# Patient Record
Sex: MALE | Race: WHITE | Employment: OTHER | URBAN - METROPOLITAN AREA
[De-identification: names, ages, dates, MRNs, and addresses within clinical notes are randomized per-mention and may not be internally consistent; named-entity substitution may affect disease eponyms.]

---

## 2017-02-13 DIAGNOSIS — R79.83 HOMOCYSTEINEMIA: ICD-10-CM

## 2017-02-13 RX ORDER — FOLIC ACID 1 MG/1
TABLET ORAL
Qty: 90 TABLET | Refills: 1 | Status: SHIPPED | OUTPATIENT
Start: 2017-02-13 | End: 2017-08-08 | Stop reason: SDUPTHER

## 2017-03-06 DIAGNOSIS — F03.90 DEMENTIA WITHOUT BEHAVIORAL DISTURBANCE, UNSPECIFIED DEMENTIA TYPE: Primary | ICD-10-CM

## 2017-03-28 ENCOUNTER — OFFICE VISIT (OUTPATIENT)
Dept: INTERNAL MEDICINE CLINIC | Age: 82
End: 2017-03-28

## 2017-03-28 VITALS
BODY MASS INDEX: 33.91 KG/M2 | HEART RATE: 92 BPM | RESPIRATION RATE: 17 BRPM | DIASTOLIC BLOOD PRESSURE: 80 MMHG | SYSTOLIC BLOOD PRESSURE: 122 MMHG | OXYGEN SATURATION: 93 % | WEIGHT: 223 LBS

## 2017-03-28 DIAGNOSIS — G30.9 DEMENTIA DUE TO ALZHEIMER'S DISEASE (HCC): Primary | ICD-10-CM

## 2017-03-28 DIAGNOSIS — F02.80 DEMENTIA DUE TO ALZHEIMER'S DISEASE (HCC): Primary | ICD-10-CM

## 2017-03-28 DIAGNOSIS — I25.810 CORONARY ARTERY DISEASE INVOLVING CORONARY BYPASS GRAFT OF NATIVE HEART WITHOUT ANGINA PECTORIS: ICD-10-CM

## 2017-03-28 PROCEDURE — 99213 OFFICE O/P EST LOW 20 MIN: CPT | Performed by: INTERNAL MEDICINE

## 2017-03-28 RX ORDER — QUETIAPINE FUMARATE 25 MG/1
25 TABLET, FILM COATED ORAL DAILY PRN
Qty: 30 TABLET | Refills: 2 | Status: ON HOLD | OUTPATIENT
Start: 2017-03-28 | End: 2017-05-04 | Stop reason: HOSPADM

## 2017-03-28 RX ORDER — DONEPEZIL HYDROCHLORIDE 5 MG/1
5 TABLET, FILM COATED ORAL NIGHTLY
Qty: 90 TABLET | Refills: 1 | Status: SHIPPED | OUTPATIENT
Start: 2017-03-28 | End: 2017-09-12 | Stop reason: SDUPTHER

## 2017-03-30 ENCOUNTER — TELEPHONE (OUTPATIENT)
Dept: INTERNAL MEDICINE CLINIC | Age: 82
End: 2017-03-30

## 2017-03-30 DIAGNOSIS — I25.810 CORONARY ARTERY DISEASE INVOLVING CORONARY BYPASS GRAFT OF NATIVE HEART WITHOUT ANGINA PECTORIS: ICD-10-CM

## 2017-04-24 ENCOUNTER — OFFICE VISIT (OUTPATIENT)
Dept: INTERNAL MEDICINE CLINIC | Age: 82
End: 2017-04-24

## 2017-04-24 VITALS
WEIGHT: 225 LBS | BODY MASS INDEX: 34.21 KG/M2 | SYSTOLIC BLOOD PRESSURE: 110 MMHG | OXYGEN SATURATION: 94 % | HEART RATE: 87 BPM | DIASTOLIC BLOOD PRESSURE: 60 MMHG | RESPIRATION RATE: 20 BRPM

## 2017-04-24 DIAGNOSIS — I63.19 CEREBROVASCULAR ACCIDENT (CVA) DUE TO EMBOLISM OF OTHER PRECEREBRAL ARTERY (HCC): ICD-10-CM

## 2017-04-24 DIAGNOSIS — I70.90 ATHEROSCLEROSIS: ICD-10-CM

## 2017-04-24 DIAGNOSIS — E78.2 MIXED HYPERLIPIDEMIA: ICD-10-CM

## 2017-04-24 DIAGNOSIS — I71.40 AAA (ABDOMINAL AORTIC ANEURYSM) WITHOUT RUPTURE: ICD-10-CM

## 2017-04-24 DIAGNOSIS — M51.36 DDD (DEGENERATIVE DISC DISEASE), LUMBAR: ICD-10-CM

## 2017-04-24 DIAGNOSIS — J42 CHRONIC BRONCHITIS, UNSPECIFIED CHRONIC BRONCHITIS TYPE (HCC): ICD-10-CM

## 2017-04-24 DIAGNOSIS — R30.0 DYSURIA: ICD-10-CM

## 2017-04-24 DIAGNOSIS — I25.810 CORONARY ARTERY DISEASE INVOLVING CORONARY BYPASS GRAFT OF NATIVE HEART WITHOUT ANGINA PECTORIS: Primary | ICD-10-CM

## 2017-04-24 DIAGNOSIS — M16.0 PRIMARY OSTEOARTHRITIS OF BOTH HIPS: ICD-10-CM

## 2017-04-24 DIAGNOSIS — R79.83 HOMOCYSTEINEMIA: ICD-10-CM

## 2017-04-24 LAB
BILIRUBIN URINE: NEGATIVE
BLOOD, URINE: NEGATIVE
CLARITY: ABNORMAL
COLOR: ABNORMAL
CRYSTALS, UA: ABNORMAL /HPF
EPITHELIAL CELLS, UA: 6 /HPF (ref 0–5)
GLUCOSE URINE: NEGATIVE MG/DL
HYALINE CASTS: 4 /LPF (ref 0–8)
KETONES, URINE: NEGATIVE MG/DL
LEUKOCYTE ESTERASE, URINE: ABNORMAL
MICROSCOPIC EXAMINATION: YES
NITRITE, URINE: NEGATIVE
PH UA: 5.5
PROTEIN UA: ABNORMAL MG/DL
RBC UA: 3 /HPF (ref 0–4)
SPECIFIC GRAVITY UA: 1.03
URINE TYPE: ABNORMAL
UROBILINOGEN, URINE: 0.2 E.U./DL
WBC UA: 6 /HPF (ref 0–5)

## 2017-04-24 PROCEDURE — 99214 OFFICE O/P EST MOD 30 MIN: CPT | Performed by: INTERNAL MEDICINE

## 2017-04-25 DIAGNOSIS — N30.90 BLADDER INFECTION: Primary | ICD-10-CM

## 2017-04-25 RX ORDER — CIPROFLOXACIN 250 MG/1
250 TABLET, FILM COATED ORAL 2 TIMES DAILY
Qty: 6 TABLET | Refills: 0 | Status: SHIPPED | OUTPATIENT
Start: 2017-04-25 | End: 2017-04-28

## 2017-04-30 LAB
A/G RATIO: 1.5 (CALC) (ref 0.8–2.6)
ALBUMIN SERPL-MCNC: 4 GM/DL (ref 3.5–5.2)
ALP BLD-CCNC: 83 U/L (ref 23–144)
ALT SERPL-CCNC: 18 U/L (ref 0–60)
AST SERPL-CCNC: 18 U/L (ref 0–55)
BASOPHILS ABSOLUTE: 0 K/MM3 (ref 0–0.3)
BASOPHILS RELATIVE PERCENT: 0.2 % (ref 0–2)
BILIRUB SERPL-MCNC: 0.3 MG/DL (ref 0–1.2)
BUN / CREAT RATIO: 13 (CALC) (ref 7–25)
BUN BLDV-MCNC: 18 MG/DL (ref 3–29)
CALCIUM SERPL-MCNC: 9.7 MG/DL (ref 8.5–10.5)
CHLORIDE BLD-SCNC: 101 MEQ/L (ref 96–110)
CHOLESTEROL, TOTAL: 165 MG/DL
CO2: 28 MEQ/L (ref 19–32)
CREAT SERPL-MCNC: 1.4 MG/DL
EOSINOPHILS ABSOLUTE: 0 K/MM3 (ref 0–0.6)
EOSINOPHILS RELATIVE PERCENT: 0.3 % (ref 0–7)
GFR SERPL CREATININE-BSD FRML MDRD: 47 ML/MIN/1.73M2
GLOBULIN: 2.7 GM/DL (CALC) (ref 1.9–3.6)
GLUCOSE BLD-MCNC: 96 MG/DL
HCT VFR BLD CALC: 44.1 % (ref 41–50)
HDLC SERPL-MCNC: 34 MG/DL
HEMOGLOBIN: 14.3 G/DL (ref 13.8–17.2)
HOMOCYSTEINE PLASMA: 14.7 UMOL/L (ref 6–16.8)
LDL CHOLESTEROL: 91 MG/DL (CALC)
LEUKOCYTES, UA: 7 K/MM3 (ref 3.8–10.8)
LYMPHOCYTES ABSOLUTE: 2.4 K/MM3 (ref 0.9–4.1)
LYMPHOCYTES RELATIVE PERCENT: 34.1 % (ref 14–51)
MCH RBC QN AUTO: 33.6 PG (ref 27–33)
MCHC RBC AUTO-ENTMCNC: 32.5 G/DL (ref 32–36)
MCV RBC AUTO: 103.5 FL (ref 80–100)
MONOCYTES ABSOLUTE: 0.5 K/MM3 (ref 0.2–1.1)
MONOCYTES RELATIVE PERCENT: 6.8 % (ref 0–14)
NEUTROPHILS ABSOLUTE: 4.1 K/MM3 (ref 1.5–7.8)
PDW BLD-RTO: 14.6 % (ref 9–15)
PLATELET # BLD: 155 K/MM3 (ref 130–400)
POTASSIUM SERPL-SCNC: 4.5 MEQ/L (ref 3.4–5.3)
RBC # BLD: 4.26 M/MM3 (ref 4.4–5.8)
SEGMENTED NEUTROPHILS RELATIVE PERCENT: 58.6 % (ref 40–76)
SODIUM BLD-SCNC: 143 MEQ/L (ref 135–148)
TOTAL PROTEIN: 6.7 GM/DL (ref 6–8.3)
TRIGL SERPL-MCNC: 200 MG/DL
TSH SERPL DL<=0.05 MIU/L-ACNC: 1.83 MICRO IU/ML (ref 0.4–4)
VLDLC SERPL CALC-MCNC: 40 MG/DL (CALC) (ref 4–38)

## 2017-05-01 DIAGNOSIS — M51.36 DDD (DEGENERATIVE DISC DISEASE), LUMBAR: ICD-10-CM

## 2017-05-02 DIAGNOSIS — M16.0 PRIMARY OSTEOARTHRITIS OF BOTH HIPS: Primary | ICD-10-CM

## 2017-05-03 PROBLEM — G45.1 HEMISPHERIC CAROTID ARTERY SYNDROME: Status: ACTIVE | Noted: 2017-05-03

## 2017-05-03 PROBLEM — R41.0 DELIRIUM: Status: ACTIVE | Noted: 2017-05-03

## 2017-05-04 PROBLEM — I47.20 VENTRICULAR TACHYCARDIA (HCC): Status: ACTIVE | Noted: 2017-05-04

## 2017-05-09 ENCOUNTER — OFFICE VISIT (OUTPATIENT)
Dept: INTERNAL MEDICINE CLINIC | Age: 82
End: 2017-05-09

## 2017-05-09 VITALS
HEART RATE: 63 BPM | DIASTOLIC BLOOD PRESSURE: 68 MMHG | OXYGEN SATURATION: 93 % | BODY MASS INDEX: 33.15 KG/M2 | WEIGHT: 218 LBS | SYSTOLIC BLOOD PRESSURE: 124 MMHG | RESPIRATION RATE: 16 BRPM

## 2017-05-09 DIAGNOSIS — I47.20 VENTRICULAR TACHYCARDIA: Primary | ICD-10-CM

## 2017-05-09 DIAGNOSIS — R41.0 DELIRIUM: ICD-10-CM

## 2017-05-09 DIAGNOSIS — I25.810 CORONARY ARTERY DISEASE INVOLVING CORONARY BYPASS GRAFT OF NATIVE HEART WITHOUT ANGINA PECTORIS: ICD-10-CM

## 2017-05-09 PROCEDURE — 99213 OFFICE O/P EST LOW 20 MIN: CPT | Performed by: INTERNAL MEDICINE

## 2017-05-09 RX ORDER — METOPROLOL SUCCINATE 25 MG/1
25 TABLET, EXTENDED RELEASE ORAL DAILY
Qty: 90 TABLET | Refills: 1 | Status: SHIPPED | OUTPATIENT
Start: 2017-05-09 | End: 2017-08-28 | Stop reason: SDUPTHER

## 2017-05-09 RX ORDER — AMIODARONE HYDROCHLORIDE 200 MG/1
200 TABLET ORAL DAILY
Qty: 30 TABLET | Refills: 3 | Status: CANCELLED | OUTPATIENT
Start: 2017-05-09

## 2017-05-09 RX ORDER — RANOLAZINE 500 MG/1
500 TABLET, EXTENDED RELEASE ORAL 2 TIMES DAILY
Qty: 180 TABLET | Refills: 1 | Status: SHIPPED | OUTPATIENT
Start: 2017-05-09 | End: 2017-06-05

## 2017-06-05 ENCOUNTER — OFFICE VISIT (OUTPATIENT)
Dept: INTERNAL MEDICINE CLINIC | Age: 82
End: 2017-06-05

## 2017-06-05 VITALS
DIASTOLIC BLOOD PRESSURE: 62 MMHG | HEART RATE: 81 BPM | WEIGHT: 216.8 LBS | OXYGEN SATURATION: 95 % | BODY MASS INDEX: 32.96 KG/M2 | SYSTOLIC BLOOD PRESSURE: 110 MMHG

## 2017-06-05 DIAGNOSIS — I47.20 VENTRICULAR TACHYCARDIA: ICD-10-CM

## 2017-06-05 DIAGNOSIS — I25.810 CORONARY ARTERY DISEASE INVOLVING CORONARY BYPASS GRAFT OF NATIVE HEART WITHOUT ANGINA PECTORIS: ICD-10-CM

## 2017-06-05 DIAGNOSIS — J42 CHRONIC BRONCHITIS, UNSPECIFIED CHRONIC BRONCHITIS TYPE (HCC): ICD-10-CM

## 2017-06-05 DIAGNOSIS — I71.40 ABDOMINAL AORTIC ANEURYSM (AAA) WITHOUT RUPTURE: Primary | ICD-10-CM

## 2017-06-05 PROCEDURE — 99213 OFFICE O/P EST LOW 20 MIN: CPT | Performed by: INTERNAL MEDICINE

## 2017-06-05 RX ORDER — LORAZEPAM 0.5 MG/1
0.5 TABLET ORAL EVERY 6 HOURS PRN
COMMUNITY
End: 2017-09-12 | Stop reason: SDUPTHER

## 2017-06-14 ENCOUNTER — TELEPHONE (OUTPATIENT)
Dept: INTERNAL MEDICINE CLINIC | Age: 82
End: 2017-06-14

## 2017-06-14 RX ORDER — CIPROFLOXACIN 250 MG/1
250 TABLET, FILM COATED ORAL 2 TIMES DAILY
Qty: 10 TABLET | Refills: 0 | Status: SHIPPED | OUTPATIENT
Start: 2017-06-14 | End: 2017-06-19

## 2017-06-25 DIAGNOSIS — F32.A ANXIETY AND DEPRESSION: ICD-10-CM

## 2017-06-25 DIAGNOSIS — F41.9 ANXIETY AND DEPRESSION: ICD-10-CM

## 2017-06-26 RX ORDER — CITALOPRAM 10 MG/1
TABLET ORAL
Qty: 180 TABLET | Refills: 1 | Status: SHIPPED | OUTPATIENT
Start: 2017-06-26 | End: 2018-04-23 | Stop reason: SDUPTHER

## 2017-06-30 ENCOUNTER — TELEPHONE (OUTPATIENT)
Dept: INTERNAL MEDICINE CLINIC | Age: 82
End: 2017-06-30

## 2017-08-08 ENCOUNTER — OFFICE VISIT (OUTPATIENT)
Dept: INTERNAL MEDICINE CLINIC | Age: 82
End: 2017-08-08

## 2017-08-08 VITALS
WEIGHT: 218 LBS | BODY MASS INDEX: 33.04 KG/M2 | SYSTOLIC BLOOD PRESSURE: 128 MMHG | TEMPERATURE: 98 F | DIASTOLIC BLOOD PRESSURE: 88 MMHG | OXYGEN SATURATION: 94 % | HEIGHT: 68 IN | HEART RATE: 98 BPM

## 2017-08-08 DIAGNOSIS — R79.83 HOMOCYSTEINEMIA: ICD-10-CM

## 2017-08-08 DIAGNOSIS — F51.01 PRIMARY INSOMNIA: ICD-10-CM

## 2017-08-08 DIAGNOSIS — I10 ESSENTIAL HYPERTENSION: Primary | ICD-10-CM

## 2017-08-08 DIAGNOSIS — J02.9 SORE THROAT: ICD-10-CM

## 2017-08-08 PROCEDURE — 99213 OFFICE O/P EST LOW 20 MIN: CPT | Performed by: INTERNAL MEDICINE

## 2017-08-08 RX ORDER — TRAZODONE HYDROCHLORIDE 50 MG/1
25 TABLET ORAL NIGHTLY PRN
Qty: 30 TABLET | Refills: 2
Start: 2017-08-08 | End: 2017-11-07 | Stop reason: SDUPTHER

## 2017-08-08 RX ORDER — FOLIC ACID 1 MG/1
TABLET ORAL
Qty: 90 TABLET | Refills: 1 | Status: SHIPPED | OUTPATIENT
Start: 2017-08-08 | End: 2018-07-25 | Stop reason: SDUPTHER

## 2017-08-09 DIAGNOSIS — R79.83 HOMOCYSTEINEMIA: ICD-10-CM

## 2017-08-09 RX ORDER — FOLIC ACID 1 MG/1
TABLET ORAL
Qty: 90 TABLET | Refills: 1 | Status: SHIPPED | OUTPATIENT
Start: 2017-08-09 | End: 2017-11-07 | Stop reason: SDUPTHER

## 2017-08-28 DIAGNOSIS — I47.20 VENTRICULAR TACHYCARDIA: ICD-10-CM

## 2017-08-28 DIAGNOSIS — I25.810 CORONARY ARTERY DISEASE INVOLVING CORONARY BYPASS GRAFT OF NATIVE HEART WITHOUT ANGINA PECTORIS: ICD-10-CM

## 2017-08-28 RX ORDER — METOPROLOL SUCCINATE 25 MG/1
25 TABLET, EXTENDED RELEASE ORAL DAILY
Qty: 90 TABLET | Refills: 1 | Status: SHIPPED | OUTPATIENT
Start: 2017-08-28 | End: 2017-11-07 | Stop reason: SDUPTHER

## 2017-09-12 ENCOUNTER — OFFICE VISIT (OUTPATIENT)
Dept: INTERNAL MEDICINE CLINIC | Age: 82
End: 2017-09-12

## 2017-09-12 VITALS
DIASTOLIC BLOOD PRESSURE: 82 MMHG | OXYGEN SATURATION: 92 % | SYSTOLIC BLOOD PRESSURE: 126 MMHG | WEIGHT: 217 LBS | RESPIRATION RATE: 16 BRPM | BODY MASS INDEX: 32.99 KG/M2 | HEART RATE: 78 BPM

## 2017-09-12 DIAGNOSIS — I10 ESSENTIAL HYPERTENSION: ICD-10-CM

## 2017-09-12 DIAGNOSIS — F41.9 ANXIETY AND DEPRESSION: ICD-10-CM

## 2017-09-12 DIAGNOSIS — Z23 NEED FOR PNEUMOCOCCAL VACCINATION: ICD-10-CM

## 2017-09-12 DIAGNOSIS — F02.80 DEMENTIA DUE TO ALZHEIMER'S DISEASE (HCC): Primary | ICD-10-CM

## 2017-09-12 DIAGNOSIS — G30.9 DEMENTIA DUE TO ALZHEIMER'S DISEASE (HCC): Primary | ICD-10-CM

## 2017-09-12 DIAGNOSIS — Z23 NEED FOR INFLUENZA VACCINATION: ICD-10-CM

## 2017-09-12 DIAGNOSIS — F32.A ANXIETY AND DEPRESSION: ICD-10-CM

## 2017-09-12 PROCEDURE — 99213 OFFICE O/P EST LOW 20 MIN: CPT | Performed by: INTERNAL MEDICINE

## 2017-09-12 PROCEDURE — 90662 IIV NO PRSV INCREASED AG IM: CPT | Performed by: INTERNAL MEDICINE

## 2017-09-12 PROCEDURE — G0008 ADMIN INFLUENZA VIRUS VAC: HCPCS | Performed by: INTERNAL MEDICINE

## 2017-09-12 PROCEDURE — G0009 ADMIN PNEUMOCOCCAL VACCINE: HCPCS | Performed by: INTERNAL MEDICINE

## 2017-09-12 PROCEDURE — 90670 PCV13 VACCINE IM: CPT | Performed by: INTERNAL MEDICINE

## 2017-09-12 RX ORDER — DONEPEZIL HYDROCHLORIDE 5 MG/1
5 TABLET, FILM COATED ORAL NIGHTLY
Qty: 90 TABLET | Refills: 1 | Status: SHIPPED | OUTPATIENT
Start: 2017-09-12 | End: 2017-11-07 | Stop reason: SDUPTHER

## 2017-09-12 RX ORDER — LORAZEPAM 0.5 MG/1
0.5 TABLET ORAL EVERY 6 HOURS PRN
Qty: 20 TABLET | Refills: 0 | Status: SHIPPED | OUTPATIENT
Start: 2017-09-12 | End: 2017-11-07 | Stop reason: SDUPTHER

## 2017-11-07 ENCOUNTER — OFFICE VISIT (OUTPATIENT)
Dept: INTERNAL MEDICINE CLINIC | Age: 82
End: 2017-11-07

## 2017-11-07 VITALS
WEIGHT: 222 LBS | HEART RATE: 81 BPM | OXYGEN SATURATION: 95 % | BODY MASS INDEX: 33.75 KG/M2 | DIASTOLIC BLOOD PRESSURE: 64 MMHG | SYSTOLIC BLOOD PRESSURE: 118 MMHG | RESPIRATION RATE: 16 BRPM

## 2017-11-07 DIAGNOSIS — F32.A ANXIETY AND DEPRESSION: ICD-10-CM

## 2017-11-07 DIAGNOSIS — F51.01 PRIMARY INSOMNIA: ICD-10-CM

## 2017-11-07 DIAGNOSIS — F02.80 DEMENTIA DUE TO ALZHEIMER'S DISEASE (HCC): ICD-10-CM

## 2017-11-07 DIAGNOSIS — J42 CHRONIC BRONCHITIS, UNSPECIFIED CHRONIC BRONCHITIS TYPE (HCC): ICD-10-CM

## 2017-11-07 DIAGNOSIS — I47.20 VENTRICULAR TACHYCARDIA: ICD-10-CM

## 2017-11-07 DIAGNOSIS — G47.62 NOCTURNAL LEG CRAMPS: ICD-10-CM

## 2017-11-07 DIAGNOSIS — I25.810 CORONARY ARTERY DISEASE INVOLVING CORONARY BYPASS GRAFT OF NATIVE HEART WITHOUT ANGINA PECTORIS: Primary | ICD-10-CM

## 2017-11-07 DIAGNOSIS — G30.9 DEMENTIA DUE TO ALZHEIMER'S DISEASE (HCC): ICD-10-CM

## 2017-11-07 DIAGNOSIS — F41.9 ANXIETY AND DEPRESSION: ICD-10-CM

## 2017-11-07 PROCEDURE — 3288F FALL RISK ASSESSMENT DOCD: CPT | Performed by: INTERNAL MEDICINE

## 2017-11-07 PROCEDURE — 99214 OFFICE O/P EST MOD 30 MIN: CPT | Performed by: INTERNAL MEDICINE

## 2017-11-07 PROCEDURE — G8510 SCR DEP NEG, NO PLAN REQD: HCPCS | Performed by: INTERNAL MEDICINE

## 2017-11-07 RX ORDER — TRAZODONE HYDROCHLORIDE 50 MG/1
25 TABLET ORAL NIGHTLY PRN
Qty: 30 TABLET | Refills: 2 | Status: SHIPPED | OUTPATIENT
Start: 2017-11-07 | End: 2018-01-22 | Stop reason: SDUPTHER

## 2017-11-07 RX ORDER — ASPIRIN AND DIPYRIDAMOLE 25; 200 MG/1; MG/1
1 CAPSULE, EXTENDED RELEASE ORAL 2 TIMES DAILY
Qty: 180 CAPSULE | Refills: 1 | Status: SHIPPED | OUTPATIENT
Start: 2017-11-07 | End: 2018-10-04 | Stop reason: SDUPTHER

## 2017-11-07 RX ORDER — DONEPEZIL HYDROCHLORIDE 5 MG/1
5 TABLET, FILM COATED ORAL NIGHTLY
Qty: 90 TABLET | Refills: 1 | Status: SHIPPED | OUTPATIENT
Start: 2017-11-07 | End: 2018-01-22 | Stop reason: SDUPTHER

## 2017-11-07 RX ORDER — LISINOPRIL 2.5 MG/1
2.5 TABLET ORAL DAILY
Qty: 90 TABLET | Refills: 1 | Status: SHIPPED | OUTPATIENT
Start: 2017-11-07 | End: 2018-02-20

## 2017-11-07 RX ORDER — METOPROLOL SUCCINATE 25 MG/1
25 TABLET, EXTENDED RELEASE ORAL DAILY
Qty: 90 TABLET | Refills: 1 | Status: SHIPPED | OUTPATIENT
Start: 2017-11-07 | End: 2018-02-20 | Stop reason: SDUPTHER

## 2017-11-07 RX ORDER — LORAZEPAM 0.5 MG/1
0.5 TABLET ORAL EVERY 6 HOURS PRN
Qty: 20 TABLET | Refills: 0 | Status: SHIPPED | OUTPATIENT
Start: 2017-11-07 | End: 2018-01-22 | Stop reason: SDUPTHER

## 2017-11-07 RX ORDER — ALBUTEROL SULFATE 90 UG/1
2 AEROSOL, METERED RESPIRATORY (INHALATION) EVERY 6 HOURS PRN
Qty: 1 INHALER | Refills: 6 | Status: SHIPPED | OUTPATIENT
Start: 2017-11-07 | End: 2018-10-08 | Stop reason: SDUPTHER

## 2017-11-07 RX ORDER — PRAVASTATIN SODIUM 40 MG
40 TABLET ORAL EVERY EVENING
Qty: 90 TABLET | Refills: 1 | Status: SHIPPED | OUTPATIENT
Start: 2017-11-07 | End: 2018-10-04 | Stop reason: SDUPTHER

## 2017-11-07 RX ORDER — GABAPENTIN 100 MG/1
100 CAPSULE ORAL NIGHTLY
Qty: 90 CAPSULE | Refills: 1 | Status: SHIPPED | OUTPATIENT
Start: 2017-11-07 | End: 2018-07-25 | Stop reason: SDUPTHER

## 2017-11-07 ASSESSMENT — PATIENT HEALTH QUESTIONNAIRE - PHQ9
SUM OF ALL RESPONSES TO PHQ9 QUESTIONS 1 & 2: 0
SUM OF ALL RESPONSES TO PHQ QUESTIONS 1-9: 0
1. LITTLE INTEREST OR PLEASURE IN DOING THINGS: 0
2. FEELING DOWN, DEPRESSED OR HOPELESS: 0

## 2017-11-07 NOTE — PROGRESS NOTES
Anxiety and depression    5. Primary insomnia    6. Nocturnal leg cramps    7. Chronic bronchitis, unspecified chronic bronchitis type [facility])        PLAN:    Malinda Lau was seen today for other. Diagnoses and all orders for this visit:    Coronary artery disease involving coronary bypass graft of native heart without angina pectoris- W SX CP AND LAST CATH YRS AGO MAY NEED FURTHER EVAL, REFER CARDIO  -     metoprolol succinate (TOPROL XL) 25 MG extended release tablet; Take 1 tablet by mouth daily  -     dipyridamole-aspirin (AGGRENOX)  MG per extended release capsule; Take 1 capsule by mouth 2 times daily  -     lisinopril (PRINIVIL;ZESTRIL) 2.5 MG tablet; Take 1 tablet by mouth daily Do not fill until patient calls  -     pravastatin (PRAVACHOL) 40 MG tablet; Take 1 tablet by mouth every evening  -     [facility]- Todd Hill MD ([facility])  -     Lipid Panel  -     Comprehensive Metabolic Panel  -     CBC Auto Differential    Dementia due to Alzheimer's disease- Mental status, functional status remain stable on meds and will cont current regimen, monitor for progression. Remains in stable home setting.    -     donepezil (ARICEPT) 5 MG tablet; Take 1 tablet by mouth nightly    Ventricular tachycardia (HCC)- NO PALPITATIONS, SYNCOPE, RF TOPROL AND REFER CARDIO  -     metoprolol succinate (TOPROL XL) 25 MG extended release tablet; Take 1 tablet by mouth daily  -     [facility]- Todd Hill MD ([facility])    Anxiety and depression- The current medical regimen is effective;  continue present plan and medications. Controlled substances monitoring: possible medication side effects, risk of tolerance and/or dependence, and alternative treatments discussed, no signs of potential drug abuse or diversion identified and [place]AR[place] report reviewed today- activity consistent with treatment plan.]      -     LORazepam (ATIVAN) 0.5 MG tablet;  Take 1 tablet by mouth every 6 hours as needed for Anxiety

## 2018-01-22 ENCOUNTER — OFFICE VISIT (OUTPATIENT)
Dept: INTERNAL MEDICINE CLINIC | Age: 83
End: 2018-01-22

## 2018-01-22 VITALS
OXYGEN SATURATION: 96 % | RESPIRATION RATE: 16 BRPM | BODY MASS INDEX: 34.52 KG/M2 | SYSTOLIC BLOOD PRESSURE: 122 MMHG | HEART RATE: 70 BPM | DIASTOLIC BLOOD PRESSURE: 64 MMHG | WEIGHT: 227 LBS

## 2018-01-22 DIAGNOSIS — F51.01 PRIMARY INSOMNIA: ICD-10-CM

## 2018-01-22 DIAGNOSIS — I10 ESSENTIAL HYPERTENSION: ICD-10-CM

## 2018-01-22 DIAGNOSIS — I71.40 ABDOMINAL AORTIC ANEURYSM (AAA) WITHOUT RUPTURE: ICD-10-CM

## 2018-01-22 DIAGNOSIS — F02.80 DEMENTIA DUE TO ALZHEIMER'S DISEASE (HCC): ICD-10-CM

## 2018-01-22 DIAGNOSIS — I63.19 CEREBROVASCULAR ACCIDENT (CVA) DUE TO EMBOLISM OF OTHER PRECEREBRAL ARTERY (HCC): Primary | ICD-10-CM

## 2018-01-22 DIAGNOSIS — J42 CHRONIC BRONCHITIS, UNSPECIFIED CHRONIC BRONCHITIS TYPE (HCC): ICD-10-CM

## 2018-01-22 DIAGNOSIS — F32.A ANXIETY AND DEPRESSION: ICD-10-CM

## 2018-01-22 DIAGNOSIS — E78.5 DYSLIPIDEMIA: ICD-10-CM

## 2018-01-22 DIAGNOSIS — F41.9 ANXIETY AND DEPRESSION: ICD-10-CM

## 2018-01-22 DIAGNOSIS — R79.83 HOMOCYSTEINEMIA: ICD-10-CM

## 2018-01-22 DIAGNOSIS — G30.9 DEMENTIA DUE TO ALZHEIMER'S DISEASE (HCC): ICD-10-CM

## 2018-01-22 LAB
BASOPHILS ABSOLUTE: 0 K/UL (ref 0–0.2)
BASOPHILS RELATIVE PERCENT: 0.6 %
EOSINOPHILS ABSOLUTE: 0.1 K/UL (ref 0–0.6)
EOSINOPHILS RELATIVE PERCENT: 1.3 %
HCT VFR BLD CALC: 45.3 % (ref 40.5–52.5)
HEMOGLOBIN: 14.8 G/DL (ref 13.5–17.5)
LYMPHOCYTES ABSOLUTE: 2.4 K/UL (ref 1–5.1)
LYMPHOCYTES RELATIVE PERCENT: 30.1 %
MCH RBC QN AUTO: 34.2 PG (ref 26–34)
MCHC RBC AUTO-ENTMCNC: 32.6 G/DL (ref 31–36)
MCV RBC AUTO: 104.8 FL (ref 80–100)
MONOCYTES ABSOLUTE: 0.7 K/UL (ref 0–1.3)
MONOCYTES RELATIVE PERCENT: 9.4 %
NEUTROPHILS ABSOLUTE: 4.6 K/UL (ref 1.7–7.7)
NEUTROPHILS RELATIVE PERCENT: 58.6 %
PDW BLD-RTO: 16.1 % (ref 12.4–15.4)
PLATELET # BLD: 186 K/UL (ref 135–450)
PMV BLD AUTO: 8.9 FL (ref 5–10.5)
RBC # BLD: 4.33 M/UL (ref 4.2–5.9)
WBC # BLD: 7.9 K/UL (ref 4–11)

## 2018-01-22 PROCEDURE — 99214 OFFICE O/P EST MOD 30 MIN: CPT | Performed by: INTERNAL MEDICINE

## 2018-01-22 RX ORDER — DONEPEZIL HYDROCHLORIDE 10 MG/1
10 TABLET, FILM COATED ORAL NIGHTLY
Qty: 90 TABLET | Refills: 1 | Status: SHIPPED | OUTPATIENT
Start: 2018-01-22 | End: 2018-07-21 | Stop reason: SDUPTHER

## 2018-01-22 RX ORDER — LORAZEPAM 0.5 MG/1
0.5 TABLET ORAL EVERY 6 HOURS PRN
Qty: 20 TABLET | Refills: 0 | Status: SHIPPED | OUTPATIENT
Start: 2018-01-22 | End: 2018-02-21

## 2018-01-22 RX ORDER — TRAZODONE HYDROCHLORIDE 50 MG/1
50 TABLET ORAL NIGHTLY PRN
Qty: 90 TABLET | Refills: 1 | Status: SHIPPED | OUTPATIENT
Start: 2018-01-22 | End: 2018-02-11 | Stop reason: SDUPTHER

## 2018-01-22 NOTE — PROGRESS NOTES
periodic monitoring of fasting lipid profile, glucose, liver function.    -     Lipid Panel  -     Comprehensive Metabolic Panel  -     TSH with Reflex  -     CBC Auto Differential

## 2018-01-23 LAB
A/G RATIO: 1.4 (ref 1.1–2.2)
ALBUMIN SERPL-MCNC: 4.1 G/DL (ref 3.4–5)
ALP BLD-CCNC: 81 U/L (ref 40–129)
ALT SERPL-CCNC: 18 U/L (ref 10–40)
ANION GAP SERPL CALCULATED.3IONS-SCNC: 19 MMOL/L (ref 3–16)
AST SERPL-CCNC: 17 U/L (ref 15–37)
BILIRUB SERPL-MCNC: 0.3 MG/DL (ref 0–1)
BUN BLDV-MCNC: 25 MG/DL (ref 7–20)
CALCIUM SERPL-MCNC: 9.5 MG/DL (ref 8.3–10.6)
CHLORIDE BLD-SCNC: 99 MMOL/L (ref 99–110)
CHOLESTEROL, TOTAL: 180 MG/DL (ref 0–199)
CO2: 28 MMOL/L (ref 21–32)
CREAT SERPL-MCNC: 1.4 MG/DL (ref 0.8–1.3)
GFR AFRICAN AMERICAN: 59
GFR NON-AFRICAN AMERICAN: 49
GLOBULIN: 3 G/DL
GLUCOSE BLD-MCNC: 86 MG/DL (ref 70–99)
HDLC SERPL-MCNC: 31 MG/DL (ref 40–60)
LDL CHOLESTEROL CALCULATED: ABNORMAL MG/DL
LDL CHOLESTEROL DIRECT: 108 MG/DL
POTASSIUM SERPL-SCNC: 4.8 MMOL/L (ref 3.5–5.1)
SODIUM BLD-SCNC: 146 MMOL/L (ref 136–145)
TOTAL PROTEIN: 7.1 G/DL (ref 6.4–8.2)
TRIGL SERPL-MCNC: 435 MG/DL (ref 0–150)
TSH REFLEX: 1.95 UIU/ML (ref 0.27–4.2)
VLDLC SERPL CALC-MCNC: ABNORMAL MG/DL

## 2018-02-11 DIAGNOSIS — F51.01 PRIMARY INSOMNIA: ICD-10-CM

## 2018-02-12 RX ORDER — TRAZODONE HYDROCHLORIDE 50 MG/1
TABLET ORAL
Qty: 30 TABLET | Refills: 2 | Status: SHIPPED | OUTPATIENT
Start: 2018-02-12 | End: 2018-08-27

## 2018-02-20 ENCOUNTER — OFFICE VISIT (OUTPATIENT)
Dept: INTERNAL MEDICINE CLINIC | Age: 83
End: 2018-02-20

## 2018-02-20 VITALS
RESPIRATION RATE: 16 BRPM | DIASTOLIC BLOOD PRESSURE: 60 MMHG | OXYGEN SATURATION: 94 % | HEART RATE: 70 BPM | SYSTOLIC BLOOD PRESSURE: 116 MMHG

## 2018-02-20 DIAGNOSIS — R42 LIGHTHEADEDNESS: Primary | ICD-10-CM

## 2018-02-20 DIAGNOSIS — I47.20 VENTRICULAR TACHYCARDIA: ICD-10-CM

## 2018-02-20 DIAGNOSIS — I25.810 CORONARY ARTERY DISEASE INVOLVING CORONARY BYPASS GRAFT OF NATIVE HEART WITHOUT ANGINA PECTORIS: ICD-10-CM

## 2018-02-20 DIAGNOSIS — I10 ESSENTIAL HYPERTENSION: ICD-10-CM

## 2018-02-20 PROCEDURE — 99213 OFFICE O/P EST LOW 20 MIN: CPT | Performed by: INTERNAL MEDICINE

## 2018-02-20 RX ORDER — METOPROLOL SUCCINATE 25 MG/1
12.5 TABLET, EXTENDED RELEASE ORAL DAILY
Qty: 90 TABLET | Refills: 1
Start: 2018-02-20 | End: 2018-04-12 | Stop reason: SDUPTHER

## 2018-02-20 NOTE — PROGRESS NOTES
Gerda Lima  1935 02/20/18    SUBJECTIVE:    High TGs noted last mo, to 435, now cutting down on carbs, sweets. Main concern is recurring dizziness w standing the past two weeks. Is drinking fluids. Some headache noted at times. BP sl low today at 116/60. R arm sit 100/50, stand was 90/50. Hx of VT and no recent palpitations, stable on beta blocker. OBJECTIVE:    /60   Pulse 70   Resp 16   SpO2 94%     Physical Exam   Constitutional: He appears well-developed and well-nourished. Neck: Neck supple. Cardiovascular: Normal rate, regular rhythm and normal heart sounds. Exam reveals no gallop and no friction rub. No murmur heard. Pulmonary/Chest: Effort normal and breath sounds normal. No respiratory distress. He has no wheezes. He has no rales. Abdominal: Soft. Bowel sounds are normal. He exhibits no distension. There is no tenderness. Musculoskeletal: He exhibits no edema. Neurological: He is alert. Psychiatric: He has a normal mood and affect. Vitals reviewed. ASSESSMENT:    1. Lightheadedness    2. Ventricular tachycardia ([address]    3. Essential hypertension    4. Coronary artery disease involving coronary bypass graft of native heart without angina pectoris        PLAN:    Farhat Chance was seen today for headache and dizziness. Diagnoses and all orders for this visit:    Lightheadedness - C/W LOW BP, NO DEFINITE ORTHOSTASIS. PLAN STOP LISINOPRIL, PUSH FLUIDS, ALSO DECR TOPROL TO 1/2 TAB DAILY AND WIFE WILL CALL US BACK W BPS LATER THIS WEEK    Ventricular tachycardia ([address]- RESOLVED AND CONT BETA BLOCKER  -     metoprolol succinate (TOPROL XL) 25 MG extended release tablet; Take 0.5 tablets by mouth daily    Essential hypertension- ADJUST MEDS AS NOTED    Coronary artery disease involving coronary bypass graft of native heart without angina pectoris-OTHERWISE ASYMPTOMATIC AND MONITOR  -     metoprolol succinate (TOPROL XL) 25 MG extended release tablet;  Take 0.5 tablets

## 2018-02-22 ENCOUNTER — TELEPHONE (OUTPATIENT)
Dept: INTERNAL MEDICINE CLINIC | Age: 83
End: 2018-02-22

## 2018-03-15 DIAGNOSIS — G30.9 DEMENTIA DUE TO ALZHEIMER'S DISEASE (HCC): ICD-10-CM

## 2018-03-15 DIAGNOSIS — F02.80 DEMENTIA DUE TO ALZHEIMER'S DISEASE (HCC): ICD-10-CM

## 2018-03-15 RX ORDER — DONEPEZIL HYDROCHLORIDE 5 MG/1
5 TABLET, FILM COATED ORAL NIGHTLY
Qty: 90 TABLET | Refills: 1 | Status: SHIPPED | OUTPATIENT
Start: 2018-03-15 | End: 2018-09-13 | Stop reason: SDUPTHER

## 2018-03-19 ENCOUNTER — TELEPHONE (OUTPATIENT)
Dept: INTERNAL MEDICINE CLINIC | Age: 83
End: 2018-03-19

## 2018-04-12 DIAGNOSIS — I47.20 VENTRICULAR TACHYCARDIA: ICD-10-CM

## 2018-04-12 DIAGNOSIS — I25.810 CORONARY ARTERY DISEASE INVOLVING CORONARY BYPASS GRAFT OF NATIVE HEART WITHOUT ANGINA PECTORIS: ICD-10-CM

## 2018-04-12 RX ORDER — METOPROLOL SUCCINATE 25 MG/1
12.5 TABLET, EXTENDED RELEASE ORAL DAILY
Qty: 45 TABLET | Refills: 1 | Status: SHIPPED | OUTPATIENT
Start: 2018-04-12 | End: 2018-07-25 | Stop reason: SDUPTHER

## 2018-04-23 ENCOUNTER — OFFICE VISIT (OUTPATIENT)
Dept: INTERNAL MEDICINE CLINIC | Age: 83
End: 2018-04-23

## 2018-04-23 VITALS
HEART RATE: 88 BPM | DIASTOLIC BLOOD PRESSURE: 70 MMHG | RESPIRATION RATE: 18 BRPM | WEIGHT: 228 LBS | BODY MASS INDEX: 34.67 KG/M2 | OXYGEN SATURATION: 96 % | SYSTOLIC BLOOD PRESSURE: 132 MMHG

## 2018-04-23 DIAGNOSIS — F32.A ANXIETY AND DEPRESSION: ICD-10-CM

## 2018-04-23 DIAGNOSIS — I10 ESSENTIAL HYPERTENSION: Primary | ICD-10-CM

## 2018-04-23 DIAGNOSIS — F41.9 ANXIETY AND DEPRESSION: ICD-10-CM

## 2018-04-23 PROCEDURE — 99213 OFFICE O/P EST LOW 20 MIN: CPT | Performed by: INTERNAL MEDICINE

## 2018-04-23 RX ORDER — CITALOPRAM 10 MG/1
TABLET ORAL
Qty: 180 TABLET | Refills: 1 | Status: SHIPPED | OUTPATIENT
Start: 2018-04-23 | End: 2018-07-25 | Stop reason: SDUPTHER

## 2018-05-24 RX ORDER — LORAZEPAM 0.5 MG/1
0.5 TABLET ORAL EVERY 6 HOURS PRN
Qty: 20 TABLET | Refills: 0 | Status: SHIPPED | OUTPATIENT
Start: 2018-05-24 | End: 2018-08-27 | Stop reason: SDUPTHER

## 2018-07-21 DIAGNOSIS — F51.01 PRIMARY INSOMNIA: ICD-10-CM

## 2018-07-21 DIAGNOSIS — G30.9 DEMENTIA DUE TO ALZHEIMER'S DISEASE (HCC): ICD-10-CM

## 2018-07-21 DIAGNOSIS — F02.80 DEMENTIA DUE TO ALZHEIMER'S DISEASE (HCC): ICD-10-CM

## 2018-07-23 RX ORDER — TRAZODONE HYDROCHLORIDE 50 MG/1
50 TABLET ORAL NIGHTLY PRN
Qty: 90 TABLET | Refills: 1 | Status: SHIPPED | OUTPATIENT
Start: 2018-07-23 | End: 2018-07-25 | Stop reason: SDUPTHER

## 2018-07-23 RX ORDER — DONEPEZIL HYDROCHLORIDE 10 MG/1
10 TABLET, FILM COATED ORAL NIGHTLY
Qty: 90 TABLET | Refills: 1 | Status: SHIPPED | OUTPATIENT
Start: 2018-07-23 | End: 2019-01-08 | Stop reason: SDUPTHER

## 2018-07-25 ENCOUNTER — OFFICE VISIT (OUTPATIENT)
Dept: INTERNAL MEDICINE CLINIC | Age: 83
End: 2018-07-25

## 2018-07-25 VITALS
RESPIRATION RATE: 18 BRPM | HEART RATE: 77 BPM | BODY MASS INDEX: 35.28 KG/M2 | WEIGHT: 232 LBS | SYSTOLIC BLOOD PRESSURE: 132 MMHG | OXYGEN SATURATION: 95 % | DIASTOLIC BLOOD PRESSURE: 80 MMHG

## 2018-07-25 DIAGNOSIS — I47.20 VENTRICULAR TACHYCARDIA: ICD-10-CM

## 2018-07-25 DIAGNOSIS — I47.20 VENTRICULAR TACHYCARDIA (HCC): ICD-10-CM

## 2018-07-25 DIAGNOSIS — Z23 NEED FOR PROPHYLACTIC VACCINATION AGAINST STREPTOCOCCUS PNEUMONIAE (PNEUMOCOCCUS): ICD-10-CM

## 2018-07-25 DIAGNOSIS — E78.2 HYPERLIPEMIA, MIXED: ICD-10-CM

## 2018-07-25 DIAGNOSIS — F41.9 ANXIETY AND DEPRESSION: ICD-10-CM

## 2018-07-25 DIAGNOSIS — M54.50 CHRONIC MIDLINE LOW BACK PAIN WITHOUT SCIATICA: ICD-10-CM

## 2018-07-25 DIAGNOSIS — R79.83 HOMOCYSTEINEMIA: ICD-10-CM

## 2018-07-25 DIAGNOSIS — I25.810 CORONARY ARTERY DISEASE INVOLVING CORONARY BYPASS GRAFT OF NATIVE HEART WITHOUT ANGINA PECTORIS: ICD-10-CM

## 2018-07-25 DIAGNOSIS — F51.01 PRIMARY INSOMNIA: ICD-10-CM

## 2018-07-25 DIAGNOSIS — G89.29 CHRONIC MIDLINE LOW BACK PAIN WITHOUT SCIATICA: ICD-10-CM

## 2018-07-25 DIAGNOSIS — G47.62 NOCTURNAL LEG CRAMPS: ICD-10-CM

## 2018-07-25 DIAGNOSIS — N18.30 STAGE 3 CHRONIC KIDNEY DISEASE (HCC): ICD-10-CM

## 2018-07-25 DIAGNOSIS — F32.A ANXIETY AND DEPRESSION: ICD-10-CM

## 2018-07-25 DIAGNOSIS — I63.19 CEREBROVASCULAR ACCIDENT (CVA) DUE TO EMBOLISM OF OTHER PRECEREBRAL ARTERY (HCC): ICD-10-CM

## 2018-07-25 DIAGNOSIS — I63.19 CEREBROVASCULAR ACCIDENT (CVA) DUE TO EMBOLISM OF OTHER PRECEREBRAL ARTERY (HCC): Primary | ICD-10-CM

## 2018-07-25 LAB
A/G RATIO: 1.3 (ref 1.1–2.2)
ALBUMIN SERPL-MCNC: 4.2 G/DL (ref 3.4–5)
ALP BLD-CCNC: 93 U/L (ref 40–129)
ALT SERPL-CCNC: 20 U/L (ref 10–40)
ANION GAP SERPL CALCULATED.3IONS-SCNC: 11 MMOL/L (ref 3–16)
AST SERPL-CCNC: 23 U/L (ref 15–37)
BASOPHILS ABSOLUTE: 0 K/UL (ref 0–0.2)
BASOPHILS RELATIVE PERCENT: 0.7 %
BILIRUB SERPL-MCNC: 0.4 MG/DL (ref 0–1)
BUN BLDV-MCNC: 16 MG/DL (ref 7–20)
CALCIUM SERPL-MCNC: 10.3 MG/DL (ref 8.3–10.6)
CHLORIDE BLD-SCNC: 102 MMOL/L (ref 99–110)
CHOLESTEROL, TOTAL: 184 MG/DL (ref 0–199)
CO2: 29 MMOL/L (ref 21–32)
CREAT SERPL-MCNC: 1.4 MG/DL (ref 0.8–1.3)
EOSINOPHILS ABSOLUTE: 0 K/UL (ref 0–0.6)
EOSINOPHILS RELATIVE PERCENT: 0.5 %
GFR AFRICAN AMERICAN: 59
GFR NON-AFRICAN AMERICAN: 49
GLOBULIN: 3.3 G/DL
GLUCOSE BLD-MCNC: 103 MG/DL (ref 70–99)
HCT VFR BLD CALC: 46.8 % (ref 40.5–52.5)
HDLC SERPL-MCNC: 38 MG/DL (ref 40–60)
HEMOGLOBIN: 15.4 G/DL (ref 13.5–17.5)
HOMOCYSTEINE: 13 UMOL/L (ref 0–10)
LDL CHOLESTEROL CALCULATED: ABNORMAL MG/DL
LDL CHOLESTEROL DIRECT: 121 MG/DL
LYMPHOCYTES ABSOLUTE: 2.6 K/UL (ref 1–5.1)
LYMPHOCYTES RELATIVE PERCENT: 38.4 %
MCH RBC QN AUTO: 34.3 PG (ref 26–34)
MCHC RBC AUTO-ENTMCNC: 32.9 G/DL (ref 31–36)
MCV RBC AUTO: 104.3 FL (ref 80–100)
MONOCYTES ABSOLUTE: 0.5 K/UL (ref 0–1.3)
MONOCYTES RELATIVE PERCENT: 7.6 %
NEUTROPHILS ABSOLUTE: 3.6 K/UL (ref 1.7–7.7)
NEUTROPHILS RELATIVE PERCENT: 52.8 %
PDW BLD-RTO: 14.7 % (ref 12.4–15.4)
PLATELET # BLD: 176 K/UL (ref 135–450)
PMV BLD AUTO: 8.4 FL (ref 5–10.5)
POTASSIUM SERPL-SCNC: 4.9 MMOL/L (ref 3.5–5.1)
RBC # BLD: 4.49 M/UL (ref 4.2–5.9)
SODIUM BLD-SCNC: 142 MMOL/L (ref 136–145)
TOTAL PROTEIN: 7.5 G/DL (ref 6.4–8.2)
TRIGL SERPL-MCNC: 325 MG/DL (ref 0–150)
TSH REFLEX: 3.04 UIU/ML (ref 0.27–4.2)
VLDLC SERPL CALC-MCNC: ABNORMAL MG/DL
WBC # BLD: 6.9 K/UL (ref 4–11)

## 2018-07-25 PROCEDURE — 99214 OFFICE O/P EST MOD 30 MIN: CPT | Performed by: INTERNAL MEDICINE

## 2018-07-25 PROCEDURE — 90732 PPSV23 VACC 2 YRS+ SUBQ/IM: CPT | Performed by: INTERNAL MEDICINE

## 2018-07-25 PROCEDURE — G0009 ADMIN PNEUMOCOCCAL VACCINE: HCPCS | Performed by: INTERNAL MEDICINE

## 2018-07-25 RX ORDER — CITALOPRAM 10 MG/1
TABLET ORAL
Qty: 180 TABLET | Refills: 1 | Status: SHIPPED | OUTPATIENT
Start: 2018-07-25 | End: 2018-10-08 | Stop reason: SDUPTHER

## 2018-07-25 RX ORDER — GABAPENTIN 100 MG/1
100 CAPSULE ORAL 2 TIMES DAILY
Qty: 180 CAPSULE | Refills: 1 | Status: SHIPPED | OUTPATIENT
Start: 2018-07-25 | End: 2018-10-04 | Stop reason: SDUPTHER

## 2018-07-25 RX ORDER — FOLIC ACID 1 MG/1
TABLET ORAL
Qty: 90 TABLET | Refills: 1 | Status: SHIPPED | OUTPATIENT
Start: 2018-07-25 | End: 2019-01-08 | Stop reason: SDUPTHER

## 2018-07-25 RX ORDER — TRAZODONE HYDROCHLORIDE 50 MG/1
50 TABLET ORAL NIGHTLY PRN
Qty: 90 TABLET | Refills: 1 | Status: SHIPPED | OUTPATIENT
Start: 2018-07-25 | End: 2018-10-08 | Stop reason: SDUPTHER

## 2018-07-25 RX ORDER — METOPROLOL SUCCINATE 25 MG/1
12.5 TABLET, EXTENDED RELEASE ORAL DAILY
Qty: 45 TABLET | Refills: 1 | Status: SHIPPED | OUTPATIENT
Start: 2018-07-25 | End: 2018-10-08 | Stop reason: SDUPTHER

## 2018-08-27 ENCOUNTER — HOSPITAL ENCOUNTER (OUTPATIENT)
Dept: MRI IMAGING | Age: 83
Discharge: HOME | End: 2018-08-27
Attending: INTERNAL MEDICINE | Admitting: INTERNAL MEDICINE

## 2018-08-27 ENCOUNTER — OFFICE VISIT (OUTPATIENT)
Dept: INTERNAL MEDICINE CLINIC | Age: 83
End: 2018-08-27

## 2018-08-27 VITALS
SYSTOLIC BLOOD PRESSURE: 136 MMHG | HEART RATE: 90 BPM | OXYGEN SATURATION: 94 % | RESPIRATION RATE: 20 BRPM | DIASTOLIC BLOOD PRESSURE: 62 MMHG

## 2018-08-27 DIAGNOSIS — H53.8 BLURRED VISION, LEFT EYE: ICD-10-CM

## 2018-08-27 DIAGNOSIS — M54.50 CHRONIC MIDLINE LOW BACK PAIN WITHOUT SCIATICA: ICD-10-CM

## 2018-08-27 DIAGNOSIS — F41.9 ANXIETY: ICD-10-CM

## 2018-08-27 DIAGNOSIS — I63.19 CEREBROVASCULAR ACCIDENT (CVA) DUE TO EMBOLISM OF OTHER PRECEREBRAL ARTERY (HCC): ICD-10-CM

## 2018-08-27 DIAGNOSIS — I63.19 CEREBRAL INFARCTION DUE TO EMBOLISM OF OTHER PRECEREBRAL ARTERY (HCC): ICD-10-CM

## 2018-08-27 DIAGNOSIS — G89.29 CHRONIC MIDLINE LOW BACK PAIN WITHOUT SCIATICA: ICD-10-CM

## 2018-08-27 DIAGNOSIS — F34.1 DYSTHYMIA: ICD-10-CM

## 2018-08-27 DIAGNOSIS — I63.19 CEREBROVASCULAR ACCIDENT (CVA) DUE TO EMBOLISM OF OTHER PRECEREBRAL ARTERY (HCC): Primary | ICD-10-CM

## 2018-08-27 PROCEDURE — 99214 OFFICE O/P EST MOD 30 MIN: CPT | Performed by: INTERNAL MEDICINE

## 2018-08-27 RX ORDER — LORAZEPAM 0.5 MG/1
0.5 TABLET ORAL EVERY 6 HOURS PRN
Qty: 20 TABLET | Refills: 0 | Status: SHIPPED | OUTPATIENT
Start: 2018-08-27 | End: 2019-01-08 | Stop reason: SDUPTHER

## 2018-08-27 RX ORDER — TRAMADOL HYDROCHLORIDE 50 MG/1
50 TABLET ORAL 2 TIMES DAILY PRN
Qty: 14 TABLET | Refills: 0 | Status: SHIPPED | OUTPATIENT
Start: 2018-08-27 | End: 2018-09-03

## 2018-08-27 NOTE — PROGRESS NOTES
Patient is getting MRI and Carotid US at [facility] today at 15.
oriented to person, place, and time. He has normal reflexes. No cranial nerve deficit. Skin: Skin is warm and dry. Psychiatric: He has a normal mood and affect. Vitals reviewed. ASSESSMENT:    1. Cerebrovascular accident (CVA) due to embolism of other precerebral artery ([address]    2. Blurred vision, left eye    3. Chronic midline low back pain without sciatica    4. Dysthymia    5. Anxiety        PLAN:    Sameer Dias was seen today for joint pain, loss of vision and shortness of breath. Diagnoses and all orders for this visit:    Cerebrovascular accident (CVA) due to embolism of other precerebral artery ([address]- clinically HIGH RISK FOR RECURRENT STROKE, WILL CHECK STAT MRI BRAIN AND CAROTIDS, ALSO IF NO BLEED WILL PROCEED ALSO W ADDITIONAL BLOOD THINNING ON ELIQUIS LOW DOSE 2.5MG BID AND F/U CLOSELY END OF THE WEEK, ALREADY ON AGGRENOX  -     MRI BRAIN WO CONTRAST; Future  -     VL CAROTID BILATERAL; Future    Blurred vision, left eye- CVA SUSPECTED, DEP ON RESULTS IF NEG AND SX PERSIST ALSO CONSIDER OPTHO EVAL  -     MRI BRAIN WO CONTRAST; Future  -     VL CAROTID BILATERAL; Future    Chronic midline low back pain without sciatica- W SOME PAIN ONGOING, ALSO CURRENT HEADACHE SX WILL TRY PRN LOW DOSE TRAMADOL  -     traMADol (ULTRAM) 50 MG tablet; Take 1 tablet by mouth 2 times daily as needed for Pain for up to 7 days. Intended supply: 5 days. Take lowest dose possible to manage pain. Dysthymia- MOOD SL DEPRESSED BUT NOT MAJOR DEPRESSION, CONT ATIVAN ALSO PRN    Anxiety  -     LORazepam (ATIVAN) 0.5 MG tablet; Take 1 tablet by mouth every 6 hours as needed for Anxiety for up to 30 days. Inessa Castro

## 2018-08-31 ENCOUNTER — OFFICE VISIT (OUTPATIENT)
Dept: INTERNAL MEDICINE CLINIC | Age: 83
End: 2018-08-31

## 2018-08-31 VITALS
SYSTOLIC BLOOD PRESSURE: 130 MMHG | OXYGEN SATURATION: 95 % | DIASTOLIC BLOOD PRESSURE: 72 MMHG | RESPIRATION RATE: 16 BRPM | HEART RATE: 68 BPM

## 2018-08-31 DIAGNOSIS — I63.19 CEREBROVASCULAR ACCIDENT (CVA) DUE TO EMBOLISM OF OTHER PRECEREBRAL ARTERY (HCC): ICD-10-CM

## 2018-08-31 DIAGNOSIS — H35.62 RETINAL HEMORRHAGE OF LEFT EYE: Primary | ICD-10-CM

## 2018-08-31 PROCEDURE — 99213 OFFICE O/P EST LOW 20 MIN: CPT | Performed by: INTERNAL MEDICINE

## 2018-09-10 ENCOUNTER — TELEPHONE (OUTPATIENT)
Dept: INTERNAL MEDICINE CLINIC | Age: 83
End: 2018-09-10

## 2018-09-13 DIAGNOSIS — G30.9 DEMENTIA DUE TO ALZHEIMER'S DISEASE (HCC): ICD-10-CM

## 2018-09-13 DIAGNOSIS — F02.80 DEMENTIA DUE TO ALZHEIMER'S DISEASE (HCC): ICD-10-CM

## 2018-09-13 RX ORDER — DONEPEZIL HYDROCHLORIDE 5 MG/1
5 TABLET, FILM COATED ORAL NIGHTLY
Qty: 90 TABLET | Refills: 1 | Status: ON HOLD | OUTPATIENT
Start: 2018-09-13 | End: 2019-05-13 | Stop reason: ALTCHOICE

## 2018-09-17 ENCOUNTER — OFFICE VISIT (OUTPATIENT)
Dept: INTERNAL MEDICINE CLINIC | Age: 83
End: 2018-09-17

## 2018-09-17 ENCOUNTER — TELEPHONE (OUTPATIENT)
Dept: INTERNAL MEDICINE CLINIC | Age: 83
End: 2018-09-17

## 2018-09-17 VITALS
RESPIRATION RATE: 18 BRPM | OXYGEN SATURATION: 94 % | HEIGHT: 68 IN | DIASTOLIC BLOOD PRESSURE: 70 MMHG | SYSTOLIC BLOOD PRESSURE: 130 MMHG | BODY MASS INDEX: 35.77 KG/M2 | HEART RATE: 92 BPM | WEIGHT: 236 LBS

## 2018-09-17 DIAGNOSIS — Z23 NEED FOR IMMUNIZATION AGAINST INFLUENZA: ICD-10-CM

## 2018-09-17 DIAGNOSIS — I63.19 CEREBROVASCULAR ACCIDENT (CVA) DUE TO EMBOLISM OF OTHER PRECEREBRAL ARTERY (HCC): Primary | ICD-10-CM

## 2018-09-17 DIAGNOSIS — I25.810 CORONARY ARTERY DISEASE INVOLVING CORONARY BYPASS GRAFT OF NATIVE HEART WITHOUT ANGINA PECTORIS: ICD-10-CM

## 2018-09-17 PROCEDURE — 99213 OFFICE O/P EST LOW 20 MIN: CPT | Performed by: INTERNAL MEDICINE

## 2018-09-17 PROCEDURE — 90662 IIV NO PRSV INCREASED AG IM: CPT | Performed by: INTERNAL MEDICINE

## 2018-09-17 PROCEDURE — G0008 ADMIN INFLUENZA VIRUS VAC: HCPCS | Performed by: INTERNAL MEDICINE

## 2018-09-17 NOTE — TELEPHONE ENCOUNTER
Faxed referral to [facility] [phone].  [phone]. [facility] is out of their network.

## 2018-09-17 NOTE — PROGRESS NOTES
Faxed [facility] [address] [place] [address].
CVA and deconditioning ,also CAD, set up for [place] [address] eval and encouraged more exercise, daily activity. Cont walker and we will see if PT eval at home is helpful, reassess 4 weeks. Bryant Wallace was seen today for other.     Diagnoses and all orders for this visit:    Cerebrovascular accident (CVA) due to embolism of other precerebral artery ([facility] [address]-   -     [facility]    Coronary artery disease involving coronary bypass graft of native heart without angina pectoris  -     [facility]    Need for immunization against influenza  -     INFLUENZA, HIGH DOSE, 65 YRS +, IM, PF, PREFILL SYR, 0.5ML (FLUZONE HD)

## 2018-09-17 NOTE — TELEPHONE ENCOUNTER
[facility] called stating that they are out of network with Alex Molina. I informed Sweta Mcfadden, wife, of this and requested that she call her insurance and find out which [facility] [employer] companies are within their insurance. She is to call us back once she finds out.

## 2018-10-04 DIAGNOSIS — M54.50 CHRONIC MIDLINE LOW BACK PAIN WITHOUT SCIATICA: ICD-10-CM

## 2018-10-04 DIAGNOSIS — G47.62 NOCTURNAL LEG CRAMPS: ICD-10-CM

## 2018-10-04 DIAGNOSIS — G89.29 CHRONIC MIDLINE LOW BACK PAIN WITHOUT SCIATICA: ICD-10-CM

## 2018-10-04 DIAGNOSIS — I25.810 CORONARY ARTERY DISEASE INVOLVING CORONARY BYPASS GRAFT OF NATIVE HEART WITHOUT ANGINA PECTORIS: ICD-10-CM

## 2018-10-04 RX ORDER — GABAPENTIN 100 MG/1
CAPSULE ORAL
Qty: 90 CAPSULE | Refills: 1 | Status: SHIPPED | OUTPATIENT
Start: 2018-10-04 | End: 2019-01-08 | Stop reason: SDUPTHER

## 2018-10-04 RX ORDER — ASPIRIN AND DIPYRIDAMOLE 25; 200 MG/1; MG/1
CAPSULE, EXTENDED RELEASE ORAL
Qty: 180 CAPSULE | Refills: 1 | Status: SHIPPED | OUTPATIENT
Start: 2018-10-04 | End: 2018-10-08 | Stop reason: SDUPTHER

## 2018-10-04 RX ORDER — PRAVASTATIN SODIUM 40 MG
TABLET ORAL
Qty: 90 TABLET | Refills: 1 | Status: SHIPPED | OUTPATIENT
Start: 2018-10-04 | End: 2018-10-08 | Stop reason: SDUPTHER

## 2018-10-08 ENCOUNTER — OFFICE VISIT (OUTPATIENT)
Dept: INTERNAL MEDICINE CLINIC | Age: 83
End: 2018-10-08
Payer: MEDICARE

## 2018-10-08 VITALS
DIASTOLIC BLOOD PRESSURE: 76 MMHG | BODY MASS INDEX: 35.73 KG/M2 | OXYGEN SATURATION: 94 % | SYSTOLIC BLOOD PRESSURE: 122 MMHG | RESPIRATION RATE: 18 BRPM | WEIGHT: 235 LBS | HEART RATE: 78 BPM

## 2018-10-08 DIAGNOSIS — I47.20 VENTRICULAR TACHYCARDIA: ICD-10-CM

## 2018-10-08 DIAGNOSIS — F32.A ANXIETY AND DEPRESSION: ICD-10-CM

## 2018-10-08 DIAGNOSIS — F41.9 ANXIETY AND DEPRESSION: ICD-10-CM

## 2018-10-08 DIAGNOSIS — F51.01 PRIMARY INSOMNIA: ICD-10-CM

## 2018-10-08 DIAGNOSIS — J42 CHRONIC BRONCHITIS, UNSPECIFIED CHRONIC BRONCHITIS TYPE (HCC): ICD-10-CM

## 2018-10-08 DIAGNOSIS — I25.810 CORONARY ARTERY DISEASE INVOLVING CORONARY BYPASS GRAFT OF NATIVE HEART WITHOUT ANGINA PECTORIS: Primary | ICD-10-CM

## 2018-10-08 PROCEDURE — 99214 OFFICE O/P EST MOD 30 MIN: CPT | Performed by: INTERNAL MEDICINE

## 2018-10-08 RX ORDER — METOPROLOL SUCCINATE 25 MG/1
12.5 TABLET, EXTENDED RELEASE ORAL DAILY
Qty: 45 TABLET | Refills: 1 | Status: SHIPPED | OUTPATIENT
Start: 2018-10-08 | End: 2019-04-01 | Stop reason: SDUPTHER

## 2018-10-08 RX ORDER — ASPIRIN AND DIPYRIDAMOLE 25; 200 MG/1; MG/1
CAPSULE, EXTENDED RELEASE ORAL
Qty: 180 CAPSULE | Refills: 1 | Status: ON HOLD | OUTPATIENT
Start: 2018-10-08 | End: 2019-05-31 | Stop reason: HOSPADM

## 2018-10-08 RX ORDER — CITALOPRAM 10 MG/1
TABLET ORAL
Qty: 90 TABLET | Refills: 1 | Status: ON HOLD | OUTPATIENT
Start: 2018-10-08 | End: 2019-05-31 | Stop reason: HOSPADM

## 2018-10-08 RX ORDER — TRAZODONE HYDROCHLORIDE 50 MG/1
50 TABLET ORAL NIGHTLY PRN
Qty: 90 TABLET | Refills: 1 | Status: ON HOLD | OUTPATIENT
Start: 2018-10-08 | End: 2019-05-31 | Stop reason: HOSPADM

## 2018-10-08 RX ORDER — PRAVASTATIN SODIUM 40 MG
TABLET ORAL
Qty: 90 TABLET | Refills: 1 | Status: SHIPPED | OUTPATIENT
Start: 2018-10-08 | End: 2019-06-03 | Stop reason: SDUPTHER

## 2018-10-08 RX ORDER — ALBUTEROL SULFATE 90 UG/1
2 AEROSOL, METERED RESPIRATORY (INHALATION) EVERY 6 HOURS PRN
Qty: 1 INHALER | Refills: 6 | Status: SHIPPED | OUTPATIENT
Start: 2018-10-08

## 2019-01-08 ENCOUNTER — OFFICE VISIT (OUTPATIENT)
Dept: INTERNAL MEDICINE CLINIC | Age: 84
End: 2019-01-08
Payer: MEDICARE

## 2019-01-08 VITALS
WEIGHT: 237 LBS | SYSTOLIC BLOOD PRESSURE: 130 MMHG | RESPIRATION RATE: 18 BRPM | DIASTOLIC BLOOD PRESSURE: 82 MMHG | OXYGEN SATURATION: 92 % | BODY MASS INDEX: 36.04 KG/M2 | HEART RATE: 76 BPM

## 2019-01-08 DIAGNOSIS — G30.9 DEMENTIA DUE TO ALZHEIMER'S DISEASE (HCC): ICD-10-CM

## 2019-01-08 DIAGNOSIS — G89.29 CHRONIC MIDLINE LOW BACK PAIN WITHOUT SCIATICA: ICD-10-CM

## 2019-01-08 DIAGNOSIS — N39.41 URGE INCONTINENCE OF URINE: ICD-10-CM

## 2019-01-08 DIAGNOSIS — M54.50 CHRONIC MIDLINE LOW BACK PAIN WITHOUT SCIATICA: ICD-10-CM

## 2019-01-08 DIAGNOSIS — I63.19 CEREBROVASCULAR ACCIDENT (CVA) DUE TO EMBOLISM OF OTHER PRECEREBRAL ARTERY (HCC): ICD-10-CM

## 2019-01-08 DIAGNOSIS — F02.80 DEMENTIA DUE TO ALZHEIMER'S DISEASE (HCC): ICD-10-CM

## 2019-01-08 DIAGNOSIS — F41.9 ANXIETY: ICD-10-CM

## 2019-01-08 DIAGNOSIS — I71.40 AAA (ABDOMINAL AORTIC ANEURYSM) WITHOUT RUPTURE: ICD-10-CM

## 2019-01-08 DIAGNOSIS — G47.62 NOCTURNAL LEG CRAMPS: ICD-10-CM

## 2019-01-08 DIAGNOSIS — J42 CHRONIC BRONCHITIS, UNSPECIFIED CHRONIC BRONCHITIS TYPE (HCC): Primary | ICD-10-CM

## 2019-01-08 DIAGNOSIS — R79.83 HOMOCYSTEINEMIA: ICD-10-CM

## 2019-01-08 LAB
A/G RATIO: 1.4 (ref 1.1–2.2)
ALBUMIN SERPL-MCNC: 4.4 G/DL (ref 3.4–5)
ALP BLD-CCNC: 96 U/L (ref 40–129)
ALT SERPL-CCNC: 20 U/L (ref 10–40)
ANION GAP SERPL CALCULATED.3IONS-SCNC: 13 MMOL/L (ref 3–16)
AST SERPL-CCNC: 17 U/L (ref 15–37)
BASOPHILS ABSOLUTE: 0 K/UL (ref 0–0.2)
BASOPHILS RELATIVE PERCENT: 0.4 %
BILIRUB SERPL-MCNC: 0.5 MG/DL (ref 0–1)
BUN BLDV-MCNC: 15 MG/DL (ref 7–20)
CALCIUM SERPL-MCNC: 9.7 MG/DL (ref 8.3–10.6)
CHLORIDE BLD-SCNC: 102 MMOL/L (ref 99–110)
CHOLESTEROL, TOTAL: 209 MG/DL (ref 0–199)
CO2: 28 MMOL/L (ref 21–32)
CREAT SERPL-MCNC: 1.3 MG/DL (ref 0.8–1.3)
EOSINOPHILS ABSOLUTE: 0.1 K/UL (ref 0–0.6)
EOSINOPHILS RELATIVE PERCENT: 1 %
GFR AFRICAN AMERICAN: >60
GFR NON-AFRICAN AMERICAN: 53
GLOBULIN: 3.1 G/DL
GLUCOSE BLD-MCNC: 95 MG/DL (ref 70–99)
HCT VFR BLD CALC: 47.4 % (ref 40.5–52.5)
HDLC SERPL-MCNC: 36 MG/DL (ref 40–60)
HEMOGLOBIN: 15.4 G/DL (ref 13.5–17.5)
HOMOCYSTEINE: 12 UMOL/L (ref 0–10)
LDL CHOLESTEROL CALCULATED: 115 MG/DL
LYMPHOCYTES ABSOLUTE: 2.2 K/UL (ref 1–5.1)
LYMPHOCYTES RELATIVE PERCENT: 33.5 %
MCH RBC QN AUTO: 34.7 PG (ref 26–34)
MCHC RBC AUTO-ENTMCNC: 32.4 G/DL (ref 31–36)
MCV RBC AUTO: 107.1 FL (ref 80–100)
MONOCYTES ABSOLUTE: 0.4 K/UL (ref 0–1.3)
MONOCYTES RELATIVE PERCENT: 6.9 %
NEUTROPHILS ABSOLUTE: 3.8 K/UL (ref 1.7–7.7)
NEUTROPHILS RELATIVE PERCENT: 58.2 %
PDW BLD-RTO: 14.9 % (ref 12.4–15.4)
PLATELET # BLD: 182 K/UL (ref 135–450)
PMV BLD AUTO: 8.7 FL (ref 5–10.5)
POTASSIUM SERPL-SCNC: 4.9 MMOL/L (ref 3.5–5.1)
RBC # BLD: 4.43 M/UL (ref 4.2–5.9)
SODIUM BLD-SCNC: 143 MMOL/L (ref 136–145)
TOTAL PROTEIN: 7.5 G/DL (ref 6.4–8.2)
TRIGL SERPL-MCNC: 290 MG/DL (ref 0–150)
VLDLC SERPL CALC-MCNC: 58 MG/DL
WBC # BLD: 6.5 K/UL (ref 4–11)

## 2019-01-08 PROCEDURE — 99214 OFFICE O/P EST MOD 30 MIN: CPT | Performed by: INTERNAL MEDICINE

## 2019-01-08 RX ORDER — OXYBUTYNIN CHLORIDE 5 MG/1
5 TABLET ORAL 2 TIMES DAILY
Qty: 180 TABLET | Refills: 1 | Status: SHIPPED | OUTPATIENT
Start: 2019-01-08 | End: 2019-06-29 | Stop reason: SDUPTHER

## 2019-01-08 RX ORDER — LORAZEPAM 0.5 MG/1
0.5 TABLET ORAL EVERY 6 HOURS PRN
Qty: 20 TABLET | Refills: 0 | Status: SHIPPED | OUTPATIENT
Start: 2019-01-08 | End: 2019-08-09 | Stop reason: SDUPTHER

## 2019-01-08 RX ORDER — FOLIC ACID 1 MG/1
TABLET ORAL
Qty: 90 TABLET | Refills: 1 | Status: SHIPPED | OUTPATIENT
Start: 2019-01-08 | End: 2019-08-04 | Stop reason: SDUPTHER

## 2019-01-08 RX ORDER — DONEPEZIL HYDROCHLORIDE 10 MG/1
10 TABLET, FILM COATED ORAL NIGHTLY
Qty: 90 TABLET | Refills: 1 | Status: SHIPPED | OUTPATIENT
Start: 2019-01-08 | End: 2019-08-06 | Stop reason: SDUPTHER

## 2019-01-08 RX ORDER — QUETIAPINE FUMARATE 25 MG/1
25 TABLET, FILM COATED ORAL NIGHTLY
Qty: 90 TABLET | Refills: 1 | Status: SHIPPED | OUTPATIENT
Start: 2019-01-08 | End: 2019-04-09 | Stop reason: SDUPTHER

## 2019-01-08 RX ORDER — GABAPENTIN 100 MG/1
CAPSULE ORAL
Qty: 180 CAPSULE | Refills: 1 | Status: ON HOLD | OUTPATIENT
Start: 2019-01-08 | End: 2019-05-31 | Stop reason: HOSPADM

## 2019-04-01 DIAGNOSIS — I47.20 VENTRICULAR TACHYCARDIA: ICD-10-CM

## 2019-04-01 DIAGNOSIS — I25.810 CORONARY ARTERY DISEASE INVOLVING CORONARY BYPASS GRAFT OF NATIVE HEART WITHOUT ANGINA PECTORIS: ICD-10-CM

## 2019-04-01 RX ORDER — METOPROLOL SUCCINATE 25 MG/1
12.5 TABLET, EXTENDED RELEASE ORAL DAILY
Qty: 45 TABLET | Refills: 1 | Status: SHIPPED | OUTPATIENT
Start: 2019-04-01 | End: 2020-01-20

## 2019-04-09 ENCOUNTER — OFFICE VISIT (OUTPATIENT)
Dept: INTERNAL MEDICINE CLINIC | Age: 84
End: 2019-04-09
Payer: MEDICARE

## 2019-04-09 VITALS
SYSTOLIC BLOOD PRESSURE: 100 MMHG | BODY MASS INDEX: 36.01 KG/M2 | HEART RATE: 64 BPM | OXYGEN SATURATION: 93 % | WEIGHT: 236.8 LBS | DIASTOLIC BLOOD PRESSURE: 62 MMHG | RESPIRATION RATE: 18 BRPM

## 2019-04-09 DIAGNOSIS — F41.9 ANXIETY: ICD-10-CM

## 2019-04-09 DIAGNOSIS — I25.10 CORONARY ARTERY DISEASE INVOLVING NATIVE CORONARY ARTERY OF NATIVE HEART WITHOUT ANGINA PECTORIS: ICD-10-CM

## 2019-04-09 DIAGNOSIS — F02.80 DEMENTIA DUE TO ALZHEIMER'S DISEASE (HCC): ICD-10-CM

## 2019-04-09 DIAGNOSIS — J43.8 OTHER EMPHYSEMA (HCC): Primary | ICD-10-CM

## 2019-04-09 DIAGNOSIS — K21.9 GASTROESOPHAGEAL REFLUX DISEASE WITHOUT ESOPHAGITIS: ICD-10-CM

## 2019-04-09 DIAGNOSIS — G30.9 DEMENTIA DUE TO ALZHEIMER'S DISEASE (HCC): ICD-10-CM

## 2019-04-09 LAB
A/G RATIO: 1.4 (ref 1.1–2.2)
ALBUMIN SERPL-MCNC: 4.3 G/DL (ref 3.4–5)
ALP BLD-CCNC: 99 U/L (ref 40–129)
ALT SERPL-CCNC: 19 U/L (ref 10–40)
ANION GAP SERPL CALCULATED.3IONS-SCNC: 15 MMOL/L (ref 3–16)
AST SERPL-CCNC: 18 U/L (ref 15–37)
BASOPHILS ABSOLUTE: 0 K/UL (ref 0–0.2)
BASOPHILS RELATIVE PERCENT: 0.4 %
BILIRUB SERPL-MCNC: 0.5 MG/DL (ref 0–1)
BUN BLDV-MCNC: 15 MG/DL (ref 7–20)
CALCIUM SERPL-MCNC: 9.8 MG/DL (ref 8.3–10.6)
CHLORIDE BLD-SCNC: 103 MMOL/L (ref 99–110)
CHOLESTEROL, TOTAL: 187 MG/DL (ref 0–199)
CO2: 27 MMOL/L (ref 21–32)
CREAT SERPL-MCNC: 1.4 MG/DL (ref 0.8–1.3)
EOSINOPHILS ABSOLUTE: 0 K/UL (ref 0–0.6)
EOSINOPHILS RELATIVE PERCENT: 0.6 %
GFR AFRICAN AMERICAN: 59
GFR NON-AFRICAN AMERICAN: 49
GLOBULIN: 3.1 G/DL
GLUCOSE BLD-MCNC: 101 MG/DL (ref 70–99)
HCT VFR BLD CALC: 45.7 % (ref 40.5–52.5)
HDLC SERPL-MCNC: 35 MG/DL (ref 40–60)
HEMOGLOBIN: 15 G/DL (ref 13.5–17.5)
LDL CHOLESTEROL CALCULATED: ABNORMAL MG/DL
LDL CHOLESTEROL DIRECT: 114 MG/DL
LYMPHOCYTES ABSOLUTE: 2.5 K/UL (ref 1–5.1)
LYMPHOCYTES RELATIVE PERCENT: 38.5 %
MCH RBC QN AUTO: 35 PG (ref 26–34)
MCHC RBC AUTO-ENTMCNC: 32.9 G/DL (ref 31–36)
MCV RBC AUTO: 106.2 FL (ref 80–100)
MONOCYTES ABSOLUTE: 0.5 K/UL (ref 0–1.3)
MONOCYTES RELATIVE PERCENT: 8.1 %
NEUTROPHILS ABSOLUTE: 3.3 K/UL (ref 1.7–7.7)
NEUTROPHILS RELATIVE PERCENT: 52.4 %
PDW BLD-RTO: 14.6 % (ref 12.4–15.4)
PLATELET # BLD: 169 K/UL (ref 135–450)
PMV BLD AUTO: 8.9 FL (ref 5–10.5)
POTASSIUM SERPL-SCNC: 5.2 MMOL/L (ref 3.5–5.1)
RBC # BLD: 4.3 M/UL (ref 4.2–5.9)
SODIUM BLD-SCNC: 145 MMOL/L (ref 136–145)
TOTAL PROTEIN: 7.4 G/DL (ref 6.4–8.2)
TRIGL SERPL-MCNC: 345 MG/DL (ref 0–150)
VLDLC SERPL CALC-MCNC: ABNORMAL MG/DL
WBC # BLD: 6.4 K/UL (ref 4–11)

## 2019-04-09 PROCEDURE — 99214 OFFICE O/P EST MOD 30 MIN: CPT | Performed by: INTERNAL MEDICINE

## 2019-04-09 RX ORDER — OMEPRAZOLE 40 MG/1
40 CAPSULE, DELAYED RELEASE ORAL
Qty: 90 CAPSULE | Refills: 1 | Status: SHIPPED | OUTPATIENT
Start: 2019-04-09 | End: 2019-06-17

## 2019-04-09 RX ORDER — QUETIAPINE FUMARATE 25 MG/1
25 TABLET, FILM COATED ORAL NIGHTLY
Qty: 90 TABLET | Refills: 1 | Status: ON HOLD | OUTPATIENT
Start: 2019-04-09 | End: 2019-05-31 | Stop reason: HOSPADM

## 2019-04-09 NOTE — PROGRESS NOTES
Migeul Angel Loud  1935 04/09/19    SUBJECTIVE:    Some issues w stomach upset and GERD, ST noted in afternoon and evenings. Not on antacid therapy x sl relief w otc rolaids. Insomnia and confusion, better on seroquel but states ~100$ for 90 tabs. Asked her to check the [facility], is cheaper that way. COPD-  Sl incr SOB noted also, using inhaler albuterol prn. Not on LABA. CAD - Dr Dexter Arias apparently also planned for stress testing w sl abnl EKG? Denies sx cp. OBJECTIVE:    /62   Pulse 64   Resp 18   Wt 236 lb 12.8 oz (107.4 kg)   SpO2 93%   BMI 36.01 kg/m²     Physical Exam   Constitutional: He appears well-developed and well-nourished. No distress. HENT:   Head: Normocephalic and atraumatic. Nose: Nose normal.   Mouth/Throat: Oropharynx is clear and moist. No oropharyngeal exudate. Eyes: Pupils are equal, round, and reactive to light. Conjunctivae and EOM are normal. Right eye exhibits no discharge. Left eye exhibits no discharge. No scleral icterus. Neck: Normal range of motion. Neck supple. No tracheal deviation present. Cardiovascular: Normal rate, regular rhythm, normal heart sounds and intact distal pulses. Exam reveals no gallop and no friction rub. No murmur heard. Pulmonary/Chest: Effort normal and breath sounds normal. No respiratory distress. He has no wheezes. He has no rales. Abdominal: Soft. Bowel sounds are normal. He exhibits no distension and no mass. There is no tenderness. There is no rebound and no guarding. Musculoskeletal: He exhibits no edema or tenderness. Lymphadenopathy:     He has no cervical adenopathy. Neurological: He is alert. He has normal reflexes. Skin: Skin is warm and dry. Psychiatric: He has a normal mood and affect. Vitals reviewed. ASSESSMENT:    1. Other emphysema ([address]    2. Gastroesophageal reflux disease without esophagitis    3. Dementia due to Alzheimer's disease    4. Anxiety    5.  Coronary artery disease involving native coronary artery of native heart without angina pectoris        PLAN:    Isaiah Parikh was seen today for other, pain, shortness of breath and dizziness. Diagnoses and all orders for this visit:    Other emphysema ([address]- trial advair to see if helps his SOB/WILSON  -     fluticasone-salmeterol (ADVAIR DISKUS) 250-50 MCG/DOSE AEPB; Inhale 1 puff into the lungs every 12 hours    Gastroesophageal reflux disease without esophagitis- suspected cause of GI upset and ST, some heartburn noted. Trial prilosec  -     omeprazole (PRILOSEC) 40 MG delayed release capsule; Take 1 capsule by mouth every morning (before breakfast)    Dementia due to Alzheimer's disease- retry rogelio Cruz to see if less expensive, rf given  -     QUEtiapine (SEROQUEL) 25 MG tablet; Take 1 tablet by mouth nightly    Anxiety- stable and improved on meds  -     QUEtiapine (SEROQUEL) 25 MG tablet; Take 1 tablet by mouth nightly    Coronary artery disease involving native coronary artery of native heart without angina pectoris - for stress testing as noted per Dr Malu Garibay and check lab      -     Lipid Panel; Future  -     Comprehensive Metabolic Panel;  Future  -     CBC Auto Differential; Future

## 2019-05-07 ENCOUNTER — HOSPITAL ENCOUNTER (OUTPATIENT)
Age: 84
Discharge: HOME | End: 2019-05-07
Payer: MEDICARE

## 2019-05-07 LAB
ANION GAP SERPL CALCULATED.3IONS-SCNC: 15 MMOL/L (ref 4–16)
APTT: 28.2 SECONDS (ref 21.2–33)
BASOPHILS ABSOLUTE: 0 K/CU MM
BASOPHILS RELATIVE PERCENT: 0.3 % (ref 0–1)
BUN BLDV-MCNC: 19 MG/DL (ref 6–23)
CALCIUM SERPL-MCNC: 9 MG/DL (ref 8.3–10.6)
CHLORIDE BLD-SCNC: 98 MMOL/L (ref 99–110)
CO2: 24 MMOL/L (ref 21–32)
CREAT SERPL-MCNC: 1.3 MG/DL (ref 0.9–1.3)
DIFFERENTIAL TYPE: ABNORMAL
EOSINOPHILS ABSOLUTE: 0.1 K/CU MM
EOSINOPHILS RELATIVE PERCENT: 1.5 % (ref 0–3)
GFR AFRICAN AMERICAN: >60 ML/MIN/1.73M2
GFR NON-AFRICAN AMERICAN: 53 ML/MIN/1.73M2
GLUCOSE BLD-MCNC: 105 MG/DL (ref 70–99)
HCT VFR BLD CALC: 45.1 % (ref 42–52)
HEMOGLOBIN: 14.3 GM/DL (ref 13.5–18)
IMMATURE NEUTROPHIL %: 0.3 % (ref 0–0.43)
INR BLD: 0.99 INDEX
LYMPHOCYTES ABSOLUTE: 2.4 K/CU MM
LYMPHOCYTES RELATIVE PERCENT: 37.5 % (ref 24–44)
MCH RBC QN AUTO: 34 PG (ref 27–31)
MCHC RBC AUTO-ENTMCNC: 31.7 % (ref 32–36)
MCV RBC AUTO: 107.1 FL (ref 78–100)
MONOCYTES ABSOLUTE: 0.5 K/CU MM
MONOCYTES RELATIVE PERCENT: 7.7 % (ref 0–4)
NUCLEATED RBC %: 0 %
PDW BLD-RTO: 14.3 % (ref 11.7–14.9)
PLATELET # BLD: 180 K/CU MM (ref 140–440)
PMV BLD AUTO: 10.3 FL (ref 7.5–11.1)
POTASSIUM SERPL-SCNC: 4.5 MMOL/L (ref 3.5–5.1)
PROTHROMBIN TIME: 11.5 SECONDS (ref 9.12–12.5)
RBC # BLD: 4.21 M/CU MM (ref 4.6–6.2)
SEGMENTED NEUTROPHILS ABSOLUTE COUNT: 3.4 K/CU MM
SEGMENTED NEUTROPHILS RELATIVE PERCENT: 52.7 % (ref 36–66)
SODIUM BLD-SCNC: 137 MMOL/L (ref 135–145)
TOTAL IMMATURE NEUTOROPHIL: 0.02 K/CU MM
TOTAL NUCLEATED RBC: 0 K/CU MM
WBC # BLD: 6.5 K/CU MM (ref 4–10.5)

## 2019-05-07 PROCEDURE — 85025 COMPLETE CBC W/AUTO DIFF WBC: CPT

## 2019-05-07 PROCEDURE — 80048 BASIC METABOLIC PNL TOTAL CA: CPT

## 2019-05-07 PROCEDURE — 85730 THROMBOPLASTIN TIME PARTIAL: CPT

## 2019-05-07 PROCEDURE — 85610 PROTHROMBIN TIME: CPT

## 2019-05-07 PROCEDURE — 36415 COLL VENOUS BLD VENIPUNCTURE: CPT

## 2019-05-09 DIAGNOSIS — F41.9 ANXIETY AND DEPRESSION: ICD-10-CM

## 2019-05-09 DIAGNOSIS — F32.A ANXIETY AND DEPRESSION: ICD-10-CM

## 2019-05-09 RX ORDER — CITALOPRAM 10 MG/1
TABLET ORAL
Qty: 180 TABLET | Refills: 1 | Status: SHIPPED | OUTPATIENT
Start: 2019-05-09 | End: 2019-11-30 | Stop reason: SDUPTHER

## 2019-05-13 ENCOUNTER — HOSPITAL ENCOUNTER (INPATIENT)
Age: 84
LOS: 5 days | Discharge: NURSING, REHAB OR LONG-TERM CARE | DRG: 065 | End: 2019-05-18
Attending: EMERGENCY MEDICINE | Admitting: INTERNAL MEDICINE
Payer: MEDICARE

## 2019-05-13 ENCOUNTER — APPOINTMENT (OUTPATIENT)
Dept: GENERAL RADIOLOGY | Age: 84
DRG: 065 | End: 2019-05-13
Payer: MEDICARE

## 2019-05-13 ENCOUNTER — HOSPITAL ENCOUNTER (OUTPATIENT)
Dept: CARDIAC CATH/INVASIVE PROCEDURES | Age: 84
Discharge: HOME | DRG: 065 | End: 2019-05-13
Attending: INTERNAL MEDICINE | Admitting: INTERNAL MEDICINE
Payer: MEDICARE

## 2019-05-13 ENCOUNTER — APPOINTMENT (OUTPATIENT)
Dept: CT IMAGING | Age: 84
DRG: 065 | End: 2019-05-13
Payer: MEDICARE

## 2019-05-13 VITALS
HEIGHT: 68 IN | BODY MASS INDEX: 35.92 KG/M2 | HEART RATE: 66 BPM | TEMPERATURE: 97.3 F | WEIGHT: 237 LBS | RESPIRATION RATE: 18 BRPM | DIASTOLIC BLOOD PRESSURE: 57 MMHG | SYSTOLIC BLOOD PRESSURE: 132 MMHG | OXYGEN SATURATION: 97 %

## 2019-05-13 DIAGNOSIS — R47.1 DYSARTHRIA: Primary | ICD-10-CM

## 2019-05-13 DIAGNOSIS — R53.1 RIGHT SIDED WEAKNESS: ICD-10-CM

## 2019-05-13 DIAGNOSIS — R47.01 APHASIA: ICD-10-CM

## 2019-05-13 PROBLEM — I63.9 ACUTE CVA (CEREBROVASCULAR ACCIDENT) (HCC): Status: ACTIVE | Noted: 2019-05-13

## 2019-05-13 PROBLEM — I63.50 CEREBROVASCULAR ACCIDENT (CVA) DUE TO STENOSIS OF CEREBRAL ARTERY (HCC): Status: ACTIVE | Noted: 2019-05-13

## 2019-05-13 LAB
ALBUMIN SERPL-MCNC: 4 GM/DL (ref 3.4–5)
ALP BLD-CCNC: 97 IU/L (ref 40–129)
ALT SERPL-CCNC: 20 U/L (ref 10–40)
ANION GAP SERPL CALCULATED.3IONS-SCNC: 13 MMOL/L (ref 4–16)
APTT: 25.3 SECONDS (ref 21.2–33)
AST SERPL-CCNC: 20 IU/L (ref 15–37)
BASE EXCESS MIXED: ABNORMAL (ref 0–1.2)
BASOPHILS ABSOLUTE: 0 K/CU MM
BASOPHILS RELATIVE PERCENT: 0.3 % (ref 0–1)
BILIRUB SERPL-MCNC: 0.4 MG/DL (ref 0–1)
BUN BLDV-MCNC: 19 MG/DL (ref 6–23)
CALCIUM SERPL-MCNC: 9.1 MG/DL (ref 8.3–10.6)
CARBON MONOXIDE, BLOOD: 2.1 % (ref 0–5)
CHLORIDE BLD-SCNC: 100 MMOL/L (ref 99–110)
CO2 CONTENT: 28.7 MMOL/L (ref 19–24)
CO2: 25 MMOL/L (ref 21–32)
CREAT SERPL-MCNC: 1.5 MG/DL (ref 0.9–1.3)
DIFFERENTIAL TYPE: ABNORMAL
EOSINOPHILS ABSOLUTE: 0.1 K/CU MM
EOSINOPHILS RELATIVE PERCENT: 0.9 % (ref 0–3)
GFR AFRICAN AMERICAN: 54 ML/MIN/1.73M2
GFR NON-AFRICAN AMERICAN: 45 ML/MIN/1.73M2
GLUCOSE BLD-MCNC: 148 MG/DL (ref 70–99)
HCO3 ARTERIAL: 27.3 MMOL/L (ref 18–23)
HCT VFR BLD CALC: 45.9 % (ref 42–52)
HEMOGLOBIN: 14.4 GM/DL (ref 13.5–18)
IMMATURE NEUTROPHIL %: 0.3 % (ref 0–0.43)
INR BLD: 1.05 INDEX
LYMPHOCYTES ABSOLUTE: 1.6 K/CU MM
LYMPHOCYTES RELATIVE PERCENT: 21.2 % (ref 24–44)
MCH RBC QN AUTO: 33.3 PG (ref 27–31)
MCHC RBC AUTO-ENTMCNC: 31.4 % (ref 32–36)
MCV RBC AUTO: 106.3 FL (ref 78–100)
METHEMOGLOBIN ARTERIAL: 0.5 %
MONOCYTES ABSOLUTE: 0.5 K/CU MM
MONOCYTES RELATIVE PERCENT: 6.6 % (ref 0–4)
NUCLEATED RBC %: 0 %
O2 SATURATION: 91.1 % (ref 96–97)
PCO2 ARTERIAL: 44 MMHG (ref 32–45)
PDW BLD-RTO: 14.2 % (ref 11.7–14.9)
PH BLOOD: 7.4 (ref 7.34–7.45)
PLATELET # BLD: 154 K/CU MM (ref 140–440)
PMV BLD AUTO: 9.8 FL (ref 7.5–11.1)
PO2 ARTERIAL: 62 MMHG (ref 75–100)
POTASSIUM SERPL-SCNC: 4.8 MMOL/L (ref 3.5–5.1)
PROTHROMBIN TIME: 12.2 SECONDS (ref 9.12–12.5)
RBC # BLD: 4.32 M/CU MM (ref 4.6–6.2)
SEGMENTED NEUTROPHILS ABSOLUTE COUNT: 5.5 K/CU MM
SEGMENTED NEUTROPHILS RELATIVE PERCENT: 70.7 % (ref 36–66)
SODIUM BLD-SCNC: 138 MMOL/L (ref 135–145)
TOTAL IMMATURE NEUTOROPHIL: 0.02 K/CU MM
TOTAL NUCLEATED RBC: 0 K/CU MM
TOTAL PROTEIN: 7.2 GM/DL (ref 6.4–8.2)
TROPONIN T: <0.01 NG/ML
WBC # BLD: 7.7 K/CU MM (ref 4–10.5)

## 2019-05-13 PROCEDURE — 93005 ELECTROCARDIOGRAM TRACING: CPT | Performed by: EMERGENCY MEDICINE

## 2019-05-13 PROCEDURE — B2111ZZ FLUOROSCOPY OF MULTIPLE CORONARY ARTERIES USING LOW OSMOLAR CONTRAST: ICD-10-PCS | Performed by: INTERNAL MEDICINE

## 2019-05-13 PROCEDURE — 6360000004 HC RX CONTRAST MEDICATION: Performed by: EMERGENCY MEDICINE

## 2019-05-13 PROCEDURE — 85730 THROMBOPLASTIN TIME PARTIAL: CPT

## 2019-05-13 PROCEDURE — B2131ZZ FLUOROSCOPY OF MULTIPLE CORONARY ARTERY BYPASS GRAFTS USING LOW OSMOLAR CONTRAST: ICD-10-PCS | Performed by: INTERNAL MEDICINE

## 2019-05-13 PROCEDURE — 99285 EMERGENCY DEPT VISIT HI MDM: CPT

## 2019-05-13 PROCEDURE — 70498 CT ANGIOGRAPHY NECK: CPT

## 2019-05-13 PROCEDURE — C1769 GUIDE WIRE: HCPCS

## 2019-05-13 PROCEDURE — 4A023N7 MEASUREMENT OF CARDIAC SAMPLING AND PRESSURE, LEFT HEART, PERCUTANEOUS APPROACH: ICD-10-PCS | Performed by: INTERNAL MEDICINE

## 2019-05-13 PROCEDURE — 1200000000 HC SEMI PRIVATE

## 2019-05-13 PROCEDURE — B2121ZZ FLUOROSCOPY OF SINGLE CORONARY ARTERY BYPASS GRAFT USING LOW OSMOLAR CONTRAST: ICD-10-PCS | Performed by: INTERNAL MEDICINE

## 2019-05-13 PROCEDURE — 6370000000 HC RX 637 (ALT 250 FOR IP): Performed by: EMERGENCY MEDICINE

## 2019-05-13 PROCEDURE — 85025 COMPLETE CBC W/AUTO DIFF WBC: CPT

## 2019-05-13 PROCEDURE — 80053 COMPREHEN METABOLIC PANEL: CPT

## 2019-05-13 PROCEDURE — 70496 CT ANGIOGRAPHY HEAD: CPT

## 2019-05-13 PROCEDURE — 70450 CT HEAD/BRAIN W/O DYE: CPT

## 2019-05-13 PROCEDURE — 2580000003 HC RX 258: Performed by: INTERNAL MEDICINE

## 2019-05-13 PROCEDURE — C1894 INTRO/SHEATH, NON-LASER: HCPCS

## 2019-05-13 PROCEDURE — 71045 X-RAY EXAM CHEST 1 VIEW: CPT

## 2019-05-13 PROCEDURE — 36415 COLL VENOUS BLD VENIPUNCTURE: CPT

## 2019-05-13 PROCEDURE — 93461 R&L HRT ART/VENTRICLE ANGIO: CPT

## 2019-05-13 PROCEDURE — 6370000000 HC RX 637 (ALT 250 FOR IP): Performed by: INTERNAL MEDICINE

## 2019-05-13 PROCEDURE — 6360000002 HC RX W HCPCS

## 2019-05-13 PROCEDURE — 84484 ASSAY OF TROPONIN QUANT: CPT

## 2019-05-13 PROCEDURE — 2709999900 HC NON-CHARGEABLE SUPPLY

## 2019-05-13 PROCEDURE — 2500000003 HC RX 250 WO HCPCS

## 2019-05-13 PROCEDURE — C1751 CATH, INF, PER/CENT/MIDLINE: HCPCS

## 2019-05-13 PROCEDURE — 82803 BLOOD GASES ANY COMBINATION: CPT

## 2019-05-13 PROCEDURE — 93226 XTRNL ECG REC<48 HR SCAN A/R: CPT

## 2019-05-13 PROCEDURE — 6360000004 HC RX CONTRAST MEDICATION

## 2019-05-13 PROCEDURE — 85610 PROTHROMBIN TIME: CPT

## 2019-05-13 RX ORDER — SODIUM CHLORIDE 0.9 % (FLUSH) 0.9 %
10 SYRINGE (ML) INJECTION PRN
Status: DISCONTINUED | OUTPATIENT
Start: 2019-05-13 | End: 2019-05-13 | Stop reason: HOSPADM

## 2019-05-13 RX ORDER — OXYBUTYNIN CHLORIDE 5 MG/1
5 TABLET ORAL 2 TIMES DAILY
Status: DISCONTINUED | OUTPATIENT
Start: 2019-05-14 | End: 2019-05-18 | Stop reason: HOSPADM

## 2019-05-13 RX ORDER — PANTOPRAZOLE SODIUM 40 MG/1
40 TABLET, DELAYED RELEASE ORAL
Status: DISCONTINUED | OUTPATIENT
Start: 2019-05-14 | End: 2019-05-18 | Stop reason: HOSPADM

## 2019-05-13 RX ORDER — GABAPENTIN 100 MG/1
200 CAPSULE ORAL NIGHTLY
Status: DISCONTINUED | OUTPATIENT
Start: 2019-05-14 | End: 2019-05-17

## 2019-05-13 RX ORDER — ATROPINE SULFATE 0.4 MG/ML
0.5 AMPUL (ML) INJECTION
Status: DISCONTINUED | OUTPATIENT
Start: 2019-05-13 | End: 2019-05-13 | Stop reason: HOSPADM

## 2019-05-13 RX ORDER — ASPIRIN AND DIPYRIDAMOLE 25; 200 MG/1; MG/1
1 CAPSULE, EXTENDED RELEASE ORAL 2 TIMES DAILY
Status: DISCONTINUED | OUTPATIENT
Start: 2019-05-14 | End: 2019-05-15

## 2019-05-13 RX ORDER — QUETIAPINE FUMARATE 25 MG/1
25 TABLET, FILM COATED ORAL NIGHTLY
Status: DISCONTINUED | OUTPATIENT
Start: 2019-05-14 | End: 2019-05-17

## 2019-05-13 RX ORDER — SODIUM CHLORIDE 0.9 % (FLUSH) 0.9 %
10 SYRINGE (ML) INJECTION PRN
Status: DISCONTINUED | OUTPATIENT
Start: 2019-05-13 | End: 2019-05-18 | Stop reason: HOSPADM

## 2019-05-13 RX ORDER — ASPIRIN 81 MG/1
81 TABLET ORAL DAILY
Status: DISCONTINUED | OUTPATIENT
Start: 2019-05-14 | End: 2019-05-18 | Stop reason: HOSPADM

## 2019-05-13 RX ORDER — METOPROLOL SUCCINATE 25 MG/1
12.5 TABLET, EXTENDED RELEASE ORAL DAILY
Status: DISCONTINUED | OUTPATIENT
Start: 2019-05-14 | End: 2019-05-17

## 2019-05-13 RX ORDER — ONDANSETRON 2 MG/ML
4 INJECTION INTRAMUSCULAR; INTRAVENOUS EVERY 6 HOURS PRN
Status: DISCONTINUED | OUTPATIENT
Start: 2019-05-13 | End: 2019-05-18 | Stop reason: HOSPADM

## 2019-05-13 RX ORDER — TRAZODONE HYDROCHLORIDE 50 MG/1
50 TABLET ORAL NIGHTLY PRN
Status: DISCONTINUED | OUTPATIENT
Start: 2019-05-14 | End: 2019-05-18 | Stop reason: HOSPADM

## 2019-05-13 RX ORDER — CITALOPRAM 20 MG/1
10 TABLET ORAL DAILY
Status: DISCONTINUED | OUTPATIENT
Start: 2019-05-14 | End: 2019-05-16

## 2019-05-13 RX ORDER — ASPIRIN 81 MG/1
81 TABLET ORAL DAILY
Status: DISCONTINUED | OUTPATIENT
Start: 2019-05-14 | End: 2019-05-13

## 2019-05-13 RX ORDER — MORPHINE SULFATE 2 MG/ML
1 INJECTION, SOLUTION INTRAMUSCULAR; INTRAVENOUS
Status: DISCONTINUED | OUTPATIENT
Start: 2019-05-13 | End: 2019-05-13 | Stop reason: HOSPADM

## 2019-05-13 RX ORDER — ALBUTEROL SULFATE 90 UG/1
2 AEROSOL, METERED RESPIRATORY (INHALATION) EVERY 6 HOURS PRN
Status: DISCONTINUED | OUTPATIENT
Start: 2019-05-13 | End: 2019-05-18 | Stop reason: HOSPADM

## 2019-05-13 RX ORDER — DIPHENHYDRAMINE HCL 25 MG
50 TABLET ORAL ONCE
Status: DISCONTINUED | OUTPATIENT
Start: 2019-05-13 | End: 2019-05-13 | Stop reason: SINTOL

## 2019-05-13 RX ORDER — ASPIRIN 81 MG/1
324 TABLET, CHEWABLE ORAL ONCE
Status: COMPLETED | OUTPATIENT
Start: 2019-05-13 | End: 2019-05-13

## 2019-05-13 RX ORDER — DIPHENHYDRAMINE HCL 25 MG
50 TABLET ORAL ONCE
Status: COMPLETED | OUTPATIENT
Start: 2019-05-13 | End: 2019-05-13

## 2019-05-13 RX ORDER — SODIUM CHLORIDE 9 MG/ML
INJECTION, SOLUTION INTRAVENOUS CONTINUOUS
Status: DISCONTINUED | OUTPATIENT
Start: 2019-05-13 | End: 2019-05-13 | Stop reason: HOSPADM

## 2019-05-13 RX ORDER — PRAVASTATIN SODIUM 40 MG
40 TABLET ORAL DAILY
Status: DISCONTINUED | OUTPATIENT
Start: 2019-05-14 | End: 2019-05-14

## 2019-05-13 RX ORDER — ACETAMINOPHEN 325 MG/1
650 TABLET ORAL EVERY 4 HOURS PRN
Status: DISCONTINUED | OUTPATIENT
Start: 2019-05-13 | End: 2019-05-13 | Stop reason: HOSPADM

## 2019-05-13 RX ORDER — FOLIC ACID 1 MG/1
1 TABLET ORAL DAILY
Status: DISCONTINUED | OUTPATIENT
Start: 2019-05-14 | End: 2019-05-18 | Stop reason: HOSPADM

## 2019-05-13 RX ORDER — DONEPEZIL HYDROCHLORIDE 10 MG/1
10 TABLET, FILM COATED ORAL NIGHTLY
Status: DISCONTINUED | OUTPATIENT
Start: 2019-05-14 | End: 2019-05-18 | Stop reason: HOSPADM

## 2019-05-13 RX ORDER — SODIUM CHLORIDE 0.9 % (FLUSH) 0.9 %
10 SYRINGE (ML) INJECTION 2 TIMES DAILY
Status: DISCONTINUED | OUTPATIENT
Start: 2019-05-14 | End: 2019-05-18 | Stop reason: HOSPADM

## 2019-05-13 RX ORDER — DIAZEPAM 5 MG/1
5 TABLET ORAL ONCE
Status: COMPLETED | OUTPATIENT
Start: 2019-05-13 | End: 2019-05-13

## 2019-05-13 RX ORDER — SODIUM CHLORIDE 0.9 % (FLUSH) 0.9 %
10 SYRINGE (ML) INJECTION EVERY 12 HOURS SCHEDULED
Status: DISCONTINUED | OUTPATIENT
Start: 2019-05-13 | End: 2019-05-13 | Stop reason: HOSPADM

## 2019-05-13 RX ADMIN — DIAZEPAM 5 MG: 5 TABLET ORAL at 07:55

## 2019-05-13 RX ADMIN — DIPHENHYDRAMINE HCL 25 MG: 25 TABLET ORAL at 07:55

## 2019-05-13 RX ADMIN — SODIUM CHLORIDE: 9 INJECTION, SOLUTION INTRAVENOUS at 07:54

## 2019-05-13 RX ADMIN — ACETAMINOPHEN 650 MG: 325 TABLET ORAL at 11:00

## 2019-05-13 RX ADMIN — ASPIRIN 81 MG 324 MG: 81 TABLET ORAL at 21:42

## 2019-05-13 RX ADMIN — IOPAMIDOL 80 ML: 755 INJECTION, SOLUTION INTRAVENOUS at 19:52

## 2019-05-13 ASSESSMENT — PAIN SCALES - GENERAL
PAINLEVEL_OUTOF10: 0
PAINLEVEL_OUTOF10: 6

## 2019-05-13 NOTE — ED NOTES
Bed: 02TR-02  Expected date:   Expected time:   Means of arrival:   Comments:  Stroke alert     Stone Edwards RN  05/13/19 [id number]

## 2019-05-13 NOTE — ED PROVIDER NOTES
Triage Chief Complaint:   Aphasia and Extremity Weakness (right)    [place]:  Rebecca Tse is a 82 y.o. male that presents with slurred speech and right leg weakness and shuffling gait. Patient's last known well is reported as 1730, per spouse. Patient was sitting down D lunch when he needed to go to the bathroom and on the way to the bathroom wife noticed that patient was mumbling and slurring his speech. Additionally, patient had a shuffling gait. EMS was called. This is an abrupt change from patient's baseline. Wife reports the patient has had prior stroke but fully recovered after therapy. His old stroke affected his left side. Additionally, patient underwent cardiac cath today and was sent home. Patient is on aggrenox for anticoag. ROS:  At least 10 systems reviewed and otherwise acutely negative except as in the [address]     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 Ria Clemons. & Dr. Amish Boudreaux Carotid stenosis, right 11/13    ~50% or less, on MRA and carotid u/s    Chronic kidney disease     CKD (chronic kidney disease)     cannot take nsaids    COPD [facility]ch[facility] obstructive pulmonary disease) [facility]     CVA (cerebrovascular accident) ([facility] 11/16/13 admission    [place]) R temporal/occipital and R thalamic infarct, L sided weakness and focal seizure    Dementia due to Alzheimer's disease 03/28/2017    MMSE 22/30 ON 3/28/17--- STARTING TRIAL ARICEPT    Dyslipidemia 10/04    ED (erectile dysfunction) 6/02 372 ,  1/2001  46    testos    Former smoker     GERD (gastroesophageal reflux disease)     Hearing loss 5/06    wear hearing aides bilat  [facility]     on foltx    Hyperlipidemia     Hypertension     Kidney stone     Migraine headache     Obesity (BMI 30-39. 9)     PFO (patent foramen ovale)     noted on [place] 12/17/13- Dr Mega Pepe Redundant prepuce and phimosis 1/5/12    Dr Dustin Cam Seizure disorder, focal motor ([facility] 11/13 admission    L hand tremor assoc w CVA    Vertigo      Past Surgical History:   Procedure Laterality Date    ABDOMINAL AORTIC ANEURYSM REPAIR, ENDOVASCULAR  9/24/13    CARDIOVASCULAR STRESS TEST  1/10/2012    Dr. Marcus Terry- normal with EF 45%    CORONARY ARTERY BYPASS GRAFT  1/1994    Dr Carolina Long  years ago    wears full dentures     Family History   Problem Relation Age of Onset    COPD Mother     Other Mother         benign tremors     Social History     Socioeconomic History    Marital status:      Spouse name: Not on file    Number of children: Not on file    Years of education: Not on file    Highest education level: Not on file   Occupational History    Occupation: heating and air     Comment: business owner   Social Needs    Financial resource strain: Not on file    Food insecurity:     Worry: Not on file     Inability: Not on file    Transportation needs:     Medical: Not on file     Non-medical: Not on file   Tobacco Use    Smoking status: Former Smoker     Packs/day: 1.50     Years: 36.00     Pack years: 54.00    Smokeless tobacco: Never Used    Tobacco comment: quit smoking in 1994   Substance and Sexual Activity    Alcohol use: No     Comment:         Caffeine: Decaf    Drug use: No    Sexual activity: Not on file   Lifestyle    Physical activity:     Days per week: Not on file     Minutes per session: Not on file    Stress: Not on file   Relationships    Social connections:     Talks on phone: Not on file     Gets together: Not on file     Attends Caodaism service: Not on file     Active member of club or organization: Not on file     Attends meetings of clubs or organizations: Not on file     Relationship status: Not on file    Intimate partner violence:     Fear of current or ex partner: Not on file     Emotionally abused: Not on file     Physically abused: Not on file     Forced sexual activity: Not on file   Other Topics Concern    Not on file   Social History Narrative    Not on file     Current Facility-Administered Medications   Medication Dose Route Frequency Provider Last Rate Last Dose    iopamidol (ISOVUE-370) 76 % injection 80 mL  80 mL Intravenous ONCE PRN Veena Ornelas MD         Current Outpatient Medications   Medication Sig Dispense Refill    citalopram (CELEXA) 10 MG tablet TAKE 1 TABLET BY MOUTH IN THE MORNING AND 1 TABLET BY MOUTH AT BEDTIME 180 tablet 1    omeprazole (PRILOSEC) 40 MG delayed release capsule Take 1 capsule by mouth every morning (before breakfast) 90 capsule 1    QUEtiapine (SEROQUEL) 25 MG tablet Take 1 tablet by mouth nightly 90 tablet 1    fluticasone-salmeterol (ADVAIR DISKUS) 250-50 MCG/DOSE AEPB Inhale 1 puff into the lungs every 12 hours 60 each 3    metoprolol succinate (TOPROL XL) 25 MG extended release tablet Take 0.5 tablets by mouth daily 45 tablet 1    folic acid (FOLVITE) 1 MG tablet TAKE 1 TABLET BY MOUTH DAILY 90 tablet 1    donepezil (ARICEPT) 10 MG tablet Take 1 tablet by mouth nightly 90 tablet 1    gabapentin (NEURONTIN) 100 MG capsule TAKE TWO CAPSULE BY MOUTH EVERY NIGHT.  180 capsule 1    oxybutynin (DITROPAN) 5 MG tablet Take 1 tablet by mouth 2 times daily 180 tablet 1    albuterol sulfate HFA (PROVENTIL HFA) 108 (90 Base) MCG/ACT inhaler Inhale 2 puffs into the lungs every 6 hours as needed for Wheezing or Shortness of Breath (or coughing spells) 1 Inhaler 6    citalopram (CELEXA) 10 MG tablet TAKE 1/2 TABLET BY MOUTH IN THE MORNING AND 1/2 TABLET BY MOUTH AT BEDTIME 90 tablet 1    dipyridamole-aspirin (AGGRENOX)  MG per extended release capsule TAKE ONE CAPSULE BY MOUTH TWICE A  capsule 1    pravastatin (PRAVACHOL) 40 MG tablet TAKE 1 TABLET BY MOUTH EVERY DAY 90 tablet 1    traZODone (DESYREL) 50 MG tablet Take 1 tablet by mouth nightly as needed for Sleep 90 tablet 1    aspirin EC 81 MG EC tablet Take 1 tablet by mouth daily 30 tablet 3     Allergies   Allergen Reactions    Flomax [Tamsulosin Hcl]      Hypotension/syncope    Nsaids      Due to CKD    Penicillins Hives    Topamax [Topiramate]      Fatigue and severe depression       Nursing Notes Reviewed    Physical Exam:  ED Triage Vitals [05/13/19 1904]   Enc Vitals Group      BP       Pulse       Resp       Temp       Temp src       SpO2       Weight 237 lb (107.5 kg)      Height 5' 8.11\" (1.73 m)      Head Circumference       Peak Flow       Pain Score       Pain Loc       Pain Edu? Excl. in [address]? GENERAL APPEARANCE: Awake and alert. Cooperative. Appears deconditioned. HEAD: Normocephalic. Atraumatic. EYES: EOM's grossly intact. Sclera anicteric. ENT: Mucous membranes are moist. Tolerates saliva. No trismus. NECK: Supple. No meningismus. Trachea midline. HEART: RRR. Radial pulses 2+. LUNGS: Respirations unlabored. CTAB  ABDOMEN: Soft. Non-tender. No guarding or rebound. EXTREMITIES: No acute deformities. SKIN: Warm and dry.   NEUROLOGICAL:   Matilde Love's NIH Stroke Scale at 7:26 PM is:  Level of Consciousness:  0 - alert; keenly responsive    LOC Questions:  0 - answers both questions correctly    LOC Commands:  0 - performs both tasks correctly    Best Gaze:  0 - normal    Visual Fields:  0 - no visual loss    Facial Palsy:  0 - normal symmetric movement    Motor-Arm-Left:  0 - no drift, limb holds 90 (or 45) degrees for full 10 seconds    Motor-Leg-Left:  0 - no drift; leg holds 30 degree position for full 5 seconds    Motor-Arm-Right:  1 - drift, limb holds 90 (or 45) degrees but drifts down before full 10 seconds: does not hit bed    Motor-Leg-Right:  1 - drift; leg falls by the end of the 5 second period but does not hit bed    Limb Ataxia:  0 - absent    Sensory:  0 - normal; no sensory loss    Best Language:  1 - mild to moderate aphasia; some obvious loss of fluency or facility of comprehension without significant limitation on ideas expressed or form of expression. Reduction of speech and/or comprehension, however, makes conversation about provided materials difficult or impossible. For example, in conversation about provided materials, examiner can identify picture or naming card content from patient's response. Dysarthria:  1 - mild to moderate, patient slurs at least some words and at worst, can be understood with some difficulty    Extinction and Inattention:  0 - no abnormality  PSYCHIATRIC: Normal mood.     I have reviewed and interpreted all of the currently available lab results from this visit (if applicable):  Results for orders placed or performed during the hospital encounter of 05/13/19   CBC auto diff   Result Value Ref Range    WBC 7.7 4.0 - 10.5 K/CU MM    RBC 4.32 (L) 4.6 - 6.2 M/CU MM    Hemoglobin 14.4 13.5 - 18.0 GM/DL    Hematocrit 45.9 42 - 52 %    .3 (H) 78 - 100 FL    MCH 33.3 (H) 27 - 31 PG    MCHC 31.4 (L) 32.0 - 36.0 %    RDW 14.2 11.7 - 14.9 %    Platelets 370 985 - 435 K/CU MM    MPV 9.8 7.5 - 11.1 FL    Differential Type AUTOMATED DIFFERENTIAL     Segs Relative 70.7 (H) 36 - 66 %    Lymphocytes % 21.2 (L) 24 - 44 %    Monocytes % 6.6 (H) 0 - 4 %    Eosinophils % 0.9 0 - 3 %    Basophils % 0.3 0 - 1 %    Segs Absolute 5.5 K/CU MM    Lymphocytes # 1.6 K/CU MM    Monocytes # 0.5 K/CU MM    Eosinophils # 0.1 K/CU MM    Basophils # 0.0 K/CU MM    Nucleated RBC % 0.0 %    Total Nucleated RBC 0.0 K/CU MM    Total Immature Neutrophil 0.02 K/CU MM    Immature Neutrophil % 0.3 0 - 0.43 %   CMP   Result Value Ref Range    Sodium 138 135 - 145 MMOL/L    Potassium 4.8 3.5 - 5.1 MMOL/L    Chloride 100 99 - 110 mMol/L    CO2 25 21 - 32 MMOL/L    BUN 19 6 - 23 MG/DL CREATININE 1.5 (H) 0.9 - 1.3 MG/DL    Glucose 148 (H) 70 - 99 MG/DL    Calcium 9.1 8.3 - 10.6 MG/DL    Alb 4.0 3.4 - 5.0 GM/DL    Total Protein 7.2 6.4 - 8.2 GM/DL    Total Bilirubin 0.4 0.0 - 1.0 MG/DL    ALT 20 10 - 40 U/L    AST 20 15 - 37 IU/L    Alkaline Phosphatase 97 40 - 129 IU/L    GFR Non- 45 (L) >60 mL/min/1.73m2    GFR  54 (L) >60 mL/min/1.73m2    Anion Gap 13 4 - 16   Troponin   Result Value Ref Range    Troponin T <0.010 <0.01 NG/ML   PT - INR   Result Value Ref Range    Protime 12.2 9.12 - 12.5 SECONDS    INR 1.05 INDEX   PTT   Result Value Ref Range    aPTT 25.3 21.2 - 33.0 SECONDS      Radiographs (if obtained):  [] The following radiograph was interpreted by myself in the absence of a radiologist:  [x] Radiologist's Report Reviewed:  Ct Head Wo Contrast    Addendum Date: 5/13/2019    ADDENDUM: Critical results were called by Dr. Tabby De Paz to   [address] on 5/13/2019 at 19:34     Result Date: 5/13/2019  EXAMINATION: CT OF THE HEAD WITHOUT CONTRAST  5/13/2019 7:23 pm TECHNIQUE: CT of the head was performed without the administration of intravenous contrast. Dose modulation, iterative reconstruction, and/or weight based adjustment of the mA/kV was utilized to reduce the radiation dose to as low as reasonably achievable. COMPARISON: CT head 05/03/2017 HISTORY: ORDERING SYSTEM PROVIDED HISTORY: RLE weakness, slurred speech TECHNOLOGIST PROVIDED HISTORY: Has a \"code stroke\" or \"stroke alert\" been called? ->Yes Ordering Physician Provided Reason for Exam: RLE weakness, slurred speech Acuity: Acute Type of Exam: Initial Additional signs and symptoms: no Relevant Medical/Surgical History: none FINDINGS: BRAIN/VENTRICLES: There is no acute intracranial hemorrhage, mass effect or midline shift. No abnormal extra-axial fluid collection. Remote encephalomalacia right PCA distribution involving the right occipital and posterior inferior temporal lobes. ..   There is no evidence of hydrocephalus. Mild-to-moderate chronic small vessel ischemic disease. Intracranial vascular calcifications. ORBITS: The visualized portion of the orbits demonstrate no acute abnormality. SINUSES: The visualized paranasal sinuses and mastoid air cells demonstrate no acute abnormality. SOFT TISSUES/SKULL:  No acute abnormality of the visualized skull or soft tissues. No acute intracranial abnormality. Chronic encephalomalacia right occipital lobe and posterior inferior temporal lobe. Chronic small vessel ischemic disease. Xr Chest Portable    Result Date: 5/13/2019  EXAMINATION: ONE X-RAY VIEW OF THE CHEST, 5/13/2019 7:45 pm COMPARISON: May 3, 2017 HISTORY: ORDERING SYSTEM PROVIDED HISTORY: s/p cath, weakness, dysarthria TECHNOLOGIST PROVIDED HISTORY: Reason for exam:->s/p cath, weakness, dysarthria Ordering Physician Provided Reason for Exam: s/p cath, weakness, dysarthria Acuity: Unknown Type of Exam: Initial Additional signs and symptoms: None Relevant Medical/Surgical History: COPD, ASTHMA FINDINGS: No focal consolidation. Cardiomegaly. Sternotomy. No pulmonary edema. No acute findings. Cta Neck W Contrast    Result Date: 5/13/2019  EXAMINATION: CTA OF THE HEAD WITH CONTRAST; CTA OF THE NECK 5/13/2019 7:53 pm; 5/13/2019 7:51 pm: TECHNIQUE: CTA of the head/brain was performed with the administration of intravenous contrast. Multiplanar reformatted images are provided for review. MIP images are provided for review. Dose modulation, iterative reconstruction, and/or weight based adjustment of the mA/kV was utilized to reduce the radiation dose to as low as reasonably achievable.; CTA of the neck was performed with the administration of intravenous contrast. Multiplanar reformatted images are provided for review. MIP images are provided for review. Stenosis of the internal carotid arteries measured using NASCET criteria.  Dose modulation, iterative reconstruction, and/or weight based adjustment of criteria. Dose modulation, iterative reconstruction, and/or weight based adjustment of the mA/kV was utilized to reduce the radiation dose to as low as reasonably achievable. COMPARISON: None. HISTORY: ORDERING SYSTEM PROVIDED HISTORY: dysarthria TECHNOLOGIST PROVIDED HISTORY: Has a \"code stroke\" or \"stroke alert\" been called? ->Yes Ordering Physician Provided Reason for Exam: dysarthria, RLE weakness Acuity: Acute Type of Exam: Initial Additional signs and symptoms: no Relevant Medical/Surgical History: 80 ml isovue 370 used for head/neck.; ORDERING SYSTEM PROVIDED HISTORY: dysarthria, RLE weakness TECHNOLOGIST PROVIDED HISTORY: Has a \"code stroke\" or \"stroke alert\" been called? ->Yes Ordering Physician Provided Reason for Exam: dysarthria, RLE weakness Acuity: Acute Type of Exam: Initial Additional signs and symptoms: no Relevant Medical/Surgical History: 80 ml isovue 370 used for head/neck FINDINGS: CTA NECK: AORTIC ARCH/ARCH VESSELS: There is a normal branch pattern of the aortic arch. No significant stenosis is seen of the innominate artery or subclavian arteries. CAROTID ARTERIES: The common carotid arteries are normal in appearance without evidence of a flow limiting stenosis. Moderate severe plaque identified right carotid cave position resulting in 50 versus could appearance stenosis right proximal ICA per NASCET criteria. High-grade stenosis left proximal ICA resulting in critical greater than 90% narrowing per NASCET criteria. No dissection or arterial injury is seen. VERTEBRAL ARTERIES: The vertebral arteries both arise from the subclavian arteries and are normal in caliber without evidence of flow limiting stenosis or dissection. SOFT TISSUES: The lung apices are clear. No cervical or superior mediastinal lymphadenopathy. The visualized portion of the larynx and pharynx appear unremarkable. The parotid, submandibular and thyroid glands demonstrate no acute abnormality.  BONES: The visualized osseous structures demonstrate moderate severe multilevel degenerative changes. CTA HEAD: ANTERIOR CIRCULATION: The internal carotid arteries are normal in course and caliber without focal stenosis. The anterior cerebral and middle cerebral arteries demonstrate no focal stenosis. POSTERIOR CIRCULATION: The left posterior cerebral artery demonstrate no focal stenosis. There high-grade stenosis versus occlusion involving the right posterior cerebral artery corresponding to the area of encephalomalacia on recent CT. Right distal V4 segment demonstrates no contrast enhancement suggestive of occlusion. This is age indeterminate. On prior MRI there may have been loss of flow void in this location versus artifact. The vertebral and basilar arteries appear unremarkable. BRAIN: No mass effect or midline shift. No abnormal extra-axial fluid collection. The gray-white differentiation appears grossly maintained. Greater than 90% stenosis left proximal ICA 50-60% stenosis right proximal ICA. High-grade stenosis versus occlusion right V4 segment, age indeterminate. High-grade stenosis versus occlusion right posterior cerebral artery corresponding to the site of right PCA distribution encephalomalacia. MRI could be beneficial to evaluate for new  infarct as clinically warranted. EKG (if obtained): (All EKG's are interpreted by myself in the absence of a cardiologist)  12 lead EKG per my interpretation:  Normal Sinus Rhythm at 74  Axis is   Normal  QTc is  within an acceptable range  There is no specific T wave changes appreciated. There is no specific ST wave changes appreciated. RBBB    Prior EKG to compare with was available and no clinically significant change in overall morphology. MDM:  Pt presents as above. Emergent conditions considered. Presentation prompted initial immediate evaluation. Code stroke was called and patient quickly to CT. CT head negative for acute intracranial process.  CTA imaging of patient's head and neck also obtained on arrival. Patient discussed with Dr. Jerri Gonzalez stroke neurology attending who is not recommending tPA as patient's last known normal was outside of treatment window (per family new to bedside was before cardiac cath). He does play patient likely has had a stroke and would like patient to be monitored here and agrees with CTA imaging. CTA demonstrating ICA stenosis as above as well as patient's prior PCA infarct. There is no intervening amenable large vessel occlusion at this time. Patient to be kept here. Labs are nonemergent. Patient's cardiology team was also consulted and informed of patient's presence in the emergency department. Did speak with Dr. Nimesh Pendleton who will see the patient tomorrow in the hospital. Full dose aspirin given. Patient discussed with his primary care provider team (Dr. Abel Marroquin) and patient was admitted to medicine. Questions sought and answered with the patient. They voice understanding and agree with plan. Instructed to return for any worsening or worrisome concerns. 1.  Physician evaluation performed at:  1900  2. Stroke alert called at:  Pre-hospital  3. CT results obtained at:  1935  4. Decision was made that TPA is NOT indicated in consultation with [facility] Stroke Neurologist, Dr. Ruben Bautista. Clinical Impression:  1. Dysarthria    2. Aphasia    3.  Right sided weakness      (Please note that portions of this note may have been completed with a voice recognition program. Efforts were made to edit the dictations but occasionally words are mis-transcribed.)    MD Destiny Smith MD  05/14/19 [id number]

## 2019-05-13 NOTE — PROCEDURES
[address] [place] [address], [address] [place] [address]                            CARDIAC CATHETERIZATION    PATIENT NAME: Mary Morris                       :        1935  MED REC NO:   [id number]                          ROOM:  ACCOUNT NO:   [de-identified]                           ADMIT DATE: 2019  PROVIDER:     Abigail Jim MD    DATE OF PROCEDURE:  2019    This is an 82-year-old male patient who has been having chest pain,  dyspnea present, and heart failure present. Therefore, the patient is  here for heart catheterization today. DESCRIPTION OF PROCEDURE:  The patient brought to the cath lab today. Informed consent was obtained from the patient. The patient was prepped  and draped in a sterile fashion. The patient was injected with 20 mL of  2% lidocaine in the right femoral artery region. Using a micropuncture  needle, the right femoral artery was canalized and a 4-Scottish sheath was  placed in the right femoral artery and a 7-Scottish sheath was placed in  the right femoral vein. The entire procedure was done using guidewire. The sheath was flushed  in between the procedures. Right heart catheterization was performed. Aortic sat is 91% ON room  air. PA sat is 61% on room air. RA pressure is 9/8 with a mean of 7. RV is pressure 31/4 with a mean of 8.  PA pressure is 32/30 with a mean  of 21. Pulmonary capillary wedge pressure is 9/7 with a mean of 10. LVEDP is 13 mmHg present. On the pullback, there was no gradient  present across the aortic valve. Using a pigtail catheter, LVEDP was measured. EDP was normal.  On the  pullback, there was no gradient present. Using a JL5 4-Scottish catheter, left coronary artery angiogram was  performed. Left coronary angiogram revealed the left main is patent, it  bifurcates in the LAD and left circumflex artery. Circ is a medium-sized vessel.   It gives off a medium-sized OM1 and  small OM2 branch. There is mild disease noted. There is a collateral  circulation filling the PD and PL branch. It gives off medium-sized  ramus branch. Mild disease noted. LAD is proximal 100% occluded which  is unchanged from before. Right coronary artery is proximal 100% occluded. Using a LIMA catheter, LIMA to LAD was performed. LIMA to LAD is widely  patent. LAD reaches and wraps the apex. Using a multiple risk catheter SVG graft to the PDA was performed. SVG  graft to the PDA has moderate disease present, but there is a RAISA-3  flow noted unchanged from before. It fills the PD and PL branch. IMPRESSION:  1. Left main is patent. 2.  LAD is proximally 100% occluded. 3.  Ramus has mild disease. 4.  Circ has mild disease. 5.  RCA is 100% occluded. 6.  LIMA to LAD is patent. 7.  SVG to PDA is patent. There is a collateral circulation filling the  PLB branch. 8.  EDP was normal.  9.  Right heart pressures are normal.    1.  At this time, from cardiac stand, the patient is stable. 2.  I believe his symptoms are secondary to sleep apnea. I will  recommend evaluate for sleep apnea. No complication noted. The patient  will be discharged after bed rest completed.         Taya Yousif MD    D: 05/13/2019 10:12:09       T: 05/13/2019 10:40:18     [email or web address]  Job#: [id number]     Doc#: [id number]    CC:

## 2019-05-13 NOTE — CODE DOCUMENTATION
Wife bedside, told neurologist he did not seem normal after heart cath this AM but thought it was d/t medication given prior to cath

## 2019-05-13 NOTE — PROGRESS NOTES
Discharge instructions reviewed with patient. Voices understanding. Ambulated without difficulty as same level of activity as he can perform at home.

## 2019-05-13 NOTE — H&P
[facility] [id number]
and  reactive. CHEST:  Equal expansion. LUNGS:  Clear to auscultation. No wheezing or rhonchi. HEART:  Regular rhythm. ABDOMEN:  Soft and nontender. Bowel sounds are present. No  hepatosplenomegaly or guarding appreciated. EXTREMITIES:  No cyanosis or clubbing noted. NEUROLOGIC:  Cranial nerves are grossly intact. LABORATORY DATA:  Creatinine 1.3. CBC is normal.  His INR is 0.99. IMPRESSION:  This is an 71-year-old male patient with a history of  coronary artery disease status post CABG done. Recent stress test was  abnormal, stress test and LV dysfunction present. The plan is for heart  cath to further evaluate. We will make further recommendations based on  that. His wife is present at the bedside.         Trish Cr MD    D: 05/13/2019 7:41:12       T: 05/13/2019 9:54:51     [email or web address]  Job#: [id number]     Doc#: [id number]    CC:

## 2019-05-13 NOTE — OP NOTE
[facility] --[id number]  LEFT MAIN PATENT  LAD -PROXIMAL 100% OCCLUDED  RAMUS MILD DX  LCX MILD DX  RCA PROXIMAL 100% OCCLUDED  LIMA TO LAD PATENT  SVG TO PDA PATENT  LVEDP 10  RA-9/8/7  RV-31/4/8  PA-32/13/21  PCWP-9/7/10    AO-91% SAT ROOM AIR  PA-61% RA     MEDICAL TREATMENT  CHECK FOR SLEEP STUDY

## 2019-05-14 LAB
ALBUMIN SERPL-MCNC: 4 GM/DL (ref 3.4–5)
ALP BLD-CCNC: 91 IU/L (ref 40–129)
ALT SERPL-CCNC: 19 U/L (ref 10–40)
ANION GAP SERPL CALCULATED.3IONS-SCNC: 13 MMOL/L (ref 4–16)
AST SERPL-CCNC: 20 IU/L (ref 15–37)
BASOPHILS ABSOLUTE: 0 K/CU MM
BASOPHILS RELATIVE PERCENT: 0.2 % (ref 0–1)
BILIRUB SERPL-MCNC: 0.3 MG/DL (ref 0–1)
BUN BLDV-MCNC: 20 MG/DL (ref 6–23)
CALCIUM SERPL-MCNC: 9 MG/DL (ref 8.3–10.6)
CHLORIDE BLD-SCNC: 102 MMOL/L (ref 99–110)
CHOLESTEROL: 158 MG/DL
CO2: 24 MMOL/L (ref 21–32)
CREAT SERPL-MCNC: 1.3 MG/DL (ref 0.9–1.3)
DIFFERENTIAL TYPE: ABNORMAL
EOSINOPHILS ABSOLUTE: 0 K/CU MM
EOSINOPHILS RELATIVE PERCENT: 0.5 % (ref 0–3)
ERYTHROCYTE SEDIMENTATION RATE: 23 MM/HR (ref 0–20)
GFR AFRICAN AMERICAN: >60 ML/MIN/1.73M2
GFR NON-AFRICAN AMERICAN: 53 ML/MIN/1.73M2
GLUCOSE BLD-MCNC: 161 MG/DL (ref 70–99)
HCT VFR BLD CALC: 44.6 % (ref 42–52)
HDLC SERPL-MCNC: 33 MG/DL
HEMOGLOBIN: 13.8 GM/DL (ref 13.5–18)
IMMATURE NEUTROPHIL %: 0.4 % (ref 0–0.43)
LDL CHOLESTEROL DIRECT: 102 MG/DL
LYMPHOCYTES ABSOLUTE: 2.5 K/CU MM
LYMPHOCYTES RELATIVE PERCENT: 31.6 % (ref 24–44)
MCH RBC QN AUTO: 33.9 PG (ref 27–31)
MCHC RBC AUTO-ENTMCNC: 30.9 % (ref 32–36)
MCV RBC AUTO: 109.6 FL (ref 78–100)
MONOCYTES ABSOLUTE: 0.5 K/CU MM
MONOCYTES RELATIVE PERCENT: 6.4 % (ref 0–4)
NUCLEATED RBC %: 0 %
PDW BLD-RTO: 14.4 % (ref 11.7–14.9)
PLATELET # BLD: 145 K/CU MM (ref 140–440)
PMV BLD AUTO: 9.6 FL (ref 7.5–11.1)
POTASSIUM SERPL-SCNC: 4 MMOL/L (ref 3.5–5.1)
RBC # BLD: 4.07 M/CU MM (ref 4.6–6.2)
SEGMENTED NEUTROPHILS ABSOLUTE COUNT: 4.9 K/CU MM
SEGMENTED NEUTROPHILS RELATIVE PERCENT: 60.9 % (ref 36–66)
SODIUM BLD-SCNC: 139 MMOL/L (ref 135–145)
TOTAL IMMATURE NEUTOROPHIL: 0.03 K/CU MM
TOTAL NUCLEATED RBC: 0 K/CU MM
TOTAL PROTEIN: 6.7 GM/DL (ref 6.4–8.2)
TRIGL SERPL-MCNC: 283 MG/DL
WBC # BLD: 8 K/CU MM (ref 4–10.5)

## 2019-05-14 PROCEDURE — 93010 ELECTROCARDIOGRAM REPORT: CPT | Performed by: INTERNAL MEDICINE

## 2019-05-14 PROCEDURE — 83721 ASSAY OF BLOOD LIPOPROTEIN: CPT

## 2019-05-14 PROCEDURE — 1200000000 HC SEMI PRIVATE

## 2019-05-14 PROCEDURE — 99223 1ST HOSP IP/OBS HIGH 75: CPT | Performed by: PSYCHIATRY & NEUROLOGY

## 2019-05-14 PROCEDURE — 36415 COLL VENOUS BLD VENIPUNCTURE: CPT

## 2019-05-14 PROCEDURE — 2500000003 HC RX 250 WO HCPCS: Performed by: NURSE PRACTITIONER

## 2019-05-14 PROCEDURE — 6370000000 HC RX 637 (ALT 250 FOR IP): Performed by: INTERNAL MEDICINE

## 2019-05-14 PROCEDURE — 85652 RBC SED RATE AUTOMATED: CPT

## 2019-05-14 PROCEDURE — 2580000003 HC RX 258: Performed by: INTERNAL MEDICINE

## 2019-05-14 PROCEDURE — 2580000003 HC RX 258: Performed by: NURSE PRACTITIONER

## 2019-05-14 PROCEDURE — 94640 AIRWAY INHALATION TREATMENT: CPT

## 2019-05-14 PROCEDURE — 80053 COMPREHEN METABOLIC PANEL: CPT

## 2019-05-14 PROCEDURE — 6360000002 HC RX W HCPCS: Performed by: NURSE PRACTITIONER

## 2019-05-14 PROCEDURE — 80061 LIPID PANEL: CPT

## 2019-05-14 PROCEDURE — 85025 COMPLETE CBC W/AUTO DIFF WBC: CPT

## 2019-05-14 PROCEDURE — 6360000002 HC RX W HCPCS: Performed by: INTERNAL MEDICINE

## 2019-05-14 PROCEDURE — 99223 1ST HOSP IP/OBS HIGH 75: CPT | Performed by: INTERNAL MEDICINE

## 2019-05-14 PROCEDURE — 94761 N-INVAS EAR/PLS OXIMETRY MLT: CPT

## 2019-05-14 RX ORDER — ATORVASTATIN CALCIUM 40 MG/1
40 TABLET, FILM COATED ORAL NIGHTLY
Status: DISCONTINUED | OUTPATIENT
Start: 2019-05-14 | End: 2019-05-18 | Stop reason: HOSPADM

## 2019-05-14 RX ORDER — LORAZEPAM 2 MG/ML
0.5 INJECTION INTRAMUSCULAR ONCE
Status: DISCONTINUED | OUTPATIENT
Start: 2019-05-14 | End: 2019-05-18 | Stop reason: HOSPADM

## 2019-05-14 RX ORDER — ORPHENADRINE CITRATE 30 MG/ML
30 INJECTION INTRAMUSCULAR; INTRAVENOUS ONCE
Status: COMPLETED | OUTPATIENT
Start: 2019-05-14 | End: 2019-05-14

## 2019-05-14 RX ORDER — SODIUM CHLORIDE 9 MG/ML
INJECTION, SOLUTION INTRAVENOUS CONTINUOUS
Status: DISCONTINUED | OUTPATIENT
Start: 2019-05-14 | End: 2019-05-17

## 2019-05-14 RX ADMIN — ENOXAPARIN SODIUM 40 MG: 40 INJECTION SUBCUTANEOUS at 10:28

## 2019-05-14 RX ADMIN — OXYBUTYNIN CHLORIDE 5 MG: 5 TABLET ORAL at 20:50

## 2019-05-14 RX ADMIN — DONEPEZIL HYDROCHLORIDE 10 MG: 10 TABLET, FILM COATED ORAL at 02:39

## 2019-05-14 RX ADMIN — Medication 2 PUFF: at 20:58

## 2019-05-14 RX ADMIN — VALPROATE SODIUM 500 MG: 100 INJECTION, SOLUTION INTRAVENOUS at 20:54

## 2019-05-14 RX ADMIN — Medication 2 PUFF: at 08:48

## 2019-05-14 RX ADMIN — METOPROLOL SUCCINATE 12.5 MG: 25 TABLET, EXTENDED RELEASE ORAL at 10:28

## 2019-05-14 RX ADMIN — SODIUM CHLORIDE, PRESERVATIVE FREE 10 ML: 5 INJECTION INTRAVENOUS at 10:21

## 2019-05-14 RX ADMIN — OXYBUTYNIN CHLORIDE 5 MG: 5 TABLET ORAL at 02:38

## 2019-05-14 RX ADMIN — CITALOPRAM HYDROBROMIDE 10 MG: 20 TABLET ORAL at 10:27

## 2019-05-14 RX ADMIN — ASPIRIN AND EXTENDED-RELEASE DIPYRIDAMOLE 1 CAPSULE: 25; 200 CAPSULE ORAL at 10:27

## 2019-05-14 RX ADMIN — SODIUM CHLORIDE: 9 INJECTION, SOLUTION INTRAVENOUS at 10:30

## 2019-05-14 RX ADMIN — ORPHENADRINE CITRATE 30 MG: 30 INJECTION INTRAMUSCULAR; INTRAVENOUS at 13:46

## 2019-05-14 RX ADMIN — GABAPENTIN 200 MG: 100 CAPSULE ORAL at 02:39

## 2019-05-14 RX ADMIN — OXYBUTYNIN CHLORIDE 5 MG: 5 TABLET ORAL at 10:28

## 2019-05-14 RX ADMIN — TRAZODONE HYDROCHLORIDE 50 MG: 50 TABLET ORAL at 20:51

## 2019-05-14 RX ADMIN — ASPIRIN AND EXTENDED-RELEASE DIPYRIDAMOLE 1 CAPSULE: 25; 200 CAPSULE ORAL at 20:50

## 2019-05-14 RX ADMIN — ASPIRIN 81 MG: 81 TABLET, COATED ORAL at 10:27

## 2019-05-14 RX ADMIN — QUETIAPINE FUMARATE 25 MG: 25 TABLET ORAL at 02:41

## 2019-05-14 RX ADMIN — ASPIRIN AND EXTENDED-RELEASE DIPYRIDAMOLE 1 CAPSULE: 25; 200 CAPSULE ORAL at 02:38

## 2019-05-14 RX ADMIN — QUETIAPINE FUMARATE 25 MG: 25 TABLET ORAL at 20:51

## 2019-05-14 RX ADMIN — VALPROATE SODIUM 500 MG: 100 INJECTION, SOLUTION INTRAVENOUS at 13:51

## 2019-05-14 RX ADMIN — FOLIC ACID 1 MG: 1 TABLET ORAL at 10:27

## 2019-05-14 RX ADMIN — ATORVASTATIN CALCIUM 40 MG: 40 TABLET, FILM COATED ORAL at 20:50

## 2019-05-14 RX ADMIN — PANTOPRAZOLE SODIUM 40 MG: 40 TABLET, DELAYED RELEASE ORAL at 06:23

## 2019-05-14 RX ADMIN — DONEPEZIL HYDROCHLORIDE 10 MG: 10 TABLET, FILM COATED ORAL at 20:50

## 2019-05-14 RX ADMIN — GABAPENTIN 200 MG: 100 CAPSULE ORAL at 20:50

## 2019-05-14 RX ADMIN — SODIUM CHLORIDE, PRESERVATIVE FREE 10 ML: 5 INJECTION INTRAVENOUS at 02:40

## 2019-05-14 ASSESSMENT — PAIN SCALES - GENERAL: PAINLEVEL_OUTOF10: 0

## 2019-05-14 NOTE — CONSULTS
[address] [place] [address], [address] [facility]                                  CONSULTATION    PATIENT NAME: Thierno Bray                       :        1935  MED REC NO:   [id number]                          ROOM:       3007  ACCOUNT NO:   [de-identified]                           ADMIT DATE: 2019  PROVIDER:     Felisa David MD    CONSULT DATE:  2019    INDICATION:  Coronary artery disease. HISTORY OF PRESENT ILLNESS:  This is an 37-year-old male patient who  came in yesterday and underwent heart catheterization. Everything went  fine. He was discharged home. Then when he went home, the wife called  and said the patient was having weakness and was unable to walk. His  gait was shuffling and his legs were weak. His also speech was slurred  and he was unsteady when he fell, therefore, he came to the hospital.   He had a workup done in the ER. He has extensive carotid disease  present. His CTA of the head and neck was performed, shows greater than  90% stenosis of the left proximal internal carotid artery, 50% to 60%  stenosis noted in the right proximal internal carotid artery. High  grade stenosis versus occlusion in the right V4 segment age  indeterminate and high grade stenosis versus occlusion right posterior  cerebral artery. Right now, he is awake, alert, answers questions, and  follows commands. Denies any chest pain. No shortness of breath  present. He is in sinus rhythm present. The patient is undergoing MRI  this morning. The patient had a heart catheterization yesterday and heart cath shows  left main was patent. LAD was proximal 100% occluded. Ramus had mild  disease. Circ had mild disease. RCA was proximal 100% occluded. LIMA  to LAD was patent. SVG to PDA was patent. Right heart pressure were  within normal range.     PAST MEDICAL HISTORY:  Coronary artery disease status post CABG done,  two vessels were patent LIMA to LAD and SVG to PDA. All the other  grafts were occluded. Hypertension, hyperlipidemia present. History of  having nonsustained ventricular tachycardia, history of strokes in the  past, and kidney stones. PAST SURGICAL HISTORY:  Coronary artery bypass surgery, two bypasses  were opened, LIMA to LAD and SVG to PDA. SOCIAL HISTORY:  He does not smoke and does not drink. ALLERGIES:  FLOMAX, NSAIDS, PENICILLIN, and TOPAMAX. MEDICATIONS:  He is on is Celexa, Prilosec, Seroquel, Aggrenox,  Pravachol, aspirin, and Neurontin. PHYSICAL EXAMINATION:  GENERAL:  The patient is awake, alert, answering questions, not in acute  distress. VITAL SIGNS:  Temperature afebrile, pulse is 70 which is sinus rhythm,  blood pressure 141/75. HEENT:  Head is normocephalic, atraumatic. Pupils are equal and  reactive. CHEST:  Equal expansion. LUNGS:  Clear to auscultation. No wheezing or rhonchi. HEART:  Regular rhythm. ABDOMEN:  Soft, nontender. Bowel sounds are present. No  hepatosplenomegaly or guarding appreciated. EXTREMITIES:  No cyanosis or clubbing noted. NEUROLOGIC:  Cranial nerves II through XII are grossly intact. DATA:  His creatinine is 1.5 down to 1.3. Troponins are negative. Total cholesterol is 158 and triglycerides are 283. LFTs are normal.   CBC is within normal range. PT and PTT are therapeutic. IMPRESSION:  An 82-year-old male patient with history of coronary artery  disease status post heart catheterization yesterday. He came back  yesterday with having possible stroke like symptoms. CT was negative  for acute stroke, but MRI is pending. He has significant carotid  disease. He had a history of stroke. He is on Aggrenox at this time. Question would be would he be a candidate for surgery versus aggressive  medical treatment. We will wait for MRI and make further  recommendations. At this time, from a cardiac stand, he is stable.    Continue medical treatment. Probably consider also for sleep study for sleep apnea.         Krista Woods MD    D: 05/14/2019 7:54:45       T: 05/14/2019 8:55:42     [email or web address]  Job#: [id number]     Doc#: [id number]    CC:  <>

## 2019-05-14 NOTE — CONSULTS
Allergies   Allergen Reactions    Flomax [Tamsulosin Hcl]      Hypotension/syncope    Nsaids      Due to CKD    Penicillins Hives    Topamax [Topiramate]      Fatigue and severe depression       Social History:   Social History     Socioeconomic History    Marital status:      Spouse name: Not on file    Number of children: Not on file    Years of education: Not on file    Highest education level: Not on file   Occupational History    Occupation: heating and air     Comment: business owner   Social Needs    Financial resource strain: Not on file    Food insecurity:     Worry: Not on file     Inability: Not on file    Transportation needs:     Medical: Not on file     Non-medical: Not on file   Tobacco Use    Smoking status: Former Smoker     Packs/day: 1.50     Years: 36.00     Pack years: 54.00    Smokeless tobacco: Never Used    Tobacco comment: quit smoking in 1994   Substance and Sexual Activity    Alcohol use: No     Comment:         Caffeine: Decaf    Drug use: No    Sexual activity: Not on file   Lifestyle    Physical activity:     Days per week: Not on file     Minutes per session: Not on file    Stress: Not on file   Relationships    Social connections:     Talks on phone: Not on file     Gets together: Not on file     Attends Hinduism service: Not on file     Active member of club or organization: Not on file     Attends meetings of clubs or organizations: Not on file     Relationship status: Not on file    Intimate partner violence:     Fear of current or ex partner: Not on file     Emotionally abused: Not on file     Physically abused: Not on file     Forced sexual activity: Not on file   Other Topics Concern    Not on file   Social History Narrative    Not on file       Family History:        Problem Relation Age of Onset    COPD Mother    Andrea Notice Other Mother         benign tremors       REVIEW OF SYSTEMS:  ROS: Is as noted above in HPI.  Complete system review is done and is negative except as noted above. EXAM:  Blood pressure (!) 141/75, pulse 72, temperature 97.9 °F (36.6 °C), temperature source Oral, resp. rate 16, height 5' 8\" (1.727 m), weight 235 lb 3.7 oz (106.7 kg), SpO2 92 %. General appearance: No apparent distress, appears stated age and cooperative. Skin: unremarkable  HEENT Normocephalic, atraumatic without obvious deformity. Neck: Supple, Trachea midline   Lungs: Good respiratory effort. Clear to auscultation,   Heart: Regular rate/ rhythm   Abdomen: Soft, non-tender or non-distended   Extremities: min edema warm well perfused  Neurologic: Alert, grossly intact, mild R leg weakness   Mental status: normal affect    DATA:  CTA  Impression:     Greater than 90% stenosis left proximal ICA    50-60% stenosis right proximal ICA. High-grade stenosis versus occlusion right V4 segment, age indeterminate. High-grade stenosis versus occlusion right posterior cerebral artery  corresponding to the site of right PCA distribution encephalomalacia. MRI could be beneficial to evaluate for new  infarct as clinically warranted.          IMPRESSION   prob new neurologic event  S/p remote posterior CVA  LICA stenosis       Patient Active Problem List   Diagnosis    CAD (coronary artery disease)    Benign essential tremor    Obstructive lung disease ([address]    Back pain, chronic    Hypertension    Dyslipidemia    AAA (abdominal aortic aneurysm) without rupture (HCC)    Cerebral infarction ([address]    CVA (cerebrovascular accident) ([address]    Homocysteinemia ([address]    PFO (patent foramen ovale)    Migraine headache    COPD (chronic obstructive pulmonary disease) ([address]    AAA (abdominal aortic aneurysm) ([address]    Dementia due to Alzheimer's disease    Atherosclerosis     Delirium    Hemispheric carotid artery syndrome    Ventricular tachycardia (HCC)    CKD (chronic kidney disease)    Dysthymia    GERD (gastroesophageal reflux disease)    Cerebrovascular accident (CVA) due to stenosis of cerebral artery (HCC)    Acute CVA (cerebrovascular accident) ([facility]       L carotid stenosis > 90%      RECOMMENDATIONS:  Await results of MRI. Pt will likely need delayed L carotid endarterectomy. Plan to f/u in office in 2 weeks. D/w with wife in detail    Pt seen with Dr. Michaela Ravi.      Roland Parkinson PA-C

## 2019-05-14 NOTE — CARE COORDINATION
Patient discharged to ARU on 5/18/2019    CN met with patient at on 5/14, wife at bedside, wife states patient has had a headache for over a week. STROKE INITIAL ASSESSMENT      What symptoms brought you in to the hospital? Patient came in with slurred speech and right leg weakness and shuffling gait. Had [address] on 5/13 and was home when symptoms started Wife noticed patient mumbling and shuffled gait and called EMS. Last Known Well:5/13 1730    Do you have a Neurologist?  Name:    Where do you live:       [x] Home with wife       [] Independent Living     [] Assisted Living                  [] [address]   [] Homeless/Homeless Shelter   [] Group Home    Primary Physician:   [x]Dr Jessy Pinto   [] None    [] PA/NP   [] [facility]    Pharmacy:        [] [facility]        [x] [facility]        [] [facility]   [] [facility]      [] [facility]    [] [facility]   [] [facility]              [] [address]    [] [facility]   [] [place]   [] [facility]    [] [facility]   [] [facility]   [] [facility]   [] Other:          Meds to Beds:   [] Yes  [x] No  Home Care:         [] Yes, Name:       [x] No            SNF:  [] Yes, Name:   [x] No      Do you take a blood thinner? Aspirin  Do you take a statins? Pravachol    Are you out of any of your  home medications? [] Yes   [x] No  Can you pay for your meds? [x] Yes   [] No  Do you have transportation:            [x] Yes   [] No          What time do you prefer your appointments:  [] AM   [x] PM  [] Anytime    Goals for this admission:   Patient wants to:    Steps to meet goal:   1. Medications   2. Work with therapy   3.         CN provided education to patient on reducing risk for stroke/TIA by modifying /controlling risk factors:of . CAD, HLD, HTN, CVA, Carotid Stenosis. Obesity. .. Instructed to take the medications as prescribed and dont stop unless ordered by a physician. Educated on  need to follow-up with physician after discharge .      Discussed benefits of eating a healthy diet, regularly exercising. Copy of educational materials given to the patient/caregiver to take home, reviewed signs and symptoms of stroke, including sudden numbness, dizziness, or loss of coordination or balance; weakness on one side of the body, sudden confusion, difficulty speaking, understanding or swallowing, sudden loss of vision, or severe, sudden headache. Emphasized the need to call 911 immediately if they are experiencing signs and symptoms of stroke. FAST education provided, FAST magnet given to patient. All education with teachback and questions answered     CN Contact information card given to patient/caregiver    Dysphagia screen done prior to any oral intake (including meds)                                  Yes    Date & Time of screening-5/13 2107  Date & time of first medication-5/13 2142  Did the pt fail the dysphagia screen? If yes,call the physician for SLP order, make the patient NPO and change oral medications to other routes  No    VTE Prophylaxis given by day 2 of admission ( day 1 starts on adm date,this excludes obs status and ED)  Yes  If VTE not ordered, did the physician chart BOTH a pharmacological and mechanical reasons ( in context to VTE) why it wan not ordered? NA  Was the patient given an antithrombotic by end of day 2? (day 1 starts on patients arrival date)  Yes  If the pt did not have an order for antithrombotic by day 2, did the physician document why the pt did not receive it? ( NPO status and an allergy to an antithrombotic are not acceptable reasons)  NA    Did the pt receive education on how to call 911 and documented that handout was given to pt/caregiver? Yes  Did the pt receive education on stroke symptoms and documented that handout was given to pt/caregiver? Yes  Did the patient receive education on risk factors for stroke and documented that handout was given to pt/caregiver?   Yes  Does the pt have the personalized stroke factors checklist added the the AVS with appropriate risk factors checked? Yes    Did the pt receive a list of medications that are DC'D on the AVS ?  No-discharged to ARU  Was the pt assessed by PT/OT or SLP? If not is there a physician note that pt is back to baseline,no PT/OT/SLP eval needed or if there is an outpt referral. PT/OT phone #[phone] SLP phone # [phone]  Yes   If pt has history of A-fib/flutter do they have a RX for an anticoagulation medication or a reason charted by physician on why one was not prescribed to the patient?   Yes  Does the pt have  RX for a statin on DC? (pt does not need a RX for the following: LDL less than 70 MG/DL, allergy/intolerance or a written reason why one was not prescribed per physician)  Yes

## 2019-05-14 NOTE — CONSULTS
Nutrition Education    Type and Reason for Visit: Consult, Patient Education(Low Fat)    · Verbally reviewed following information with Patient and Family: Heart Healthy Eating Nutrition Therapy (Nutrition Care Manual). Emphasized healthy vs unhealthy fats,  lowering fat intake and reading labels to monitor fat consumption. · Written educational materials provided. · Contact name and number provided. · Refer to Patient Education activity for more details.     Electronically signed by Lizbeth Ross RD, LD on 5/14/19 at 1:50 PM    Contact Number: [phone]

## 2019-05-14 NOTE — PLAN OF CARE
Problem: Risk for Impaired Skin Integrity  Goal: Tissue integrity - skin and mucous membranes  Description  Structural intactness and normal physiological function of skin and  mucous membranes.   5/14/2019 1129 by Lyla Jacobo RN  Outcome: Ongoing  5/14/2019 0558 by Kellie Parker RN  Outcome: Ongoing  5/14/2019 0558 by Kellie Parker RN  Outcome: Ongoing     Problem: Falls - Risk of:  Goal: Will remain free from falls  Description  Will remain free from falls  5/14/2019 1129 by Lyla Jacobo RN  Outcome: Ongoing  5/14/2019 0558 by Kellie Parker RN  Outcome: Ongoing  5/14/2019 0558 by Kellie Parker RN  Outcome: Ongoing  Goal: Absence of physical injury  Description  Absence of physical injury  5/14/2019 1129 by Lyla Jacobo RN  Outcome: Ongoing  5/14/2019 0558 by Kellie Parker RN  Outcome: Ongoing  5/14/2019 0558 by Kellie Parker RN  Outcome: Ongoing

## 2019-05-14 NOTE — H&P
Internal Medicine  History and Physical    Patient:  Isacc Bush  MRN: [id number]    CHIEF COMPLAINT:    Weakness and slurred speech, unsteady w fall    History Obtained From:  patient, spouse, electronic medical record  PCP: Varghese Boo MD    HISTORY OF PRESENT ILLNESS:   The patient is a 82 y.o. male w hx of prior CVA and L sided weakness, CAD, presented to ED last evening w sudden onset of slurred speech and unsteady gait w near fall, caught by his wife. Speech was unintelligible, \"like mouth was full of marbles\" according to his wife. He did have a LHCath earlier yesterday w/o any critical findings. Taken by squad to ED, stroke alert called and was not candidate for thrombolysis, on CT head/neck did have chronic R sided changes noted fr prior CVA and also >90% stenosis L ICA- which was not identified on carotid u/s which was just done Aug last yr. Given ASA and admitted for further eval.  This morning wife and son state his speech has nearly returned to baseline and pt states R leg strength is much improved.   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 Michaela Ravi. & Dr. Shannan Qureshi Carotid stenosis, right 11/13    ~50% or less, on MRA and carotid u/s    Chronic kidney disease     CKD (chronic kidney disease)     cannot take nsaids    COPD (chronic obstructive pulmonary disease) ([facility]     CVA (cerebrovascular accident) ([facility] 11/16/13 admission    (aggrenox [facility]) R temporal/occipital and R thalamic infarct, L sided weakness and focal seizure    Dementia due to Alzheimer's disease 03/28/2017    MMSE 22/30 ON 3/28/17--- STARTING TRIAL ARICEPT    Dyslipidemia 10/04    ED (erectile dysfunction) 6/02 372 ,  1/2001  387    testos    Former smoker     GERD (gastroesophageal reflux disease)     Hearing loss 5/06    wear hearing aides bilat    Homocysteinemia ([address]     on foltx    Hyperlipidemia     Hypertension     Kidney stone     Migraine headache     Obesity (BMI 30-39. 9)     PFO (patent foramen ovale)     noted on [place] 12/17/13- Dr Lisa Valiente Redundant prepuce and phimosis 1/5/12    Dr Ellie Yin Seizure disorder, focal motor ([address] 11/13 admission    L hand tremor assoc w CVA    Vertigo        Past Surgical History:        Procedure Laterality Date    ABDOMINAL AORTIC ANEURYSM REPAIR, ENDOVASCULAR  9/24/13    CARDIOVASCULAR STRESS TEST  1/10/2012    Dr. Yifan Jacob- normal with EF 45%    CORONARY ARTERY BYPASS GRAFT  1/1994    Dr Sid Biswas  years ago    wears full dentures       Medications Prior to Admission:    Prior to Admission medications    Medication Sig Start Date End Date Taking?  Authorizing Provider   citalopram (CELEXA) 10 MG tablet TAKE 1 TABLET BY MOUTH IN THE MORNING AND 1 TABLET BY MOUTH AT BEDTIME 5/9/19  Yes Chinyere Banks MD   omeprazole (PRILOSEC) 40 MG delayed release capsule Take 1 capsule by mouth every morning (before breakfast) 4/9/19  Yes Chinyere Banks MD   QUEtiapine (SEROQUEL) 25 MG tablet Take 1 tablet by mouth nightly 4/9/19  Yes Chinyere Banks MD   fluticasone-salmeterol (ADVAIR DISKUS) 250-50 MCG/DOSE AEPB Inhale 1 puff into the lungs every 12 hours 4/9/19  Yes Chinyere Banks MD   metoprolol succinate (TOPROL XL) 25 MG extended release tablet Take 0.5 tablets by mouth daily 4/1/19  Yes Chinyere Banks MD   folic acid (FOLVITE) 1 MG tablet TAKE 1 TABLET BY MOUTH DAILY 1/8/19  Yes Oli Corona MD Marlena   donepezil (ARICEPT) 10 MG tablet Take 1 tablet by mouth nightly 1/8/19  Yes Marina Stubbs MD   oxybutynin (DITROPAN) 5 MG tablet Take 1 tablet by mouth 2 times daily 1/8/19  Yes Marina Stubbs MD   albuterol sulfate HFA (PROVENTIL HFA) 108 (90 Base) MCG/ACT inhaler Inhale 2 puffs into the lungs every 6 hours as needed for Wheezing or Shortness of Breath (or coughing spells) 10/8/18  Yes Marina Stubbs MD   citalopram (CELEXA) 10 MG tablet TAKE 1/2 TABLET BY MOUTH IN THE MORNING AND 1/2 TABLET BY MOUTH AT BEDTIME 10/8/18  Yes Marina Stubbs MD   dipyridamole-aspirin (AGGRENOX)  MG per extended release capsule TAKE ONE CAPSULE BY MOUTH TWICE A DAY 10/8/18  Yes Marina Stubbs MD   pravastatin (PRAVACHOL) 40 MG tablet TAKE 1 TABLET BY MOUTH EVERY DAY 10/8/18  Yes Marina Stubbs MD   traZODone (DESYREL) 50 MG tablet Take 1 tablet by mouth nightly as needed for Sleep 10/8/18  Yes Marina Stubbs MD   aspirin EC 81 MG EC tablet Take 1 tablet by mouth daily 9/13/16  Yes Marina Stubbs MD   gabapentin (NEURONTIN) 100 MG capsule TAKE TWO CAPSULE BY MOUTH EVERY NIGHT. 1/8/19 5/13/19  Marina Stubbs MD       Allergies:  Flomax [tamsulosin hcl]; Nsaids; Penicillins; and Topamax [topiramate]    Social History:   TOBACCO:   reports that he has quit smoking. He has a 54.00 pack-year smoking history. He has never used smokeless tobacco.  ETOH:   reports that he does not drink alcohol.        Family History:       Problem Relation Age of Onset    COPD Mother     Other Mother         benign tremors       REVIEW OF SYSTEMS:  Negative except for above    Physical Exam:    Vitals: BP (!) 141/75   Pulse 72   Temp 97.9 °F (36.6 °C) (Oral)   Resp 16   Ht 5' 8\" (1.727 m)   Wt 235 lb 3.7 oz (106.7 kg)   SpO2 94%   BMI 35.77 kg/m²   General appearance: alert, appears stated age and cooperative  Skin: Skin color, texture, turgor normal. No rashes or lesions  HEENT: Head: Normocephalic, no lesions, without obvious abnormality. Eye: Normal external eye, conjunctiva, lids cornea, BRITT. Nose: Normal external nose, mucus membranes and septum. Pharynx: Dental Hygiene adequate. Normal buccal mucosa. Normal pharynx. Neck: no adenopathy, supple, symmetrical, trachea midline and thyroid not enlarged, symmetric, no tenderness/mass/nodules  Lungs: clear to auscultation bilaterally  Heart: regular rate and rhythm, S1, S2 normal, no murmur, click, rub or gallop  Abdomen: soft, non-tender; bowel sounds normal; no masses,  no organomegaly  Extremities: extremities normal, atraumatic, no cyanosis or edema  Neurologic: Mental status: Alert, oriented, thought content appropriate, SUBTLE R LEG WEAKNESS W HIP FLEXION AND DORSIFLEXION 4+/5, SPEECH MILDLY SLURRED BUT INTELLIGIBLE, CN 2-12 INTACT, NO SENSORY DEFICIT    CBC:   Recent Labs     05/13/19 1905 05/14/19 0139   WBC 7.7 8.0   HGB 14.4 13.8    145     BMP:    Recent Labs     05/13/19 1905 05/14/19 0139    139   K 4.8 4.0    102   CO2 25 24   BUN 19 20   CREATININE 1.5* 1.3   GLUCOSE 148* 161*     Hepatic:   Recent Labs     05/13/19 1905 05/14/19 0139   AST 20 20   ALT 20 19   BILITOT 0.4 0.3   ALKPHOS 97 91     Troponin: No results for input(s): TROPONINI in the last 72 hours. BNP: No results for input(s): BNP in the last 72 hours.   Lipids:   Recent Labs     05/14/19 0139   CHOL 158   HDL 33*     ABGs:   Lab Results   Component Value Date    PO2ART 62 05/13/2019    FTV2HMO 44.0 05/13/2019     INR:   Recent Labs     05/13/19 1905   INR 1.05     -----------------------------------------------------------------       Assessment and Plan     Patient Active Problem List   Diagnosis Code    CAD (coronary artery disease) I25.10    Benign essential tremor G25.0    Obstructive lung disease ([address] J44.9    Back pain, chronic M54.9, G89.29    Hypertension I10    Dyslipidemia E78.5    AAA (abdominal aortic aneurysm)

## 2019-05-14 NOTE — CONSULTS
Neurology Service Consult Note  [facility]  Patient Name: Dandy Amaya  : 1935        Subjective:   Reason for consult: dysarthria, facial droop and RLE weakness  82 y.o. right-handed male with PMH of vertigo, seizures, prepuce and phimosis, PFO, obesity, gr, kidney stones, hypertension, hyperlipidemia, homocystinemia, hearing loss, GERD, tobacco abuse, ED, dementia, CVA, COPD, CAD with CAD, benign essential tremor,, back pain, asthma, arthritis, AAA presenting to [facility] with complaints of bilateral lower leg weakness with right leg weaker than the left, expressive aphasia and dysarthria with right facial droop that occurred 1 day prior to examination, patient went for heart catheterization yesterday, 1 day prior to exam, cath was completed without any complications, went home and suddenly developed the above symptoms of the right leg weakness  Slurred speech and facial droop. Symptoms were waxing and waning in the emergency room thus no TPA administered, stayed here is wrinkled regional and has CTA showed no large vessel occlusion. Prior to Arrival patient is on aspirin Aggrenox and Pravachol. Prior stroke-Affected the left side of the body. Patient denies chest pain or shortness breath. During my exam he still has a right lower extremity weakness, dysarthria and expressive aphasia. Not have any loss of consciousness tongue biting rigidity convulsions or loss of bowel or bladder control. Since wife at the bedside, provides past medical history foreign. Patient extremely hard of hearing. Patient's wife also informs me the patient's been having a posterior around the crown of the head cephalgia that started Friday prior to examination, patient is not typically a headache patient,  He doesn't have any temple region tenderness or jaw claudication or diplopia or loss of vision, however headache has been consistent for the last 4 days without any relief.       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 Lily Riley. & Dr. Diamond Rider Carotid stenosis, right 11/13    ~50% or less, on MRA and carotid u/s    Chronic kidney disease     CKD (chronic kidney disease)     cannot take nsaids    COPD (chronic obstructive pulmonary disease) ([address]     CVA (cerebrovascular accident) ([address] 11/16/13 admission    (aggrenox [facility]) R temporal/occipital and R thalamic infarct, L sided weakness and focal seizure    Dementia due to Alzheimer's disease 03/28/2017    MMSE 22/30 ON 3/28/17--- STARTING TRIAL ARICEPT    Dyslipidemia 10/04    ED (erectile dysfunction) 6/02 372 ,  1/2001  46    testos    Former smoker     GERD (gastroesophageal reflux disease)     Hearing loss 5/06    wear hearing aides bilat    Homocysteinemia ([address]     on foltx    Hyperlipidemia     Hypertension     Kidney stone     Migraine headache     Obesity (BMI 30-39. 9)     PFO (patent foramen ovale)     noted on [place] 12/17/13- Dr Radha Allen Redundant prepuce and phimosis 1/5/12    Dr Maira Huber Seizure disorder, focal motor ([address] 11/13 admission    L hand tremor assoc w CVA    Vertigo     :   Past Surgical History:   Procedure Laterality Date    ABDOMINAL AORTIC ANEURYSM REPAIR, ENDOVASCULAR  9/24/13    CARDIOVASCULAR STRESS TEST  1/10/2012    Dr. Duarte Orr- normal with EF 45%    CORONARY ARTERY BYPASS GRAFT  1/1994    Dr Miles Catherine  years ago    wears full dentures     Medications:  Scheduled Meds:   atorvastatin  40 mg Oral Nightly    LORazepam  0.5 mg Intravenous Once    sodium chloride flush  10 mL Intravenous BID    enoxaparin  40 mg Subcutaneous Daily    citalopram  10 mg Oral Daily    dipyridamole-aspirin  1 capsule Oral BID    donepezil  10 mg Oral Nightly    mometasone-formoterol  2 puff Inhalation BID    folic acid  1 mg Oral Daily    gabapentin  200 mg Oral Nightly    metoprolol succinate  12.5 mg Oral Daily    pantoprazole  40 mg Oral QAM AC    oxybutynin  5 mg Oral BID    QUEtiapine  25 mg Oral Nightly    aspirin  81 mg Oral Daily     Continuous Infusions:   sodium chloride 50 mL/hr at 05/14/19 0945     PRN Meds:.sodium chloride flush, magnesium hydroxide, ondansetron, albuterol sulfate HFA, traZODone    Allergies   Allergen Reactions    Flomax [Tamsulosin Hcl]      Hypotension/syncope    Nsaids      Due to CKD    Penicillins Hives    Topamax [Topiramate]      Fatigue and severe depression     Social History     Socioeconomic History    Marital status:      Spouse name: Not on file    Number of children: Not on file    Years of education: Not on file    Highest education level: Not on file   Occupational History    Occupation: heating and air     Comment: business owner   Social Needs    Financial resource strain: Not on file    Food insecurity:     Worry: Not on file     Inability: Not on file    Transportation needs:     Medical: Not on file     Non-medical: Not on file   Tobacco Use    Smoking status: Former Smoker     Packs/day: 1.50     Years: 36.00     Pack years: 54.00    Smokeless tobacco: Never Used    Tobacco comment: quit smoking in 1994   Substance and Sexual Activity    Alcohol use: No     Comment:         Caffeine: Decaf    Drug use: No    Sexual activity: Not on file   Lifestyle    Physical activity:     Days per week: Not on file     Minutes per session: Not on file    Stress: Not on file   Relationships    Social connections:     Talks on phone: Not on file     Gets together: Not on file     Attends Rastafarian service: Not on file     Active member of club or organization: Not on file     Attends meetings of clubs or 1. 5* 1.3   GLUCOSE 148* 161*   INR 1.05  --        Results for Mihir Tillman (MRN [id number]) as of 5/14/2019 09:56   Ref. Range 4/9/2019 09:36 5/13/2019 19:05 5/14/2019 01:39   Cholesterol, Total Latest Ref Range: 0 - 199 mg/dL 187     HDL Cholesterol Latest Ref Range: >40 MG/DL 35 (L)  33 (L)   LDL Calculated Latest Ref Range: <100 mg/dL see below     LDL Direct Latest Ref Range: <100 MG/ (H)  102 (H)   Triglycerides Latest Ref Range: <150 MG/ (H)  283 (H)   VLDL Cholesterol Calculated Latest Ref Range: Not Established mg/dL see below         IMAGING:    MRI Brain: pending  MRI C spine: pending   CTA:  Greater than 90% stenosis left proximal ICA       50-60% stenosis right proximal ICA.       High-grade stenosis versus occlusion right V4 segment, age indeterminate.       High-grade stenosis versus occlusion right posterior cerebral artery   corresponding to the site of right PCA distribution encephalomalacia.       MRI could be beneficial to evaluate for new  infarct as clinically warranted. ECHO/Cath:  IMPRESSION:  1. Left main is patent. 2.  LAD is proximally 100% occluded. 3.  Ramus has mild disease. 4.  Circ has mild disease. 5.  RCA is 100% occluded. 6.  LIMA to LAD is patent. 7.  SVG to PDA is patent. There is a collateral circulation filling the  PLB branch. 8.  EDP was normal.  9.  Right heart pressures are normal.          ASSESSMENT/PLAN:     3 82year old male with acute dysarthria and shuffling gait due to right leg weakness secondary to acute L-MCA infarction v. Reactivation. Will rule out vasculitis. We will continue with the following plan of care:  1. Stroke risk factors:  1. Age, previous CVA, CAD  2. Neuro Exam:  1. Expressive Aphasia   2. Dysarhtria  3. RLE weakness   3. Neurodiagnostics:  1. MRI brain pending   2. CTA discussed above  3. ECHO as above  4. Sed rate pending   5. Mri C spine pending   4. Medications:  1.  Continue ASA and statin pre-MRI  5. PT/OT/ST:  1. St the focus at this time   6. Follow up:  1. Pending completion of MRI  2. Thank you to cardiology, medicine and CT surgery. 3. LAZARO work up in our office         Thank you for allowing us to participate in the care of your patient. If there are any questions regarding evaluation please feel free to contact us. ROBBIE Humphrey - CNP, 5/14/2019    Attending Note:  I have rounded on this patient with Abhi Casillas CNP. I have reviewed the chart and we have discussed this case in detail. The patient was seen and examined by myself. Pertinent labs and imaging have been personally reviewed. Our findings and impressions were discussed with the patient. I concur with the Nurse Practioner's assessment and plan.     Andrea Bravo DO 5/14/2019 1:51 PM

## 2019-05-15 ENCOUNTER — APPOINTMENT (OUTPATIENT)
Dept: MRI IMAGING | Age: 84
DRG: 065 | End: 2019-05-15
Payer: MEDICARE

## 2019-05-15 PROCEDURE — 1200000000 HC SEMI PRIVATE

## 2019-05-15 PROCEDURE — 2580000003 HC RX 258: Performed by: INTERNAL MEDICINE

## 2019-05-15 PROCEDURE — 99233 SBSQ HOSP IP/OBS HIGH 50: CPT | Performed by: NURSE PRACTITIONER

## 2019-05-15 PROCEDURE — 97162 PT EVAL MOD COMPLEX 30 MIN: CPT

## 2019-05-15 PROCEDURE — 94761 N-INVAS EAR/PLS OXIMETRY MLT: CPT

## 2019-05-15 PROCEDURE — 97535 SELF CARE MNGMENT TRAINING: CPT

## 2019-05-15 PROCEDURE — 2500000003 HC RX 250 WO HCPCS: Performed by: NURSE PRACTITIONER

## 2019-05-15 PROCEDURE — 70551 MRI BRAIN STEM W/O DYE: CPT

## 2019-05-15 PROCEDURE — 93225 XTRNL ECG REC<48 HRS REC: CPT

## 2019-05-15 PROCEDURE — 6360000002 HC RX W HCPCS: Performed by: INTERNAL MEDICINE

## 2019-05-15 PROCEDURE — 72141 MRI NECK SPINE W/O DYE: CPT

## 2019-05-15 PROCEDURE — 6370000000 HC RX 637 (ALT 250 FOR IP): Performed by: NURSE PRACTITIONER

## 2019-05-15 PROCEDURE — 2580000003 HC RX 258: Performed by: NURSE PRACTITIONER

## 2019-05-15 PROCEDURE — 97530 THERAPEUTIC ACTIVITIES: CPT

## 2019-05-15 PROCEDURE — 99233 SBSQ HOSP IP/OBS HIGH 50: CPT | Performed by: INTERNAL MEDICINE

## 2019-05-15 PROCEDURE — 6370000000 HC RX 637 (ALT 250 FOR IP): Performed by: INTERNAL MEDICINE

## 2019-05-15 PROCEDURE — 97167 OT EVAL HIGH COMPLEX 60 MIN: CPT

## 2019-05-15 PROCEDURE — 94640 AIRWAY INHALATION TREATMENT: CPT

## 2019-05-15 RX ORDER — CLOPIDOGREL BISULFATE 75 MG/1
300 TABLET ORAL ONCE
Status: COMPLETED | OUTPATIENT
Start: 2019-05-15 | End: 2019-05-15

## 2019-05-15 RX ORDER — CLOPIDOGREL BISULFATE 75 MG/1
75 TABLET ORAL ONCE
Status: COMPLETED | OUTPATIENT
Start: 2019-05-15 | End: 2019-05-15

## 2019-05-15 RX ORDER — HYDROCODONE BITARTRATE AND ACETAMINOPHEN 5; 325 MG/1; MG/1
1 TABLET ORAL EVERY 6 HOURS PRN
Status: DISCONTINUED | OUTPATIENT
Start: 2019-05-15 | End: 2019-05-18 | Stop reason: HOSPADM

## 2019-05-15 RX ADMIN — CITALOPRAM HYDROBROMIDE 10 MG: 20 TABLET ORAL at 10:40

## 2019-05-15 RX ADMIN — Medication 2 PUFF: at 08:20

## 2019-05-15 RX ADMIN — ATORVASTATIN CALCIUM 40 MG: 40 TABLET, FILM COATED ORAL at 21:51

## 2019-05-15 RX ADMIN — OXYBUTYNIN CHLORIDE 5 MG: 5 TABLET ORAL at 21:51

## 2019-05-15 RX ADMIN — SODIUM CHLORIDE 50 ML/HR: 9 INJECTION, SOLUTION INTRAVENOUS at 06:20

## 2019-05-15 RX ADMIN — DONEPEZIL HYDROCHLORIDE 10 MG: 10 TABLET, FILM COATED ORAL at 21:51

## 2019-05-15 RX ADMIN — OXYBUTYNIN CHLORIDE 5 MG: 5 TABLET ORAL at 10:40

## 2019-05-15 RX ADMIN — SODIUM CHLORIDE, PRESERVATIVE FREE 10 ML: 5 INJECTION INTRAVENOUS at 21:55

## 2019-05-15 RX ADMIN — HYDROCODONE BITARTRATE AND ACETAMINOPHEN 1 TABLET: 5; 325 TABLET ORAL at 10:39

## 2019-05-15 RX ADMIN — GABAPENTIN 200 MG: 100 CAPSULE ORAL at 21:51

## 2019-05-15 RX ADMIN — FOLIC ACID 1 MG: 1 TABLET ORAL at 10:39

## 2019-05-15 RX ADMIN — PANTOPRAZOLE SODIUM 40 MG: 40 TABLET, DELAYED RELEASE ORAL at 06:12

## 2019-05-15 RX ADMIN — METOPROLOL SUCCINATE 12.5 MG: 25 TABLET, EXTENDED RELEASE ORAL at 10:40

## 2019-05-15 RX ADMIN — QUETIAPINE FUMARATE 25 MG: 25 TABLET ORAL at 21:51

## 2019-05-15 RX ADMIN — ASPIRIN 81 MG: 81 TABLET, COATED ORAL at 10:40

## 2019-05-15 RX ADMIN — ENOXAPARIN SODIUM 40 MG: 40 INJECTION SUBCUTANEOUS at 10:39

## 2019-05-15 RX ADMIN — CLOPIDOGREL BISULFATE 300 MG: 75 TABLET ORAL at 10:39

## 2019-05-15 RX ADMIN — VALPROATE SODIUM 500 MG: 100 INJECTION, SOLUTION INTRAVENOUS at 06:12

## 2019-05-15 ASSESSMENT — PAIN DESCRIPTION - FREQUENCY: FREQUENCY: INTERMITTENT

## 2019-05-15 ASSESSMENT — PAIN SCALES - GENERAL
PAINLEVEL_OUTOF10: 0
PAINLEVEL_OUTOF10: 7
PAINLEVEL_OUTOF10: 0
PAINLEVEL_OUTOF10: 7

## 2019-05-15 ASSESSMENT — PAIN DESCRIPTION - DESCRIPTORS: DESCRIPTORS: HEADACHE

## 2019-05-15 ASSESSMENT — PAIN DESCRIPTION - LOCATION: LOCATION: HEAD

## 2019-05-15 ASSESSMENT — PAIN DESCRIPTION - PAIN TYPE: TYPE: ACUTE PAIN

## 2019-05-15 NOTE — PROGRESS NOTES
Dyslipidemia    AAA (abdominal aortic aneurysm) without rupture (HCC)    Cerebral infarction ([address]    CVA (cerebrovascular accident) ([address]    Homocysteinemia ([address]    PFO (patent foramen ovale)    Migraine headache    COPD (chronic obstructive pulmonary disease) ([address]    AAA (abdominal aortic aneurysm) (HCC)    Dementia due to Alzheimer's disease    Atherosclerosis     Delirium    Hemispheric carotid artery syndrome    Ventricular tachycardia (HCC)    CKD (chronic kidney disease)    Dysthymia    GERD (gastroesophageal reflux disease)    Cerebrovascular accident (CVA) due to stenosis of cerebral artery (HCC)    Acute CVA (cerebrovascular accident) ([address]    Dysarthria         Due to acute infarct, recommend delaying L CEA about 1 mo. Continue plavix. F/u in office in 3 weeks to schedule.      Katelynn Diallo PA-C

## 2019-05-15 NOTE — PROGRESS NOTES
Neurology Service Progress Note   [facility]  Patient Name: Chepe Gonzalez  : 1935        Subjective:   Patient seen and and examined  Exam improving  Denies CP and SOB  Stronger in all extremities        :     Medications:  Scheduled Meds:   atorvastatin  40 mg Oral Nightly    LORazepam  0.5 mg Intravenous Once    sodium chloride flush  10 mL Intravenous BID    enoxaparin  40 mg Subcutaneous Daily    citalopram  10 mg Oral Daily    dipyridamole-aspirin  1 capsule Oral BID    donepezil  10 mg Oral Nightly    mometasone-formoterol  2 puff Inhalation BID    folic acid  1 mg Oral Daily    gabapentin  200 mg Oral Nightly    metoprolol succinate  12.5 mg Oral Daily    pantoprazole  40 mg Oral QAM AC    oxybutynin  5 mg Oral BID    QUEtiapine  25 mg Oral Nightly    aspirin  81 mg Oral Daily     Continuous Infusions:   sodium chloride 50 mL/hr (05/15/19 0620)     PRN Meds:.sodium chloride flush, magnesium hydroxide, ondansetron, albuterol sulfate HFA, traZODone         Family History   Problem Relation Age of Onset    COPD Mother     Other Mother         benign tremors       Review of Symptoms:    10-point system review completed. All of which are negative except as mentioned above. Physical Exam:      Gen: Alert and oriented to self  NAD, cooperative  HEENT: NC/AT, EOMI, PERRL, mmm, no carotid bruits, neck supple, no meningeal signs;   Heart: Regular  Lungs: no distress  Ext: no edema, no calf tenderness b/l  Psych: poor eye contact   Skin: no rashes or lesions    NEUROLOGIC EXAM:    Mental Status: A&O to self, wife, situation only NAD, expressive aphasia present, speech dysarthric, he is apraxic     Cranial Nerve Exam:   CN II-XII:  PERRL, VFF, no nystagmus, no gaze paresis, sensation V1-V3 intact b/l, muscles of facial expression asymmetric with a mild right facial droop; hearing intact to conversational tone, palate elevates symmetrically, shoulder elevation symmetric and tongue protrudes midline with movement side to side. Motor Exam:       BUE antigravity with chronic weakness on the LUE   RLE drift  LLE weakness that is chronic RLE weakness >LLE      Deep Tendon Reflexes: 2+/4 biceps, triceps, brachioradialis, trace: patellar, and achilles b/l; flexor plantar responses b/l    Sensation: Intact light touch UE's/LE's b/l    Coordination/Cerebellum:       Tremors--none      Rapidly alternating movements: chronic severe dysdiadochokinesia LUE         Gait and stance:      Gait: deferred      LABS:     Recent Labs     05/13/19  1905 05/14/19  0139 05/14/19  1221   WBC 7.7 8.0  --     139  --    K 4.8 4.0  --     102  --    CO2 25 24  --    BUN 19 20  --    CREATININE 1.5* 1.3  --    GLUCOSE 148* 161*  --    INR 1.05  --   --    ESR  --   --  23*       Results for Nic Kathleen (MRN [id number]) as of 5/14/2019 09:56   Ref. Range 4/9/2019 09:36 5/13/2019 19:05 5/14/2019 01:39   Cholesterol, Total Latest Ref Range: 0 - 199 mg/dL 187     HDL Cholesterol Latest Ref Range: >40 MG/DL 35 (L)  33 (L)   LDL Calculated Latest Ref Range: <100 mg/dL see below     LDL Direct Latest Ref Range: <100 MG/ (H)  102 (H)   Triglycerides Latest Ref Range: <150 MG/ (H)  283 (H)   VLDL Cholesterol Calculated Latest Ref Range: Not Established mg/dL see below         IMAGING:    MRI Brain: pending  MRI C spine: pending   CTA:  Greater than 90% stenosis left proximal ICA       50-60% stenosis right proximal ICA.       High-grade stenosis versus occlusion right V4 segment, age indeterminate.       High-grade stenosis versus occlusion right posterior cerebral artery   corresponding to the site of right PCA distribution encephalomalacia.       MRI could be beneficial to evaluate for new  infarct as clinically warranted. ECHO/Cath:  IMPRESSION:  1. Left main is patent. 2.  LAD is proximally 100% occluded. 3.  Ramus has mild disease. 4.  Circ has mild disease. 5.  RCA is 100% occluded.   6.  LIMA to LAD is patent. 7.  SVG to PDA is patent. There is a collateral circulation filling the  PLB branch. 8.  EDP was normal.  9.  Right heart pressures are normal.    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 Disc and osteophytes as   well as facet and ligamentum flavum hypertrophy contribute to further   stenosis of the thecal sac and narrowing of the neural foramina as discussed   above.  There is severe stenosis of the thecal sac at C4-5, C5-6, and C6-7. ASSESSMENT/PLAN:     3 82year old male with acute dysarthria and shuffling gait due to right leg weakness secondary to acute left pontine infarction superimposed on R-MCA remote infarction. We will continue with the following plan of care:  1. Stroke risk factors:  1. Age, previous CVA, CAD  2. Neuro Exam:  1. Expressive Aphasia, improving  2. Dysarhtria improving  3. RLE weakness improving   3. Neurodiagnostics:  1. MRI brain L pontine infarction  2. CTA discussed above  3. ECHO as above  4. Sed rate WNL  5. Mri C spine Discussed above   4. Medications:  1. Continue ASA   2. Add Plavix D/C Aggrenox   3. Statin nightly   5. PT/OT/ST:  1. St the focus at this time   6. Follow up:  1. Thank you to cardiology, medicine and CT surgery. 2. LAZARO work up in our office   3. Will follow up peripherally         Thank you for allowing us to participate in the care of your patient. If there are any questions regarding evaluation please feel free to contact us.      ROBBIE Zhao - YOVANNY, 5/15/2019

## 2019-05-15 NOTE — PROGRESS NOTES
Occupational Therapy   Occupational Therapy Initial Assessment  Date: 5/15/2019   Patient Name: Gisele Puente  MRN: [id number]     : 1935    Date of Service: 5/15/2019    Discharge Recommendations:  IP Rehab  OT Equipment Recommendations  Other: defer    Assessment   Performance deficits / Impairments: Decreased functional mobility ; Decreased safe awareness;Decreased endurance;Decreased high-level IADLs;Decreased balance;Decreased coordination;Decreased ADL status; Decreased cognition  Treatment Diagnosis: CVA  Prognosis: Good  Decision Making: High Complexity  Assistance / Modification: Pt is a 81 yo male admitted from home for CVA. Pt at baseline is Independent for ADLs, needs assisdtance for high level IADLs and is Independent for functional mobility/transfers. Pt currently presents w/ above deficits and requires increased assistance for functional activities. Pt would benefit from continued acute care OT services w/ discharge to ARU. Patient Education: ot role, discharge rec, proper hand placement for safe functional transfers  Barriers to Learning: decreased cognition/[place]  REQUIRES OT FOLLOW UP: Yes  Activity Tolerance  Activity Tolerance: Patient Tolerated treatment well  Safety Devices  Safety Devices in place: Yes  Type of devices: All fall risk precautions in place; Chair alarm in place;Gait belt;Left in chair;Nurse notified; Patient at risk for falls;Call light within reach  Restraints  Initially in place: No           Patient Diagnosis(es): The primary encounter diagnosis was Dysarthria. Diagnoses of Aphasia and Right sided weakness were also pertinent to this visit.      has a past medical history of AAA (abdominal aortic aneurysm) ([facility] Arteriosclerosis, Arthritis, Asthma, Back pain, Balanitis xerotica obliterans, Benign essential tremor, CAD (coronary artery disease), Carotid stenosis, right, Chronic kidney disease, CKD (chronic kidney disease), COPD (chronic obstructive pulmonary disease) ([facility] CVA (cerebrovascular accident) ([facility] Dementia due to Alzheimer's disease, Dyslipidemia, ED (erectile dysfunction), Former smoker, GERD (gastroesophageal reflux disease), Hearing loss, Homocysteinemia ([address] Hyperlipidemia, Hypertension, Kidney stone, Migraine headache, Obesity (BMI 30-39.9), PFO (patent foramen ovale), Redundant prepuce and phimosis, Seizure disorder, focal motor ([address] and Vertigo. has a past surgical history that includes Coronary artery bypass graft (1/1994); cardiovascular stress test (1/10/2012); Dental surgery (years ago); and AAA repair, endovascular (9/24/13). Treatment Diagnosis: CVA      Restrictions  Restrictions/Precautions  Restrictions/Precautions: Fall Risk, General Precautions    Subjective   General  Chart Reviewed: Yes  Patient assessed for rehabilitation services?: Yes  Family / Caregiver Present: No     Social/Functional History  Social/Functional History  Lives With: Spouse  Type of Home: House  Home Layout: One level  Home Access: Level entry  Bathroom Shower/Tub: Walk-in shower  Bathroom Equipment: Shower chair, Grab bars in shower  Home Equipment: Rolling walker, 4 wheeled walker(RW in house, 4WW in community)  ADL Assistance: [address] [place] [address]: Needs assistance  Homemaking Responsibilities: No  Ambulation Assistance: Independent  Transfer Assistance: Independent  Active : No  Additional Comments: fall 5/13       Objective   Vision: Within Functional Limits  Hearing: Exceptions to [facility]  Hearing Exceptions: Hard of hearing/hearing concerns    Orientation  Overall Orientation Status: Impaired  Orientation Level: Disoriented to situation;Disoriented to time;Disoriented to place;Oriented to person  Observation/Palpation  Posture: Fair  Observation: pt received supine in bed, agreeable to therapy  Balance  Sitting Balance: Minimal assistance(posterior lean sitting EOB)  Standing Balance:  Moderate assistance  Standing Balance  Time: ~3 minutes  Activity: errors  Insights: Decreased awareness of deficits  Initiation: Requires cues for some  Sequencing: Requires cues for some  Perception  Overall Perceptual Status: Impaired  Motor Planning: Cues to use objects appropriately     Sensation  Overall Sensation Status: WFL        LUE AROM (degrees)  LUE AROM : WFL  RUE AROM (degrees)  RUE AROM : WFL  LUE Strength  Gross LUE Strength: WFL  L Hand Grasp: 4+/5  RUE Strength  Gross RUE Strength: WFL  R Hand Grasp: 4+/5           Self Care Training:   Cues were given for safety, sequence, UE/LE placement, visual cues, and balance.     Activities performed today included LE dressing, LE bathing, UE ADL grooming, UE bathing          Plan   Plan  Times per week: 2x+  Times per day: Daily  Current Treatment Recommendations: Balance Training, Endurance Training, Gait Training, Neuromuscular Re-education, Pain Management, Patient/Caregiver Education & Training, Home Management Training, Cognitive/Perceptual Training, Functional Mobility Training, Stair training, Cognitive Reorientation, Safety Education & Training, Equipment Evaluation, Education, & procurement, Positioning, Self-Care / ADL             AM-PAC Score        [facility] In[facility] Daily Activity Raw Score: 12  AM-PAC Inpatient ADL T-Scale Score : 30.6  ADL Inpatient CMS 0-100% Score: 66.57  ADL Inpatient CMS G-Code Modifier : CL    Goals  Short term goals  Time Frame for Short term goals: discharge  Short term goal 1: Pt will perform UE bathing/dressing SBA  Short term goal 2: Pt will perform LE bathing/dressing Elizabeth  Short term goal 3: Pt will perform functional transfer w/ AD Elizabeth  Short term goal 4: Pt will perform functional mobility to/from bathroom w/ AD Elizabeth  Short term goal 5: Pt will perform toileting w/ AD Elizabeth  Patient Goals   Patient goals : go home     Time In: 1357  Time Out: 1432  Total Treatment Minutes: 25 minutes  Total Treatment Time: 35 minutes    Corazon Lal OTR/L [id number]  3:16 PM,5/15/2019

## 2019-05-15 NOTE — PROGRESS NOTES
Daily Progress Note    I have seen ,spoken to  and examined this patient personally, independently of the Physician assistant . I have reviewed the hospital care given to date and reviewed all pertinent labs and imaging. The plan was developed mutually at the time of the visit with the patient,  PA  and myself. I have spoken with patient, nursing staff and provided written and verbal instructions . The above note has been reviewed and I agree with the assessment, diagnosis, and treatment plan with changes made by me as follows     CARDIOLOGY ATTENDING ADDENDUM    HPI:  I have reviewed the above HPI  And agree with above   Kiera Seen is a 82 y. o.year old who and presents with had concerns including Aphasia and Extremity Weakness (right). Chief Complaint   Patient presents with    Aphasia    Extremity Weakness     right     Interval history:  Patient is awake alert doing ok  Remain in sinus   Has not been out of bed  MRI of head showed CVA in [place] area  carotid dx present  He has significant carotid dx and CABG  Plan for delayed left carotid surgery  Will place a holter for PAFIB  He may benefit with AC and ASA instead of Aggrenox --started on plavix already also   Supportive care    CT head and neck-  Impression:        Greater than 90% stenosis left proximal ICA    50-60% stenosis right proximal ICA. High-grade stenosis versus occlusion right V4 segment, age indeterminate. High-grade stenosis versus occlusion right posterior cerebral artery  corresponding to the site of right PCA distribution encephalomalacia. MRI could be beneficial to evaluate for new  infarct as clinically warranted. Cath--  IMPRESSION:  1.  Left main is patent. 2.  LAD is proximally 100% occluded. 3.  Ramus has mild disease. 4.  Circ has mild disease. 5.  RCA is 100% occluded. 6.  LIMA to LAD is patent. 7.  SVG to PDA is patent. Doreene Jason is a collateral circulation filling the  PLB branch.   8.  EDP was normal.  9.  Right heart pressures are normal.        MRI of head   Impression:        Acute infarct in the left [place]y. Physical Exam:  General:  Awake alert   Head:normal  Eye:normal  Neck:  No JVD   Chest:  Clear to auscultation, respiration easy  Cardiovascular:  Sinus   Abdomen:   nontender  Extremities:   No edema    Pulses; palpable  Neuro: grossly normal      MEDICAL DECISION MAKING;    I agree with the above plan, which was planned by myself and discussed with SHIRA Briseno Electronically signed by Romario Paul MD [facility] on 5/15/2019 at 1:09 PM    Pt. Awake, alert and feeling better  HR stable, NSR, BP stable  Denies CP, SOB    CVA    MRI pending    Slurred speech Rt. Sided weakness occurring after LHC 2 days ago    LICA found to have 04% blockage    CT surg. Following-will need delayed CEA    Neuro following    Appears to have improved strength on RT. Side on exam this am     CAD s/p CABG    EF reduced on recent stress so LH performed 5/13    Stable- med. Tx. Rec. Will cont. To follow    IMPRESSION:  1. Left main is patent. 2.  LAD is proximally 100% occluded. 3.  Ramus has mild disease. 4.  Circ has mild disease. 5.  RCA is 100% occluded. 6.  LIMA to LAD is patent. 7.  SVG to PDA is patent. There is a collateral circulation filling the  PLB branch. 8.  EDP was normal.  9.  Right heart pressures are normal.      PAST MEDICAL HISTORY:  Coronary artery disease status post CABG done,  two vessels were patent LIMA to LAD and SVG to PDA. All the other  grafts were occluded. Hypertension, hyperlipidemia present.   History of  having nonsustained ventricular tachycardia, history of strokes in the  past, and kidney stones.     PAST SURGICAL HISTORY:  Coronary artery bypass surgery, two bypasses  were opened, LIMA to LAD and SVG to PDA.     SOCIAL HISTORY:  He does not smoke and does not drink.     ALLERGIES:  FLOMAX, NSAIDS, PENICILLIN, and TOPAMAX.     MEDICATIONS:  He is on is Celexa, Prilosec, Seroquel, Aggrenox,  Pravachol, aspirin, and Neurontin. Objective:   /88   Pulse 68   Temp 98.2 °F (36.8 °C) (Axillary)   Resp 16   Ht 5' 8\" (1.727 m)   Wt 235 lb 3.7 oz (106.7 kg)   SpO2 93%   BMI 35.77 kg/m²   No intake or output data in the 24 hours ending 05/15/19 1018    Medications:   Scheduled Meds:   clopidogrel  300 mg Oral Once    Or    clopidogrel  75 mg Oral Once    atorvastatin  40 mg Oral Nightly    LORazepam  0.5 mg Intravenous Once    sodium chloride flush  10 mL Intravenous BID    enoxaparin  40 mg Subcutaneous Daily    citalopram  10 mg Oral Daily    donepezil  10 mg Oral Nightly    mometasone-formoterol  2 puff Inhalation BID    folic acid  1 mg Oral Daily    gabapentin  200 mg Oral Nightly    metoprolol succinate  12.5 mg Oral Daily    pantoprazole  40 mg Oral QAM AC    oxybutynin  5 mg Oral BID    QUEtiapine  25 mg Oral Nightly    aspirin  81 mg Oral Daily      Infusions:   sodium chloride 50 mL/hr (05/15/19 0620)      PRN Meds:  HYDROcodone-acetaminophen, sodium chloride flush, magnesium hydroxide, ondansetron, albuterol sulfate HFA, traZODone       Physical Exam:  Vitals:    05/15/19 0821   BP:    Pulse:    Resp:    Temp:    SpO2: 93%        General: AAO, NAD  Chest: Nontender  Cardiac: First and Second Heart Sounds are Normal, No Murmurs or Gallops noted  Lungs:Clear to auscultation and percussion. Abdomen: Soft, NT, ND, +BS  Extremities: No clubbing, no edema  Vascular:  Equal 2+ peripheral pulses.         Lab Data:  CBC:   Recent Labs     05/13/19 1905 05/14/19 0139   WBC 7.7 8.0   HGB 14.4 13.8   HCT 45.9 44.6   .3* 109.6*    145     BMP:   Recent Labs     05/13/19 1905 05/14/19 0139    139   K 4.8 4.0    102   CO2 25 24   BUN 19 20   CREATININE 1.5* 1.3     LIVER PROFILE:   Recent Labs     05/13/19 1905 05/14/19 0139   AST 20 20   ALT 20 19   BILITOT 0.4 0.3   ALKPHOS 97 91     PT/INR:   Recent Labs     05/13/19  1905   PROTIME 12.2   INR 1.05     APTT:   Recent Labs     05/13/19  1905   APTT 25.3     BNP:  No results for input(s): BNP in the last 72 hours.       Assessment:  Patient Active Problem List    Diagnosis Date Noted    Cerebrovascular accident (CVA) due to stenosis of cerebral artery ([address] 05/13/2019     Priority: High    Ventricular tachycardia ([address] 05/04/2017     Priority: High    Delirium 05/03/2017     Priority: High    Hemispheric carotid artery syndrome 05/03/2017     Priority: High    Cerebral infarction ([address] 11/16/2013     Priority: High    CVA (cerebrovascular accident) ([address] 11/16/2013     Priority: High    Obstructive lung disease ([address] 10/15/2012     Priority: High    CAD (coronary artery disease) 11/29/2010     Priority: Medium    Benign essential tremor 11/29/2010     Priority: Medium    Hypertension      Priority: Low    Dysarthria     Acute CVA (cerebrovascular accident) ([address] 05/13/2019    GERD (gastroesophageal reflux disease)     Dysthymia 08/27/2018    CKD (chronic kidney disease)     Atherosclerosis  04/24/2017    Dementia due to Alzheimer's disease     COPD (chronic obstructive pulmonary disease) ([address]     AAA (abdominal aortic aneurysm) (HCC)     Migraine headache     PFO (patent foramen ovale)     Homocysteinemia ([address]     AAA (abdominal aortic aneurysm) without rupture ([address] 09/25/2013    Dyslipidemia     Back pain, chronic 11/12/2012       Electronically signed by Ania , PA-C on 5/15/2019 at 10:18 AM

## 2019-05-15 NOTE — CONSULTS
body systems (includes body structures/functions, activity/participation limitations):  · Observation:  Supine in bed upon arrival   · Vision:  WFL, glasses  · Hearing:  [place]  · Cardiopulmonary:  No O2 needs  · Cognition: impaired, see OT/SLP note for further evaluation. Musculoskeletal  · ROM R/L:  WFL. · Strength R/L:  R: 3+/5, L: 4-/5, weakness in function and endurance. · Neuro:  Decreased LLE coordination      Mobility:  · Supine to sit: Max A  · Transfers: Max A  · Sitting balance:  Min A-->SBA. · Standing balance:  Min A with RW.    · Gait: Min/mod A with RW    [facility] 6 Clicks Inpatient Mobility:  AM-PAC Inpatient Mobility Raw Score : 11    Treatment:  Cues for sequencing supine to sit transfer mod A to roll and max A for sidelying to sit, max A with LE and trunk. Patient sitting EOB, initially min A, with repeated cues for anterior weight shift d/t retropulsive LOB. Patient with STS from bed and chair with max A and cues for LE positioning, safety with RW, and anterior weight shift. Side steps at EOB with min A for balance and R knee buckling. Patient's wife insistent on patient returning home, attempted family training in order to prepare wife for caregiver burden. Patient's wife instructed on donning/doffing gait belt and how to assist for safe transfers, attempted x2, patient's wife unable to stand patient. Wife attempting to have patient pull on walker to stand, educated on using armrests to push to stand for safety, wife disagrees. Educated on importance of therapy for stroke rehab, verbalized understanding. Increased time required d/t patient [place]. Safety: patient left in chair with chair alarm, call light within reach, RN notified, gait belt used. Assessment:  Patient is a 83 yo male who presents with R sided weakness, MRI positive for acute L pontine infarct. Patient demonstrates impaired mobility and would benefit from skilled PT services and d/c to ARU.   However, patient's wife insistent on returning home with [facility]. Complexity: Moderate  Prognosis: Good, no significant barriers to participation at this time. Plan Times per week: 4/week, 1 week,   Discharge Recommendations: IP Rehab  Equipment: TBD    Goals:  Short term goals  Time Frame for Short term goals: 1 week  Short term goal 1: Patient will perform supine to sit with min A. Short term goal 2: Patient will perform STS with RW and min A. Short term goal 3: Patient will ambulate 20ft with CGA and RW       Treatment plan:  Bed mobility, transfers, balance, gait, TA, TX. Recommendations for NURSING mobility: up to chair, assist x2 and RW.     Time:   Time in: 1357  Time out: 1432  Timed treatment minutes: 25  Total time: 35    Electronically signed by:    Fredo Barnard, PT  5/15/2019, 4:18 PM

## 2019-05-15 NOTE — PROGRESS NOTES
IM Progress Note  5/15/2019 7:35 AM  Subjective:   Admit Date: 5/13/2019  PCP: Edi Kendall MD  Chief complaint- slurred speech and R leg weakness      Interval History: speech improving, did not get out of bed and still pending MRI. Pending PT eval.  Wants to go home      Data:   Scheduled Meds:   atorvastatin  40 mg Oral Nightly    LORazepam  0.5 mg Intravenous Once    sodium chloride flush  10 mL Intravenous BID    enoxaparin  40 mg Subcutaneous Daily    citalopram  10 mg Oral Daily    dipyridamole-aspirin  1 capsule Oral BID    donepezil  10 mg Oral Nightly    mometasone-formoterol  2 puff Inhalation BID    folic acid  1 mg Oral Daily    gabapentin  200 mg Oral Nightly    metoprolol succinate  12.5 mg Oral Daily    pantoprazole  40 mg Oral QAM AC    oxybutynin  5 mg Oral BID    QUEtiapine  25 mg Oral Nightly    aspirin  81 mg Oral Daily     Continuous Infusions:   sodium chloride 50 mL/hr (05/15/19 0620)     PRN Meds:HYDROcodone-acetaminophen, sodium chloride flush, magnesium hydroxide, ondansetron, albuterol sulfate HFA, traZODone  No intake/output data recorded. CBC:   Recent Labs     05/13/19 1905 05/14/19 0139   WBC 7.7 8.0   HGB 14.4 13.8    145     BMP:    Recent Labs     05/13/19 1905 05/14/19 0139    139   K 4.8 4.0    102   CO2 25 24   BUN 19 20   CREATININE 1.5* 1.3   GLUCOSE 148* 161*     Hepatic:   Recent Labs     05/13/19 1905 05/14/19 0139   AST 20 20   ALT 20 19   BILITOT 0.4 0.3   ALKPHOS 97 91     Troponin: No results for input(s): TROPONINI in the last 72 hours. BNP: No results for input(s): BNP in the last 72 hours.   INR:   Recent Labs     05/13/19 1905   INR 1.05       Objective:   Vitals: /88   Pulse 68   Temp 98.2 °F (36.8 °C) (Axillary)   Resp 16   Ht 5' 8\" (1.727 m)   Wt 235 lb 3.7 oz (106.7 kg)   SpO2 92%   BMI 35.77 kg/m²   General appearance: alert and cooperative with exam  Lungs: clear to auscultation

## 2019-05-16 LAB
ALBUMIN SERPL-MCNC: 3.5 GM/DL (ref 3.4–5)
ALP BLD-CCNC: 82 IU/L (ref 40–128)
ALT SERPL-CCNC: 17 U/L (ref 10–40)
ANION GAP SERPL CALCULATED.3IONS-SCNC: 11 MMOL/L (ref 4–16)
AST SERPL-CCNC: 19 IU/L (ref 15–37)
BASOPHILS ABSOLUTE: 0 K/CU MM
BASOPHILS RELATIVE PERCENT: 0.5 % (ref 0–1)
BILIRUB SERPL-MCNC: 0.5 MG/DL (ref 0–1)
BUN BLDV-MCNC: 16 MG/DL (ref 6–23)
CALCIUM SERPL-MCNC: 8.6 MG/DL (ref 8.3–10.6)
CHLORIDE BLD-SCNC: 105 MMOL/L (ref 99–110)
CO2: 27 MMOL/L (ref 21–32)
CREAT SERPL-MCNC: 1.2 MG/DL (ref 0.9–1.3)
DIFFERENTIAL TYPE: ABNORMAL
EOSINOPHILS ABSOLUTE: 0.2 K/CU MM
EOSINOPHILS RELATIVE PERCENT: 2.6 % (ref 0–3)
GFR AFRICAN AMERICAN: >60 ML/MIN/1.73M2
GFR NON-AFRICAN AMERICAN: 58 ML/MIN/1.73M2
GLUCOSE BLD-MCNC: 91 MG/DL (ref 70–99)
HCT VFR BLD CALC: 41.8 % (ref 42–52)
HEMOGLOBIN: 13.1 GM/DL (ref 13.5–18)
IMMATURE NEUTROPHIL %: 0.3 % (ref 0–0.43)
LYMPHOCYTES ABSOLUTE: 2.1 K/CU MM
LYMPHOCYTES RELATIVE PERCENT: 33.1 % (ref 24–44)
MCH RBC QN AUTO: 34.1 PG (ref 27–31)
MCHC RBC AUTO-ENTMCNC: 31.3 % (ref 32–36)
MCV RBC AUTO: 108.9 FL (ref 78–100)
MONOCYTES ABSOLUTE: 0.5 K/CU MM
MONOCYTES RELATIVE PERCENT: 7.6 % (ref 0–4)
NUCLEATED RBC %: 0 %
PDW BLD-RTO: 14.3 % (ref 11.7–14.9)
PLATELET # BLD: 148 K/CU MM (ref 140–440)
PMV BLD AUTO: 10.2 FL (ref 7.5–11.1)
POTASSIUM SERPL-SCNC: 4.5 MMOL/L (ref 3.5–5.1)
RBC # BLD: 3.84 M/CU MM (ref 4.6–6.2)
SEGMENTED NEUTROPHILS ABSOLUTE COUNT: 3.6 K/CU MM
SEGMENTED NEUTROPHILS RELATIVE PERCENT: 55.9 % (ref 36–66)
SODIUM BLD-SCNC: 143 MMOL/L (ref 135–145)
TOTAL IMMATURE NEUTOROPHIL: 0.02 K/CU MM
TOTAL NUCLEATED RBC: 0 K/CU MM
TOTAL PROTEIN: 5.8 GM/DL (ref 6.4–8.2)
WBC # BLD: 6.4 K/CU MM (ref 4–10.5)

## 2019-05-16 PROCEDURE — 97530 THERAPEUTIC ACTIVITIES: CPT

## 2019-05-16 PROCEDURE — 99233 SBSQ HOSP IP/OBS HIGH 50: CPT | Performed by: NURSE PRACTITIONER

## 2019-05-16 PROCEDURE — 97116 GAIT TRAINING THERAPY: CPT

## 2019-05-16 PROCEDURE — 94761 N-INVAS EAR/PLS OXIMETRY MLT: CPT

## 2019-05-16 PROCEDURE — 6360000002 HC RX W HCPCS: Performed by: INTERNAL MEDICINE

## 2019-05-16 PROCEDURE — 2580000003 HC RX 258: Performed by: INTERNAL MEDICINE

## 2019-05-16 PROCEDURE — 85025 COMPLETE CBC W/AUTO DIFF WBC: CPT

## 2019-05-16 PROCEDURE — 97110 THERAPEUTIC EXERCISES: CPT

## 2019-05-16 PROCEDURE — 1200000000 HC SEMI PRIVATE

## 2019-05-16 PROCEDURE — 6370000000 HC RX 637 (ALT 250 FOR IP): Performed by: INTERNAL MEDICINE

## 2019-05-16 PROCEDURE — 94640 AIRWAY INHALATION TREATMENT: CPT

## 2019-05-16 PROCEDURE — 99233 SBSQ HOSP IP/OBS HIGH 50: CPT | Performed by: INTERNAL MEDICINE

## 2019-05-16 PROCEDURE — 2580000003 HC RX 258

## 2019-05-16 PROCEDURE — 80053 COMPREHEN METABOLIC PANEL: CPT

## 2019-05-16 PROCEDURE — 36415 COLL VENOUS BLD VENIPUNCTURE: CPT

## 2019-05-16 RX ORDER — CITALOPRAM 20 MG/1
20 TABLET ORAL DAILY
Status: DISCONTINUED | OUTPATIENT
Start: 2019-05-16 | End: 2019-05-18 | Stop reason: HOSPADM

## 2019-05-16 RX ADMIN — ASPIRIN 81 MG: 81 TABLET, COATED ORAL at 11:46

## 2019-05-16 RX ADMIN — Medication 2 PUFF: at 08:15

## 2019-05-16 RX ADMIN — METOPROLOL SUCCINATE 12.5 MG: 25 TABLET, EXTENDED RELEASE ORAL at 11:47

## 2019-05-16 RX ADMIN — ENOXAPARIN SODIUM 40 MG: 40 INJECTION SUBCUTANEOUS at 11:47

## 2019-05-16 RX ADMIN — HYDROCODONE BITARTRATE AND ACETAMINOPHEN 1 TABLET: 5; 325 TABLET ORAL at 02:36

## 2019-05-16 RX ADMIN — OXYBUTYNIN CHLORIDE 5 MG: 5 TABLET ORAL at 21:09

## 2019-05-16 RX ADMIN — DONEPEZIL HYDROCHLORIDE 10 MG: 10 TABLET, FILM COATED ORAL at 21:09

## 2019-05-16 RX ADMIN — ATORVASTATIN CALCIUM 40 MG: 40 TABLET, FILM COATED ORAL at 21:08

## 2019-05-16 RX ADMIN — QUETIAPINE FUMARATE 25 MG: 25 TABLET ORAL at 21:09

## 2019-05-16 RX ADMIN — OXYBUTYNIN CHLORIDE 5 MG: 5 TABLET ORAL at 11:46

## 2019-05-16 RX ADMIN — SODIUM CHLORIDE 50 ML/HR: 9 INJECTION, SOLUTION INTRAVENOUS at 02:32

## 2019-05-16 RX ADMIN — ONDANSETRON 4 MG: 2 INJECTION INTRAMUSCULAR; INTRAVENOUS at 11:47

## 2019-05-16 RX ADMIN — PANTOPRAZOLE SODIUM 40 MG: 40 TABLET, DELAYED RELEASE ORAL at 06:10

## 2019-05-16 RX ADMIN — CITALOPRAM HYDROBROMIDE 20 MG: 20 TABLET ORAL at 11:46

## 2019-05-16 RX ADMIN — SODIUM CHLORIDE, PRESERVATIVE FREE 10 ML: 5 INJECTION INTRAVENOUS at 21:10

## 2019-05-16 RX ADMIN — Medication 2 PUFF: at 21:05

## 2019-05-16 RX ADMIN — GABAPENTIN 200 MG: 100 CAPSULE ORAL at 21:09

## 2019-05-16 RX ADMIN — FOLIC ACID 1 MG: 1 TABLET ORAL at 11:46

## 2019-05-16 RX ADMIN — HYDROCODONE BITARTRATE AND ACETAMINOPHEN 1 TABLET: 5; 325 TABLET ORAL at 21:08

## 2019-05-16 ASSESSMENT — PAIN DESCRIPTION - ORIENTATION: ORIENTATION: MID

## 2019-05-16 ASSESSMENT — PAIN DESCRIPTION - LOCATION
LOCATION: BACK
LOCATION: ABDOMEN;HEAD

## 2019-05-16 ASSESSMENT — PAIN DESCRIPTION - FREQUENCY: FREQUENCY: CONTINUOUS

## 2019-05-16 ASSESSMENT — PAIN SCALES - GENERAL
PAINLEVEL_OUTOF10: 3
PAINLEVEL_OUTOF10: 5
PAINLEVEL_OUTOF10: 5
PAINLEVEL_OUTOF10: 3

## 2019-05-16 ASSESSMENT — PAIN DESCRIPTION - DESCRIPTORS: DESCRIPTORS: ACHING

## 2019-05-16 ASSESSMENT — PAIN DESCRIPTION - PROGRESSION: CLINICAL_PROGRESSION: NOT CHANGED

## 2019-05-16 ASSESSMENT — PAIN DESCRIPTION - PAIN TYPE
TYPE: ACUTE PAIN
TYPE: CHRONIC PAIN

## 2019-05-16 NOTE — PROGRESS NOTES
Physical Therapy    Physical Therapy Treatment Note  Name: Felicie Bosworth MRN: [id number] :   1935   Date:  2019   Admission Date: 2019 Room:  [address] [place] [address]-A   Restrictions/Precautions:  Restrictions/Precautions  Restrictions/Precautions: Fall Risk, General Precautions       Communication with other providers: okay to do tx per [employer]. RN notified of labored breaths  Subjective:  Patient states:  \" I am hanging in there. \"  Pain:   Location, Type, Intensity (0/10 to 10/10):  7-8/10 whole back  Objective:    Observation:  Supine with wife in room at arrival. Agreeable to tx. Yocha Dehe, IV, tele  Treatment, including education/measures:  Vitals:  BP:  164/73; 124/56 HR:  74  O2 9-95% supine    Therapeutic Activity: Pt needs simple commands. ModA sup<>sit; MaxA >EOB vc/tc proper technique, sequencing and safety to improve bed mobility  Comments: Pt demonstrated increased trunk and B LE rigidity and need for cues for sequencing.     Mod/MaxA sit<>stand x 3 vc/tc proper technique, sequencing, WS and safety to improve functional mobility  Comments: Pt demonstrated post LOB and trunk rigidity at terminal stand  ModA SPT vc/tc proper technique, sequencing,  WS, WB and safety to improve functional mobility  CGA sitting balance vc/tc proper technique, posture, WS and safety to improve functional mobility  Comments: Pt demonstrated R lateral lean cued for posture correction to midline and UE support  Asael static standing balance ~ 30 s x 2 reps vc/tc proper technique, posture, WS and safety to improve functional mobility  ModA standing marches x 3 reps; 5 reps balance ~ 30 s x 2 reps vc/tc proper technique, posture, WS and safety to improve functional mobility  Comments: Pt demonstrated increased instability with marches with UE tremors     Gait:  ModA amb w RW  ~ 3 ft vc/tc proper technique, sequencing, posture, Step H/L, ty,  RW management and safety to improve functional mobility  Comments: Pt demonstrated increased instability with UE tremors, fwd flexed posture, slow ty, short step H/L and cued on RW management and UE support    Exercises:  Education:  Pt was instructed on Purpose of Exercise Program, Amarilsi Scale and Signs and Symptoms of Exercise Intolerance  Overhead Side Stretch x 10  Ankle Pumps/ Heel Raises x 10  LAQ x 10  Marching Seated x 10  Forward Arm Raises x 10  Side Arm Raises x 10  Arm Crosses x 10  SittingTrunkTwist x 10     vc/tc  proper technique to improve ROM and strength   Comments: Pt demonstrated increased labored breaths with O2sats remaining around 95%. Cues for PLB to improve RR. Safety  Patient left safely in the chair, with call light/phone in reach with alarm applied. Gait belt was used for transfers and gait. Assessment / Impression:       Patient's tolerance of treatment:  Fair+   Adverse Reaction: none  Significant change in status and impact:  none  Barriers to improvement:  strength, balance and safety     Plan for Next Session:    Cont POC. Rec SNF  Time in:  1005  Time out:  1045  Timed treatment minutes:  40  Total treatment time:  40    Previously filed items:  Social/Functional History  Lives With: Spouse  Type of Home: House  Home Layout: One level  Home Access: Level entry  Bathroom Shower/Tub: Walk-in shower  Bathroom Equipment: Shower chair, Grab bars in [address]: Rolling walker, 4 wheeled walker(RW in house, 4WW in community)  ADL Assistance: [address]: Needs assistance  Homemaking Responsibilities: No  Ambulation Assistance: Independent  Transfer Assistance: Independent  Active : No  Additional Comments: fall 5/13  Short term goals  Time Frame for Short term goals: 1 week  Short term goal 1: Patient will perform supine to sit with min A. Short term goal 2: Patient will perform STS with RW and min A.   Short term goal 3: Patient will ambulate 20ft with CGA and RW       Electronically signed by:    Rodolfo

## 2019-05-16 NOTE — PROGRESS NOTES
IM Progress Note  5/16/2019 7:58 AM  Subjective:   Admit Date: 5/13/2019  PCP: Jose Almazan MD  Chief complaint- slurred speech and R leg weakness      Interval History: extended conversation 20-25min  w pt and wife this am.  He was very weak yesterday and unable to get up on his own. Wife realizes now she cannot take care of him safely at home. Had CVA and also cervical sp stenosis noted on MRI. PT and OT both rec ARU. Pt and wife now willing to proceed. He's been more depressed also since stroke. Data:   Scheduled Meds:   citalopram  20 mg Oral Daily    atorvastatin  40 mg Oral Nightly    LORazepam  0.5 mg Intravenous Once    sodium chloride flush  10 mL Intravenous BID    enoxaparin  40 mg Subcutaneous Daily    donepezil  10 mg Oral Nightly    mometasone-formoterol  2 puff Inhalation BID    folic acid  1 mg Oral Daily    gabapentin  200 mg Oral Nightly    metoprolol succinate  12.5 mg Oral Daily    pantoprazole  40 mg Oral QAM AC    oxybutynin  5 mg Oral BID    QUEtiapine  25 mg Oral Nightly    aspirin  81 mg Oral Daily     Continuous Infusions:   sodium chloride 50 mL/hr (05/16/19 0232)     PRN Meds:HYDROcodone-acetaminophen, sodium chloride flush, magnesium hydroxide, ondansetron, albuterol sulfate HFA, traZODone  I/O last 3 completed shifts: In: 120 [P.O.:120]  Out: 300 [Urine:300]  CBC:   Recent Labs     05/13/19 1905 05/14/19 0139 05/16/19  0332   WBC 7.7 8.0 6.4   HGB 14.4 13.8 13.1*    145 148     BMP:    Recent Labs     05/13/19 1905 05/14/19 0139 05/16/19  0332    139 143   K 4.8 4.0 4.5    102 105   CO2 25 24 27   BUN 19 20 16   CREATININE 1.5* 1.3 1.2   GLUCOSE 148* 161* 91     Hepatic:   Recent Labs     05/13/19 1905 05/14/19 0139 05/16/19  0332   AST 20 20 19   ALT 20 19 17   BILITOT 0.4 0.3 0.5   ALKPHOS 97 91 82     Troponin: No results for input(s): TROPONINI in the last 72 hours.   BNP: No results for input(s): BNP in the last 72 hours.  INR:   Recent Labs     05/13/19  1905   INR 1.05       Objective:   Vitals: BP (!) 164/73   Pulse 76   Temp 98.2 °F (36.8 °C) (Oral)   Resp 18   Ht 5' 8\" (1.727 m)   Wt 246 lb (111.6 kg)   SpO2 92%   BMI 37.40 kg/m²   General appearance: alert and cooperative with exam  Lungs: clear to auscultation bilaterally  Heart: regular rate and rhythm, S1, S2 normal, no murmur, click, rub or gallop  Abdomen: soft, non-tender; bowel sounds normal; no masses,  no organomegaly  Extremities: extremities normal, atraumatic, no cyanosis or edema  Neurologic: Mental status: Alert, oriented, thought content appropriate, no slurring noted this am, generalized weakness noted    Assessment and Plan:   1. L MCA CVA- pending MRI and PT eval.  Now on plavix, pt and family in agreement for stay at ARU. Will reconsult CM for transfer, consult also to Dr Lindia Oppenheim. WE NEED TO CHECK IF WIFE CAN STAY W HIM THR/OUT THE DAY AS THIS IS THE ONLY WAY TO HELP QUELL HIS SEVERE ANXIETY. PT MAY ALSO HELP W HIS SEVERE CERVICAL SP STENOSIS  2. L carotid stenosis, Dr Sidhu Cue to rec CEA in about a month. 3. CAD- Dr Negro Cote monitoring, stable. 4. Disposition- looking into transfer ARU, ASAP. ? HOPEFULLY IN AM?      Patient Active Problem List:     CAD (coronary artery disease)     Benign essential tremor     Obstructive lung disease (HCC)     Back pain, chronic     Hypertension     Dyslipidemia     AAA (abdominal aortic aneurysm) without rupture (HCC)     Cerebral infarction (HCC)     CVA (cerebrovascular accident) [facility]     Homocysteinemia ([facility]     PFO (patent foramen ovale)     Migraine headache     COPD (chronic obstructive pulmonary disease) (HCC)     AAA (abdominal aortic aneurysm) (HCC)     Dementia due to Alzheimer's disease     Atherosclerosis      Delirium     Hemispheric carotid artery syndrome     Ventricular tachycardia (HCC)     CKD (chronic kidney disease)     Dysthymia     GERD (gastroesophageal reflux disease) Cerebrovascular accident (CVA) due to stenosis of cerebral artery [address]     Acute CVA (cerebrovascular accident) [facility])     Dysarthria      Jose Almazan MD

## 2019-05-16 NOTE — PROGRESS NOTES
[employer] pump applied to this pt bed.  Electronically signed by Jessica Davenport RN on 5/15/2019 at 11:22 PM

## 2019-05-16 NOTE — PROGRESS NOTES
Neurology Service Progress Note   [facility]  Patient Name: Sallie Caraballo  : 1935        Subjective:   Patient seen and and examined  Exam improving  Denies CP and SOB  Stronger in all extremities but there is still right arm and leg weakness  Discussed case with cardiology  No family at the bedside today        Medications:  Scheduled Meds:   citalopram  20 mg Oral Daily    atorvastatin  40 mg Oral Nightly    LORazepam  0.5 mg Intravenous Once    sodium chloride flush  10 mL Intravenous BID    enoxaparin  40 mg Subcutaneous Daily    donepezil  10 mg Oral Nightly    mometasone-formoterol  2 puff Inhalation BID    folic acid  1 mg Oral Daily    gabapentin  200 mg Oral Nightly    metoprolol succinate  12.5 mg Oral Daily    pantoprazole  40 mg Oral QAM AC    oxybutynin  5 mg Oral BID    QUEtiapine  25 mg Oral Nightly    aspirin  81 mg Oral Daily     Continuous Infusions:   sodium chloride 50 mL/hr (19 0232)     PRN Meds:. HYDROcodone-acetaminophen, sodium chloride flush, magnesium hydroxide, ondansetron, albuterol sulfate HFA, traZODone         Family History   Problem Relation Age of Onset    COPD Mother     Other Mother         benign tremors       Review of Symptoms:    10-point system review completed. All of which are negative except as mentioned above. Physical Exam:      Gen: Alert and oriented to self  NAD, cooperative  HEENT: NC/AT, EOMI, PERRL, mmm, no carotid bruits, neck supple, no meningeal signs;   Heart: Regular  Lungs: no distress  Ext: no edema, no calf tenderness b/l  Psych: poor eye contact   Skin: no rashes or lesions    NEUROLOGIC EXAM:    Mental Status: A&O to self, wife, situation only NAD, expressive aphasia present, speech dysarthric, he is apraxic     Cranial Nerve Exam:   CN II-XII:  PERRL, VFF, no nystagmus, no gaze paresis, sensation V1-V3 intact b/l, muscles of facial expression asymmetric with a mild right facial droop; hearing intact to conversational tone, palate elevates symmetrically, shoulder elevation symmetric and tongue protrudes midline with movement side to side. Motor Exam:       BUE antigravity with chronic weakness on the LUE   RLE drift  LLE weakness that is chronic RLE weakness >LLE      Deep Tendon Reflexes: 2+/4 biceps, triceps, brachioradialis, trace: patellar, and achilles b/l; flexor plantar responses b/l    Sensation: Intact light touch UE's/LE's b/l    Coordination/Cerebellum:       Tremors--none      Rapidly alternating movements: chronic severe dysdiadochokinesia LUE         Gait and stance:      Gait: deferred      LABS:     Recent Labs     05/13/19  1905 05/14/19  0139 05/14/19  1221 05/16/19  0332   WBC 7.7 8.0  --  6.4    139  --  143   K 4.8 4.0  --  4.5    102  --  105   CO2 25 24  --  27   BUN 19 20  --  16   CREATININE 1.5* 1.3  --  1.2   GLUCOSE 148* 161*  --  91   INR 1.05  --   --   --    ESR  --   --  23*  --        Results for Mary Fifi (MRN [id number]) as of 5/14/2019 09:56   Ref. Range 4/9/2019 09:36 5/13/2019 19:05 5/14/2019 01:39   Cholesterol, Total Latest Ref Range: 0 - 199 mg/dL 187     HDL Cholesterol Latest Ref Range: >40 MG/DL 35 (L)  33 (L)   LDL Calculated Latest Ref Range: <100 mg/dL see below     LDL Direct Latest Ref Range: <100 MG/ (H)  102 (H)   Triglycerides Latest Ref Range: <150 MG/ (H)  283 (H)   VLDL Cholesterol Calculated Latest Ref Range: Not Established mg/dL see below         IMAGING:    MRI Brain:  Acute infarct in the left buffy. CTA:  Greater than 90% stenosis left proximal ICA       50-60% stenosis right proximal ICA.       High-grade stenosis versus occlusion right V4 segment, age indeterminate.       High-grade stenosis versus occlusion right posterior cerebral artery   corresponding to the site of right PCA distribution encephalomalacia.       MRI could be beneficial to evaluate for new  infarct as clinically warranted. ECHO/Cath:  IMPRESSION:  1.   Left main is patent. 2.  LAD is proximally 100% occluded. 3.  Ramus has mild disease. 4.  Circ has mild disease. 5.  RCA is 100% occluded. 6.  LIMA to LAD is patent. 7.  SVG to PDA is patent. There is a collateral circulation filling the  PLB branch. 8.  EDP was normal.  9.  Right heart pressures are normal.    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 Disc and osteophytes as   well as facet and ligamentum flavum hypertrophy contribute to further   stenosis of the thecal sac and narrowing of the neural foramina as discussed   above.  There is severe stenosis of the thecal sac at C4-5, C5-6, and C6-7. ASSESSMENT/PLAN:     3 82year old male with acute dysarthria and shuffling gait due to right leg weakness secondary to acute left pontine infarction superimposed on R-MCA remote infarction. We will continue with the following plan of care:  1. Stroke risk factors:  1. Age, previous CVA, CAD  2. Neuro Exam:  1. Expressive Aphasia, improving  2. Dysarhtria improving  3. RLE and RUE weakness improving   3. Neurodiagnostics:  1. MRI brain L pontine infarction  2. CTA discussed above  3. ECHO as above  4. Sed rate WNL  5. Mri C spine Discussed above   4. Medications:  1. Continue ASA   2. Add Plavix D/C Aggrenox   3. Statin nightly   5. PT/OT/ST:  1. St the focus at this time   6. Follow up:  1. Thank you to cardiology, medicine and CT surgery. 2. LAZARO work up in our office   3. Will follow up peripherally         Thank you for allowing us to participate in the care of your patient. If there are any questions regarding evaluation please feel free to contact us.      Maye Carlos, ROBBIE - YOVANNY, 5/16/2019

## 2019-05-16 NOTE — PROGRESS NOTES
Daily Progress Note     patient is awake alert feeling better  Working with therapy  Remain in sinus rate is stable  Hx of CAD s/p CABG moderate dx medical treatment   Hx of CVA on antiplatelets need rehab  Carotid dx plan for CEA   Supportive care  holter pending to r/p afib       CT head and neck-       Impression:         Greater than 90% stenosis left proximal ICA    50-60% stenosis right proximal ICA. High-grade stenosis versus occlusion right V4 segment, age indeterminate. High-grade stenosis versus occlusion right posterior cerebral artery  corresponding to the site of right PCA distribution encephalomalacia. MRI could be beneficial to evaluate for new  infarct as clinically warranted.      Cath--  IMPRESSION:  1.  Left main is patent. 2.  LAD is proximally 100% occluded. 3.  Ramus has mild disease. 4.  Circ has mild disease. 5.  RCA is 100% occluded. 6.  LIMA to LAD is patent. 7.  SVG to PDA is patent. Doreene Jason is a collateral circulation filling the  PLB branch.   8.  EDP was normal.  9.  Right heart pressures are normal.       MRI of head        Impression:         Acute infarct in the left buffy       Objective:   BP (!) 164/73   Pulse 76   Temp 98.2 °F (36.8 °C) (Oral)   Resp 16   Ht 5' 8\" (1.727 m)   Wt 246 lb (111.6 kg)   SpO2 91%   BMI 37.40 kg/m²       Intake/Output Summary (Last 24 hours) at 5/16/2019 1040  Last data filed at 5/16/2019 0603  Gross per 24 hour   Intake 120 ml   Output 300 ml   Net -180 ml       Medications:   Scheduled Meds:   citalopram  20 mg Oral Daily    atorvastatin  40 mg Oral Nightly    LORazepam  0.5 mg Intravenous Once    sodium chloride flush  10 mL Intravenous BID    enoxaparin  40 mg Subcutaneous Daily    donepezil  10 mg Oral Nightly    mometasone-formoterol  2 puff Inhalation BID    folic acid  1 mg Oral Daily    gabapentin  200 mg Oral Nightly    metoprolol succinate  12.5 mg Oral Daily    pantoprazole  40 mg Oral QAM AC    oxybutynin 5 mg Oral BID    QUEtiapine  25 mg Oral Nightly    aspirin  81 mg Oral Daily      Infusions:   sodium chloride 50 mL/hr (05/16/19 0232)      PRN Meds:  HYDROcodone-acetaminophen, sodium chloride flush, magnesium hydroxide, ondansetron, albuterol sulfate HFA, traZODone       Physical Exam:  Vitals:    05/16/19 0815   BP:    Pulse:    Resp: 16   Temp:    SpO2: 91%        General: awake alert   Chest: Nontender  Cardiac: sinus   Lungs:Clear to auscultation and percussion. Abdomen: Soft, NT, ND, +BS  Extremities: no edema   Vascular:  Equal 2+ peripheral pulses. Lab Data:  CBC:   Recent Labs     05/13/19 1905 05/14/19 0139 05/16/19 0332   WBC 7.7 8.0 6.4   HGB 14.4 13.8 13.1*   HCT 45.9 44.6 41.8*   .3* 109.6* 108.9*    145 148     BMP:   Recent Labs     05/13/19 1905 05/14/19 0139 05/16/19 0332    139 143   K 4.8 4.0 4.5    102 105   CO2 25 24 27   BUN 19 20 16   CREATININE 1.5* 1.3 1.2     LIVER PROFILE:   Recent Labs     05/13/19 1905 05/14/19 0139 05/16/19 0332   AST 20 20 19   ALT 20 19 17   BILITOT 0.4 0.3 0.5   ALKPHOS 97 91 82     PT/INR:   Recent Labs     05/13/19 1905   PROTIME 12.2   INR 1.05     APTT:   Recent Labs     05/13/19 1905   APTT 25.3     BNP:  No results for input(s): BNP in the last 72 hours.       Assessment:  Patient Active Problem List    Diagnosis Date Noted    [facility] to [facility] 05/13/2019     Priority: High    Ventricular tachycardia ([facility] 05/04/2017     Priority: High    Delirium 05/03/2017     Priority: High    Hemispheric carotid artery syndrome 05/03/2017     Priority: High    Cerebral infarction ([facility] 11/16/2013     Priority: High    CVA [facility]cerebrovascular accident) ([facility] 11/16/2013     Priority: High    Obstructive lung disease ([facility] 10/15/2012     Priority: High    CAD (coronary artery disease) 11/29/2010     Priority: Medium    Benign essential tremor 11/29/2010     Priority:

## 2019-05-16 NOTE — CARE COORDINATION
Noted Dr. Melany Knott conversation about ARU and called referral to [facility]/ARU admissions. 11:20 AM   Informed by Solis Khan RN in ARU of pt acceptance to ARU and pre-cert initiated with [employer]. Updated pt in his room and called his wife/Deidra [phone] and updated her also of acceptance and pre-cert initiation. CM following.

## 2019-05-17 ENCOUNTER — APPOINTMENT (OUTPATIENT)
Dept: GENERAL RADIOLOGY | Age: 84
DRG: 065 | End: 2019-05-17
Payer: MEDICARE

## 2019-05-17 ENCOUNTER — APPOINTMENT (OUTPATIENT)
Dept: CT IMAGING | Age: 84
DRG: 065 | End: 2019-05-17
Payer: MEDICARE

## 2019-05-17 PROBLEM — I65.22 LEFT CAROTID STENOSIS: Status: ACTIVE | Noted: 2019-05-17

## 2019-05-17 LAB
ACQUISITION DURATION: NORMAL S
AVERAGE HEART RATE: 71 BPM
EKG DIAGNOSIS: NORMAL
FASTEST SUPRAVENTRICULAR RATE: 112 BPM
HOOKUP DATE: NORMAL
HOOKUP TIME: NORMAL
LONGEST SUPRAVENTRICULAR RATE: 112 BPM
MAX HEART RATE TIME/DATE: NORMAL
MAX HEART RATE: 106 BPM
MIN HEART RATE TIME/DATE: NORMAL
MIN HEART RATE: 55 BPM
NUMBER OF FASTEST SUPRAVENTRICULAR BEATS: 3
NUMBER OF LONGEST SUPRAVENTRICULAR BEATS: 3
NUMBER OF QRS COMPLEXES: NORMAL
NUMBER OF SUPRAVENTRICULAR BEATS IN RUNS: 3
NUMBER OF SUPRAVENTRICULAR COUPLETS: 1
NUMBER OF SUPRAVENTRICULAR ECTOPICS: 47
NUMBER OF SUPRAVENTRICULAR ISOLATED BEATS: 42
NUMBER OF SUPRAVENTRICULAR RUNS: 1
NUMBER OF VENTRICULAR BEATS IN RUNS: 0
NUMBER OF VENTRICULAR BIGEMINAL CYCLES: 6
NUMBER OF VENTRICULAR COUPLETS: 16
NUMBER OF VENTRICULAR ECTOPICS: 1060
NUMBER OF VENTRICULAR ISOLATED BEATS: 1028
NUMBER OF VENTRICULAR RUNS: 0
PRO-BNP: 561.6 PG/ML

## 2019-05-17 PROCEDURE — 94664 DEMO&/EVAL PT USE INHALER: CPT

## 2019-05-17 PROCEDURE — 97530 THERAPEUTIC ACTIVITIES: CPT

## 2019-05-17 PROCEDURE — 1200000000 HC SEMI PRIVATE

## 2019-05-17 PROCEDURE — 99232 SBSQ HOSP IP/OBS MODERATE 35: CPT | Performed by: INTERNAL MEDICINE

## 2019-05-17 PROCEDURE — 6370000000 HC RX 637 (ALT 250 FOR IP): Performed by: NURSE PRACTITIONER

## 2019-05-17 PROCEDURE — 70450 CT HEAD/BRAIN W/O DYE: CPT

## 2019-05-17 PROCEDURE — 83880 ASSAY OF NATRIURETIC PEPTIDE: CPT

## 2019-05-17 PROCEDURE — 94761 N-INVAS EAR/PLS OXIMETRY MLT: CPT

## 2019-05-17 PROCEDURE — 6370000000 HC RX 637 (ALT 250 FOR IP): Performed by: INTERNAL MEDICINE

## 2019-05-17 PROCEDURE — 2700000000 HC OXYGEN THERAPY PER DAY

## 2019-05-17 PROCEDURE — 2580000003 HC RX 258: Performed by: INTERNAL MEDICINE

## 2019-05-17 PROCEDURE — 6360000002 HC RX W HCPCS: Performed by: INTERNAL MEDICINE

## 2019-05-17 PROCEDURE — 36415 COLL VENOUS BLD VENIPUNCTURE: CPT

## 2019-05-17 PROCEDURE — 71045 X-RAY EXAM CHEST 1 VIEW: CPT

## 2019-05-17 PROCEDURE — 94640 AIRWAY INHALATION TREATMENT: CPT

## 2019-05-17 RX ORDER — IPRATROPIUM BROMIDE AND ALBUTEROL SULFATE 2.5; .5 MG/3ML; MG/3ML
1 SOLUTION RESPIRATORY (INHALATION) 4 TIMES DAILY
Status: DISCONTINUED | OUTPATIENT
Start: 2019-05-17 | End: 2019-05-18 | Stop reason: HOSPADM

## 2019-05-17 RX ORDER — FOLIC ACID 1 MG/1
1 TABLET ORAL DAILY
Status: CANCELLED | OUTPATIENT
Start: 2019-05-18

## 2019-05-17 RX ORDER — AZITHROMYCIN 250 MG/1
250 TABLET, FILM COATED ORAL DAILY
Status: DISCONTINUED | OUTPATIENT
Start: 2019-05-17 | End: 2019-05-18 | Stop reason: HOSPADM

## 2019-05-17 RX ORDER — DONEPEZIL HYDROCHLORIDE 10 MG/1
10 TABLET, FILM COATED ORAL NIGHTLY
Status: CANCELLED | OUTPATIENT
Start: 2019-05-17

## 2019-05-17 RX ORDER — CITALOPRAM 20 MG/1
20 TABLET ORAL DAILY
Status: CANCELLED | OUTPATIENT
Start: 2019-05-18

## 2019-05-17 RX ORDER — METOPROLOL SUCCINATE 25 MG/1
25 TABLET, EXTENDED RELEASE ORAL DAILY
Status: DISCONTINUED | OUTPATIENT
Start: 2019-05-18 | End: 2019-05-18 | Stop reason: HOSPADM

## 2019-05-17 RX ORDER — ALBUTEROL SULFATE 90 UG/1
2 AEROSOL, METERED RESPIRATORY (INHALATION) EVERY 6 HOURS PRN
Status: CANCELLED | OUTPATIENT
Start: 2019-05-17

## 2019-05-17 RX ORDER — ATORVASTATIN CALCIUM 40 MG/1
40 TABLET, FILM COATED ORAL NIGHTLY
Status: CANCELLED | OUTPATIENT
Start: 2019-05-17

## 2019-05-17 RX ORDER — QUETIAPINE FUMARATE 25 MG/1
25 TABLET, FILM COATED ORAL NIGHTLY PRN
Status: CANCELLED | OUTPATIENT
Start: 2019-05-17

## 2019-05-17 RX ORDER — HYDROCODONE BITARTRATE AND ACETAMINOPHEN 5; 325 MG/1; MG/1
1 TABLET ORAL EVERY 6 HOURS PRN
Status: CANCELLED | OUTPATIENT
Start: 2019-05-17

## 2019-05-17 RX ORDER — METOPROLOL SUCCINATE 25 MG/1
12.5 TABLET, EXTENDED RELEASE ORAL DAILY
Status: CANCELLED | OUTPATIENT
Start: 2019-05-17

## 2019-05-17 RX ORDER — CLOPIDOGREL BISULFATE 75 MG/1
75 TABLET ORAL DAILY
Status: CANCELLED | OUTPATIENT
Start: 2019-05-18

## 2019-05-17 RX ORDER — ASPIRIN 81 MG/1
81 TABLET ORAL DAILY
Status: CANCELLED | OUTPATIENT
Start: 2019-05-17

## 2019-05-17 RX ORDER — TRAZODONE HYDROCHLORIDE 50 MG/1
50 TABLET ORAL NIGHTLY PRN
Status: CANCELLED | OUTPATIENT
Start: 2019-05-17

## 2019-05-17 RX ORDER — OXYBUTYNIN CHLORIDE 5 MG/1
5 TABLET ORAL 2 TIMES DAILY
Status: CANCELLED | OUTPATIENT
Start: 2019-05-17

## 2019-05-17 RX ORDER — PANTOPRAZOLE SODIUM 40 MG/1
40 TABLET, DELAYED RELEASE ORAL
Status: CANCELLED | OUTPATIENT
Start: 2019-05-18

## 2019-05-17 RX ORDER — CLOPIDOGREL BISULFATE 75 MG/1
75 TABLET ORAL DAILY
Status: DISCONTINUED | OUTPATIENT
Start: 2019-05-17 | End: 2019-05-18 | Stop reason: HOSPADM

## 2019-05-17 RX ORDER — FUROSEMIDE 10 MG/ML
20 INJECTION INTRAMUSCULAR; INTRAVENOUS ONCE
Status: COMPLETED | OUTPATIENT
Start: 2019-05-17 | End: 2019-05-17

## 2019-05-17 RX ORDER — FOLIC ACID 1 MG/1
1 TABLET ORAL DAILY
Status: CANCELLED | OUTPATIENT
Start: 2019-05-17

## 2019-05-17 RX ORDER — AZITHROMYCIN 250 MG/1
250 TABLET, FILM COATED ORAL DAILY
Status: CANCELLED | OUTPATIENT
Start: 2019-05-18 | End: 2019-05-22

## 2019-05-17 RX ORDER — CITALOPRAM 20 MG/1
20 TABLET ORAL DAILY
Status: CANCELLED | OUTPATIENT
Start: 2019-05-17

## 2019-05-17 RX ORDER — METOPROLOL SUCCINATE 25 MG/1
25 TABLET, EXTENDED RELEASE ORAL DAILY
Status: CANCELLED | OUTPATIENT
Start: 2019-05-18

## 2019-05-17 RX ORDER — ASPIRIN 81 MG/1
81 TABLET ORAL DAILY
Status: CANCELLED | OUTPATIENT
Start: 2019-05-18

## 2019-05-17 RX ORDER — QUETIAPINE FUMARATE 25 MG/1
25 TABLET, FILM COATED ORAL NIGHTLY PRN
Status: DISCONTINUED | OUTPATIENT
Start: 2019-05-17 | End: 2019-05-18 | Stop reason: HOSPADM

## 2019-05-17 RX ADMIN — Medication 2 PUFF: at 08:31

## 2019-05-17 RX ADMIN — ASPIRIN 81 MG: 81 TABLET, COATED ORAL at 10:00

## 2019-05-17 RX ADMIN — ENOXAPARIN SODIUM 40 MG: 40 INJECTION SUBCUTANEOUS at 10:00

## 2019-05-17 RX ADMIN — AZITHROMYCIN 250 MG: 250 TABLET, FILM COATED ORAL at 10:00

## 2019-05-17 RX ADMIN — Medication 2 PUFF: at 20:12

## 2019-05-17 RX ADMIN — FOLIC ACID 1 MG: 1 TABLET ORAL at 10:00

## 2019-05-17 RX ADMIN — SODIUM CHLORIDE, PRESERVATIVE FREE 10 ML: 5 INJECTION INTRAVENOUS at 20:00

## 2019-05-17 RX ADMIN — PANTOPRAZOLE SODIUM 40 MG: 40 TABLET, DELAYED RELEASE ORAL at 05:58

## 2019-05-17 RX ADMIN — CLOPIDOGREL BISULFATE 75 MG: 75 TABLET ORAL at 11:09

## 2019-05-17 RX ADMIN — SODIUM CHLORIDE, PRESERVATIVE FREE 10 ML: 5 INJECTION INTRAVENOUS at 11:09

## 2019-05-17 RX ADMIN — FUROSEMIDE 20 MG: 10 INJECTION, SOLUTION INTRAVENOUS at 11:15

## 2019-05-17 RX ADMIN — DONEPEZIL HYDROCHLORIDE 10 MG: 10 TABLET, FILM COATED ORAL at 19:59

## 2019-05-17 RX ADMIN — CITALOPRAM HYDROBROMIDE 20 MG: 20 TABLET ORAL at 10:00

## 2019-05-17 RX ADMIN — METOPROLOL SUCCINATE 12.5 MG: 25 TABLET, EXTENDED RELEASE ORAL at 10:00

## 2019-05-17 RX ADMIN — ATORVASTATIN CALCIUM 40 MG: 40 TABLET, FILM COATED ORAL at 20:00

## 2019-05-17 RX ADMIN — HYDROCODONE BITARTRATE AND ACETAMINOPHEN 1 TABLET: 5; 325 TABLET ORAL at 22:33

## 2019-05-17 RX ADMIN — OXYBUTYNIN CHLORIDE 5 MG: 5 TABLET ORAL at 10:00

## 2019-05-17 RX ADMIN — OXYBUTYNIN CHLORIDE 5 MG: 5 TABLET ORAL at 20:00

## 2019-05-17 RX ADMIN — IPRATROPIUM BROMIDE AND ALBUTEROL SULFATE 1 AMPULE: .5; 3 SOLUTION RESPIRATORY (INHALATION) at 20:03

## 2019-05-17 ASSESSMENT — PAIN DESCRIPTION - PROGRESSION: CLINICAL_PROGRESSION: NOT CHANGED

## 2019-05-17 ASSESSMENT — PAIN SCALES - GENERAL
PAINLEVEL_OUTOF10: 0
PAINLEVEL_OUTOF10: 6

## 2019-05-17 ASSESSMENT — PAIN DESCRIPTION - DESCRIPTORS: DESCRIPTORS: ACHING

## 2019-05-17 ASSESSMENT — PAIN DESCRIPTION - LOCATION: LOCATION: ABDOMEN

## 2019-05-17 ASSESSMENT — PAIN DESCRIPTION - ORIENTATION: ORIENTATION: RIGHT;LEFT

## 2019-05-17 ASSESSMENT — PAIN DESCRIPTION - FREQUENCY: FREQUENCY: CONTINUOUS

## 2019-05-17 ASSESSMENT — PAIN DESCRIPTION - PAIN TYPE: TYPE: ACUTE PAIN

## 2019-05-17 NOTE — PROGRESS NOTES
Physical Therapy    Physical Therapy Treatment Note  Name: Thomas Mora MRN: [id number] :   1935   Date:  2019   Admission Date: 2019 Room:  [address] [place], [place] [address]   Restrictions/Precautions:  Restrictions/Precautions  Restrictions/Precautions: Fall Risk, General Precautions       Communication with other providers: okay to do tx per JEN Ward  Subjective:  Patient states:  \" I want to eat in a chair  Pain:   Location, Type, Intensity (0/10 to 10/10):  8/10 HA  Objective:    Observation:  LS in bed with wife +1 guest in room at arrival. Agreeable to tx. Treatment, including education/measures:    Therapeutic Activity:  Asael sup>sit vc/tc proper technique, sequencing and safety to improve bed mobility  MaxA sit<>stand vc/tc proper technique, sequencing, WS and safety to improve functional mobility  MaxA SPT vc/tc proper technique, sequencing,  WS, WB and safety to improve functional mobility  SBA sitting balance vc/tc proper technique, posture, WS and safety to improve functional mobility  Comments: Pt demonstrated severe fatigue and SOB. Gait: Unable at this time    Safety  Patient left safely in the chair with wife in room at lunch in front of patient, with call light/phone in reach with alarm applied. Gait belt was used for transfers and gait. Assessment / Impression:       Patient's tolerance of treatment:  poor   Adverse Reaction: none  Significant change in status and impact:  none  Barriers to improvement:  strength, balance and safety     Plan for Next Session:    Cont POC.  Rec SNF  Time in:  6853  Time out:  1150  Timed treatment minutes:  14  Total treatment time:  14    Previously filed items:  Social/Functional History  Lives With: Spouse  Type of Home: House  Home Layout: One level  Home Access: Level entry  Bathroom Shower/Tub: Walk-in shower  Bathroom Equipment: Shower chair, Grab bars in [address] [place]: Rolling walker, 4 wheeled walker(RW in house, 4JA in community)  ADL Assistance: Independent  Homemaking Assistance: Needs assistance  Homemaking Responsibilities: No  Ambulation Assistance: Independent  Transfer Assistance: Independent  Active : No  Additional Comments: fall 5/13  Short term goals  Time Frame for Short term goals: 1 week  Short term goal 1: Patient will perform supine to sit with min A. Short term goal 2: Patient will perform STS with RW and min A.   Short term goal 3: Patient will ambulate 20ft with CGA and RW       Electronically signed by:    Abigail Chiu PTA  5/17/2019, 11:50 AM

## 2019-05-17 NOTE — CARE COORDINATION
TINO received a call from Kimmie Freeman with ARU and the pt has been approved by insurance to go to [facility]. ARU can take pt tomorrow 5/18/2019. TINO PS Dr. Edilma Caal and informed him of this information.

## 2019-05-17 NOTE — PROGRESS NOTES
now-not on any at home    Keeps having episodes of apnea-had one on exam today    Believe he has severe LAZARO-needs CPAP    CXR today     Will cont. To follow     IMPRESSION:  1.  Left main is patent. 2.  LAD is proximally 100% occluded. 3.  Ramus has mild disease. 4.  Circ has mild disease. 5.  RCA is 100% occluded. 6.  LIMA to LAD is patent. 7.  SVG to PDA is patent. Wayna Anson is a collateral circulation filling the  PLB branch. 8.  EDP was normal.  9.  Right heart pressures are normal.        PAST MEDICAL HISTORY:  Coronary artery disease status post CABG done,  two vessels were patent LIMA to LAD and SVG to PDA.  All the other  grafts were occluded.  Hypertension, hyperlipidemia present.  History of  having nonsustained ventricular tachycardia, history of strokes in the  past, and kidney stones.     PAST SURGICAL HISTORY:  Coronary artery bypass surgery, two bypasses  were opened, LIMA to LAD and SVG to PDA.     SOCIAL HISTORY:  He does not smoke and does not drink.     ALLERGIES:  FLOMAX, NSAIDS, PENICILLIN, and TOPAMAX.     MEDICATIONS:  He is on is Celexa, Prilosec, Seroquel, Aggrenox,  Pravachol, aspirin, and Neurontin.           Objective:   /70   Pulse 75   Temp 98.4 °F (36.9 °C) (Oral)   Resp 15   Ht 5' 8\" (1.727 m)   Wt 246 lb (111.6 kg)   SpO2 95%   BMI 37.40 kg/m²       Intake/Output Summary (Last 24 hours) at 5/17/2019 1004  Last data filed at 5/17/2019 0559  Gross per 24 hour   Intake 800 ml   Output 200 ml   Net 600 ml       Medications:   Scheduled Meds:   azithromycin  250 mg Oral Daily    ipratropium-albuterol  1 ampule Inhalation 4x daily    clopidogrel  75 mg Oral Daily    citalopram  20 mg Oral Daily    atorvastatin  40 mg Oral Nightly    LORazepam  0.5 mg Intravenous Once    sodium chloride flush  10 mL Intravenous BID    enoxaparin  40 mg Subcutaneous Daily    donepezil  10 mg Oral Nightly    mometasone-formoterol  2 puff Inhalation BID    folic acid  1 mg Oral Daily  gabapentin  200 mg Oral Nightly    metoprolol succinate  12.5 mg Oral Daily    pantoprazole  40 mg Oral QAM AC    oxybutynin  5 mg Oral BID    QUEtiapine  25 mg Oral Nightly    aspirin  81 mg Oral Daily      Infusions:   sodium chloride 50 mL/hr (05/16/19 0232)      PRN Meds:  HYDROcodone-acetaminophen, sodium chloride flush, magnesium hydroxide, ondansetron, albuterol sulfate HFA, traZODone       Physical Exam:  Vitals:    05/17/19 0955   BP: 116/70   Pulse: 75   Resp: 15   Temp: 98.4 °F (36.9 °C)   SpO2: 95%        General: AAO, NAD  Chest: Nontender  Cardiac: First and Second Heart Sounds are Normal, No Murmurs or Gallops noted  Lungs:Clear to auscultation and percussion. Abdomen: Soft, NT, ND, +BS  Extremities: No clubbing, no edema  Vascular:  Equal 2+ peripheral pulses. Lab Data:  CBC:   Recent Labs     05/16/19 0332   WBC 6.4   HGB 13.1*   HCT 41.8*   .9*        BMP:   Recent Labs     05/16/19 0332      K 4.5      CO2 27   BUN 16   CREATININE 1.2     LIVER PROFILE:   Recent Labs     05/16/19 0332   AST 19   ALT 17   BILITOT 0.5   ALKPHOS 82     PT/INR: No results for input(s): PROTIME, INR in the last 72 hours. APTT: No results for input(s): APTT in the last 72 hours. BNP:  No results for input(s): BNP in the last 72 hours.       Assessment:  Patient Active Problem List    Diagnosis Date Noted    Cerebrovascular accident (CVA) due to stenosis of cerebral artery ([facility] 05/13/2019     Priority: High    Ventricular tachycardia ([facility] 05/04/2017     Priority: High    Delirium 05/03/2017     Priority: High    Hemispheric carotid artery syndrome 05/03/2017     Priority: High    Cerebral infarction ([facility] 11/16/2013     Priority: High    CVA (cerebrovascular accident) ([facility] 11/16/2013     Priority: High    Obstructive lung disease ([facility] 10/15/2012     Priority: High    CAD (coronary artery disease) 11/29/2010     Priority: Medium    Benign essential tremor 11/29/2010     Priority: Medium    Hypertension      Priority: Low    Dysarthria     Acute CVA (cerebrovascular accident) ([facility] 05/13/2019    GERD (gastroesophageal reflux disease)     Dysthymia 08/27/2018    CKD (chronic kidney disease)     Atherosclerosis  04/24/2017    Dementia due to Alzheimer's disease     COPD (chronic obstructive pulmonary disease) ([facility]     AAA (abdominal aortic aneurysm) (HCC)     Migraine headache     PFO (patent foramen ovale)     Homocysteinemia (HCC)     AAA [facility]dominal [facility] 09/25/2013    Dyslipidemia     Back pain, chronic 11/12/2012       Electronically signed by Debra Pathak PA-C on 5/17/2019 at 10:04 AM

## 2019-05-17 NOTE — PROGRESS NOTES
IM Progress Note  5/17/2019 7:47 AM  Subjective:   Admit Date: 5/13/2019  PCP: Nitin Maloney MD  Chief complaint- slurred speech and R leg weakness      Interval History: a little stronger yesterday and got up to ambulate short distance w walker and was up in chair. Has been on O2 w sl desat and also coughing but no fever. No slurred speech. Data:   Scheduled Meds:   azithromycin  250 mg Oral Daily    ipratropium-albuterol  1 ampule Inhalation 4x daily    citalopram  20 mg Oral Daily    atorvastatin  40 mg Oral Nightly    LORazepam  0.5 mg Intravenous Once    sodium chloride flush  10 mL Intravenous BID    enoxaparin  40 mg Subcutaneous Daily    donepezil  10 mg Oral Nightly    mometasone-formoterol  2 puff Inhalation BID    folic acid  1 mg Oral Daily    gabapentin  200 mg Oral Nightly    metoprolol succinate  12.5 mg Oral Daily    pantoprazole  40 mg Oral QAM AC    oxybutynin  5 mg Oral BID    QUEtiapine  25 mg Oral Nightly    aspirin  81 mg Oral Daily     Continuous Infusions:   sodium chloride 50 mL/hr (05/16/19 0232)     PRN Meds:HYDROcodone-acetaminophen, sodium chloride flush, magnesium hydroxide, ondansetron, albuterol sulfate HFA, traZODone  I/O last 3 completed shifts: In: 800 [I.V.:800]  Out: 200 [Urine:200]  CBC:   Recent Labs     05/16/19 0332   WBC 6.4   HGB 13.1*        BMP:    Recent Labs     05/16/19 0332      K 4.5      CO2 27   BUN 16   CREATININE 1.2   GLUCOSE 91     Hepatic:   Recent Labs     05/16/19 0332   AST 19   ALT 17   BILITOT 0.5   ALKPHOS 82     Troponin: No results for input(s): TROPONINI in the last 72 hours. BNP: No results for input(s): BNP in the last 72 hours. INR:   No results for input(s): INR in the last 72 hours.     Objective:   Vitals: /68   Pulse 71   Temp 98.4 °F (36.9 °C) (Oral)   Resp 20   Ht 5' 8\" (1.727 m)   Wt 246 lb (111.6 kg)   SpO2 94%   BMI 37.40 kg/m²   General appearance: alert and cooperative with

## 2019-05-17 NOTE — CARE COORDINATION
Received call from [address] [place] [address] stating patient has been approved for ARU. Sandra Aranda #[id number]. Patient able to admit 5/18/2019.   Notified Ruby Fox CM of approval.

## 2019-05-17 NOTE — PROGRESS NOTES
Patient has acute left pontine infarction. The standard of care is for any small vessel infarction patient's should be treated with Dual Antiplatelet Therapy for 30 days. This patient was previously on ASA and Aggrenox, this is an Aggrenox failed infarction, thus new Dual Antiplatelet therapy should be ASA AND PLAVIX. There is no identification at this time of an A. Fib. If A. Fib is found then anticoagulation is our recommended source of secondary stroke prevention while D/Cing the Plavix. However until A. Fib is found, it is contraindicated to treat a left pontine infarction with anticoagulation. The left carotid stenosis has no relation to the left pontine infarction, however it is a leading stroke risk factor in this patient and intervention should be performed. Thank you  Mar Naylor CNP

## 2019-05-17 NOTE — DISCHARGE SUMMARY
Gisele Puente  :  1935  MRN:  [id number]    Admit date:  2019  Discharge date:      Admitting Physician:  Henny Castro MD    Discharge Diagnoses:    Patient Active Problem List   Diagnosis    CAD (coronary artery disease)    Benign essential tremor    Obstructive lung disease ([address]    Back pain, chronic    Hypertension    Dyslipidemia    AAA (abdominal aortic aneurysm) without rupture (HCC)    [facility]bral infarction [facility]    CVA (cerebrovascular accident) ([address]    Homocysteinemia ([address]    PFO (patent foramen ovale)    Migraine headache    COPD (chronic obstructive pulmonary disease) ([address]    AAA (abdominal aortic aneurysm) ([facility]    Dementia due to Alzheimer's disease    Atherosclerosis     Delirium    Hemispheric carotid artery syndrome    Ventricular tachycardia (HCC)    CKD (chronic kidney disease)    Dysthymia    GERD (gastroesophageal reflux disease)    Cerebrovascular accident (CVA) due to stenosis of cerebral artery (HCC)    Acute CVA [facility]cerebrovascular accident) ([facility]    Dysarthria    Left carotid stenosis       Admission Condition:  serious    Discharged Condition:  stable    Hospital Course:   81yo WM w hx of prior stroke and L sided weakness presented to ED w acute onset R sided weakness and slurred speech. Transferred by squad to ED and not candidate for thrombolytics, but clinically w ac CVA. Admitted for eval, neuro consulted. Cardiology followed w Dr Santo Woodruff. He had significant L sided carotid stenosis noted on CTA neck and was seen by Dr Theodora Cadena, will need L CEA in about a month w f/u vasc surgery rec in three weeks. He was also seen by neurology and antiplt rx switched fr aggrenox to plavix w cont of asa.  pravachol switched to lipitor. MRI brain did confirm ac L pontine infarct. He underwent pt and ot but was too unsteady for home w [facility] so later qualified for transfer to aru for cont rehab, accepted for . See orders.     Discharge Medications: Sole Tomas   Home Medication Instructions ULS:[id number]    Printed on:05/17/19 2877   Medication Information                      albuterol sulfate HFA (PROVENTIL HFA) 108 (90 Base) MCG/ACT inhaler  Inhale 2 puffs into the lungs every 6 hours as needed for Wheezing or Shortness of Breath (or coughing spells)             aspirin EC 81 MG EC tablet  Take 1 tablet by mouth daily             citalopram (CELEXA) 10 MG tablet  TAKE 1/2 TABLET BY MOUTH IN THE MORNING AND 1/2 TABLET BY MOUTH AT BEDTIME             citalopram (CELEXA) 10 MG tablet  TAKE 1 TABLET BY MOUTH IN THE MORNING AND 1 TABLET BY MOUTH AT BEDTIME             dipyridamole-aspirin (AGGRENOX)  MG per extended release capsule  TAKE ONE CAPSULE BY MOUTH TWICE A DAY             donepezil (ARICEPT) 10 MG tablet  Take 1 tablet by mouth nightly             fluticasone-salmeterol (ADVAIR DISKUS) 250-50 MCG/DOSE AEPB  Inhale 1 puff into the lungs every 12 hours             folic acid (FOLVITE) 1 MG tablet  TAKE 1 TABLET BY MOUTH DAILY             gabapentin (NEURONTIN) 100 MG capsule  TAKE TWO CAPSULE BY MOUTH EVERY NIGHT.              metoprolol succinate (TOPROL XL) 25 MG extended release tablet  Take 0.5 tablets by mouth daily             omeprazole (PRILOSEC) 40 MG delayed release capsule  Take 1 capsule by mouth every morning (before breakfast)             oxybutynin (DITROPAN) 5 MG tablet  Take 1 tablet by mouth 2 times daily             pravastatin (PRAVACHOL) 40 MG tablet  TAKE 1 TABLET BY MOUTH EVERY DAY             QUEtiapine (SEROQUEL) 25 MG tablet  Take 1 tablet by mouth nightly             traZODone (DESYREL) 50 MG tablet  Take 1 tablet by mouth nightly as needed for Sleep                   Consults: as above    Significant Diagnostic Studies: as above  Treatments:  As above    Discharge Exam:  BP (!) 125/53   Pulse 73   Temp 97.5 °F (36.4 °C) (Oral)   Resp 20   Ht 5' 8\" (1.727 m)   Wt 234 lb 12.6 oz (106.5 kg)   SpO2 95%   BMI

## 2019-05-17 NOTE — CARE COORDINATION
ARU pre-cert still pending at this time. Will continue to follow for determination. Met with patient regarding pre-cert process and discussed ARU. Explained to patient the required 3 hours of therapy a day. Also explained the average length of stay is 12 days, could be longer or shorter depending on recommendations of therapy and Dr. Shelly Pacheco. Patient expresses his understanding and states he's agreeable to admit to ARU. Patient states his goal is to return home with his wife at discharge from ARU.

## 2019-05-18 ENCOUNTER — HOSPITAL ENCOUNTER (INPATIENT)
Age: 84
LOS: 14 days | Discharge: HOME | DRG: 057 | End: 2019-06-01
Attending: PHYSICAL MEDICINE & REHABILITATION | Admitting: PHYSICAL MEDICINE & REHABILITATION
Payer: MEDICARE

## 2019-05-18 VITALS
HEIGHT: 68 IN | DIASTOLIC BLOOD PRESSURE: 43 MMHG | HEART RATE: 74 BPM | RESPIRATION RATE: 15 BRPM | OXYGEN SATURATION: 93 % | BODY MASS INDEX: 35.58 KG/M2 | WEIGHT: 234.79 LBS | SYSTOLIC BLOOD PRESSURE: 115 MMHG | TEMPERATURE: 98.3 F

## 2019-05-18 DIAGNOSIS — I71.40 ABDOMINAL AORTIC ANEURYSM (AAA) WITHOUT RUPTURE: Primary | ICD-10-CM

## 2019-05-18 LAB
ALBUMIN SERPL-MCNC: 3.8 GM/DL (ref 3.4–5)
ALP BLD-CCNC: 84 IU/L (ref 40–129)
ALT SERPL-CCNC: 23 U/L (ref 10–40)
ANION GAP SERPL CALCULATED.3IONS-SCNC: 10 MMOL/L (ref 4–16)
AST SERPL-CCNC: 23 IU/L (ref 15–37)
BASOPHILS ABSOLUTE: 0 K/CU MM
BASOPHILS RELATIVE PERCENT: 0.3 % (ref 0–1)
BILIRUB SERPL-MCNC: 0.5 MG/DL (ref 0–1)
BUN BLDV-MCNC: 15 MG/DL (ref 6–23)
CALCIUM SERPL-MCNC: 9 MG/DL (ref 8.3–10.6)
CHLORIDE BLD-SCNC: 99 MMOL/L (ref 99–110)
CO2: 30 MMOL/L (ref 21–32)
CREAT SERPL-MCNC: 1.2 MG/DL (ref 0.9–1.3)
DIFFERENTIAL TYPE: ABNORMAL
EOSINOPHILS ABSOLUTE: 0.1 K/CU MM
EOSINOPHILS RELATIVE PERCENT: 2 % (ref 0–3)
GFR AFRICAN AMERICAN: >60 ML/MIN/1.73M2
GFR NON-AFRICAN AMERICAN: 58 ML/MIN/1.73M2
GLUCOSE BLD-MCNC: 94 MG/DL (ref 70–99)
HCT VFR BLD CALC: 42 % (ref 42–52)
HEMOGLOBIN: 13.3 GM/DL (ref 13.5–18)
IMMATURE NEUTROPHIL %: 0.5 % (ref 0–0.43)
LYMPHOCYTES ABSOLUTE: 1.6 K/CU MM
LYMPHOCYTES RELATIVE PERCENT: 24.1 % (ref 24–44)
MCH RBC QN AUTO: 33.9 PG (ref 27–31)
MCHC RBC AUTO-ENTMCNC: 31.7 % (ref 32–36)
MCV RBC AUTO: 107.1 FL (ref 78–100)
MONOCYTES ABSOLUTE: 0.5 K/CU MM
MONOCYTES RELATIVE PERCENT: 8.4 % (ref 0–4)
NUCLEATED RBC %: 0 %
PDW BLD-RTO: 14.2 % (ref 11.7–14.9)
PLATELET # BLD: 150 K/CU MM (ref 140–440)
PMV BLD AUTO: 9.8 FL (ref 7.5–11.1)
POTASSIUM SERPL-SCNC: 4.2 MMOL/L (ref 3.5–5.1)
RBC # BLD: 3.92 M/CU MM (ref 4.6–6.2)
SEGMENTED NEUTROPHILS ABSOLUTE COUNT: 4.2 K/CU MM
SEGMENTED NEUTROPHILS RELATIVE PERCENT: 64.7 % (ref 36–66)
SODIUM BLD-SCNC: 139 MMOL/L (ref 135–145)
TOTAL IMMATURE NEUTOROPHIL: 0.03 K/CU MM
TOTAL NUCLEATED RBC: 0 K/CU MM
TOTAL PROTEIN: 6.5 GM/DL (ref 6.4–8.2)
WBC # BLD: 6.4 K/CU MM (ref 4–10.5)

## 2019-05-18 PROCEDURE — 6360000002 HC RX W HCPCS: Performed by: INTERNAL MEDICINE

## 2019-05-18 PROCEDURE — 6370000000 HC RX 637 (ALT 250 FOR IP): Performed by: INTERNAL MEDICINE

## 2019-05-18 PROCEDURE — 99238 HOSP IP/OBS DSCHRG MGMT 30/<: CPT | Performed by: INTERNAL MEDICINE

## 2019-05-18 PROCEDURE — 2700000000 HC OXYGEN THERAPY PER DAY

## 2019-05-18 PROCEDURE — 80053 COMPREHEN METABOLIC PANEL: CPT

## 2019-05-18 PROCEDURE — 94761 N-INVAS EAR/PLS OXIMETRY MLT: CPT

## 2019-05-18 PROCEDURE — 94640 AIRWAY INHALATION TREATMENT: CPT

## 2019-05-18 PROCEDURE — 2580000003 HC RX 258: Performed by: INTERNAL MEDICINE

## 2019-05-18 PROCEDURE — 36415 COLL VENOUS BLD VENIPUNCTURE: CPT

## 2019-05-18 PROCEDURE — 1280000000 HC REHAB R&B

## 2019-05-18 PROCEDURE — 6370000000 HC RX 637 (ALT 250 FOR IP): Performed by: NURSE PRACTITIONER

## 2019-05-18 PROCEDURE — 99233 SBSQ HOSP IP/OBS HIGH 50: CPT | Performed by: NURSE PRACTITIONER

## 2019-05-18 PROCEDURE — 85025 COMPLETE CBC W/AUTO DIFF WBC: CPT

## 2019-05-18 RX ORDER — FOLIC ACID 1 MG/1
1 TABLET ORAL DAILY
Status: DISCONTINUED | OUTPATIENT
Start: 2019-05-19 | End: 2019-06-01 | Stop reason: HOSPADM

## 2019-05-18 RX ORDER — DONEPEZIL HYDROCHLORIDE 10 MG/1
10 TABLET, FILM COATED ORAL NIGHTLY
Status: DISCONTINUED | OUTPATIENT
Start: 2019-05-18 | End: 2019-06-01 | Stop reason: HOSPADM

## 2019-05-18 RX ORDER — ATORVASTATIN CALCIUM 40 MG/1
40 TABLET, FILM COATED ORAL NIGHTLY
Status: DISCONTINUED | OUTPATIENT
Start: 2019-05-18 | End: 2019-06-01 | Stop reason: HOSPADM

## 2019-05-18 RX ORDER — QUETIAPINE FUMARATE 25 MG/1
25 TABLET, FILM COATED ORAL NIGHTLY PRN
Status: DISCONTINUED | OUTPATIENT
Start: 2019-05-18 | End: 2019-05-18

## 2019-05-18 RX ORDER — CITALOPRAM 20 MG/1
20 TABLET ORAL DAILY
Status: DISCONTINUED | OUTPATIENT
Start: 2019-05-18 | End: 2019-05-18

## 2019-05-18 RX ORDER — TRAZODONE HYDROCHLORIDE 50 MG/1
50 TABLET ORAL NIGHTLY PRN
Status: DISCONTINUED | OUTPATIENT
Start: 2019-05-18 | End: 2019-05-18

## 2019-05-18 RX ORDER — TRAZODONE HYDROCHLORIDE 50 MG/1
50 TABLET ORAL NIGHTLY PRN
Status: DISCONTINUED | OUTPATIENT
Start: 2019-05-18 | End: 2019-05-24

## 2019-05-18 RX ORDER — HYDROCODONE BITARTRATE AND ACETAMINOPHEN 5; 325 MG/1; MG/1
1 TABLET ORAL EVERY 6 HOURS PRN
Status: DISCONTINUED | OUTPATIENT
Start: 2019-05-18 | End: 2019-05-23

## 2019-05-18 RX ORDER — ALBUTEROL SULFATE 90 UG/1
2 AEROSOL, METERED RESPIRATORY (INHALATION) EVERY 6 HOURS PRN
Status: DISCONTINUED | OUTPATIENT
Start: 2019-05-18 | End: 2019-05-18

## 2019-05-18 RX ORDER — FOLIC ACID 1 MG/1
1 TABLET ORAL DAILY
Status: DISCONTINUED | OUTPATIENT
Start: 2019-05-18 | End: 2019-05-18 | Stop reason: SDUPTHER

## 2019-05-18 RX ORDER — METOPROLOL SUCCINATE 25 MG/1
12.5 TABLET, EXTENDED RELEASE ORAL DAILY
Status: DISCONTINUED | OUTPATIENT
Start: 2019-05-18 | End: 2019-06-01 | Stop reason: HOSPADM

## 2019-05-18 RX ORDER — METOPROLOL SUCCINATE 25 MG/1
25 TABLET, EXTENDED RELEASE ORAL DAILY
Status: DISCONTINUED | OUTPATIENT
Start: 2019-05-19 | End: 2019-05-18 | Stop reason: SDUPTHER

## 2019-05-18 RX ORDER — AZITHROMYCIN 250 MG/1
250 TABLET, FILM COATED ORAL DAILY
Status: COMPLETED | OUTPATIENT
Start: 2019-05-19 | End: 2019-05-22

## 2019-05-18 RX ORDER — ASPIRIN 81 MG/1
81 TABLET ORAL DAILY
Status: DISCONTINUED | OUTPATIENT
Start: 2019-05-19 | End: 2019-06-01 | Stop reason: HOSPADM

## 2019-05-18 RX ORDER — CLOPIDOGREL BISULFATE 75 MG/1
75 TABLET ORAL DAILY
Status: DISCONTINUED | OUTPATIENT
Start: 2019-05-19 | End: 2019-06-01 | Stop reason: HOSPADM

## 2019-05-18 RX ORDER — PANTOPRAZOLE SODIUM 40 MG/1
40 TABLET, DELAYED RELEASE ORAL
Status: DISCONTINUED | OUTPATIENT
Start: 2019-05-19 | End: 2019-06-01 | Stop reason: HOSPADM

## 2019-05-18 RX ORDER — QUETIAPINE FUMARATE 25 MG/1
25 TABLET, FILM COATED ORAL NIGHTLY PRN
Status: DISCONTINUED | OUTPATIENT
Start: 2019-05-18 | End: 2019-05-24

## 2019-05-18 RX ORDER — ALBUTEROL SULFATE 90 UG/1
2 AEROSOL, METERED RESPIRATORY (INHALATION) EVERY 6 HOURS PRN
Status: DISCONTINUED | OUTPATIENT
Start: 2019-05-18 | End: 2019-06-01 | Stop reason: HOSPADM

## 2019-05-18 RX ORDER — METOPROLOL SUCCINATE 25 MG/1
12.5 TABLET, EXTENDED RELEASE ORAL DAILY
Status: DISCONTINUED | OUTPATIENT
Start: 2019-05-18 | End: 2019-05-18 | Stop reason: DRUGHIGH

## 2019-05-18 RX ORDER — OXYBUTYNIN CHLORIDE 5 MG/1
5 TABLET ORAL 2 TIMES DAILY
Status: DISCONTINUED | OUTPATIENT
Start: 2019-05-18 | End: 2019-05-18

## 2019-05-18 RX ORDER — PANTOPRAZOLE SODIUM 40 MG/1
40 TABLET, DELAYED RELEASE ORAL
Status: DISCONTINUED | OUTPATIENT
Start: 2019-05-19 | End: 2019-05-18

## 2019-05-18 RX ORDER — ACETAMINOPHEN 325 MG/1
650 TABLET ORAL EVERY 4 HOURS PRN
Status: DISCONTINUED | OUTPATIENT
Start: 2019-05-18 | End: 2019-06-01 | Stop reason: HOSPADM

## 2019-05-18 RX ORDER — CITALOPRAM 20 MG/1
20 TABLET ORAL DAILY
Status: DISCONTINUED | OUTPATIENT
Start: 2019-05-19 | End: 2019-06-01 | Stop reason: HOSPADM

## 2019-05-18 RX ORDER — DONEPEZIL HYDROCHLORIDE 10 MG/1
10 TABLET, FILM COATED ORAL NIGHTLY
Status: DISCONTINUED | OUTPATIENT
Start: 2019-05-18 | End: 2019-05-18

## 2019-05-18 RX ORDER — ASPIRIN 81 MG/1
81 TABLET ORAL DAILY
Status: DISCONTINUED | OUTPATIENT
Start: 2019-05-19 | End: 2019-05-18 | Stop reason: SDUPTHER

## 2019-05-18 RX ORDER — OXYBUTYNIN CHLORIDE 5 MG/1
5 TABLET ORAL 2 TIMES DAILY
Status: DISCONTINUED | OUTPATIENT
Start: 2019-05-18 | End: 2019-06-01 | Stop reason: HOSPADM

## 2019-05-18 RX ORDER — ATORVASTATIN CALCIUM 40 MG/1
40 TABLET, FILM COATED ORAL NIGHTLY
Status: DISCONTINUED | OUTPATIENT
Start: 2019-05-18 | End: 2019-05-18

## 2019-05-18 RX ADMIN — PANTOPRAZOLE SODIUM 40 MG: 40 TABLET, DELAYED RELEASE ORAL at 06:15

## 2019-05-18 RX ADMIN — FOLIC ACID 1 MG: 1 TABLET ORAL at 10:05

## 2019-05-18 RX ADMIN — OXYBUTYNIN CHLORIDE 5 MG: 5 TABLET ORAL at 21:00

## 2019-05-18 RX ADMIN — AZITHROMYCIN 250 MG: 250 TABLET, FILM COATED ORAL at 10:06

## 2019-05-18 RX ADMIN — Medication 2 PUFF: at 19:45

## 2019-05-18 RX ADMIN — ATORVASTATIN CALCIUM 40 MG: 40 TABLET, FILM COATED ORAL at 21:00

## 2019-05-18 RX ADMIN — ASPIRIN 81 MG: 81 TABLET, COATED ORAL at 10:06

## 2019-05-18 RX ADMIN — TRAZODONE HYDROCHLORIDE 50 MG: 50 TABLET ORAL at 21:00

## 2019-05-18 RX ADMIN — CLOPIDOGREL BISULFATE 75 MG: 75 TABLET ORAL at 10:05

## 2019-05-18 RX ADMIN — Medication 2 PUFF: at 09:01

## 2019-05-18 RX ADMIN — CITALOPRAM HYDROBROMIDE 20 MG: 20 TABLET ORAL at 10:06

## 2019-05-18 RX ADMIN — ENOXAPARIN SODIUM 40 MG: 40 INJECTION SUBCUTANEOUS at 10:06

## 2019-05-18 RX ADMIN — OXYBUTYNIN CHLORIDE 5 MG: 5 TABLET ORAL at 10:06

## 2019-05-18 RX ADMIN — QUETIAPINE FUMARATE 25 MG: 25 TABLET, FILM COATED ORAL at 23:39

## 2019-05-18 RX ADMIN — TRAZODONE HYDROCHLORIDE 50 MG: 50 TABLET ORAL at 01:30

## 2019-05-18 RX ADMIN — DONEPEZIL HYDROCHLORIDE 10 MG: 10 TABLET, FILM COATED ORAL at 21:00

## 2019-05-18 RX ADMIN — SODIUM CHLORIDE, PRESERVATIVE FREE 10 ML: 5 INJECTION INTRAVENOUS at 10:07

## 2019-05-18 RX ADMIN — IPRATROPIUM BROMIDE AND ALBUTEROL SULFATE 1 AMPULE: .5; 3 SOLUTION RESPIRATORY (INHALATION) at 09:01

## 2019-05-18 ASSESSMENT — PAIN SCALES - GENERAL: PAINLEVEL_OUTOF10: 0

## 2019-05-18 NOTE — PROCEDURES
[address] [place] [address], [address] [place] [address]                                 HOLTER MONITOR    PATIENT NAME: Cristian Oviedo                       :        1935  MED REC NO:   [id number]                          ROOM:       3007  ACCOUNT NO:   [de-identified]                           ADMIT DATE: 2019  PROVIDER:     Adrián Amanda MD    HOLTER MONITOR 24-HOURS    DATE OF STUDY:  2019    INDICATION:  Stroke. Rule out atrial fibrillation. FINDINGS:  This is a 24-hour Holter monitor. Minimum heart rate is 55  beats per minute. Average heart rate is 71 beats per minute, maximum  heart rate is 176 beats per minute. The longest R-R interval is 1.40  seconds. The patient,s entire rhythm is sinus rhythm. The patient with  a maximum rate of 106 has sinus tachycardia. The patient has short runs  of SVTs present and the longest was 3 beats present. The patient has  bigeminy and isolated PVCs noted. There is no sustained  supraventricular or ventricular arrhythmias present. IMPRESSION:  1. Sinus rhythm. 2.  Sinus tachycardia. 3.  Short run of SVT. 4.  Isolated PVCs.         Deloise Kawasaki, MD    D: 2019 16:00:05       T: 2019 20:56:36     [email or web address]  Job#: [id number]     Doc#: [id number]    CC:  <>

## 2019-05-18 NOTE — PROGRESS NOTES
A Complete drug regimen review was completed for this patient this date. [x]  No clinically significant medication issue was identified   []  Yes, a clinically significant medication issue was identified     []  Adverse Drug Event:    []  Allergy:    []  Side Effect:    []  Ineffective Therapy:    []  Drug interaction:     []  Duplicate Therapy:    []  Untreated Indication:    []  Non-adherence:    []  Other:  Nursing contacted the physician:       Date:5/18/2019    Time:1329    Actions recommended by physician were completed:   Date:                 Time:  Action(s) Taken:             []  New Physician Order Received    []  Issue Noted by Physician;  However No Action Required    []  Other:

## 2019-05-18 NOTE — PLAN OF CARE
ARU Interdisciplinary Plan of Care (IPOC)  [facility]  [address] [facility]  Mika Hernandez, [address]  [phone]  Fax: [phone]        Tina Whitaker    : 1935  Acct #: [de-identified]  MRN: [id number]   PHYSICIAN:  Lilia Mcdonald MD  Primary Active Problems:   Active Hospital Problems    Diagnosis Date Noted    Hypertension [I10]      Priority: Low    Right hemiparesis ([facility] [G81.91] 2019    Acute CVA (cerebrovascular accident) ([facility] [I63.9] 2019    Stage 3 chronic kidney disease ([facility] [N18.3]     COPD (chronic obstructive pulmonary disease) ([facility] [J44.9]        Rehabilitation Diagnosis:     Acute CVA (cerebrovascular accident) [facility]) [I63.9]       ADMIT DATE:2019   CARE PLAN     NURSING:  Tina Whitaker while on this unit will:      Bowel and Bladder   [x] Be continent of bowel and bladder      [x] Have an adequate number of bowel movements   [] Urinate with no urinary retention >300ml in bladder   [] Bladder Scan: (details)   [] Complete bladder protocol with alegria removal   [] Initiate Bladder Program to toilet every ___ hours   [] Initiate Bowel Program to toilet every ___hours   [] Bladder training    [] Bowel training  Pulmonary   [x] Maintain O2 SATs at 92.%  Pain Management   [x] Have pain managed while on ARU        [x] Be pain free by discharge    [x] Medication Management and Education  Maintenance of Skin Integrity/Wound Management   [x] Have no skin breakdown while on ARU   [x] Have improved skin integrity via wound measurements   [x] Have no signs/symptoms of infection via infection protection and monitoring at the          wound site  Fall Prevention   [x] Be free from injury during hospitalization via fall prevention measures     [] Disease management and Education  Precautions   [] Weight Bearing Precautions   [] Swallowing Precautions   [x] Monitoring of Risks of Complications   [x] DVT Prophylaxis    [x] Fluid/electrolyte/Nutrition Management    [x] Complete education with patient/family with understanding demonstrated for          in-room safety with transfers to bed, toilet, wheelchair, shower as well as                bathroom activities and hygiene. [] Adjustment   [] Other:   Nursing interventions may include bowel/bladder training, education for medical assistive devices, medication education, O2 saturation management, energy conservation, stress management techniques, fall prevention, alarms protocol, seating and positioning, skin/wound care, pressure relief instruction,dressing changes,  infection protection, DVT prophylaxis, and/or assistance with in room safety with transfers to bed, toilet, wheelchair, shower as well as bathroom activities and hygiene. Patient/caregiver education for:   [] Disease/sustained injury/management      [x] Medication Use   [] Surgical intervention   [x] Safety/Precautions   [x] Body mechanics and or joint protection   [x] Health maintenance         PHYSICAL THERAPY:  Goals:                  Short term goals  Time Frame for Short term goals: 10-14 days  Short term goal 1: Pt will perform bed mobility independently  Short term goal 2: Pt will perform OOB transfers with a FWW with SBA  Short term goal 3: Pt will ambulate 50 ft with supervision at a FWW with two turns   Short term goal 4: Pt will perform advanced ambulation, including ascending/descending a curb step and navigating uneven surfaces with CGA   Short term goal 5: Pt will pick an object off the ground using a reacher with SBA            Long term goals  Time Frame for Long term goals : LTG  = STG   These goals were reviewed with this patient at the time of assessment and Javier Encarnacion is in agreement. Plan of Care: Pt to be seen 5 days per week for a minimum of 60 minutes for 10-14 days.                 Current Treatment Recommendations: Strengthening, ROM, Balance Training, Functional Mobility Training, Transfer Training, Cognitive/Perceptual Training, Endurance Training, Gait Training, Stair training, Cognitive Reorientation, Pain Management, Home Exercise Program, Safety Education & Training, Patient/Caregiver Education & Training community reintegration,and concurrent/group therapy. OCCUPATIONAL THERAPY:  Goals:             Short term goals  Time Frame for Short term goals: STGs=LTGs :  Long term goals  Time Frame for Long term goals : ~10 days or until d/c. Long term goal 1: Pt will complete grooming tasks c S.  Long term goal 2: Pt will complete total body bathing c S using AE PRN. Long term goal 3: Pt will complete UB dressing c S/setup. Long term goal 4: Pt will complete LB dressing c S/setup using AE PRN. Long term goal 5: Pt will complete toileting c S.  Long term goals 6: Pt will complete functional transfers (bed, chair, shower, toilet) c DME PRN and S.  Long term goal 7: Pt will perform therex/therax to facilitate increased strength/endurance/ax tolerance (c emphasis on dynamic standing balance/tolerance >8 mins and BUE endurance) c SBA.  :    These goals were reviewed with this patient at the time of assessment and Connie Juan is in agreement    Plan of Care:  Pt to be seen 5 days per week for a minimum of 60 minutes for 10 days. Plan  Times per day: Daily  Current Treatment Recommendations: Balance Training, Functional Mobility Training, Endurance Training, Pain Management, Safety Education & Training, Patient/Caregiver Education & Training, Equipment Evaluation, Education, & procurement, Positioning, Self-Care / ADL, Cognitive/Perceptual Training         cognitive training, home management, energy conservation training, community reintegration, splint fabrication, patient/caregiver education and training, and concurrent and/or group therapy.       SPEECH THERAPY: (If ordered)  Plan of Care and Goals:   LTG       FIM Goals: Comprehension: GOAL: Comprehension: 5  Expression: GOAL: Expression: 5  Social: GOAL: Social Interaction: 5  Problem Solving: GOAL: Problem Solvin  Memory: GOAL: Memory: 3                    Short-term Goals  Timeframe for Short-term Goals: No tx--in speaking with OT she reports suspected baseline cognitive deficits as well as what wife reported. Pt responds to cues and will likely continue to require cues in fx. Timeframe for Short-term Goals: No tx--in speaking with OT she reports suspected baseline cognitive deficits as well as what wife reported. Pt responds to cues and will likely continue to require cues in fx. LTG:  Goal 1: Monitor diet x2 days for safety                        Treatments may include speech/language/communication therapy, cognitive training, group therapy, education, and/or dysphagia therapy based on the above goals. Co-treats where appropriate with PT or OT to facilitate patient goals in functional tasks. These goals were reviewed with this patient at the time of assessment and Shay Cline is in agreement. CASE MANAGEMENT:  Goals:   Assist patient/family with discharge planning, patient/family counseling, assistance in obtaining recommended equipment and other services, and coordination with insurance during ARU stay. Patient Goals: Patient will achieve maximum independence and return home.       Current  FIMS and Goals:   SELF-CARE  Eatin - Feeds self with setup/supervision/cues and/or requires only setup/supervision/cues to perform tube feedings  GOAL: Eatin  Groomin - Requires setup/cues to do all tasks  GOAL: Groomin  Bathing: 3 - Able to bathe 5-7 areas  GOAL: Bathin  Dressing-Upper: 5 - Requires setup/supervision/cues and/or requires assist with presthesis/brace only  GOAL: Dressing-Upper: 5  Dressing-Lower: 1 - Total assist with lower body dressing  GOAL: Dressing-Lower: 5  Toiletin - Requires steadying assistance only  GOAL: Toiletin  Bowel Level of Assistance: 6- Requires device like bedpan, diaper, bedside commode, but patient obtains and empties own device including colostomy. Or requires bowel management medication including stool softeners, laxatives, inserts own suppository(no bm wears depends)  TRANSFERS  Bed, Chair, Wheel Chair: 4 - Requires steadying assistance only <25% assist  and/or requires assist with one leg only  GOAL: Bed, Chair, Wheel Chair: 5  Toilet Transfer: 4 - Requires steadying assistance only < 25% assist  GOAL: Toilet: 5  Primary Mode: Shower  GOAL: Tub, Shower: 5  Shower Transfer: 4 - Minimal contact assistance, pt. expends 75% or more effort  SPHINCTER CONTROL  Bladder Level of Assistance: 6- Requires bedpan, urinal, diaper, catheter but patient obtains and empties own device. Or requires bladder management medication  Bladder Frequency of Accidents: 3 - Three accidents (soiling linens or clothing) in past 7 days, includes bedpan or urinal spills by patient  Bowel Level of Assistance: 6- Requires device like bedpan, diaper, bedside commode, but patient obtains and empties own device including colostomy.   Or requires bowel management medication including stool softeners, laxatives, inserts own suppository(no bm wears depends)  Bowel Frequency of Accidents: 6 - No accidents, uses device like bedpan, diaper, bedside commode colostomy, or requires medication to manage bowels  LOCOMOTION  Primary Mode: Walk  GOAL: Walk/Wheel Chair: 2  Distance Walked: 56  Distance Traveled in Wheel Chair: 30  Walk: 2 - Maximal Assistance Requires up to Maximal Assistance AND requires assistance of one person to walk between  feet (Patient performs 25-49% of locomotion effort or goes between  feet)  Wheel Chair: 1 - Total Assistance Operates wheelchair < 50 feet OR requires two or more people OR patient performs < 25% of locomotion effort  Stairs: 1- Total Assistance perfoms less than 25% of the effort, or requirs the assistance of two people, or goes up and down fewer than 4 stairs  GOAL: Stairs: 1  COMMUNICATION  Comprehension: 4 - Patient understands basic needs 75-90%+ of the time  GOAL: Comprehension: 5  Expression: 5 - Expresses basic ideas/needs only (hungry/hot/pain)  GOAL: Expression: 5  SOCIAL COGNITION  Social Interaction: 5 - Patient is appropriate with supervision/cues  GOAL: Social Interaction: 5  Problem Solving: 3 - Patient solves simple/routine tasks 50%-74%  GOAL: Problem Solvin  Memory: 3 - Patient remembers 50%-74% of the time  GOAL: Memory: 3    Activities Prior to Admit:   Homemaking Responsibilities: No  Active : No  Mode of Transportation: SUV  Occupation: Retired  Leisure & Hobbies: Pt will go out to eat with wife, watches tv, wife manages meds and finances. Intensity of Therapy  Rebecca Tse will be seen a minimum of 3 hours of therapy per day/a minimum of 5 out of 7 days per week. [] In this rare instance due to the nature of this patient's medical involvement, this patient will be seen 15 hours per week (900 minutes within a 7 day period). Treatments may include therapeutic exercises, gait training, neuromuscular re-ed, transfer training, community reintegration, bed mobility, w/c mobility and training, self care, home mgmt, cognitive training, energy conservation,dysphagia tx, speech/language/communication therapy, group therapy, and patient/family education. In addition, dietician/nutritionist may monitor calorie count as well as intake and collaboratively work with SLP on dietary upgrades. Neuropsychology/Psychology may evaluate and provide necessary support. Group therapy as appropriate to facilitate improved endurance, STR, COORD, function, safety, transfers, awareness and insight into deficits, problem solving, memory, and social interaction and engagement.     Medical issues being managed closely and that require 24 hour availability of a physician:   [x] Swallowing Precautions [] Weight bearing precautions   [] Wound Care                             [x] Infection Prevention   [x] DVT Prophylaxis/assessment              [x] Monitoring for complications    [x] Fall Precautions/Prevention                         [x] Fluid/Electrolyte/Nutrition Balance   [x] Voice Protection                           [x] Medication Management   [x] Respiratory                   [x] Pain Mgmt   [x] Bowel/Bladder Fx    Medical Prognosis: [x] Good  [] Fair    [] Guarded   Total expected IRF days 16-18                                            Physician anticipated functional outcomes:  Supervision ADLs , mobility and speech  Rehab Goals:   [] Return to premorbid function of_______________________________.    [] Independent   [] Mod I  [x] Supervision  [] CGA   [] Min A   [] Mod A  Level for ambulation []without assistive device  [] with assistive device        [] Independent   [] Mod I  [x] Supervision [] CGA   [] Min A   [] Mod A  Level for transfers []without assistive device  [] with assistive device         [] Independent   [] Mod I  [x] Supervision [] CGA   [] Min A   [] Mod A Level with ADL's []without assistive device   [] with assistive device     ___________________     Level with cognitive skills requiring [] No assist [x] Supervision  [] Active Assist/Cues     [x] Maximize level of mobility and ADL's to decrease burden on caregiver    IPOC brief synthesis of Preadmission Screen, Post-Admission Evaluation, and Therapy Evaluations: IMPRESSION:  An 82-year-old male with a history of right cerebrovascular  accident six years ago with chronic left hemiparesis. 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 He has chronic kidney disease, hypertension, and COPD.     Strengths of the patient include his supportive spouse, accessible home,  and experience with adaptive equipment. Limitations include the  bilaterality of his weakness.  his risk for falling, and his kidney  disease.     RECOMMENDATIONS:  Acute inpatient rehabilitation with occupational and  physical therapy and speech-language pathology, 180 minutes, five out of  every seven days. We will address basic and advancing mobility with  self-care instruction and adaptive equipment training. Caregiver  education will be offered. Expected length of stay prior to a  supervised level of function for discharge to home is 16 to 18 days.     ADDITIONAL RECOMMENDATIONS:  1. Ischemic stroke with infarction:  The patient requires daily  occupational and physical therapy with speech-language pathology. He  requires antiplatelet therapy with Plavix and baby aspirin. He is  getting GI prophylaxis. Blood pressure and kidney disease treatment is  required. We will address appropriate nutrition and hydration orally. Pulmonary hygiene and treatment of his COPD are important. We will  emphasize pulmonary hygiene and DVT prophylaxis:  2. DVT prophylaxis:  Lovenox will be dosed at 30 mg daily. Weightbearing activities will be pursued daily. I must monitor his  hemoglobin and platelet count while on this medication. GI prophylaxis  is offered. 3.  Hypertension:  The patient's blood pressures have been fluctuating  some and Toprol is now used to control the fluctuation. Target systolic  blood pressure is 120 to 140. We will monitor vital signs at rest and  with activity and encourage consistent oral intake. His cardiologist is  modifying his diet limiting sodium. 4. COPD:  The patient requires Dulera inhaler and p.r.n. albuterol  inhaler, Zithromax, and pulmonary hygiene. We will monitor O2  saturations at rest and with activity and consult Pulmonary Medicine  should his symptoms deteriorate. 5.  Chronic kidney disease stage III:  Try to avoid nephrotoxic  medications including anti-inflammatories.   We will monitor his  chemistries periodically and encourage consistent oral intake.         Anticipated discharge destination:    [] Home Independently   [x] Home with supervision    []SNF     [] Other       This plan has been reviewed with me in a language I understand. I have had the opportunity to include my input with my therapy team.    ________________________________________________   ______________________  Patient/Significant Other      Date    I have reviewed this initial plan of care and agree with its contents:    Title   Name    Date    Time    Physician: Guilherme Fernández MD 5/21/2019 10:13 AM    Case Mgmt: Johny Perdomo MSW/SANA  5/21/2019, 3:42 PM    OT: Zaira Grewal MS, OTR/L 05/20/19 1624    PT: Carin Leon PT, DPT #[id number] 5:36 PM 5/20/2019    RN: Tiffany Coronel RN 5/18/2019 1330    ST: [facility]  [place], [place], [address] [place] [address] 5/21/2019  3:06 PM

## 2019-05-18 NOTE — PROGRESS NOTES
Daily Progress Note    patient is awake alert   Remain in sinus  holter no afib-sinus  Keep on low dose Toprol  Continue ASA and plavix  Going to ARU  CVA continue rehab  Need to check for LAZARO  Carotid dx plan for CEA     holter--IMPRESSION:  1. Sinus rhythm. 2.  Sinus tachycardia. 3.  Short run of SVT. 4.  Isolated PVCs.     IMPRESSION:  1.  Left main is patent. 2.  LAD is proximally 100% occluded. 3.  Ramus has mild disease. 4.  Circ has mild disease. 5.  RCA is 100% occluded. 6.  LIMA to LAD is patent. 7.  SVG to PDA is patent. Danella Gell is a collateral circulation filling the  PLB branch.   8.  EDP was normal.  9.  Right heart pressures are normal.     Objective:   BP (!) 115/43   Pulse 74   Temp 98.3 °F (36.8 °C) (Oral)   Resp 15   Ht 5' 8\" (1.727 m)   Wt 234 lb 12.6 oz (106.5 kg)   SpO2 93%   BMI 35.70 kg/m²       Intake/Output Summary (Last 24 hours) at 5/18/2019 1043  Last data filed at 5/17/2019 2230  Gross per 24 hour   Intake 490 ml   Output 75 ml   Net 415 ml       Medications:   Scheduled Meds:   azithromycin  250 mg Oral Daily    ipratropium-albuterol  1 ampule Inhalation 4x daily    clopidogrel  75 mg Oral Daily    metoprolol succinate  25 mg Oral Daily    citalopram  20 mg Oral Daily    atorvastatin  40 mg Oral Nightly    LORazepam  0.5 mg Intravenous Once    sodium chloride flush  10 mL Intravenous BID    enoxaparin  40 mg Subcutaneous Daily    donepezil  10 mg Oral Nightly    mometasone-formoterol  2 puff Inhalation BID    folic acid  1 mg Oral Daily    pantoprazole  40 mg Oral QAM AC    oxybutynin  5 mg Oral BID    aspirin  81 mg Oral Daily      Infusions:     PRN Meds:  QUEtiapine, HYDROcodone-acetaminophen, sodium chloride flush, magnesium hydroxide, ondansetron, albuterol sulfate HFA, traZODone       Physical Exam:  Vitals:    05/18/19 1003   BP: (!) 115/43   Pulse: 74   Resp: 15   Temp: 98.3 °F (36.8 °C)   SpO2: 93%        General: awake alert   Chest: Nontender  Cardiac: sinus   Lungs:Clear to auscultation and percussion. Abdomen: Soft, NT, ND, +BS  Extremities: no edema   Vascular:  Equal 2+ peripheral pulses. Lab Data:  CBC:   Recent Labs     05/16/19  0332 05/18/19  0547   WBC 6.4 6.4   HGB 13.1* 13.3*   HCT 41.8* 42.0   .9* 107.1*    150     BMP:   Recent Labs     05/16/19 0332 05/18/19  0547    139   K 4.5 4.2    99   CO2 27 30   BUN 16 15   CREATININE 1.2 1.2     LIVER PROFILE:   Recent Labs     05/16/19  0332 05/18/19  0547   AST 19 23   ALT 17 23   BILITOT 0.5 0.5   ALKPHOS 82 84     PT/INR: No results for input(s): PROTIME, INR in the last 72 hours. APTT: No results for input(s): APTT in the last 72 hours. BNP:  No results for input(s): BNP in the last 72 hours.       Assessment:  Patient Active Problem List    Diagnosis Date Noted    Left carotid stenosis 05/17/2019     Priority: High    Cerebrovascular accident (CVA) due to stenosis of cerebral artery ([address] 05/13/2019     Priority: High    Ventricular tachycardia ([address] 05/04/2017     Priority: High    Delirium 05/03/2017     Priority: High    Hemispheric carotid artery syndrome 05/03/2017     Priority: High    Cerebral infarction ([address] 11/16/2013     Priority: High    CVA (cerebrovascular accident) ([address] 11/16/2013     Priority: High    Obstructive lung disease ([address] 10/15/2012     Priority: High    CAD (coronary artery disease) 11/29/2010     Priority: Medium    Benign essential tremor 11/29/2010     Priority: Medium    Hypertension      Priority: Low    Dysarthria     Acute CVA (cerebrovascular accident) ([address] 05/13/2019    GERD (gastroesophageal reflux disease)     Dysthymia 08/27/2018    CKD (chronic kidney disease)     Atherosclerosis  04/24/2017    Dementia due to Alzheimer's disease     COPD (chronic obstructive pulmonary disease) ([address]     AAA (abdominal aortic aneurysm) (HCC)     Migraine headache     PFO (patent foramen ovale)     Homocysteinemia ([facility]     AAA (abdominal aortic aneurysm) without rupture ([facility] 09/25/2013    Dyslipidemia     Back pain, chronic 11/12/2012       Guicho Moreland MD 5/18/2019 10:43 AM

## 2019-05-18 NOTE — PROGRESS NOTES
Neurology Service Progress Note   [facility]  Patient Name: Apollo Mcclure  : 1935        Subjective:   Patient seen and and examined  I got a phone call yesterday patient was lethargic and not responding, wife corrected this statement and actually said he was more agitated and disoriented, we did discuss sleep wake cycle and being in the hospital for nearly 6 days now with a history of dementia. No new stroke like symptoms, patient improving, possible rehab today. Medications:  Scheduled Meds:   azithromycin  250 mg Oral Daily    ipratropium-albuterol  1 ampule Inhalation 4x daily    clopidogrel  75 mg Oral Daily    metoprolol succinate  25 mg Oral Daily    citalopram  20 mg Oral Daily    atorvastatin  40 mg Oral Nightly    LORazepam  0.5 mg Intravenous Once    sodium chloride flush  10 mL Intravenous BID    enoxaparin  40 mg Subcutaneous Daily    donepezil  10 mg Oral Nightly    mometasone-formoterol  2 puff Inhalation BID    folic acid  1 mg Oral Daily    pantoprazole  40 mg Oral QAM AC    oxybutynin  5 mg Oral BID    aspirin  81 mg Oral Daily     Continuous Infusions:    PRN Meds:. QUEtiapine, HYDROcodone-acetaminophen, sodium chloride flush, magnesium hydroxide, ondansetron, albuterol sulfate HFA, traZODone         Family History   Problem Relation Age of Onset    COPD Mother     Other Mother         benign tremors       Review of Symptoms:    10-point system review completed. All of which are negative except as mentioned above. Physical Exam:      Gen: Alert and oriented to self  NAD, cooperative  HEENT: NC/AT, EOMI, PERRL, mmm, no carotid bruits, neck supple, no meningeal signs;   Heart: Regular  Lungs: no distress  Ext: no edema, no calf tenderness b/l  Psych: poor eye contact   Skin: no rashes or lesions    NEUROLOGIC EXAM:    Mental Status: A&O to self, wife, situation only NAD, expressive aphasia present, speech dysarthric, he is apraxic     Cranial Nerve Exam:   CN II-XII:  PERRL, VFF, no nystagmus, no gaze paresis, sensation V1-V3 intact b/l, muscles of facial expression asymmetric with a mild right facial droop; hearing intact to conversational tone, palate elevates symmetrically, shoulder elevation symmetric and tongue protrudes midline with movement side to side. Motor Exam:       BUE antigravity with chronic weakness on the LUE   RLE drift  LLE weakness that is chronic RLE weakness >LLE      Deep Tendon Reflexes: 2+/4 biceps, triceps, brachioradialis, trace: patellar, and achilles b/l; flexor plantar responses b/l    Sensation: Intact light touch UE's/LE's b/l    Coordination/Cerebellum:       Tremors--none      Rapidly alternating movements: chronic severe dysdiadochokinesia LUE         Gait and stance:      Gait: deferred      LABS:     Recent Labs     05/16/19  0332 05/18/19  0547   WBC 6.4 6.4    139   K 4.5 4.2    99   CO2 27 30   BUN 16 15   CREATININE 1.2 1.2   GLUCOSE 91 94       Results for London Royal (MRN [id number]) as of 5/14/2019 09:56   Ref. Range 4/9/2019 09:36 5/13/2019 19:05 5/14/2019 01:39   Cholesterol, Total Latest Ref Range: 0 - 199 mg/dL 187     HDL Cholesterol Latest Ref Range: >40 MG/DL 35 (L)  33 (L)   LDL Calculated Latest Ref Range: <100 mg/dL see below     LDL Direct Latest Ref Range: <100 MG/ (H)  102 (H)   Triglycerides Latest Ref Range: <150 MG/ (H)  283 (H)   VLDL Cholesterol Calculated Latest Ref Range: Not Established mg/dL see below         IMAGING:    MRI Brain:  Acute infarct in the left buffy. CTA:  Greater than 90% stenosis left proximal ICA       50-60% stenosis right proximal ICA.       High-grade stenosis versus occlusion right V4 segment, age indeterminate.       High-grade stenosis versus occlusion right posterior cerebral artery   corresponding to the site of right PCA distribution encephalomalacia.       MRI could be beneficial to evaluate for new  infarct as clinically warranted. ECHO/Cath:  IMPRESSION:  1. Left main is patent. 2.  LAD is proximally 100% occluded. 3.  Ramus has mild disease. 4.  Circ has mild disease. 5.  RCA is 100% occluded. 6.  LIMA to LAD is patent. 7.  SVG to PDA is patent. There is a collateral circulation filling the  PLB branch. 8.  EDP was normal.  9.  Right heart pressures are normal.    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 Disc and osteophytes as   well as facet and ligamentum flavum hypertrophy contribute to further   stenosis of the thecal sac and narrowing of the neural foramina as discussed   above.  There is severe stenosis of the thecal sac at C4-5, C5-6, and C6-7. ASSESSMENT/PLAN:     3 82year old male with acute dysarthria and shuffling gait due to right leg weakness secondary to acute left pontine infarction superimposed on R-MCA remote infarction. We will continue with the following plan of care:  1. Stroke risk factors:  1. Age, previous CVA, CAD  2. Neuro Exam:  1. Expressive Aphasia, improving  2. Dysarhtria improving  3. RLE and RUE weakness improving   3. Neurodiagnostics:  1. MRI brain L pontine infarction  2. CTA discussed above  3. ECHO as above  4. Sed rate WNL  5. Mri C spine Discussed above   4. Medications:  1. Continue ASA   2. Add Plavix D/C Aggrenox   3. Statin nightly   5. PT/OT/ST:  1. St the focus at this time   6. Follow up:  1. Thank you to cardiology, medicine and CT surgery. 2. LAZARO work up in our office   3. Will follow up peripherally         Thank you for allowing us to participate in the care of your patient. If there are any questions regarding evaluation please feel free to contact us.      Jeremy Boucher, ROBBIE - YOVANNY, 5/18/2019

## 2019-05-18 NOTE — PROGRESS NOTES
A Complete drug regimen review was completed for this patient this date. []  No clinically significant medication issue was identified   [x]  Yes, a clinically significant medication issue was identified     []  Adverse Drug Event:    [x]  Allergy:allergy to NSAIDS, ASA ordered, as been taking without difficulty since 5/14/2019  []  Side Effect:    []  Ineffective Therapy:    []  Drug interaction:     []  Duplicate Therapy:    []  Untreated Indication:    []  Non-adherence:    []  Other:  Nursing contacted the physician:       Date: 5/18/2019        Time:1418    Actions recommended by physician were completed:   Date:                 Time:  Action(s) Taken:             []  New Physician Order Received    []  Issue Noted by Physician;  However No Action Required    []  Other:

## 2019-05-19 PROBLEM — G81.91 RIGHT HEMIPARESIS (HCC): Status: ACTIVE | Noted: 2019-05-19

## 2019-05-19 PROCEDURE — 94761 N-INVAS EAR/PLS OXIMETRY MLT: CPT

## 2019-05-19 PROCEDURE — 6370000000 HC RX 637 (ALT 250 FOR IP): Performed by: INTERNAL MEDICINE

## 2019-05-19 PROCEDURE — 99223 1ST HOSP IP/OBS HIGH 75: CPT | Performed by: PHYSICAL MEDICINE & REHABILITATION

## 2019-05-19 PROCEDURE — 2700000000 HC OXYGEN THERAPY PER DAY

## 2019-05-19 PROCEDURE — 6360000002 HC RX W HCPCS: Performed by: INTERNAL MEDICINE

## 2019-05-19 PROCEDURE — 1280000000 HC REHAB R&B

## 2019-05-19 PROCEDURE — 94640 AIRWAY INHALATION TREATMENT: CPT

## 2019-05-19 RX ADMIN — PANTOPRAZOLE SODIUM 40 MG: 40 TABLET, DELAYED RELEASE ORAL at 06:13

## 2019-05-19 RX ADMIN — QUETIAPINE FUMARATE 25 MG: 25 TABLET, FILM COATED ORAL at 20:31

## 2019-05-19 RX ADMIN — Medication 2 PUFF: at 11:27

## 2019-05-19 RX ADMIN — AZITHROMYCIN 250 MG: 250 TABLET, FILM COATED ORAL at 08:09

## 2019-05-19 RX ADMIN — DONEPEZIL HYDROCHLORIDE 10 MG: 10 TABLET, FILM COATED ORAL at 20:31

## 2019-05-19 RX ADMIN — TRAZODONE HYDROCHLORIDE 50 MG: 50 TABLET ORAL at 20:31

## 2019-05-19 RX ADMIN — METOPROLOL SUCCINATE 12.5 MG: 25 TABLET, EXTENDED RELEASE ORAL at 08:09

## 2019-05-19 RX ADMIN — OXYBUTYNIN CHLORIDE 5 MG: 5 TABLET ORAL at 08:09

## 2019-05-19 RX ADMIN — Medication 2 PUFF: at 18:53

## 2019-05-19 RX ADMIN — CLOPIDOGREL BISULFATE 75 MG: 75 TABLET ORAL at 08:09

## 2019-05-19 RX ADMIN — ATORVASTATIN CALCIUM 40 MG: 40 TABLET, FILM COATED ORAL at 20:31

## 2019-05-19 RX ADMIN — OXYBUTYNIN CHLORIDE 5 MG: 5 TABLET ORAL at 20:31

## 2019-05-19 RX ADMIN — CITALOPRAM HYDROBROMIDE 20 MG: 20 TABLET ORAL at 08:09

## 2019-05-19 RX ADMIN — FOLIC ACID 1 MG: 1 TABLET ORAL at 08:09

## 2019-05-19 RX ADMIN — HYDROCODONE BITARTRATE AND ACETAMINOPHEN 1 TABLET: 5; 325 TABLET ORAL at 11:47

## 2019-05-19 RX ADMIN — ALBUTEROL SULFATE 2 PUFF: 90 AEROSOL, METERED RESPIRATORY (INHALATION) at 11:28

## 2019-05-19 RX ADMIN — ASPIRIN 81 MG: 81 TABLET, COATED ORAL at 08:09

## 2019-05-19 RX ADMIN — ENOXAPARIN SODIUM 40 MG: 40 INJECTION SUBCUTANEOUS at 08:10

## 2019-05-19 ASSESSMENT — PAIN SCALES - GENERAL: PAINLEVEL_OUTOF10: 9

## 2019-05-19 NOTE — PROGRESS NOTES
Discussed the use of Autopap with patient. Patient refuses to wear and states that he has no interest in wearing it. RN present and aware of patient's decision.

## 2019-05-19 NOTE — PROGRESS NOTES
Patient insistent that he is dry, after some discussion, he allowed me to check him, and he was wet, said he does not feel it. Patient briefs changed along with some linen.

## 2019-05-19 NOTE — H&P
kidney disease, hypertension, and COPD. Strengths of the patient include his supportive spouse, accessible home,  and experience with adaptive equipment. Limitations include the  bilaterality of his weakness. his risk for falling, and his kidney  disease. RECOMMENDATIONS:  Acute inpatient rehabilitation with occupational and  physical therapy and speech-language pathology, 180 minutes, five out of  every seven days. We will address basic and advancing mobility with  self-care instruction and adaptive equipment training. Caregiver  education will be offered. Expected length of stay prior to a  supervised level of function for discharge to home is 16 to 18 days. ADDITIONAL RECOMMENDATIONS:  1. Ischemic stroke with infarction:  The patient requires daily  occupational and physical therapy with speech-language pathology. He  requires antiplatelet therapy with Plavix and baby aspirin. He is  getting GI prophylaxis. Blood pressure and kidney disease treatment is  required. We will address appropriate nutrition and hydration orally. Pulmonary hygiene and treatment of his COPD are important. We will  emphasize pulmonary hygiene and DVT prophylaxis:    2. DVT prophylaxis:  Lovenox will be dosed at 30 mg daily. Weightbearing activities will be pursued daily. I must monitor his  hemoglobin and platelet count while on this medication. GI prophylaxis  is offered. 3.  Hypertension:  The patient's blood pressures have been fluctuating  some and Toprol is now used to control the fluctuation. Target systolic  blood pressure is 120 to 140. We will monitor vital signs at rest and  with activity and encourage consistent oral intake. His cardiologist is  modifying his diet limiting sodium. 4. COPD:  The patient requires Dulera inhaler and p.r.n. albuterol  inhaler, Zithromax, and pulmonary hygiene.   We will monitor O2  saturations at rest and with activity and consult Pulmonary Medicine  should his symptoms deteriorate. 5.  Chronic kidney disease stage III:  Try to avoid nephrotoxic  medications including anti-inflammatories. We will monitor his  chemistries periodically and encourage consistent oral intake. I personally performed a history and physical on this patient within 24  hours of admission to the rehab unit. I have reviewed the preadmission  screen and concur with its findings except the [facility] should be 1.3 bilateral involvement. A detailed plan of care will be established by hospital day #4, and I attest he is appropriate for inpatient rehabilitation at this time.         Elizabeth Monory MD    D: 05/19/2019 9:09:06       T: 05/19/2019 9:12:25     [email or web address]  Job#: [id number]     Doc#: [id number]

## 2019-05-20 LAB
EKG ATRIAL RATE: 74 BPM
EKG DIAGNOSIS: NORMAL
EKG Q-T INTERVAL: 440 MS
EKG QRS DURATION: 142 MS
EKG QTC CALCULATION (BAZETT): 488 MS
EKG R AXIS: -21 DEGREES
EKG T AXIS: -7 DEGREES
EKG VENTRICULAR RATE: 74 BPM

## 2019-05-20 PROCEDURE — 97535 SELF CARE MNGMENT TRAINING: CPT

## 2019-05-20 PROCEDURE — 94761 N-INVAS EAR/PLS OXIMETRY MLT: CPT

## 2019-05-20 PROCEDURE — 2700000000 HC OXYGEN THERAPY PER DAY

## 2019-05-20 PROCEDURE — 99232 SBSQ HOSP IP/OBS MODERATE 35: CPT | Performed by: PHYSICAL MEDICINE & REHABILITATION

## 2019-05-20 PROCEDURE — 6370000000 HC RX 637 (ALT 250 FOR IP): Performed by: INTERNAL MEDICINE

## 2019-05-20 PROCEDURE — 97542 WHEELCHAIR MNGMENT TRAINING: CPT

## 2019-05-20 PROCEDURE — 6370000000 HC RX 637 (ALT 250 FOR IP): Performed by: PHYSICAL MEDICINE & REHABILITATION

## 2019-05-20 PROCEDURE — 97116 GAIT TRAINING THERAPY: CPT

## 2019-05-20 PROCEDURE — 6360000002 HC RX W HCPCS: Performed by: PHYSICAL MEDICINE & REHABILITATION

## 2019-05-20 PROCEDURE — 94640 AIRWAY INHALATION TREATMENT: CPT

## 2019-05-20 PROCEDURE — 97162 PT EVAL MOD COMPLEX 30 MIN: CPT

## 2019-05-20 PROCEDURE — 97530 THERAPEUTIC ACTIVITIES: CPT

## 2019-05-20 PROCEDURE — 97166 OT EVAL MOD COMPLEX 45 MIN: CPT

## 2019-05-20 PROCEDURE — 92610 EVALUATE SWALLOWING FUNCTION: CPT

## 2019-05-20 PROCEDURE — 1280000000 HC REHAB R&B

## 2019-05-20 RX ADMIN — TRAZODONE HYDROCHLORIDE 50 MG: 50 TABLET ORAL at 20:37

## 2019-05-20 RX ADMIN — Medication 2 PUFF: at 20:25

## 2019-05-20 RX ADMIN — ACETAMINOPHEN 325 MG: 325 TABLET ORAL at 08:41

## 2019-05-20 RX ADMIN — ENOXAPARIN SODIUM 30 MG: 30 INJECTION SUBCUTANEOUS at 08:41

## 2019-05-20 RX ADMIN — DONEPEZIL HYDROCHLORIDE 10 MG: 10 TABLET, FILM COATED ORAL at 20:36

## 2019-05-20 RX ADMIN — HYDROCODONE BITARTRATE AND ACETAMINOPHEN 1 TABLET: 5; 325 TABLET ORAL at 08:42

## 2019-05-20 RX ADMIN — QUETIAPINE FUMARATE 25 MG: 25 TABLET, FILM COATED ORAL at 20:36

## 2019-05-20 RX ADMIN — FOLIC ACID 1 MG: 1 TABLET ORAL at 08:43

## 2019-05-20 RX ADMIN — ATORVASTATIN CALCIUM 40 MG: 40 TABLET, FILM COATED ORAL at 20:36

## 2019-05-20 RX ADMIN — PANTOPRAZOLE SODIUM 40 MG: 40 TABLET, DELAYED RELEASE ORAL at 06:06

## 2019-05-20 RX ADMIN — METOPROLOL SUCCINATE 12.5 MG: 25 TABLET, EXTENDED RELEASE ORAL at 08:43

## 2019-05-20 RX ADMIN — Medication 2 PUFF: at 09:00

## 2019-05-20 RX ADMIN — CLOPIDOGREL BISULFATE 75 MG: 75 TABLET ORAL at 08:42

## 2019-05-20 RX ADMIN — AZITHROMYCIN 250 MG: 250 TABLET, FILM COATED ORAL at 08:42

## 2019-05-20 RX ADMIN — OXYBUTYNIN CHLORIDE 5 MG: 5 TABLET ORAL at 20:36

## 2019-05-20 RX ADMIN — OXYBUTYNIN CHLORIDE 5 MG: 5 TABLET ORAL at 08:42

## 2019-05-20 RX ADMIN — CITALOPRAM HYDROBROMIDE 20 MG: 20 TABLET ORAL at 08:42

## 2019-05-20 RX ADMIN — ASPIRIN 81 MG: 81 TABLET, COATED ORAL at 08:43

## 2019-05-20 ASSESSMENT — PAIN SCALES - GENERAL
PAINLEVEL_OUTOF10: 9
PAINLEVEL_OUTOF10: 9

## 2019-05-20 NOTE — PROGRESS NOTES
Nutrition Assessment    Type and Reason for Visit: Initial(rehab admit )    Nutrition Recommendations: Continue cardiac diet per order     Nutrition Assessment: Patient currently low nutrition risk with good appetite, meal intake %. Tolerating cardiac diet, diet ed completed on acute floor.      Malnutrition Assessment:  · Malnutrition Status: No malnutrition  · Context: Acute illness or injury    Nutrition Risk Level: Low    Nutrient Needs:  · Estimated Daily Total Kcal: 9421-0977 (15-18 kcal/kg current weight with BMI over 30)  · Estimated Daily Protein (g): 82-89 (1.2-1.3 g/kg IBW)   · Estimated Daily Total Fluid (ml/day): 1900 (1 ml/sandra)     Nutrition Diagnosis:   · Problem: No nutrition diagnosis at this time    Objective Information:  · Nutrition-Focused Physical Findings: sitting up in bed eaing lunch, self feeding, spouse in room   · Wound Type: None  · Current Nutrition Therapies:  · Oral Diet Orders: Cardiac   · Oral Diet intake: %  · Oral Nutrition Supplement (ONS) Orders: None  · ONS intake:    · Anthropometric Measures:  · Ht: 5' 8\" (172.7 cm)   · Current Body Wt: 235 lb 3.7 oz (106.7 kg)  · Usual Body Wt: 236 lb (107 kg)(per hx)  · % Weight Change:  ,  no loss per records   · Ideal Body Wt: 154 lb (69.9 kg), % Ideal Body 152  · BMI Classification: BMI 35.0 - 39.9 Obese Class II(BMI-35.8)    Nutrition Interventions:   Continue current diet  Education Completed, Continued Inpatient Monitoring, Coordination of Care    Nutrition Evaluation:   · Evaluation: Goals set   · Goals: Patient will consume at least 75% at meals during stay     · Monitoring: Meal Intake, Weight, Pertinent Labs, Diet Tolerance      Electronically signed by Kush Chaudhary, RD, LD on 5/20/19 at 1:01 PM    Contact Number: [phone]

## 2019-05-20 NOTE — PROGRESS NOTES
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 Myriam Dee. & Dr. Ashley Fought Carotid stenosis, right 11/13    ~50% or less, on MRA and carotid u/s    Chronic kidney disease     CKD (chronic kidney disease)     cannot take nsaids    COPD (chronic obstructive pulmonary disease) ([address]     CVA (cerebrovascular accident) ([address] 11/16/13 admission    (aggrenox [facility]) R temporal/occipital and R thalamic infarct, L sided weakness and focal seizure    Dementia due to Alzheimer's disease 03/28/2017    MMSE 22/30 ON 3/28/17--- STARTING TRIAL ARICEPT    Dyslipidemia 10/04    ED (erectile dysfunction) 6/02 372 ,  1/2001  46    testos    Former smoker     GERD (gastroesophageal reflux disease)     Hearing loss 5/06    wear hearing aides bilat    Homocysteinemia ([address]     on foltx    Hyperlipidemia     Hypertension     Kidney stone     Migraine headache     Obesity (BMI 30-39. 9)     PFO (patent foramen ovale)     noted on [place] 12/17/13- Dr Marion Holloway Redundant prepuce and phimosis 1/5/12    Dr Yoni Hernandez Seizure disorder, focal motor ([address] 11/13 admission    L hand tremor assoc w CVA    Vertigo      Past Surgical History:   Procedure Laterality Date    ABDOMEN SURGERY      ABDOMINAL AORTIC ANEURYSM REPAIR, ENDOVASCULAR  9/24/13    CARDIAC SURGERY      CARDIOVASCULAR STRESS TEST  1/10/2012    Dr. Princeton Mcburney- normal with EF 45%    CORONARY ARTERY BYPASS GRAFT  1/1994    Dr Gary Dia  years ago    wears full dentures Transfer To: (built in shower bench)  Shower - Technique: Ambulating(c RW)  Shower Transfers: Minimal assistance  Shower Transfers Comments: Cues for body mechanics       Functional Mobility:    Balance  Sitting Balance: Stand by assistance  Standing Balance: Minimal assistance(Ranged from CGA-min A)  Standing Balance  Time: Multiple stands ranging 1-2 mins each  Activity: ADLs  Comment: When fatigued pt required BUE support to maintain standing balance  Functional Mobility  Functional - Mobility Device: Rolling Walker  Activity: To/from bathroom  Assist Level: Minimal assistance  Functional Mobility Comments: Cues to maintain closer proximity to device     Cognition:  Cognition  Overall Cognitive Status: Exceptions  Following Commands: Follows one step commands consistently  Attention Span: Difficulty dividing attention  Memory: Decreased short term memory, Decreased recall of recent events  Safety Judgement: Decreased awareness of need for assistance, Decreased awareness of need for safety  Problem Solving: Assistance required to generate solutions, Assistance required to implement solutions, Assistance required to identify errors made, Assistance required to correct errors made, Decreased awareness of errors  Insights: Decreased awareness of deficits  Initiation: Requires cues for all  Sequencing: Requires cues for all  Cognition Comment: Wife reports pt has significant memory deficits at baseline from first CVA. Stated pt seems slightly more confused than baseline and feels it could be sleeping medication. Pt demo'ed poor initiation, problem solving, and recall throughout evaluation.     Perception:  Perception  Overall Perceptual Status: WFL    Assessment:     Performance deficits / Impairments: Decreased functional mobility , Decreased safe awareness, Decreased endurance, Decreased balance, Decreased ADL status, Decreased cognition    The patient is a 82year old male admitted onto ARU after hospitalization for sudden onset R sided weakness s/p outpatient cardiac cath procedure; MRI was + for L pontine infarct. PTA, pt lives c spouse who provided supervision and setup assist for all ADLs and mobility. Wife completes all IADLs, and reports that whenever she leaves the home without the patient, someone is there with him so that pt truly has continuous supervision. Today, pt was limited by decreased cognition (however he has baseline deficits from CVA and dementia), [place], fatigue, and decreased aerobic capacity, currently requiring supplemental oxygen which is not his baseline. The FIM, MMT, and ROM standardized assessments were used this date to determine the above performance deficits, which compromise pt's ability to safely complete ADLs/IADLs/mobility. Pt will benefit from ARU OT services to increase functional performance and return to PLOF. Decision Making: Medium Complexity  Clinical Presentation:  Evolving c changing characteristics (I.e. Pain)  History:  See \"Social/Functional\" sections for complete Occupational Profile. Pt's co-morbidities include CVA, dementia, chronic back pain, benign essential tremor, and COPD which all impact function and ability to progress through plan of care. Assistance/Modification:  Use of DME, providing verbal cues, providing physical assistance for mobility and ADL's, providing set-up of supplies during ADL's  Patient education:   ARU procotol, Role of O.T., O.T. plan of care  []   Patient goal was established and reviewed in Rehabtracker with patient and/or family this date.   Treatment Initiated:  Patient education, ADL re-training  Barriers to Improvement:  Baseline cognitive deficits  REQUIRES OT FOLLOW UP: Yes  Discharge Recommendations:  Home c continuous assist, C OT  Equipment Recommendations:  TBD; wife reports grab bar is being installed by toilet    Goals:     Short term goals  Time Frame for Short term goals: STGs=LTGs  Long term goals  Time Frame for Long term goals : ~10 days or until d/c. Long term goal 1: Pt will complete grooming tasks c S.  Long term goal 2: Pt will complete total body bathing c S using AE PRN. Long term goal 3: Pt will complete UB dressing c S/setup. Long term goal 4: Pt will complete LB dressing c S/setup using AE PRN. Long term goal 5: Pt will complete toileting c S.  Long term goals 6: Pt will complete functional transfers (bed, chair, shower, toilet) c DME PRN and S.  Long term goal 7: Pt will perform therex/therax to facilitate increased strength/endurance/ax tolerance (c emphasis on dynamic standing balance/tolerance >8 mins and BUE endurance) c SBA. Plan:    Pt will be seen at least 60 minutes per day for a minimum of 5 days per week, plus group therapy as appropriate  Current Treatment Recommendations: Balance Training, Functional Mobility Training, Endurance Training, Pain Management, Safety Education & Training, Patient/Caregiver Education & Training, Equipment Evaluation, Education, & procurement, Positioning, Self-Care / ADL, Cognitive/Perceptual Training    OT Individual Minutes  Time In: 0945  Time Out: 1100  Minutes: 75                Number of Minutes/Billable Intervention  OT Evaluation 15   Therapeutic Exercise    ADL Self-care 60   Neuro Re-Ed    Therapeutic Activity    Group    Other:    TOTAL 75     Electronically signed by:    Rolande Gosselin, MS, OTR/L  License #[id number]   5/20/2019, 4:12 PM

## 2019-05-20 NOTE — PROGRESS NOTES
gait distrubance, HTN, CKD stage III, COPD, and remote R CVA with chronic L barrie. Pt developed R sided wekness following a cardiac cath. Pt was assisted to floor by wife and squad was called. Pt found to have L pontine infarct with reported changes in speech on admit with improvement over the course of his stay. PMHX includes AAA, asthma, back pain, benign esstntial tremor, CAD, carotid stenosis, CKD, COPD, CVA, Dementia, Dyslipidemia, ED, GERD, [place], HTN, Seizure Distorder, Vertigo. Dysphagia Diagnosis: Swallow function appears grossly intact  Dysphagia Impression : NSG and wife both report coughing with meals. Not observed over 40 minutes. Pt used slow pace, chewed food well, and liquids were taken by cup and straw with single and multiple sips. Dysphagia Outcome Severity Scale: Level 7: Normal in all situations     Treatment Plan  Requires SLP Intervention: Yes(continue to monitor)  Duration/Frequency of Treatment: Monitor x2 days  D/C Recommendations: To be determined  Referral To: Dietician(Prefers big meals)    Recommended Diet and Intervention  Diet Solids Recommendation: Regular(Cardiac)  Liquid Consistency Recommendation: Thin  Recommended Form of Meds: PO  Recommendations: Other (comments)(Monitor x2 days)  Therapeutic Interventions: Diet tolerance monitoring    Compensatory Swallowing Strategies  Compensatory Swallowing Strategies: Alternate solids and liquids;Eat/Feed slowly; Small bites/sips;Upright as possible for all oral intake    Treatment/Goals  Long-term Goals  Goal 1: Monitor diet x2 days for safety    General  Chart Reviewed: Yes  Subjective  Subjective: Pt [place]  Behavior/Cognition: Alert; Cooperative  Respiratory Status: O2 via nasual cannula  O2 Device: Nasal cannula  Follows Directions: Simple  Dentition: Dentures bottom; Dentures top(Pt c/o sore gum on upper L)  Patient Positioning: Upright in bed  Baseline Vocal Quality: Normal  Volitional Cough: Strong  Prior Dysphagia History: None reported  Consistencies Administered: Reg solid;Mechanical soft;Puree; Thin - cup; Thin - straw           Vision/Hearing  Vision  Vision: Within Functional Limits  Hearing  Hearing: Exceptions to [facility] Exceptions: Hard of hearing/hearing concerns(Requires direct facial cues)    Oral Motor Deficits  Oral/Motor  Oral Motor: Within functional limits    Oral Phase Dysfunction  Oral Phase  Oral Phase: WFL  Oral Phase  Oral Phase - Comment: WFL     Indicators of Pharyngeal Phase Dysfunction   Pharyngeal Phase  Pharyngeal Phase: WFL  Pharyngeal Phase   Pharyngeal: WFL    Prognosis  Prognosis  Prognosis for safe diet advancement: good  Barriers to reach goals: cognitive deficits  Individuals consulted  Consulted and agree with results and recommendations: Patient; Family member  Family member consulted: [employer]    Education  Patient Education: small bites, slow pace, monitor breathing. Patient Education Response: Verbalizes understanding;Needs reinforcement  Safety Devices in place:  Yes                   Therapy Time  SLP Individual Minutes  Time In: 4007  Time Out: [place]  Minutes: [address], [address]ared[address] Samaracynthia Shine  5/20/2019 5:51 PM

## 2019-05-20 NOTE — PROGRESS NOTES
Harmony Smith is a 82 y.o. male patient.     Current Facility-Administered Medications   Medication Dose Route Frequency Provider Last Rate Last Dose    enoxaparin (LOVENOX) injection 30 mg  30 mg Subcutaneous Daily C Renita Mendoza MD   30 mg at 05/20/19 0841    albuterol sulfate  (90 Base) MCG/ACT inhaler 2 puff  2 puff Inhalation Q6H PRN Sallie Larsen MD   2 puff at 05/19/19 1128    aspirin EC tablet 81 mg  81 mg Oral Daily Sallie Larsen MD   81 mg at 05/20/19 0843    atorvastatin (LIPITOR) tablet 40 mg  40 mg Oral Nightly Sallie Larsen MD   40 mg at 05/19/19 2031    azithromycin (ZITHROMAX) tablet 250 mg  250 mg Oral Daily Sallie Larsen MD   250 mg at 05/20/19 8549    citalopram (CELEXA) tablet 20 mg  20 mg Oral Daily Sallie Larsen MD   20 mg at 05/20/19 4828    clopidogrel (PLAVIX) tablet 75 mg  75 mg Oral Daily Sallie Larsen MD   75 mg at 05/20/19 7556    donepezil (ARICEPT) tablet 10 mg  10 mg Oral Nightly Sallie Larsen MD   10 mg at 66/18/15 5943    folic acid (FOLVITE) tablet 1 mg  1 mg Oral Daily Sallie Larsen MD   1 mg at 05/20/19 0843    HYDROcodone-acetaminophen (Clyda Searing) 5-325 MG per tablet 1 tablet  1 tablet Oral Q6H PRN Sallie Larsen MD   1 tablet at 05/20/19 0842    mometasone-formoterol (DULERA) 200-5 MCG/ACT inhaler 2 puff  2 puff Inhalation BID Sallie Larsen MD   2 puff at 05/20/19 0900    oxybutynin (DITROPAN) tablet 5 mg  5 mg Oral BID Sallie Larsen MD   5 mg at 05/20/19 0842    pantoprazole (PROTONIX) tablet 40 mg  40 mg Oral QAM AC Sallie Larsen MD   40 mg at 05/20/19 0606    QUEtiapine (SEROQUEL) tablet 25 mg  25 mg Oral Nightly PRN Sallie Larsen MD   25 mg at 05/19/19 2031    traZODone (DESYREL) tablet 50 mg  50 mg Oral Nightly PRN Sallie Larsen MD   50 mg at 05/19/19 2031    acetaminophen (TYLENOL) tablet 650 mg  650 mg Oral Q4H PRN JULISSA Mendoza MD   325 mg at 05/20/19 1505    magnesium hydroxide (MILK OF MAGNESIA) 400 MG/5ML suspension 30 mL  30 mL Oral Daily PRN C Abelardo Neville MD        metoprolol succinate (TOPROL XL) extended release tablet 12.5 mg  12.5 mg Oral Daily Madelyn Crowley MD   12.5 mg at 05/20/19 6013     Allergies   Allergen Reactions    Flomax [Tamsulosin Hcl]      Hypotension/syncope    Nsaids      Due to CKD    Penicillins Hives    Topamax [Topiramate]      Fatigue and severe depression     Principal Problem:    Acute CVA (cerebrovascular accident) ([facility]  Active Problems:    Hypertension    COPD (chronic obstructive pulmonary disease) ([facility])    Stage 3 chronic kidney disease ([facility])    Right hemiparesis ([facility])  Resolved Problems:    * No resolved hospital problems. *    Blood pressure 121/69, pulse 78, temperature 97.7 °F (36.5 °C), temperature source Oral, resp. rate 16, height 5' 8\" (1.727 m), weight 235 lb 3.2 oz (106.7 kg), SpO2 95 %. Subjective patient seen in his room with nursing staff. Tolerating rehab program.  Using CPAP at night. Very anxious for discharge. Explained the rehab program to the patient and his wife. He appears to be making progress. Denies bowel or bladder complaints. Dyspnea with exertion. Denies chest pain. Otherwise complete review of systems as above are unremarkable  Objective:  General Appearance:  Comfortable. Vital signs: (most recent): Blood pressure 121/69, pulse 78, temperature 97.7 °F (36.5 °C), temperature source Oral, resp. rate 16, height 5' 8\" (1.727 m), weight 235 lb 3.2 oz (106.7 kg), SpO2 95 %. Vital signs are normal.    Lungs:  Normal respiratory rate. Breath sounds clear to auscultation. Heart: Normal rate. Regular rhythm. Abdomen: Bowel sounds are normal.   There is no abdominal tenderness. Neurological: Patient is alert and oriented to person, place and time. moving all 4 limbs. Diminished  strength bilaterally.     Assessment & Plan  An 82-year-old male with a history of right inhaler and p.r.n. albuterol  inhaler, Zithromax, and pulmonary hygiene. We will monitor O2  saturations at rest and with activity and consult Pulmonary Medicine  should his symptoms deteriorate. 5.  Chronic kidney disease stage III:  Try to avoid nephrotoxic  medications including anti-inflammatories. We will monitor his  chemistries periodically and encourage consistent oral intake.     Discharge plan: Await team staffing 5/21/19    Barriers: Decreased endurance, decreased safety, difficulty with mobility and ADLs, bowel and bladder management, dyspnea with exertion, anxiety    The patient continues to benefit from inpatient rehabilitation. He has significant functional deficits and medical comorbidities requiring ongoing physician visits and comprehensive interdisciplinary rehabilitation care. His needs could not be met at a lesser level of care.       Azam Mclain MD  5/20/2019

## 2019-05-20 NOTE — PROGRESS NOTES
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 Jamila Monahan. & Dr. Ish Barbour Carotid stenosis, right 11/13    ~50% or less, on MRA and carotid u/s    Chronic kidney disease     CKD (chronic kidney disease)     cannot take nsaids    COPD (chronic obstructive pulmonary disease) ([address]     CVA (cerebrovascular accident) ([address] 11/16/13 admission    ([facility]) R temporal/occipital and R thalamic infarct, L sided weakness and focal seizure    Dementia due to Alzheimer's disease 03/28/2017    MMSE 22/30 ON 3/28/17--- STARTING TRIAL ARICEPT    Dyslipidemia 10/04    ED (erectile dysfunction) 6/02 372 ,  1/2001  46    testos    Former smoker     GERD (gastroesophageal reflux disease)     Hearing loss 5/06    wear hearing aides bilat    Homocysteinemia ([address]     on foltx    Hyperlipidemia     Hypertension     Kidney stone     Migraine headache     Obesity (BMI 30-39. 9)     PFO (patent foramen ovale)     noted on [place] 12/17/13- Dr Simona Okeefe Redundant prepuce and phimosis 1/5/12    Dr Reno Hardy Seizure disorder, focal motor ([address] 11/13 admission    L hand tremor assoc w CVA    Vertigo      Past Surgical History:   Procedure Laterality Date    ABDOMEN SURGERY      ABDOMINAL AORTIC ANEURYSM REPAIR, ENDOVASCULAR  9/24/13    CARDIAC SURGERY      CARDIOVASCULAR STRESS TEST  1/10/2012    Dr. Teofilo Juan- normal with EF 45%    CORONARY ARTERY BYPASS GRAFT  1/1994    Dr Amalia Bhandari  years ago    wears full dentures       Fall History (# in past 12 months):   1 fall with injury   Social History:  Social/Functional History  Lives With: Spouse  Type of Home: House  Home Layout: One level  Home Access: Level entry  Bathroom Shower/Tub: Walk-in shower  Bathroom Toilet: Handicap height(uses the counter, son planning on placing a grabbar)  Bathroom Equipment: Shower chair, Grab bars in shower  Bathroom Accessibility: Walker accessible(\"he leaves it outside the door\")  Home Equipment: Rolling walker, 4 wheeled walker, Wheelchair-manual, Grab bars, Alert Button, Lift chair(RW in house, has been primarily walking household distances)  Receives Help From: Family(Son, Jason Carlos \"in and out\")  ADL Assistance: Needs assistance(Wife provided set-up assist/supervision throughout showering and dressing)  Homemaking Assistance: Needs assistance(Wife performs all of the cooking and cleaning)  Homemaking Responsibilities: No  Ambulation Assistance: Independent(with a walker)  Transfer Assistance: Independent(with the walker )  Active : No  Patient's  Info: Wife drives   Mode of Transportation: [facility]  Occupation: Retired  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 Ailyn Dorsey was semi fowlers up in bed on PT arrival. He reported B hip pain at a 9/10.      Pain Level: 9       Objective:  Orientation  Overall Orientation Status: Impaired  Orientation Level: Disoriented X4        Vision  Vision: Within Functional Limits  Hearing  Hearing: Exceptions to [facility]  Hearing Exceptions: Hard of hearing/hearing concerns    ROM:      AROM RLE (degrees)  RLE AROM: WFL     AROM LLE (degrees)  LLE AROM : WFL                Strength:    Strength RLE  Comment: 4+/5 knee flexion, knee extension, DF   Strength LLE  Comment: 4+/5 knee flexion, knee extension, DF              Bed Mobility:    Bed mobility  Rolling to Left: Contact guard assistance  Rolling to Right: Minimal assistance  Supine to Sit: Maximum assistance(Assist required at trunk and BLE; then to scoot with feet flat on the ground)  Sit to Supine: Minimal assistance    Transfers:    Transfers  Sit to Stand: Maximum Assistance, Moderate Assistance(initial stand at EOB to jessica brief, Max A; second stand Mod A)  Stand to sit: Moderate Assistance  Bed to Chair: Moderate assistance(Assist required for steadying and managing the FWW, increased distance between LACEY and FWW)  Car Transfer: Moderate Assistance(assist required to steady into the car and upon transferring out, decreased safety mechanics upon ascending into the car)    Ambulation:    Ambulation?: Yes  Ambulation 1  Surface: level tile  Device: Rolling Walker  Assistance: Minimal assistance  Quality of Gait: Reduced step length B, FWD bent posture, narrow LACEY with B feet ER, FWD bent posture; Shaking observed at BUE and BLE with fatigue   Distance: 10 ft then up to 42 ft (Max effort, with on turn)      Uneven surface: Mod A, assist required for steadying the walker over uneven surfaces     Curb/Stairs:  Stairs?: Yes  Stairs  Curbs: 4\"(Max A d/t B knees buckling )  Device: Rolling walker    Wheelchair:  Propulsion 1  Propulsion: Manual  Level: Level Tile  Method: MARTHA ALARCON  Level of Assistance: Minimal assistance  Distance: 30  Wheelchair Activities  Propulsion: Yes    Balance:    Balance  Posture: Fair  Sitting - Static: Good  Sitting - Dynamic: Fair, +(Supervision throughout dynamic sitting;  Pt assisted to don new brief in sitting at EOB due to fatigue)  Standing - Static: Fair  Standing - Dynamic: Fair  Comments: Min A for picking an object off the ground in standing     Assessment:   Mr. Anupam Feliz is an 81 y/o male s/p CABG on 5/13, who was readmitted to [facility] after experiencing R leg weakness, slurred speech and shuffling of gait. An MRI on 5/14 found an acute infarct in the L buffy and Chest Xray 5/17 Mild Pulmonary vascular congestion. His COPD had exacerbated throughout his acute care stay. Additional pertinent PMHx includes dementia. Upon PT evaluation, he presents with impaired BLE strength, balance and endurance, as well as, impaired cognition requiring consistent VC to ensure safety. Goals were set at a SBA level, as he will likely require cues to ensure safety upon return home. Primary barriers to improvement are cognition and endurance. At this time, he presents as evolving d/t increased falls risk, impaired cognition and PMHx. He will benefit from skilled PT to [facility].        Body structures, Functions, Activity limitations: Decreased strength, Decreased endurance, Decreased coordination, Decreased ROM, Decreased balance, Decreased fine motor control, Increased Pain, Decreased cognition, Decreased functional mobility , Decreased safe awareness  Treatment Diagnosis: Impaired strength, endurance and balance, as well as, cognitive impairments limiting safety and independence with functional mobility and gait   Prognosis: Good  Decision Making: High Complexity  Clinical Presentation: Evolving  Patient education:   ARU schedule, ARU expectations for participation, plan of care, safety upon return home, importance of CGT  Treatment Initiated:  Functional mobility training, gait training, patient education, safety with advanced mobiltiy  Barriers to Improvement:  Cognition/memory  Discharge Recommendations:   Home with [facility], 24 hr sup   Equipment Recommendations:  Pt with RW    Goals:  Patient Goals   Patient goals : Return home with wife   Short term goals  Time Frame for Short term goals: 10-14 days  Short term goal 1: Pt will perform bed mobility independently  Short term goal 2: Pt will perform OOB transfers with a FWW with SBA  Short term goal 3: Pt will ambulate 50 ft with supervision at a FWW with two

## 2019-05-21 PROCEDURE — 2700000000 HC OXYGEN THERAPY PER DAY

## 2019-05-21 PROCEDURE — 97112 NEUROMUSCULAR REEDUCATION: CPT

## 2019-05-21 PROCEDURE — 96125 COGNITIVE TEST BY HC PRO: CPT

## 2019-05-21 PROCEDURE — 6370000000 HC RX 637 (ALT 250 FOR IP): Performed by: PHYSICAL MEDICINE & REHABILITATION

## 2019-05-21 PROCEDURE — 97110 THERAPEUTIC EXERCISES: CPT

## 2019-05-21 PROCEDURE — 94150 VITAL CAPACITY TEST: CPT

## 2019-05-21 PROCEDURE — 1280000000 HC REHAB R&B

## 2019-05-21 PROCEDURE — 97530 THERAPEUTIC ACTIVITIES: CPT

## 2019-05-21 PROCEDURE — 97116 GAIT TRAINING THERAPY: CPT

## 2019-05-21 PROCEDURE — 6370000000 HC RX 637 (ALT 250 FOR IP): Performed by: INTERNAL MEDICINE

## 2019-05-21 PROCEDURE — 99232 SBSQ HOSP IP/OBS MODERATE 35: CPT | Performed by: PHYSICAL MEDICINE & REHABILITATION

## 2019-05-21 PROCEDURE — 94660 CPAP INITIATION&MGMT: CPT

## 2019-05-21 PROCEDURE — 6360000002 HC RX W HCPCS: Performed by: PHYSICAL MEDICINE & REHABILITATION

## 2019-05-21 PROCEDURE — 94640 AIRWAY INHALATION TREATMENT: CPT

## 2019-05-21 PROCEDURE — 94761 N-INVAS EAR/PLS OXIMETRY MLT: CPT

## 2019-05-21 RX ADMIN — CLOPIDOGREL BISULFATE 75 MG: 75 TABLET ORAL at 08:14

## 2019-05-21 RX ADMIN — OXYBUTYNIN CHLORIDE 5 MG: 5 TABLET ORAL at 08:15

## 2019-05-21 RX ADMIN — ACETAMINOPHEN 650 MG: 325 TABLET ORAL at 08:14

## 2019-05-21 RX ADMIN — PANTOPRAZOLE SODIUM 40 MG: 40 TABLET, DELAYED RELEASE ORAL at 05:46

## 2019-05-21 RX ADMIN — Medication 2 PUFF: at 20:40

## 2019-05-21 RX ADMIN — CITALOPRAM HYDROBROMIDE 20 MG: 20 TABLET ORAL at 08:14

## 2019-05-21 RX ADMIN — ASPIRIN 81 MG: 81 TABLET, COATED ORAL at 08:15

## 2019-05-21 RX ADMIN — AZITHROMYCIN 250 MG: 250 TABLET, FILM COATED ORAL at 08:15

## 2019-05-21 RX ADMIN — FOLIC ACID 1 MG: 1 TABLET ORAL at 08:14

## 2019-05-21 RX ADMIN — ACETAMINOPHEN 650 MG: 325 TABLET ORAL at 15:26

## 2019-05-21 RX ADMIN — OXYBUTYNIN CHLORIDE 5 MG: 5 TABLET ORAL at 21:26

## 2019-05-21 RX ADMIN — ATORVASTATIN CALCIUM 40 MG: 40 TABLET, FILM COATED ORAL at 21:26

## 2019-05-21 RX ADMIN — METOPROLOL SUCCINATE 12.5 MG: 25 TABLET, EXTENDED RELEASE ORAL at 08:15

## 2019-05-21 RX ADMIN — Medication 2 PUFF: at 08:25

## 2019-05-21 RX ADMIN — DONEPEZIL HYDROCHLORIDE 10 MG: 10 TABLET, FILM COATED ORAL at 21:26

## 2019-05-21 RX ADMIN — ENOXAPARIN SODIUM 30 MG: 30 INJECTION SUBCUTANEOUS at 08:16

## 2019-05-21 ASSESSMENT — PAIN SCALES - GENERAL: PAINLEVEL_OUTOF10: 8

## 2019-05-21 ASSESSMENT — PAIN DESCRIPTION - LOCATION: LOCATION: BACK;HIP

## 2019-05-21 ASSESSMENT — PAIN DESCRIPTION - PAIN TYPE: TYPE: ACUTE PAIN;CHRONIC PAIN

## 2019-05-21 ASSESSMENT — PAIN DESCRIPTION - DESCRIPTORS: DESCRIPTORS: ACHING

## 2019-05-21 ASSESSMENT — PAIN DESCRIPTION - ORIENTATION: ORIENTATION: RIGHT;LEFT;LOWER

## 2019-05-21 NOTE — PROGRESS NOTES
Physical Therapy  [x] daily progress note       [] discharge       Patient Name:  Ruby Gonzalez   :  1935 MRN: [id number]  Room:  [address] [place], [place] [address]A Date of Admission: 2019  Rehabilitation Diagnosis:   Acute CVA (cerebrovascular accident) [facility]) [I63.9]       Date 2019       Day of ARU Week:  4   Time IN/OUT 4024-4220   Individual Tx Minutes 30   TOTAL Tx Time Mins 30   Variance Time    Variance Time []   Refusal due to:     []   Medical hold/reason:    []   Illness   []   Off Unit for test/procedure  []   Extra time needed to complete task  []   Therapeutic need  []   Other (specify):   Restrictions Restrictions/Precautions  Restrictions/Precautions: Fall Risk, General Precautions      Communication with other providers: [x]   OK to see per nursing:     []   Spoke with team member regarding:      Subjective observations and cognitive status: Pt resting in bed, lethargic and sleepy during session but agreeable. Spouse present and education provided regarding LE therx as provided by Casper Jensen PT. Pain level/location: Denies pain   Discharge recommendations  Anticipated discharge date:  TBD  Destination: []home alone   []home with assist PRN     [] home with continuous supervision     []SNF      [] Assisted living     [] Other:   Continued therapy: []HHC PT  []OUTPATIENT  PT   [] No Further PT  Equipment needs: TBD     Additional Therapeutic activities/exercises completed this date:     []   Nu-step:  Time:        Level:         #Steps:       []   Rebounder:    []  Seated     []  Standing        []   Balance training         []   Postural training    [x]   Supine ther ex (reps/sets): B LE x 20 reps, AROM. Min cues for ex technique using handout. Max cues required due to visual impairment bilaterally, L >R. Spouse educated with ex technique, use of plastic bag to reduce friction. Spouse with good understanding.      []   Seated ther ex (reps/sets):     []   Standing ther ex (reps/sets):     []   Picked up object from floor                       []   Reacher used   []   Other:   []   Other:   []   Other:      Patient/Caregiver Education and Training:   []   Bed Mobility/Transfer technique/safety  []   Gait technique/sequencing  []   Proper use of assistive device  []   Advanced mobility safety and technique  []   Reinforced patient's precautions/mobility while maintaining precautions  []   Postural awareness  []   Family training  [x]   Progress was updated and reviewed in Rehabtracker with patient and/or family this date.     Treatment Plan for Next Session: gait progression, LE therx, standing balance        Treatment/Activity Tolerance:   [x] Tolerated treatment with no adverse effects    [] Patient limited by fatigue  [] Patient limited by pain   [] Patient limited by medical complications:    [] Adverse reaction to Tx:   [] Significant change in status    Safety:       [x]  bed alarm set    []  chair alarm set    []  Pt refused alarms                []  Telesitter activated      []  Gait belt used during tx session      []other:         Number of Minutes/Billable Intervention  Gait Training    Therapeutic Exercise 30   Neuro Re-Ed    Therapeutic Activity    Wheelchair Propulsion    Group    Other:    TOTAL 30         Social History  Social/Functional History  Lives With: Spouse  Type of Home: House  Home Layout: One level  Home Access: Level entry  Bathroom Shower/Tub: Walk-in shower  Bathroom Toilet: Handicap height(uses the counter, son planning on placing a grabbar)  Bathroom Equipment: Shower chair, Grab bars in shower  Bathroom Accessibility: Walker accessible(\"he leaves it outside the door\")  Home Equipment: Rolling walker, 4 wheeled walker, Wheelchair-manual, Grab bars, Alert Button, Lift chair(RW in house, has been primarily walking household distances)  Receives Help From: Family(Son, Chantal Molina \"in and out\")  ADL Assistance: Needs assistance(Wife provided set-up assist/supervision throughout showering and dressing)  Homemaking Assistance: Needs assistance(Wife performs all of the cooking and cleaning)  Homemaking Responsibilities: No  Ambulation Assistance: Independent(with a walker)  Transfer Assistance: Independent(with the walker )  Active : No  Patient's  Info: Wife drives   Mode of Transportation: SUV  Occupation: Retired  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   IADL Comments: Wife is present 24 hrs a day or family comes over to assist if she needs to leave,   Additional Comments: Fall on 5/13 during stroke leading to admission, no other falls no injury     Objective                                                                                    Goals:  (Update in navigator)  Short term goals  Time Frame for Short term goals: 10-14 days  Short term goal 1: Pt will perform bed mobility independently  Short term goal 2: Pt will perform OOB transfers with a FWW with SBA  Short term goal 3: Pt will ambulate 50 ft with supervision at a FWW with two turns   Short term goal 4: Pt will perform advanced ambulation, including ascending/descending a curb step and navigating uneven surfaces with CGA   Short term goal 5: Pt will pick an object off the ground using a reacher with SBA:  Long term goals  Time Frame for Long term goals : LTG  = STG :        Plan of Care                                                                              Times per week: 5 days per week for a minimum of 60 minutes/day plus group as appropriate for 60 minutes.   Treatment to include Current Treatment Recommendations: Strengthening, ROM, Balance Training, Functional Mobility Training, Transfer Training, Cognitive/Perceptual Training, Endurance Training, Gait Training, Stair training, Cognitive Reorientation, Pain Management, Home Exercise Program, Safety Education & Training, Patient/Caregiver Education & Training    Electronically signed by   Abigail Miles, PTA #[id number]  5/21/2019, 8:39 AM

## 2019-05-21 NOTE — PROGRESS NOTES
Speech Language Pathology  Facility/Department: [address] [place] [address]  Initial Speech/Language/Cognitive Assessment    NAME: Elo Laura  : 1935   MRN: [id number]  ADMISSION DATE: 2019  ADMITTING DIAGNOSIS: has CAD (coronary artery disease); Benign essential tremor; Obstructive lung disease ([address] Back pain, chronic; Hypertension; Dyslipidemia; AAA (abdominal aortic aneurysm) without rupture ([address] Cerebral infarction [facility]); CVA (cerebrovascular accident) ([address] Homocysteinemia ([address] PFO (patent foramen ovale); Migraine headache; COPD (chronic obstructive pulmonary disease) ([address] AAA (abdominal aortic aneurysm) ([address] Dementia due to Alzheimer's disease; Atherosclerosis ; Delirium; Hemispheric carotid artery syndrome; Ventricular tachycardia ([address] Stage 3 chronic kidney disease ([address] Dysthymia; GERD (gastroesophageal reflux disease); Cerebrovascular accident (CVA) due to stenosis of cerebral artery ([address] Acute CVA (cerebrovascular accident) ([address] Dysarthria; Left carotid stenosis; and Right hemiparesis ([address] on their problem list.  DATE ONSET: 19    Date of Eval: 2019   Evaluating Therapist: CLARK Ruiz    RECENT RESULTS  CT OF HEAD/MRI:       FINDINGS:   BRAIN/VENTRICLES: There is no acute intracranial hemorrhage, mass effect or   midline shift. No abnormal extra-axial fluid collection. The gray-white   differentiation is maintained without evidence of an acute infarct. There is   no evidence of hydrocephalus. The cerebral sulci and ventricles are enlarged. There is low-attenuation within the periventricular white matter and deep   white matter of the brain. There is encephalomalacia involving the right   occipital lobe. The abnormality noted within the buffy on the MRI 05/15/2019   is not seen on this CT scan. ORBITS: The visualized portion of the orbits demonstrate no acute abnormality.        SINUSES: The visualized paranasal sinuses and mastoid air cells demonstrate   no acute abnormality. SOFT TISSUES/SKULL:  No acute abnormality of the visualized skull or soft   tissues. Impression   1. Stable CT scan of the brain with no acute intracranial abnormality. The   acute pontine infarct seen on the MRI is not seen on the CT scan. 2. Diffuse cerebral atrophy with chronic small vessel ischemic disease. FINDINGS:   INTRACRANIAL STRUCTURES/VENTRICLES: There is an acute infarct in the left   ventral paramedian buffy. There is no acute hemorrhage, herniation or   hydrocephalus. There is a chronic right posterior cerebral artery territory   infarct. ORBITS: The visualized portion of the orbits demonstrate no acute abnormality. SINUSES: The visualized paranasal sinuses and mastoid air cells are well   aerated. BONES/SOFT TISSUES: The bone marrow signal intensity appears normal. The soft   tissues demonstrate no acute abnormality. Impression   Acute infarct in the left buffy. Primary Complaint: My back hurts so bad today. Pain:  Pain Assessment  Pain Assessment: 0-10  Pain Level: 9    Assessment: This 81 yo male was admitted with ischemic stroke with infarction with R barrie, gait distrubance, HTN, CKD stage III, COPD, and remote R CVA with chronic L barrie. Pt developed R sided wekness following a cardiac cath. Pt was assisted to floor by wife and squad was called. Pt found to have L pontine infarct with reported changes in speech on admit with improvement over the course of his stay. PMHX includes AAA, asthma, back pain, benign esstntial tremor, CAD, carotid stenosis, CKD, COPD, CVA, Dementia, Dyslipidemia, ED, GERD, [place], HTN, Seizure Distorder, Vertigo. Cognitive Diagnosis: Pt exhibits mild cognitive deficits characterized by reduced orientation, word retrieval delays with occasional anomia, reduced STM that improves with cues, and reduced problem solving.   Communication Diagnosis: WFL--pt with sore gum on upper L with loose well.)    Expression  Primary Mode of Expression: Verbal    Verbal Expression  Verbal Expression: Exceptions to functional limits  Responsive: Mild(Word retrieval delays--pt uses circumlocation to assist.  Wife reports this has occurred since last stroke and worsened with memory deficits)  Conversation: Mild(word finding difficulty--some episodes of anomia)  Interfering Components: Impaired thought organization;Paraphasia  Effective Techniques: Provide extra time; Word retrived strategies    Written Expression  Dominant Hand: Right  Written Expression: Within Functional Limits(Basic numbers and name)    Motor Speech  Motor Speech: Within Functional Limits    Pragmatics/Social Functioning  Pragmatics: Within functional limits    Cognition:      Orientation  Overall Orientation Status: Impaired(Pt disoriented to year--thought it was 26 and the month was June)  Attention  Attention: Within Functional Limits([place] so needs occasional tactile cues to redirect attn)  Memory  Memory: Exceptions to [facility]  Short-term Memory: Mild  Problem Solving  Problem Solving: Exceptions to [facility]  Managing Medications: (Wife manages)  Verbal Reasoning Skills: Mild  Abstract Reasoning  Abstract Reasoning: Exceptions to [facility]  Divergent Thinking: Mild  Safety/Judgement  Safety/Judgement: Exceptions to [facility]  Novel Situations: Mild    Additional Assessments: The Brief Cognitive Assessment Tool (BCAT®) was administered 5/21/2019 to assess the following cognitive domains: orientation, verbal recall, visual recognition, visual recall, attention, abstraction, language, executive functions, and visuo-spatial processing. The emphasis of the tool is assessing contextual memory, executive functions, and attentional capacity.    Cognitive Impairment Scale:  NORMAL:    46-50   NO FUNCTIONAL DEFICIT; INDEPENDENT LIVING; MAY BE  SUBJECTIVE MEMORY COMPLAINTS BUT LITTLE TO NO EVIDENCE  MILD COGNITIVE  IMPAIRMENT; 34-46   GENERALLY FUNCTIONAL WITH EARLY SPECIFIC Level; Family/Community Support; Potential;Previous Level of Function  Additional Comments: premorbid cognitive deficits  Individuals consulted  Consulted and agree with results and recommendations: Patient; Family member(Wife)    Education:  Patient Education: Results and recommendations  Patient Education Response: Verbalizes understanding;Needs reinforcement  Safety Devices in place: Yes  Type of devices:  All fall risk precautions in place                Therapy Time:   Individual Concurrent Group Co-treatment   Time In 0900         Time Out 0940         Minutes [address] [place] [address]  5/21/2019 2:43 PM

## 2019-05-21 NOTE — PROGRESS NOTES
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 Esther Funes. & Dr. Jane Del Rio Carotid stenosis, right     ~50% or less, on MRA and carotid u/s    Chronic kidney disease     CKD (chronic kidney disease)     cannot take nsaids    COPD (chronic obstructive pulmonary disease) ([facility]     CVA (cerebrovascular accident) ([facility] 13 admission    [place]) R temporal/occipital and R thalamic infarct, L sided weakness and focal seizure    Dementia due to Alzheimer's disease 2017    MMSE 22/30 ON 3/28/17--- STARTING TRIAL ARICEPT    Dyslipidemia 10/04    ED (erectile dysfunction)  372 ,  2001  46    testos    Former smoker     GERD (gastroesophageal reflux disease)     Hearing loss     wear hearing aides bilat    Homocysteinemia ([facility]     on foltx    Hyperlipidemia     Hypertension     Kidney stone     Migraine headache     Obesity (BMI 30-39. 9)     PFO (patent foramen ovale)     noted on [place]ET[place] 13- Dr Darrin Walker Redundant prepuce and phimosis 12    Dr Sven Toribio Seizure disorder, focal motor ([facility]  admission    L hand tremor assoc w CVA    Vertigo      Past Surgical History:   Procedure Laterality Date    ABDOMEN SURGERY      ABDOMINAL AORTIC ANEURYSM REPAIR, ENDOVASCULAR  13    CARDIAC SURGERY      CARDIOVASCULAR STRESS TEST  1/10/2012    Dr. Kenneth Cuevas- normal with EF 45%    CORONARY ARTERY BYPASS GRAFT  1994    Dr Janeth Leonardo DENTAL SURGERY  years ago    wears full dentures     Allergies   Allergen Reactions    Flomax [Tamsulosin Hcl]      Hypotension/syncope    Nsaids      Due to CKD    Penicillins Hives    Topamax [Topiramate]      Fatigue and severe depression     Precautions: falls    Date of Admit: 5/18/2019  Room #: 5382/9380-I      Current functional status at time of admit:        Home Living/DME Available:      Type of Home: House  Home Access: Level entry  Bathroom Shower/Tub: Walk-in shower  Bathroom Toilet: Handicap height(uses the counter, son planning on placing a grabbar)  Bathroom Equipment: Shower chair, Grab bars in shower  Home Equipment: Rolling walker, 4 wheeled walker, [employer], Grab bars, Alert Button, Lift chair(RW in house, has been primarily walking household distances)       IADL Hx:   Homemaking Responsibilities: No  Active : No  Mode of Transportation: [facility]  Occupation: Retired  Leisure & Hobbies: Pt will go out to eat with wife, watches tv, wife manages meds and finances. Spouse: Patient's wife has been assisting him. Family:     Comments: Patient is very hard of hearing.      Dyke Opitz, 5/21/2019, 3:46 PM

## 2019-05-21 NOTE — PROGRESS NOTES
Occupational Therapy  Physical Rehabilitation: OCCUPATIONAL THERAPY     [x] daily progress note       [] discharge       Patient Name:  Damaso Murrieta   :  1935 MRN: [id number]  Room:  [address] [place], [place] [address] Date of Admission: 2019  Rehabilitation Diagnosis:   Acute CVA (cerebrovascular accident) [facility]) [I63.9]       Date 2019       Day of ARU Week:  4   Time IN/OUT 1605   Individual Tx Minutes    Group Tx Minutes    Co-Treat Minutes    Concurrent Tx Minutes    TOTAL Tx Time Mins 60   Variance Time N/A   Variance Time []   Refusal due to:     []   Medical hold/reason:    []   Illness   []   Off Unit for test/procedure  []   Extra time needed to complete task  []   Therapeutic need  []   Other (specify):   Restrictions Restrictions/Precautions: Fall Risk, General Precautions         Communication with other providers: [x]   OK to see per nursing:     []   Spoke with team member regarding:      Subjective observations and cognitive status: Pt received in semi-folwers and agreeable to OT Tx session     Pain level/location:  Pain: 6/10 Location: Low Back   Discharge recommendations  Anticipated discharge date:  TBD  Destination: []home alone   []home alone w assist prn   [] home w/ family    [] Continuous supervision       []SNF    [] Assisted living     [] Other:   Continued therapy: []HHC OT  []OUTPATIENT  OT   [] No Further OT  Equipment needs: TBD       Bed Mobility:           []   Pt received out of bed   Rolling R/L:  Intermittent CGA + cues to roll to R  Scooting:  Min A for hip scoot to EOB  Supine --> Sit:  Min A to trunk + sequential verbal / tactile cues + min increased time to perform  Sit --> Supine:   Mod A (trunk + RLE)    Transfers:    Sit--> Stand:  Varied CGA to Min A  Stand --> Sit:   Min A to Mod A, varied c fatigue and for controlled descent  Stand-Pivot:   Varied Min A / CGA  Other:  Required mod verbal / tactile cues for safe body positioning  Assistive device required for transfer: injury     Objective                                                                                    Goals:  (Update in navigator)  Short term goals  Time Frame for Short term goals: STGs=LTGs:  Long term goals  Time Frame for Long term goals : ~10 days or until d/c. Long term goal 1: Pt will complete grooming tasks c S.  Long term goal 2: Pt will complete total body bathing c S using AE PRN. Long term goal 3: Pt will complete UB dressing c S/setup. Long term goal 4: Pt will complete LB dressing c S/setup using AE PRN. Long term goal 5: Pt will complete toileting c S.  Long term goals 6: Pt will complete functional transfers (bed, chair, shower, toilet) c DME PRN and S.  Long term goal 7: Pt will perform therex/therax to facilitate increased strength/endurance/ax tolerance (c emphasis on dynamic standing balance/tolerance >8 mins and BUE endurance) c SBA. :        Plan of Care                                                                              Times per week: 5 days per week for a minimum of 60 minutes/day plus group as appropriate for 60 minutes.   Treatment to include Plan  Times per day: Daily  Current Treatment Recommendations: Balance Training, Functional Mobility Training, Endurance Training, Pain Management, Safety Education & Training, Patient/Caregiver Education & Training, Equipment Evaluation, Education, & procurement, Positioning, Self-Care / ADL, Cognitive/Perceptual Training    Electronically signed by   SONIA Vázquez  5/21/2019, 3:14 PM

## 2019-05-21 NOTE — PROGRESS NOTES
[facility] ARU Physical Therapy     [x] daily progress note       [] discharge       Patient Name:  Javier Encarnacion   :  1935 MRN: [id number]  Room:  [address] [place] [address]A Date of Admission: 2019  Rehabilitation Diagnosis:   Acute CVA (cerebrovascular accident) [facility]) [I63.9]        Date 2019       Day of ARU Week:  4   Time IN/OUT 1103/1203; Individual Tx Minutes Timed Code Treatment Minutes: 60 Minutes   TOTAL Tx Time Mins  Timed Code Treatment Minutes: 60 Minutes   Restrictions Restrictions/Precautions  Restrictions/Precautions: Fall Risk, General Precautions      Communication with other providers: [x]   OK to see per nursing:   Discussed with nursing regarding pt's increased back pain and possibility of getting a kpad to address pain management.   []   Spoke with team member regarding:      Subjective observations and cognitive status: Javier Encarnacion was semifowlers up in bed on PT arrival. he was agreeable to PT treatment session. Wife present.     Pain level/location:           Location:     Discharge recommendations  Destination: 24 hour supervision or assist, Home with Home health PT   Equipment needs:   Pt with RW     Bed Mobility:  []   Pt received out of bed      Alteration/AD:    []  Mat   []  HOB elevated  [x]  Use of Handrails      Sit --> Supine:  Min A at the trunk     Transfers:    Sit--> Stand:  CGA to Min A  Stand --> Sit:   Min A   Stand-Pivot:   Min A   Other:  Min A toilet transfer; Pt able to perform 3/3 tasks with CGA in standing   Assistive device required for transfer:   RW    Gait:    Distance:  6+43+54+56 feet  Assistance:  CGA to Min A   Device:  RW  Gait Quality:  FWD bent posture, tremoring in BUE & BLE with fatigue, increased FWD bent posture    Stairs   # Completed:   3  Assistance:    CGA  Supportive Device:  B handrails  Height:   4\"    Additional Therapeutic activities/exercises completed this date:     [x]   Seated ther ex (reps/sets):  LAQ x 10 B with 5 sec hold 2#     [x] Standing ther ex (reps/sets):  Standing step ups for BLE strengthening x 5 B; Standing hip ABD x6 ea    [x]   Other: Wife educated regarding supine exercises to perform when pt is fatigued in order to build BLE strength,    [x]   Other: biofreeze applied to lower back d/t increased pain     Comments:  Increased time required for seated rest breaks this date     Patient/Caregiver Education and Training:   [x]   Bed Mobility/Transfer technique/safety  [x]   Gait technique/sequencing  [x]   Proper use of assistive device  [x]   Advanced mobility safety and technique  [x]   Reinforced patient's precautions/mobility while maintaining precautions  [x]   Postural awareness  []   Family training  [x]   Progress was updated and reviewed in Rehabtracker with patient and/or family this  5/21/2019       date. Treatment Plan for Next Session: further standing endurance training, with pain management, safety throughout transfers and gait, advanced ambulation, further gait training       Assessment:  Mr. Russel Neville was primarily limited this date by increased back pain and reduced endurance. His OA in his back, hands and hips will likely be barriers toward reaching goals. However this date, he was able to perform stair training. Additionally, he demonstrated improved momentum and safety with sit/stands requiring Min A. He will benefit from further PT to progress endurance, gait and dynamic balance to return to PLOF.      Treatment/Activity Tolerance:   [] Tolerated treatment with no adverse effects    [x] Patient limited by fatigue  [] Patient limited by pain   [] Patient limited by medical complications:    [] Adverse reaction to Tx:   [] Significant change in status    Safety:      Left up in:   []  bed       [x]  chair        Alarm:    [x] Alarm set      []  Pt refused alarms                []  Telesitter activated      [x]  Gait belt used during tx session      []Nurse notified      Number of Minutes/Billable Intervention  Therapeutic Exercise 15    Neuro Re-Ed    Gait 30   Therapeutic Activity 15   Wheelchair Propulsion    Group    Other:    TOTAL 60         Social History  Social/Functional History  Lives With: Spouse  Type of Home: House  Home Layout: One level  Home Access: Level entry  Bathroom Shower/Tub: Walk-in shower  Bathroom Toilet: Handicap height(uses the counter, son planning on placing a grabbar)  Bathroom Equipment: Shower chair, Grab bars in shower  Bathroom Accessibility: Walker accessible(\"he leaves it outside the door\")  Home Equipment: Rolling walker, 4 wheeled walker, Wheelchair-manual, Grab bars, Alert Button, Lift chair(RW in house, has been primarily walking household distances)  Receives Help From: Family(Son, Yumiko Mao \"in and out\")  ADL Assistance: Needs assistance(Wife provided set-up assist/supervision throughout showering and dressing)  Homemaking Assistance: Needs assistance(Wife performs all of the cooking and cleaning)  Homemaking Responsibilities: No  Ambulation Assistance: Independent(with a walker)  Transfer Assistance: Independent(with the walker )  Active : No  Patient's  Info:  Wife drives   Mode of Transportation: [facility]  Occupation: Retired  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   IADL Comments: Wife is present 24 hrs a day or family comes over to assist if she needs to leave,   Additional Comments: Fall on 5/13 during stroke leading to admission, no other falls no injury     Objective                                                                                    Goals:  (Update in navigator)  Short term goals  Time Frame for Short term goals: 10-14 days  Short term goal 1: Pt will perform bed mobility independently  Short term goal 2: Pt will perform OOB transfers with a FWW with SBA  Short term goal 3: Pt will ambulate 50 ft with supervision at a FWW with two turns   Short term goal 4: Pt will perform advanced ambulation, including ascending/descending a curb step and navigating uneven surfaces with CGA   Short term goal 5: Pt will pick an object off the ground using a reacher with SBA:  Long term goals  Time Frame for Long term goals : LTG  = STG :        Plan of Care                                                                              Times per week: 5 days per week for a minimum of 60 minutes/day plus group as appropriate for 60 minutes.   Treatment to include Current Treatment Recommendations: Strengthening, ROM, Balance Training, Functional Mobility Training, Transfer Training, Cognitive/Perceptual Training, Endurance Training, Gait Training, Stair training, Cognitive Reorientation, Pain Management, Home Exercise Program, Safety Education & Training, Patient/Caregiver Education & Training    Electronically signed by   Tai Churchill PT, DPT #[id number]  5/21/2019, 12:35 PM

## 2019-05-22 PROCEDURE — 97110 THERAPEUTIC EXERCISES: CPT

## 2019-05-22 PROCEDURE — 6370000000 HC RX 637 (ALT 250 FOR IP): Performed by: INTERNAL MEDICINE

## 2019-05-22 PROCEDURE — 94640 AIRWAY INHALATION TREATMENT: CPT

## 2019-05-22 PROCEDURE — 97150 GROUP THERAPEUTIC PROCEDURES: CPT

## 2019-05-22 PROCEDURE — 6370000000 HC RX 637 (ALT 250 FOR IP): Performed by: PHYSICAL MEDICINE & REHABILITATION

## 2019-05-22 PROCEDURE — 6360000002 HC RX W HCPCS: Performed by: PHYSICAL MEDICINE & REHABILITATION

## 2019-05-22 PROCEDURE — 97116 GAIT TRAINING THERAPY: CPT

## 2019-05-22 PROCEDURE — 97530 THERAPEUTIC ACTIVITIES: CPT

## 2019-05-22 PROCEDURE — 2700000000 HC OXYGEN THERAPY PER DAY

## 2019-05-22 PROCEDURE — 94660 CPAP INITIATION&MGMT: CPT

## 2019-05-22 PROCEDURE — 1280000000 HC REHAB R&B

## 2019-05-22 PROCEDURE — 94760 N-INVAS EAR/PLS OXIMETRY 1: CPT

## 2019-05-22 PROCEDURE — 99231 SBSQ HOSP IP/OBS SF/LOW 25: CPT | Performed by: PHYSICAL MEDICINE & REHABILITATION

## 2019-05-22 RX ADMIN — QUETIAPINE FUMARATE 25 MG: 25 TABLET, FILM COATED ORAL at 21:22

## 2019-05-22 RX ADMIN — ATORVASTATIN CALCIUM 40 MG: 40 TABLET, FILM COATED ORAL at 21:21

## 2019-05-22 RX ADMIN — HYDROCODONE BITARTRATE AND ACETAMINOPHEN 1 TABLET: 5; 325 TABLET ORAL at 01:25

## 2019-05-22 RX ADMIN — HYDROCODONE BITARTRATE AND ACETAMINOPHEN 1 TABLET: 5; 325 TABLET ORAL at 21:22

## 2019-05-22 RX ADMIN — CLOPIDOGREL BISULFATE 75 MG: 75 TABLET ORAL at 09:14

## 2019-05-22 RX ADMIN — AZITHROMYCIN 250 MG: 250 TABLET, FILM COATED ORAL at 09:14

## 2019-05-22 RX ADMIN — ASPIRIN 81 MG: 81 TABLET, COATED ORAL at 09:14

## 2019-05-22 RX ADMIN — DONEPEZIL HYDROCHLORIDE 10 MG: 10 TABLET, FILM COATED ORAL at 21:21

## 2019-05-22 RX ADMIN — ENOXAPARIN SODIUM 30 MG: 30 INJECTION SUBCUTANEOUS at 09:14

## 2019-05-22 RX ADMIN — TRAZODONE HYDROCHLORIDE 50 MG: 50 TABLET ORAL at 21:22

## 2019-05-22 RX ADMIN — CITALOPRAM HYDROBROMIDE 20 MG: 20 TABLET ORAL at 09:14

## 2019-05-22 RX ADMIN — Medication 2 PUFF: at 08:34

## 2019-05-22 RX ADMIN — METOPROLOL SUCCINATE 12.5 MG: 25 TABLET, EXTENDED RELEASE ORAL at 09:14

## 2019-05-22 RX ADMIN — FOLIC ACID 1 MG: 1 TABLET ORAL at 09:13

## 2019-05-22 RX ADMIN — OXYBUTYNIN CHLORIDE 5 MG: 5 TABLET ORAL at 21:22

## 2019-05-22 RX ADMIN — ACETAMINOPHEN 650 MG: 325 TABLET ORAL at 09:13

## 2019-05-22 RX ADMIN — HYDROCODONE BITARTRATE AND ACETAMINOPHEN 1 TABLET: 5; 325 TABLET ORAL at 09:23

## 2019-05-22 RX ADMIN — Medication 2 PUFF: at 19:45

## 2019-05-22 RX ADMIN — PANTOPRAZOLE SODIUM 40 MG: 40 TABLET, DELAYED RELEASE ORAL at 04:39

## 2019-05-22 RX ADMIN — OXYBUTYNIN CHLORIDE 5 MG: 5 TABLET ORAL at 09:14

## 2019-05-22 ASSESSMENT — PAIN SCALES - GENERAL
PAINLEVEL_OUTOF10: 7
PAINLEVEL_OUTOF10: 6
PAINLEVEL_OUTOF10: 4
PAINLEVEL_OUTOF10: 4

## 2019-05-22 ASSESSMENT — PAIN DESCRIPTION - ORIENTATION: ORIENTATION: RIGHT;LEFT;LOWER

## 2019-05-22 ASSESSMENT — PAIN DESCRIPTION - PAIN TYPE: TYPE: ACUTE PAIN;CHRONIC PAIN

## 2019-05-22 ASSESSMENT — PAIN DESCRIPTION - DESCRIPTORS: DESCRIPTORS: ACHING

## 2019-05-22 ASSESSMENT — PAIN DESCRIPTION - LOCATION: LOCATION: BACK;HIP

## 2019-05-22 NOTE — PROGRESS NOTES
Occupational Therapy  Physical Rehabilitation: OCCUPATIONAL THERAPY     [x] daily progress note       [] discharge       Patient Name:  Dandy Amaya   :  1935 MRN: [id number]  Room:  [address] [place], [place] [address] Date of Admission: 2019  Rehabilitation Diagnosis:   Acute CVA (cerebrovascular accident) [facility]) [I63.9]       Date 2019       Day of ARU Week:  5   Time IN/OUT 1425/1525   Individual Tx Minutes 60   Group Tx Minutes    Co-Treat Minutes    Concurrent Tx Minutes    TOTAL Tx Time Mins 60   Variance Time    Variance Time []   Refusal due to:     []   Medical hold/reason:    []   Illness   []   Off Unit for test/procedure  []   Extra time needed to complete task  []   Therapeutic need  []   Other (specify):   Restrictions Restrictions/Precautions: Fall Risk, General Precautions         Communication with other providers: [x]   OK to see per nursing:     []   Spoke with team member regarding:      Subjective observations and cognitive status: Pt in bed sleeping on approach; woke easily and agreeable to tx session. Flat affect throughout.      Pain level/location:    10       Location: Unrated back/hip pain, relieved with seated rest breaks   Discharge recommendations  Anticipated discharge date:  TBD  Destination: []home alone   []home alone w assist prn   [] home w/ family    [x] Continuous supervision       []SNF    [] Assisted living     [] Other:   Continued therapy: [x]HHC OT  []OUTPATIENT  OT   [] No Further OT  Equipment needs: TBD     Toileting:  Denied need      Bed Mobility:           []   Pt received out of bed   Supine --> Sit:  SBA, increased time/effort, cues to avoid holding breath  Sit --> Supine:  SBA,  cues to avoid holding breath    Transfers:    Sit--> Stand:  SBA, cues for hand placement  Stand --> Sit:   Ranged from CGA-SBA, inconsistent compliance with cues for hand placement  Stand-Pivot:   CGA; poor RW management during turn  Other:    Assistive device required for transfer: RW      Functional Mobility:    Assistance:  CGA ~50 ft  Device:   [x]   Rolling Walker     []   Standard Walker []   Wheelchair        []   Cathlene Backers       []   4-Wheeled [place] P[place]it         []   Cardiac Walker       []   Other:      Additional Therapeutic activities/exercises completed this date:     []   ADL Training   [x]   Balance/Postural training: Pt stood x 4 min + x5 min at RW with CGA while completing dynamic stand task while reaching outside base of support to facilitate increased balance for ADL tasks and transfers. Pt required significantly increased time to complete matching task d/t decreased attention and memory. []   Bed/Transfer Training   []   Endurance Training   []   Neuromuscular Re-ed   []   Nu-step:  Time:        Level:         #Steps:       [x]   Rebounder:    []  Seated     [x]  Standing : 20 reps c 2# ball, pt unable to further continue with ax 2* fatigue. CGA required d/t mild unsteadiness during task but experienced no LOB         [x]   Seated Ther Ex (reps/sets): To increase BUE endurance for ADLs and transfers, pt completed 1 set x10 reps of the following therex, alternating UEs, c a 2# weight:   Shoulder presses  Chest presses  Shoulder abduction/adduction  Shoulder horizontal abduction/adduction  Concentric arm circles (clockwise and counterclockwise)  Bicep curls     []   Standing Ther Ex (reps/sets):     []   Other:      Comments: All intervention performed to increase pt's strength, endurance, ax tolerance, and  balance in prep for increased I c ADL/IADLs and functional transfers/mobility.          Patient/Caregiver Education and Training:   []   [employer] Equipment Use  [x]   Bed Mobility/Transfer Technique/Safety  []   Energy Conservation Tips  []   Family training  [x]   Postural Awareness  [x]   Safety During Functional Activities  []   Reinforced Patient's Precautions   []   Progress was updated and reviewed in Rehabtracker with patient and/or family this date.    Treatment Plan for Next Session: Continue OT POC     Assessment:  Pt continues to demo poor carryover for safety with transfers. Did demo improved standing tolerance and sit>stands this date. Treatment/Activity Tolerance:   [x] Tolerated treatment with no adverse effects    [] Patient limited by fatigue  [] Patient limited by pain   [] Patient limited by medical complications:    [] Adverse reaction to Tx:   [] Significant change in status    Safety:       [x]  bed alarm set    []  chair alarm set    []  Pt refused alarms                []  Telesitter activated      [x]  Gait belt used during tx session      []other:       Number of Minutes/Billable Intervention  Therapeutic Exercise 20   ADL Self-care    Neuro Re-Ed    Therapeutic Activity 40   Group    Other:    TOTAL 60       Social History  Social/Functional History  Lives With: Spouse  Type of Home: House  Home Layout: One level  Home Access: Level entry  Bathroom Shower/Tub: Walk-in shower  Bathroom Toilet: Handicap height(uses the counter, son planning on placing a grabbar)  Bathroom Equipment: Shower chair, Grab bars in shower  Bathroom Accessibility: Walker accessible(\"he leaves it outside the door\")  Home Equipment: Rolling walker, 4 wheeled walker, Wheelchair-manual, Grab bars, Alert Button, Lift chair(RW in house, has been primarily walking household distances)  Receives Help From: Family(Son, Alfredo Noyola \"in and out\")  ADL Assistance: Needs assistance(Wife provided set-up assist/supervision throughout showering and dressing)  Homemaking Assistance: Needs assistance(Wife performs all of the cooking and cleaning)  Homemaking Responsibilities: No  Ambulation Assistance: Independent(with a walker)  Transfer Assistance: Independent(with the walker )  Active : No  Patient's  Info:  Wife drives   Mode of Transportation: [facility]  Occupation: Retired  Type of occupation: Worked in [address] [place] [address] Street: Pt will go out to eat with wife, watches tv, wife manages meds and finances. IADL Comments: Wife is present 24 hrs a day or family comes over to assist if she needs to leave,   Additional Comments: Fall on 5/13 during stroke leading to admission, no other falls no injury     Objective                                                                                    Goals:  (Update in navigator)  Short term goals  Time Frame for Short term goals: STGs=LTGs:  Long term goals  Time Frame for Long term goals : ~10 days or until d/c. Long term goal 1: Pt will complete grooming tasks c S.  Long term goal 2: Pt will complete total body bathing c S using AE PRN. Long term goal 3: Pt will complete UB dressing c S/setup. Long term goal 4: Pt will complete LB dressing c S/setup using AE PRN. Long term goal 5: Pt will complete toileting c S.  Long term goals 6: Pt will complete functional transfers (bed, chair, shower, toilet) c DME PRN and S.  Long term goal 7: Pt will perform therex/therax to facilitate increased strength/endurance/ax tolerance (c emphasis on dynamic standing balance/tolerance >8 mins and BUE endurance) c SBA. :        Plan of Care                                                                              Times per week: 5 days per week for a minimum of 60 minutes/day plus group as appropriate for 60 minutes. Treatment to include Plan  Times per day: Daily  Current Treatment Recommendations: Balance Training, Functional Mobility Training, Endurance Training, Pain Management, Safety Education & Training, Patient/Caregiver Education & Training, Equipment Evaluation, Education, & procurement, Positioning, Self-Care / ADL, Cognitive/Perceptual Training    Electronically signed by   Raymundo Liang MS, OTR/L  License #[id number]  5/22/2019, 2:26 PM

## 2019-05-22 NOTE — PROGRESS NOTES
Shay Cline is a 82 y.o. male patient.     Current Facility-Administered Medications   Medication Dose Route Frequency Provider Last Rate Last Dose    enoxaparin (LOVENOX) injection 30 mg  30 mg Subcutaneous Daily C Donya Romero MD   30 mg at 05/22/19 0914    albuterol sulfate  (90 Base) MCG/ACT inhaler 2 puff  2 puff Inhalation Q6H PRN Sanna Sutton MD   2 puff at 05/19/19 1128    aspirin EC tablet 81 mg  81 mg Oral Daily Sanna Sutton MD   81 mg at 05/22/19 0914    atorvastatin (LIPITOR) tablet 40 mg  40 mg Oral Nightly Sanna Sutton MD   40 mg at 05/21/19 2126    citalopram (CELEXA) tablet 20 mg  20 mg Oral Daily Sanna Sutton MD   20 mg at 05/22/19 0914    clopidogrel (PLAVIX) tablet 75 mg  75 mg Oral Daily Sanna Sutton MD   75 mg at 05/22/19 0914    donepezil (ARICEPT) tablet 10 mg  10 mg Oral Nightly Sanna Sutton MD   10 mg at 37/06/75 0648    folic acid (FOLVITE) tablet 1 mg  1 mg Oral Daily Sanna Sutton MD   1 mg at 05/22/19 0913    HYDROcodone-acetaminophen ([facility]) 5-325 MG per tablet 1 tablet  1 tablet Oral Q6H PRN Sanna Sutton MD   1 tablet at 05/22/19 0923    mometasone-formoterol (DULERA) 200-5 MCG/ACT inhaler 2 puff  2 puff Inhalation BID Sanna Sutton MD   2 puff at 05/22/19 0834    oxybutynin (DITROPAN) tablet 5 mg  5 mg Oral BID Sanna Sutton MD   5 mg at 05/22/19 0914    pantoprazole (PROTONIX) tablet 40 mg  40 mg Oral QAM AC Sanna Sutton MD   40 mg at 05/22/19 0439    QUEtiapine (SEROQUEL) tablet 25 mg  25 mg Oral Nightly PRN Sanna Sutton MD   25 mg at 05/20/19 2036    traZODone (DESYREL) tablet 50 mg  50 mg Oral Nightly PRN Sanna Sutton MD   50 mg at 05/20/19 2037    acetaminophen (TYLENOL) tablet 650 mg  650 mg Oral Q4H PRN Sushil Goldberg MD   650 mg at 05/22/19 0913    magnesium hydroxide (MILK OF MAGNESIA) 400 MG/5ML suspension 30 mL  30 mL Oral Daily PRN JULISSA Romero MD  metoprolol succinate (TOPROL XL) extended release tablet 12.5 mg  12.5 mg Oral Daily Rajesh Jang MD   12.5 mg at 05/22/19 9657     Allergies   Allergen Reactions    Flomax [Tamsulosin Hcl]      Hypotension/syncope    Nsaids      Due to CKD    Penicillins Hives    Topamax [Topiramate]      Fatigue and severe depression     Principal Problem:    Acute CVA (cerebrovascular accident) ([facility] [address]  Active Problems:    Hypertension    COPD (chronic obstructive pulmonary disease) ([facility])    Stage 3 chronic kidney disease (HCC)    Right hemiparesis ([facility])  Resolved Problems:    * No resolved hospital problems. *    Blood pressure (!) 161/82, pulse 83, temperature 97.8 °F (36.6 °C), temperature source Oral, resp. rate 20, height 5' 8\" (1.727 m), weight 235 lb 3.2 oz (106.7 kg), SpO2 93 %. Subjective 5/20/19: patient seen in his room with nursing staff. Tolerating rehab program.  Using CPAP at night. Very anxious for discharge. Explained the rehab program to the patient and his wife. He appears to be making progress. Denies bowel or bladder complaints. Dyspnea with exertion. Denies chest pain. Otherwise complete review of systems as above are unremarkable  5/21/19: Patient seen in his room with nursing staff. Requires 3 L of oxygen. He was not on home oxygen prior. Seen by speech therapy today. He is receiving his pills in applesauce. He has bladder incontinence. Reports chronic lower back pain. His wife reports he was previously independent at home after his prior stroke with left hemiparesis which resolved. He does have a lift chair at home. Denies chest pain or shortness of breath. Denies any bowel complaints. Pain is controlled with Norco.  Currently transferring minimal assistance with cues. Ambulates 43 feet contact guard to minimal assistance. 5/22/19: Patient seen in his room with his wife. Reports history of chronic back pain. Controlled with Norco.  Remains on 3 L of oxygen. occupational and  physical therapy and speech-language pathology, 180 minutes, five out of  every seven days. We will address basic and advancing mobility with  self-care instruction and adaptive equipment training. Caregiver  education will be offered. Expected length of stay prior to a  supervised level of function for discharge to home is 16 to 18 days.     ADDITIONAL RECOMMENDATIONS:  1. Ischemic stroke with infarction:  The patient requires daily  occupational and physical therapy with speech-language pathology. He  requires antiplatelet therapy with Plavix and baby aspirin. He is  getting GI prophylaxis. Blood pressure and kidney disease treatment is  required. We will address appropriate nutrition and hydration orally. Pulmonary hygiene and treatment of his COPD are important. We will  emphasize pulmonary hygiene and DVT prophylaxis:  2. DVT prophylaxis:  Lovenox will be dosed at 30 mg daily. Weightbearing activities will be pursued daily. I must monitor his  hemoglobin and platelet count while on this medication. GI prophylaxis  is offered. 3.  Hypertension:  The patient's blood pressures have been fluctuating  some and Toprol is now used to control the fluctuation. Target systolic  blood pressure is 120 to 140. We will monitor vital signs at rest and  with activity and encourage consistent oral intake. His cardiologist is  modifying his diet limiting sodium. 4. COPD:  The patient requires Dulera inhaler and p.r.n. albuterol  inhaler, Zithromax, and pulmonary hygiene. We will monitor O2  saturations at rest and with activity and consult Pulmonary Medicine  should his symptoms deteriorate. 5.  Chronic kidney disease stage III:  Try to avoid nephrotoxic  medications including anti-inflammatories. We will monitor his  chemistries periodically and encourage consistent oral intake. 6.  Chronic lower back pain: Continue with Norco as needed   7.   Chronic urinary incontinence: Continue

## 2019-05-22 NOTE — PATIENT CARE CONFERENCE
ACUTE REHAB TEAM CONFERENCE SUMMARY   [address]    NAME: Thomas Mora  : 1935 ADMIT DATE: 2019    Rehab Admitting Dx:Acute CVA (cerebrovascular accident) [facility]) [I63.9]  Patient Comorbid Conditions: Active Hospital Problems    Diagnosis Date Noted    Hypertension [I10]      Priority: Low    Right hemiparesis ([facility] [address] [G81.91] 2019    Acute CVA (cerebrovascular accident) ([facility] [address] [I63.9] 2019    Stage 3 chronic kidney disease ([facility] [address] [N18.3]     COPD (chronic obstructive pulmonary disease) ([facility] [address] [J44.9]      Date: 2019    CASE MANAGEMENT  Current issues/needs regarding patient and family discharge status: Patient lives with his wife in a single level home.       PHYSICAL THERAPY   Short term goals  Time Frame for Short term goals: 10-14 days  Short term goal 1: Pt will perform bed mobility independently  Short term goal 2: Pt will perform OOB transfers with a FWW with SBA  - MET   Short term goal 3: Pt will ambulate 50 ft with supervision at a FWW with two turns  - NMET , 106', CGA  Short term goal 4: Pt will perform advanced ambulation, including ascending/descending a curb step and navigating uneven surfaces with CGA   -  PMET, CGA, MAX on eval,   Short term goal 5: Pt will pick an object off the ground using a reacher with SBA    - MET in a eval  Long term goals  Time Frame for Long term goals : LTG  = STG      Ongoing impairments or deficits:  Endurance, cognition/memory,   Areas where progress has been made: ambulation,   Specific barriers to progress:  Confusion,   Strategies that improve performance: Sequential VC  Equipment needed at discharge: has RW    FIMS:  Bed, Chair, Wheel Chair: 3 - Requires 25-49% assistance to transfer  Walk: 2 - Maximal Assistance Requires up to Maximal Assistance AND requires assistance of one person to walk between  feet (Patient performs 25-49% of locomotion effort or goes between  feet)  Distance Walked: 56  Wheel only  Dressing-Lower: 2 - Requires assist with 4-5 parts of dressing  Toiletin - Able to perform 1 task only (e.g. hygiene)  Toilet Transfer: (No transfer this date, used urinal from chair level)  Shower Transfer: 4 - Minimal contact assistance, pt. expends 75% or more effort              Ongoing impairments or deficits: Decreased balance, endurance, cognition  Areas where progress has been made:Minimal progress c ADLs, introduced AE this date  Specific barriers to progress: Decreased cognition (recall, problem solving)  Strategies that improve performance: VCs, increased time  Equipment needed at discharge: Grab bar is being installed by toilet      COGNITIVE FUNCTION/SPEECH THERAPY (AS INDICATED)  LTG                         Short-term Goals  Timeframe for Short-term Goals: No tx--in speaking with OT she reports suspected baseline cognitive deficits as well as what wife reported. Pt responds to cues and will likely continue to require cues in fx.                             Current Medical Status:   [] Is continent of bowel and bladder     [x] Is incontinent of bowel and bladder    [x] Has had an adequate number of bowel movements   [] Urinates with no urinary retention >300ml in bladder   [] Targeting bladder protocol with alegria removal   [x] Maintaining O2 SATs at 92% on oxygen   [x] Has pain managed while on ARU         [x] Has had no skin breakdown while on ARU   [] Has improved skin integrity via wound measurements   [] Has no signs/symptoms of infection at the wound site  [x] Has been free from injury during hospitalization   [] Has experienced a fall during hospitalization  [x] Ongoing education with patient/family with understanding demonstrated for CVA  [] Receives IV Fluids  [] Other:    NURSING  FIMS: Bladder Level of Assistance: 2- Requires 50-74% assistance for bladder management tasks (ie. helper places, holds and removes/empties device)  Bladder Frequency of Accidents: 4 - Two accidents (soiling linens or clothing) in past 7 days, includes bedpan or urinal spills by patient  Bowel Level of Assistance: 6- Requires device like bedpan, diaper, bedside commode, but patient obtains and empties own device including colostomy. Or requires bowel management medication including stool softeners, laxatives, inserts own suppository  Bowel Frequency of Accidents: 6 - No accidents, uses device like bedpan, diaper, bedside commode colostomy, or requires medication to manage bowels    NUTRITION  Weight: 235 lb 3.2 oz (106.7 kg) / Body mass index is 35.76 kg/m². Current diet: DIET CARDIAC; Intake: Usually has good appetite, meal intake %. Medical improvements/barriers: (eg: appetite, meds, pain, impulsivity, confusion, bowel/bladder) 24 hr care, groggy after pain meds, nauseous, ween off 02,     Team goals for next treatment period/Intervention for current barriers:   [x] Pt will increase activity tolerance for daily tasks. [x] Pt will improve bed mobility with reduced assist.  [x] Pt will improve safety in fx tasks with reduced cues/assist  [] Pt will improve transfers with reduced assist  [x] Pt will improve toileting with reduced assist  [] Pt will improve ADL's with use of adaptive equipment with reduced assist  [] Pt will improve pain mgmt for maximum participation in tx program  []  Plan for discharge to home.      Patient Strengths: Motivated, Cooperative and Pleasant    Ongoing tx following discharge: [x]HHC - PT    []OUTPATIENT     [] No Further Treatment     [x] Family/Caregiver Training  []  Transitional Living Arrangement   [] Home Assessment (date  )     [] Family Conference   []  Therapeutic Pass       []  Other: (specify)    Estimated Discharge Date: 6/5    Estimated Discharge Destination: []home alone   []home alone with assist prn  [x]Continuous supervision []Return home with spouse/family   [] Assisted living  []SNF     See team signature page for team members participating in today's conference. Goals have been updated to reflect recent status.       I have led this Team Conference and agree with the plan, Dr Jessy Fung MD, 5/23/2019, 12:44 PM    Team conference note transcribed this date by: Carolee Velez [facility] 5/23/2019 12:46 PM

## 2019-05-22 NOTE — PROGRESS NOTES
Physical Therapy  [x] daily progress note       [] discharge       Patient Name:  Guillermo Gallardo   :  1935 MRN: [id number]  Room:  [address] [place] [address] Date of Admission: 2019  Rehabilitation Diagnosis:   Acute CVA (cerebrovascular accident) [facility]) [I63.9]       Date 2019       Day of ARU Week:  5   Time IN/OUT [id number]   Individual Tx Minutes 60   TOTAL Tx Time Mins 60   Variance Time    Variance Time []   Refusal due to:     []   Medical hold/reason:    []   Illness   []   Off Unit for test/procedure  []   Extra time needed to complete task  []   Therapeutic need  []   Other (specify):   Restrictions Restrictions/Precautions  Restrictions/Precautions: Fall Risk, General Precautions      Communication with other providers: [x]   OK to see per nursing:     []   Spoke with team member regarding:      Subjective observations and cognitive status:  Pt sitting up in WC, agreeable to session. Spouse present during session. Reports pt calling her at 2 AM and perseverating on \"getting out of here\", reports he is leaving tomorrow. Spouse reports providing positive reinforcement. Pt has no recollection of phone call/conversation. Pain level/location: Denies pain   Discharge recommendations  Anticipated discharge date:  TBD  Destination: []home alone   []home with assist PRN     [] home with continuous supervision     []SNF      [] Assisted living     [] Other:   Continued therapy: []HHC PT  []OUTPATIENT  PT   [] No Further PT  Equipment needs: TBD     Bed Mobility:           [x]   Pt received out of bed     Transfers:     Sit--> Stand:  SBA, max cues for safety with hand placement to push up from chair vs AD  Stand --> Sit:   SBA  Assistive device required for transfer:   RW    Gait:    Distance:  106'+82'+38'   Assistance:  CGA  Device:  RW  Gait Quality:  Short step length bilaterally, able to maintain upright posture and safe distance with AD with turning.  Improved endurance first amb trial, but increased treatment with no adverse effects    [] Patient limited by fatigue  [] Patient limited by pain   [] Patient limited by medical complications:    [] Adverse reaction to Tx:   [] Significant change in status    Safety:       []  bed alarm set    []  chair alarm set    []  Pt refused alarms                []  Telesitter activated      [x]  Gait belt used during tx session      [x]other:   Pt taken to DR for group session      Number of Minutes/Billable Intervention  Gait Training 30   Therapeutic Exercise 15   Neuro Re-Ed    Therapeutic Activity 15   Wheelchair Propulsion    Group    Other:    TOTAL 60         Social History  Social/Functional History  Lives With: Spouse  Type of Home: House  Home Layout: One level  Home Access: Level entry  Bathroom Shower/Tub: Walk-in shower  Bathroom Toilet: Handicap height(uses the counter, son planning on placing a grabbar)  Bathroom Equipment: Shower chair, Grab bars in shower  Bathroom Accessibility: Walker accessible(\"he leaves it outside the door\")  Home Equipment: Rolling walker, 4 wheeled walker, Wheelchair-manual, Grab bars, Alert Button, Lift chair(RW in house, has been primarily walking household distances)  Receives Help From: Family(SonClaire Single \"in and out\")  ADL Assistance: Needs assistance(Wife provided set-up assist/supervision throughout showering and dressing)  Homemaking Assistance: Needs assistance(Wife performs all of the cooking and cleaning)  Homemaking Responsibilities: No  Ambulation Assistance: Independent(with a walker)  Transfer Assistance: Independent(with the walker )  Active : No  Patient's  Info: Wife drives   Mode of Transportation: [facility]  Occupation: Retired  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   IADL Comments: Wife is present 24 hrs a day or family comes over to assist if she needs to leave,   Additional Comments: Fall on 5/13 during stroke leading to admission, no other falls no injury     Objective                                                                                    Goals:  (Update in navigator)  Short term goals  Time Frame for Short term goals: 10-14 days  Short term goal 1: Pt will perform bed mobility independently  Short term goal 2: Pt will perform OOB transfers with a FWW with SBA  Short term goal 3: Pt will ambulate 50 ft with supervision at a FWW with two turns   Short term goal 4: Pt will perform advanced ambulation, including ascending/descending a curb step and navigating uneven surfaces with CGA   Short term goal 5: Pt will pick an object off the ground using a reacher with SBA:  Long term goals  Time Frame for Long term goals : LTG  = STG :        Plan of Care                                                                              Times per week: 5 days per week for a minimum of 60 minutes/day plus group as appropriate for 60 minutes.   Treatment to include Current Treatment Recommendations: Strengthening, ROM, Balance Training, Functional Mobility Training, Transfer Training, Cognitive/Perceptual Training, Endurance Training, Gait Training, Stair training, Cognitive Reorientation, Pain Management, Home Exercise Program, Safety Education & Training, Patient/Caregiver Education & Training    Electronically signed by   Hector Parker PTA #[id number]  5/22/2019, 8:49 AM

## 2019-05-22 NOTE — PROGRESS NOTES
Occupational Therapy   Physical Rehabilitation: OCCUPATIONAL THERAPY     [x] daily progress note       [] discharge       Patient Name:  Shay Cline   :  1935 MRN: [id number]  [facility]:  [facility] Date of Admission: 2019    Rehabilitation Diagnosis:     Acute CVA (cerebrovascular accident) [facility]) [I63.9]    Social History  Social/Functional History  Lives With: Spouse  Type of Home: House  Home Layout: One level  Home Access: Level entry  Bathroom Shower/Tub: Walk-in shower  Bathroom Toilet: Handicap height(uses the counter, son planning on placing a grabbar)  Bathroom Equipment: Shower chair, Grab bars in shower  Bathroom Accessibility: Walker accessible(\"he leaves it outside the door\")  Home Equipment: Rolling walker, 4 wheeled walker, Wheelchair-manual, Grab bars, Alert Button, Lift chair(RW in house, has been primarily walking household distances)  Receives Help From: Family(Son, Lito Thompson \"in and out\")  ADL Assistance: Needs assistance(Wife provided set-up assist/supervision throughout showering and dressing)  Homemaking Assistance: Needs assistance(Wife performs all of the cooking and cleaning)  Homemaking Responsibilities: No  Ambulation Assistance: Independent(with a walker)  Transfer Assistance: Independent(with the walker )  Active : No  Patient's  Info: Wife drives   Mode of Transportation: [facility]  Occupation: Retired  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   IADL Comments: Wife is present 24 hrs a day or family comes over to assist if she needs to leave,   Additional Comments: Fall on  during stroke leading to admission, no other falls no injury     Objective                                                                                    Goals:  (Update in navigator)    Short term goals  Time Frame for Short term goals: STGs=LTGs :  Long term goals  Time Frame for Long term goals : ~10 days or until d/c. Long term goal 1: Pt will complete grooming tasks c S.  Long term goal 2: Pt will complete total body bathing c S using AE PRN. Long term goal 3: Pt will complete UB dressing c S/setup. Long term goal 4: Pt will complete LB dressing c S/setup using AE PRN. Long term goal 5: Pt will complete toileting c S.  Long term goals 6: Pt will complete functional transfers (bed, chair, shower, toilet) c DME PRN and S.  Long term goal 7: Pt will perform therex/therax to facilitate increased strength/endurance/ax tolerance (c emphasis on dynamic standing balance/tolerance >8 mins and BUE endurance) c SBA.  :    Plan of Care:  Pt to be seen at least  5x per week for a minimum of 60 minutes as appropriate. Date            WED 5/22/2019   TIMES IN/OUT   [id number]   Group Tx Minutes 60   TOTAL Tx time seen 60   Variance Time    Variance Reason    [] Refusal due to   [] Medical hold/reason  [] Illness   [] Off Unit for test/procedure  [] Extra time needed to complete task  [] Other (specify)   Restrictions/Precautions Restrictions/Precautions  Restrictions/Precautions: Fall Risk, General Precautions         Current Diet/Swallowing Issues DIET CARDIAC; Communication with other providers: [x] Ok to see per nursing at morning huddle  [] Medical hold and reason  [] Spoke with (team    member) regarding   Subjective observations: Pt agreeable to group session. Pain level/location:    Alarm placed/where? Fall Risk  [] Pt taken to DR for lunch with nsg present   [] Pt taken back to room with chair/bed alarm in place         Group Activity:    Total time in group = 60min   Balance Group: Patient performs a variety of seated and standing activities as able, with focus on technique, safety and balance. This provides the opportunity to have fun, build camaraderie, increase endurance, and improve balance and strength.  Balance activities incorporate dynamic reaching and bending

## 2019-05-23 PROCEDURE — 99232 SBSQ HOSP IP/OBS MODERATE 35: CPT | Performed by: PHYSICAL MEDICINE & REHABILITATION

## 2019-05-23 PROCEDURE — 94150 VITAL CAPACITY TEST: CPT

## 2019-05-23 PROCEDURE — 2700000000 HC OXYGEN THERAPY PER DAY

## 2019-05-23 PROCEDURE — 6370000000 HC RX 637 (ALT 250 FOR IP): Performed by: PHYSICAL MEDICINE & REHABILITATION

## 2019-05-23 PROCEDURE — 6360000002 HC RX W HCPCS: Performed by: PHYSICAL MEDICINE & REHABILITATION

## 2019-05-23 PROCEDURE — 97110 THERAPEUTIC EXERCISES: CPT

## 2019-05-23 PROCEDURE — 1280000000 HC REHAB R&B

## 2019-05-23 PROCEDURE — 97530 THERAPEUTIC ACTIVITIES: CPT

## 2019-05-23 PROCEDURE — 94761 N-INVAS EAR/PLS OXIMETRY MLT: CPT

## 2019-05-23 PROCEDURE — 6370000000 HC RX 637 (ALT 250 FOR IP): Performed by: INTERNAL MEDICINE

## 2019-05-23 PROCEDURE — 97535 SELF CARE MNGMENT TRAINING: CPT

## 2019-05-23 PROCEDURE — 97116 GAIT TRAINING THERAPY: CPT

## 2019-05-23 PROCEDURE — 94640 AIRWAY INHALATION TREATMENT: CPT

## 2019-05-23 RX ORDER — HYDROCODONE BITARTRATE AND ACETAMINOPHEN 5; 325 MG/1; MG/1
0.5 TABLET ORAL EVERY 6 HOURS PRN
Status: DISCONTINUED | OUTPATIENT
Start: 2019-05-23 | End: 2019-05-24

## 2019-05-23 RX ORDER — LORAZEPAM 0.5 MG/1
0.5 TABLET ORAL EVERY 6 HOURS PRN
Status: ON HOLD | COMMUNITY
End: 2019-05-31 | Stop reason: HOSPADM

## 2019-05-23 RX ORDER — LORAZEPAM 0.5 MG/1
0.5 TABLET ORAL EVERY 6 HOURS PRN
Status: DISCONTINUED | OUTPATIENT
Start: 2019-05-23 | End: 2019-05-24

## 2019-05-23 RX ORDER — ONDANSETRON 4 MG/1
4 TABLET, ORALLY DISINTEGRATING ORAL EVERY 6 HOURS PRN
Status: DISCONTINUED | OUTPATIENT
Start: 2019-05-23 | End: 2019-06-01 | Stop reason: HOSPADM

## 2019-05-23 RX ADMIN — PANTOPRAZOLE SODIUM 40 MG: 40 TABLET, DELAYED RELEASE ORAL at 09:18

## 2019-05-23 RX ADMIN — LORAZEPAM 0.5 MG: 0.5 TABLET ORAL at 21:12

## 2019-05-23 RX ADMIN — QUETIAPINE FUMARATE 25 MG: 25 TABLET, FILM COATED ORAL at 21:15

## 2019-05-23 RX ADMIN — CLOPIDOGREL BISULFATE 75 MG: 75 TABLET ORAL at 09:18

## 2019-05-23 RX ADMIN — ATORVASTATIN CALCIUM 40 MG: 40 TABLET, FILM COATED ORAL at 21:15

## 2019-05-23 RX ADMIN — ASPIRIN 81 MG: 81 TABLET, COATED ORAL at 09:19

## 2019-05-23 RX ADMIN — METOPROLOL SUCCINATE 12.5 MG: 25 TABLET, EXTENDED RELEASE ORAL at 09:18

## 2019-05-23 RX ADMIN — ENOXAPARIN SODIUM 30 MG: 30 INJECTION SUBCUTANEOUS at 09:19

## 2019-05-23 RX ADMIN — HYDROCODONE BITARTRATE AND ACETAMINOPHEN 1 TABLET: 5; 325 TABLET ORAL at 09:18

## 2019-05-23 RX ADMIN — OXYBUTYNIN CHLORIDE 5 MG: 5 TABLET ORAL at 21:15

## 2019-05-23 RX ADMIN — TRAZODONE HYDROCHLORIDE 50 MG: 50 TABLET ORAL at 21:14

## 2019-05-23 RX ADMIN — ACETAMINOPHEN 650 MG: 325 TABLET ORAL at 13:10

## 2019-05-23 RX ADMIN — DONEPEZIL HYDROCHLORIDE 10 MG: 10 TABLET, FILM COATED ORAL at 21:15

## 2019-05-23 RX ADMIN — Medication 2 PUFF: at 11:24

## 2019-05-23 RX ADMIN — FOLIC ACID 1 MG: 1 TABLET ORAL at 09:18

## 2019-05-23 RX ADMIN — HYDROCODONE BITARTRATE AND ACETAMINOPHEN 0.5 TABLET: 5; 325 TABLET ORAL at 15:33

## 2019-05-23 RX ADMIN — Medication 2 PUFF: at 20:41

## 2019-05-23 RX ADMIN — ONDANSETRON 4 MG: 4 TABLET, ORALLY DISINTEGRATING ORAL at 14:49

## 2019-05-23 RX ADMIN — OXYBUTYNIN CHLORIDE 5 MG: 5 TABLET ORAL at 09:18

## 2019-05-23 RX ADMIN — CITALOPRAM HYDROBROMIDE 20 MG: 20 TABLET ORAL at 09:18

## 2019-05-23 ASSESSMENT — PAIN SCALES - GENERAL
PAINLEVEL_OUTOF10: 9
PAINLEVEL_OUTOF10: 9
PAINLEVEL_OUTOF10: 8

## 2019-05-23 NOTE — PROGRESS NOTES
[] No Further OT  Equipment needs: TBD - spouse reports grab bar is being installed by toilet   (HIT F2 to transition between stars)    Grooming:   Not addressed as pt required full hour for remainder of ADLs. Bathing: Mod A for physical assist + vc's for trunk extension while pt washed isabel area and made meager attempts to wash R side and center of buttocks, required touching assist to complete. Pt washed UB, washed BLE c use of LH sponge c min vc's. Dressing:      Upper Body Dressing:  Sup seated to cues + min increased time to doff / don pullover shirt. Lower Body Dressing:  Max A c this therapist providing max cues and touching assist c reacher to doff B  socks and thread BLE through pull up and pants. Pt performed clothing hikes from mid calf to mid way over buttocks for pants, able to complete hike of pull up. Toileting:   Due to urgency, pt required Max A for assistance to hold urinal and perform clothing mgmt up, was able to perform clothing mgmt down c Mod A. Toilet Transfers:   Not performed, performed toileting from chair level. Device Used:    []   Standard Toilet         []   Yolanda [place]           []  Bedside Commode       []   Elevated Toilet          []   Other:        Tub/Shower Transfer:   Not attempted this date d/t SOB and decreased act tolerance. Device Used:    []   Shower Bench      []   Shower Chair      []   Tub Transfer Bench           []   Bathtub    []   [employer]         []   Other:         Bed Mobility:           []   Pt received out of bed   Rolling R/L:  SBA + cues for bed rail to roll to R-side  Scooting: Mod A for B hips + cues for lateral leans / weight shift to scoot to EOB  Supine --> Sit:  Min A to trunk + increased time to maintain sitting midline. Sit --> Supine:  N/A    Transfers:    Sit--> Stand: Mod A  Stand --> Sit:   Mod A  Stand-Pivot:    Mod A / Carolene Band A (for pivot) + sequential cues for safe body positioning  Other: Requires max cues for safe PRN.      Treatment/Activity Tolerance:   [x] Tolerated treatment with no adverse effects    [x] Patient limited by fatigue / intermittent shortness of breath  [] Patient limited by pain   [] Patient limited by medical complications:    [] Adverse reaction to Tx:   [] Significant change in status    Safety:       []  bed alarm set    []  chair alarm set    []  Pt refused alarms                []  Telesitter activated      [x]  Gait belt used during tx session      [x]other:  Left seated in wheelchair c staff in dining room for group Tx session     Number of Minutes/Billable Intervention  Therapeutic Exercise    ADL Self-care 60   Neuro Re-Ed    Therapeutic Activity    Group    Other:    TOTAL 60       Social History  Social/Functional History  Lives With: Spouse  Type of Home: House  Home Layout: One level  Home Access: Level entry  Bathroom Shower/Tub: Walk-in shower  Bathroom Toilet: Handicap height(uses the counter, son planning on placing a grabbar)  Bathroom Equipment: Shower chair, Grab bars in shower  Bathroom Accessibility: Walker accessible(\"he leaves it outside the door\")  Home Equipment: Rolling walker, 4 wheeled walker, Wheelchair-manual, Grab bars, Alert Button, Lift chair(RW in house, has been primarily walking household distances)  Receives Help From: Family(Son, Lotus Fillers \"in and out\")  ADL Assistance: Needs assistance(Wife provided set-up assist/supervision throughout showering and dressing)  Homemaking Assistance: Needs assistance(Wife performs all of the cooking and cleaning)  Homemaking Responsibilities: No  Ambulation Assistance: Independent(with a walker)  Transfer Assistance: Independent(with the walker )  Active : No  Patient's  Info: Wife drives   Mode of Transportation: [employer]  Occupation: Retired  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   IADL Comments: Wife is present 25 hrs a day or family comes over to assist if she needs to leave,   Additional Comments: Fall on 5/13 during stroke leading to admission, no other falls no injury     Objective                                                                                    Goals:  (Update in navigator)  Short term goals  Time Frame for Short term goals: STGs=LTGs:  Long term goals  Time Frame for Long term goals : ~10 days or until d/c. Long term goal 1: Pt will complete grooming tasks c S.  Long term goal 2: Pt will complete total body bathing c S using AE PRN. Long term goal 3: Pt will complete UB dressing c S/setup. Long term goal 4: Pt will complete LB dressing c S/setup using AE PRN. Long term goal 5: Pt will complete toileting c S.  Long term goals 6: Pt will complete functional transfers (bed, chair, shower, toilet) c DME PRN and S.  Long term goal 7: Pt will perform therex/therax to facilitate increased strength/endurance/ax tolerance (c emphasis on dynamic standing balance/tolerance >8 mins and BUE endurance) c SBA. :        Plan of Care                                                                              Times per week: 5 days per week for a minimum of 60 minutes/day plus group as appropriate for 60 minutes.   Treatment to include Plan  Times per day: Daily  Current Treatment Recommendations: Balance Training, Functional Mobility Training, Endurance Training, Pain Management, Safety Education & Training, Patient/Caregiver Education & Training, Equipment Evaluation, Education, & procurement, Positioning, Self-Care / ADL, Cognitive/Perceptual Training    Electronically signed by   SONIA Eckert  5/23/2019, 9:04 AM  '

## 2019-05-23 NOTE — PROGRESS NOTES
Connie Juan is a 82 y.o. male patient.     Current Facility-Administered Medications   Medication Dose Route Frequency Provider Last Rate Last Dose    ondansetron (ZOFRAN-ODT) disintegrating tablet 4 mg  4 mg Oral Q6H PRN Chelsi Garcia MD        enoxaparin (LOVENOX) injection 30 mg  30 mg Subcutaneous Daily C Valerie Conrad MD   30 mg at 05/23/19 0919    albuterol sulfate  (90 Base) MCG/ACT inhaler 2 puff  2 puff Inhalation Q6H PRN Edi Kendall MD   2 puff at 05/19/19 1128    aspirin EC tablet 81 mg  81 mg Oral Daily Edi Kendall MD   81 mg at 05/23/19 0919    atorvastatin (LIPITOR) tablet 40 mg  40 mg Oral Nightly Edi Kendall MD   40 mg at 05/22/19 2121    citalopram (CELEXA) tablet 20 mg  20 mg Oral Daily Edi Kendall MD   20 mg at 05/23/19 4234    clopidogrel (PLAVIX) tablet 75 mg  75 mg Oral Daily Edi Kendall MD   75 mg at 05/23/19 9194    donepezil (ARICEPT) tablet 10 mg  10 mg Oral Nightly Edi Kendall MD   10 mg at 47/35/70 0865    folic acid (FOLVITE) tablet 1 mg  1 mg Oral Daily Edi Kendall MD   1 mg at 05/23/19 0918    HYDROcodone-acetaminophen (NORCO) 5-325 MG per tablet 1 tablet  1 tablet Oral Q6H PRN Edi Kendall MD   1 tablet at 05/23/19 0918    mometasone-formoterol (DULERA) 200-5 MCG/ACT inhaler 2 puff  2 puff Inhalation BID Edi Kendall MD   2 puff at 05/23/19 1124    oxybutynin (DITROPAN) tablet 5 mg  5 mg Oral BID Edi Kendall MD   5 mg at 05/23/19 0918    pantoprazole (PROTONIX) tablet 40 mg  40 mg Oral QAM AC Edi Kendall MD   40 mg at 05/23/19 7937    QUEtiapine (SEROQUEL) tablet 25 mg  25 mg Oral Nightly PRN Edi Kendall MD   25 mg at 05/22/19 2122    traZODone (DESYREL) tablet 50 mg  50 mg Oral Nightly PRN Edi Kendall MD   50 mg at 05/22/19 2122    acetaminophen (TYLENOL) tablet 650 mg  650 mg Oral Q4H PRN JULISSA Conrad MD   650 mg at 05/23/19 1310    magnesium hydroxide (MILK OF MAGNESIA) 400 MG/5ML suspension 30 mL  30 mL Oral Daily PRN C Mar Maria MD        metoprolol succinate (TOPROL XL) extended release tablet 12.5 mg  12.5 mg Oral Daily Aileen Ly MD   12.5 mg at 05/23/19 0502     Allergies   Allergen Reactions    Flomax [Tamsulosin Hcl]      Hypotension/syncope    Nsaids      Due to CKD    Penicillins Hives    Topamax [Topiramate]      Fatigue and severe depression     Principal Problem:    Acute CVA (cerebrovascular accident) ([facility] [address]  Active Problems:    Hypertension    COPD (chronic obstructive pulmonary disease) ([facility])    Stage 3 chronic kidney disease (H[facility])    Right hemiparesis ([facility])  Resolved Problems:    * No resolved hospital problems. *    Blood pressure 120/61, pulse 65, temperature 97.9 °F (36.6 °C), temperature source Oral, resp. rate 18, height 5' 8\" (1.727 m), weight 235 lb 3.2 oz (106.7 kg), SpO2 96 %. Subjective 5/20/19: patient seen in his room with nursing staff. Tolerating rehab program.  Using CPAP at night. Very anxious for discharge. Explained the rehab program to the patient and his wife. He appears to be making progress. Denies bowel or bladder complaints. Dyspnea with exertion. Denies chest pain. Otherwise complete review of systems as above are unremarkable  5/21/19: Patient seen in his room with nursing staff. Requires 3 L of oxygen. He was not on home oxygen prior. Seen by speech therapy today. He is receiving his pills in applesauce. He has bladder incontinence. Reports chronic lower back pain. His wife reports he was previously independent at home after his prior stroke with left hemiparesis which resolved. He does have a lift chair at home. Denies chest pain or shortness of breath. Denies any bowel complaints. Pain is controlled with Norco.  Currently transferring minimal assistance with cues. Ambulates 43 feet contact guard to minimal assistance.     5/22/19: Patient seen in his room with his wife.  Reports history of chronic back pain. Controlled with Norco.  Remains on 3 L of oxygen. Attempting to wean oxygen at this time. Last bowel movement was yesterday. Continues to make progress with the rehab program.  His wife reports that there  Son is  planning on installing grab bars in the bathroom. 5/23/19: Patient seen in his room with nursing staff and his wife. Therapy is reporting some cognitive dysfunction and intermittent grogginess. He has been receiving Norco for pain. He does not take narcotics at home previously. He does have a long history of chronic lower back pain. He has experienced some nausea and Zofran has been ordered. Denies chest pain or shortness of breath. No bowel or bladder complaints. Remains on oxygen and attempting to wean. Function:  Bathing: Mod A for physical assist + vc's for trunk extension while pt washed isabel area and made meager attempts to wash R side and center of buttocks, required touching assist to complete. Pt washed UB, washed BLE c use of LH sponge c min vc's.        Dressing:      Upper Body Dressing:  Sup seated to cues + min increased time to doff / don pullover shirt. Lower Body Dressing:  Max A c this therapist providing max cues and touching assist c reacher to doff B  socks and thread BLE through pull up and pants. Pt performed clothing hikes from mid calf to mid way over buttocks for pants, able to complete hike of pull up.         Toileting:   Due to urgency, pt required Max A for assistance to hold urinal and perform clothing mgmt up, was able to perform clothing mgmt down c Mod A.         Transfers:    Sit--> Stand:  CGA to Min A  Stand --> Sit:   Min A   Stand-Pivot:   Min A   Other:  Min A toilet transfer; Pt able to perform 3/3 tasks with CGA in standing   Assistive device required for transfer:   RW     Gait:    Distance:  0+05+16+29 feet  Assistance:  CGA to Min A   Device:  RW  Gait Quality:  FWD bent posture, tremoring in BUE & BLE with fatigue, increased FWD bent posture     Stairs   # Completed:   3  Assistance:    CGA  Supportive Device:  B handrails  Height:   4\"         Objective:  General Appearance:  Comfortable. Vital signs: (most recent): Blood pressure 120/61, pulse 65, temperature 97.9 °F (36.6 °C), temperature source Oral, resp. rate 18, height 5' 8\" (1.727 m), weight 235 lb 3.2 oz (106.7 kg), SpO2 96 %. Vital signs are normal.    Lungs:  Normal respiratory rate. Breath sounds clear to auscultation. Heart: Normal rate. Regular rhythm. Abdomen: Bowel sounds are normal.   There is no abdominal tenderness. Neurological: Patient is alert and oriented to person, place and time. Flat affect. Slow to respond at times. Speech is fluent. moving all 4 limbs. Motor strength 5 over 5 in both upper and lower limbs. Assessment & Plan  An 37-year-old male with a history of right cerebrovascular  accident six years ago with chronic left hemiparesis. 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 He has chronic kidney disease, hypertension, and COPD.     Strengths of the patient include his supportive spouse, accessible home,  and experience with adaptive equipment. Limitations include the  bilaterality of his weakness. his risk for falling, and his kidney  disease.     RECOMMENDATIONS:  Acute inpatient rehabilitation with occupational and  physical therapy and speech-language pathology, 180 minutes, five out of  every seven days. We will address basic and advancing mobility with  self-care instruction and adaptive equipment training. Caregiver  education will be offered. Expected length of stay prior to a  supervised level of function for discharge to home is 16 to 18 days.     ADDITIONAL RECOMMENDATIONS:  1. Ischemic stroke with infarction:  The patient requires daily  occupational and physical therapy with speech-language pathology.   He  requires antiplatelet therapy with Plavix and baby aspirin. He is  getting GI prophylaxis. Blood pressure and kidney disease treatment is  required. We will address appropriate nutrition and hydration orally. Pulmonary hygiene and treatment of his COPD are important. We will  emphasize pulmonary hygiene and DVT prophylaxis:  2. DVT prophylaxis:  Lovenox will be dosed at 30 mg daily. Weightbearing activities will be pursued daily. I must monitor his  hemoglobin and platelet count while on this medication. GI prophylaxis  is offered. 3.  Hypertension:  The patient's blood pressures have been fluctuating  some and Toprol is now used to control the fluctuation. Target systolic  blood pressure is 120 to 140. We will monitor vital signs at rest and  with activity and encourage consistent oral intake. His cardiologist is  modifying his diet limiting sodium. 4. COPD:  The patient requires Dulera inhaler and p.r.n. albuterol  inhaler, Zithromax, and pulmonary hygiene. We will monitor O2  saturations at rest and with activity and consult Pulmonary Medicine  should his symptoms deteriorate. 5/23/19: He may require home oxygen. We'll attempt wean. 5.  Chronic kidney disease stage III:  Try to avoid nephrotoxic  medications including anti-inflammatories. We will monitor his  chemistries periodically and encourage consistent oral intake. 6.  Chronic lower back pain: 5/23/19: Decreased Norco dose to 2.5 mg every 6 hours as needed. Sedation remains a barrier   7. Chronic urinary incontinence: Continue with Ditropan  8. Depression: Continue with Celexa  9. Dysphagia: Cleared by speech therapy. Discharge plan: Completed team staffing 5/23/19   . Plan for discharge on 6/5/19. Provide family education. He lives with his wife in a single level plan. He will require 24-hour supervision upon discharge.     Barriers: Decreased endurance, decreased safety, difficulty with mobility and ADLs, bowel and bladder management, dyspnea with exertion, anxiety, chronic low back pain, decreased cognition    The patient continues to benefit from inpatient rehabilitation. He has significant functional deficits and medical comorbidities requiring ongoing physician visits and comprehensive interdisciplinary rehabilitation care. His needs could not be met at a lesser level of care.       Tamela Byrd MD  5/23/2019

## 2019-05-23 NOTE — PROGRESS NOTES
Physical Therapy  [x] daily progress note       [] discharge       Patient Name:  Vu Salomon   :  1935 MRN: [id number]  Room:  [address] [place] [address] Date of Admission: 2019  Rehabilitation Diagnosis:   Acute CVA (cerebrovascular accident) [facility]) [I63.9]       Date 2019       Day of ARU Week:  6   Time IN/OUT 1100-x  [id number]  [id number]   Individual Tx Minutes 30   Group Tx Minutes 0   Concurrent Tx Minutes 30   TOTAL Tx Time Mins 60   Variance Time -60   Variance Time [x]   Refusal due to:  C/o Nausea   []   Medical hold/reason:    []   Illness   []   Off Unit for test/procedure  []   Extra time needed to complete task  []   Therapeutic need  []   Other (specify):   Restrictions Restrictions/Precautions  Restrictions/Precautions: Fall Risk, General Precautions      Communication with other providers: [x]   OK to see per nursing:     []   Spoke with team member regarding:      Subjective observations and cognitive status: AM: Pt refused group due to c/o nausea and \"feeling like he is going to throw up. \" Emesis basin provided and ngs alerted. Pt perseverating on going back to bed and assisted by nsg. PM: Reports of nausea during session. Nsg alerted and medication given. Spouse upset, crying upon therapist arrival. Reported to therapist that pt was having an anxiety attack shortly before this session. Also pt stated to her that he though he was dying, that he was not going to make it out of here, and that she was better off without him. Moral support given. Spouse requested for staff to not inform pt of discharge date due to fear of him becoming upset and giving up. She reports that she tells pt \"in a few days, as soon as you are doing better. \" Discussed with other staff members. Pain level/location: C/o LBP and B hip pain during session but did not rate.     Discharge recommendations  Anticipated discharge date: TBD  Destination: []home alone   []home with assist PRN     [] home with continuous supervision     []SNF      [] Assisted living     [] Other:   Continued therapy: []HHC PT  []OUTPATIENT  PT   [] No Further PT  Equipment needs: TBD, pt has RW and 4WW     Bed Mobility:           []   Pt received out of bed   Rolling R/L:  SBA with rail, increased shaking and effort  Scooting:  Sit scoot to EOB SBA  Supine --> Sit:  From  R SL SBA with extra time and effort, min cues    Transfers:    Sit--> Stand:  CGA, max cues to push up from chair  Stand --> Sit:   CGA, max cues to reach back  Assistive device required for transfer:   RW    Gait:    Distance:  28'+ 22'(max distances)   Assistance:  CG-Asael  Device:  RW  Gait Quality:  Reciprocal pattern with decreased step length bilaterally, shaking of extremities, mild LOB to R, slow ty. Pt reporting needing rest break due to back pain and nausea. Additional Therapeutic activities/exercises completed this date:     []   Nu-step:  Time:        Level:         #Steps:       []   Rebounder:    []  Seated     []  Standing        [x]   Balance training : Dynamic standing balance for balloon volleyball at RW with CGA, able to stand 1' + 1' 10\" before needing a sitting break due to reports of back pain. Dynamic standing reaching task reaching mildly out of LACEY all directions x 2 trials CGA, min safety cues        []   Postural training    []   Supine ther ex (reps/sets):     [x]   Seated ther ex (reps/sets): B LE's 1# each x 15 -20 reps, min rest breaks   []   Standing ther ex (reps/sets):     []   Picked up object from floor                       []   Reacher used   []   Other:   []   Other:   []   Other:       Patient/Caregiver Education and Training:   [x]   Bed Mobility/Transfer technique/safety  [x]   Gait technique/sequencing  [x]   Proper use of assistive device  []   Advanced mobility safety and technique  []   Reinforced patient's precautions/mobility while maintaining precautions  []   Postural awareness  []   Family training  [x]   Progress was updated and reviewed in Rehabtracker with patient and/or family this date. Treatment Plan for Next Session:  Car transfer, community barriers, gait progression. LE strengthening      Treatment/Activity Tolerance:   [x] Tolerated treatment with no adverse effects    [] Patient limited by fatigue  [x] Patient limited by pain, nausea   [] Patient limited by medical complications:    [] Adverse reaction to Tx:   [] Significant change in status    Safety:       []  bed alarm set    []  chair alarm set    []  Pt refused alarms                []  Telesitter activated      [x]  Gait belt used during tx session      [x]other: Pt taken back to room by therapy aide. Number of Minutes/Billable Intervention  Gait Training 15   Therapeutic Exercise 15   Neuro Re-Ed    Therapeutic Activity 30   Wheelchair Propulsion    Group    Other:    TOTAL 60         Social History  Social/Functional History  Lives With: Spouse  Type of Home: House  Home Layout: One level  Home Access: Level entry  Bathroom Shower/Tub: Walk-in shower  Bathroom Toilet: Handicap height(uses the counter, son planning on placing a grabbar)  Bathroom Equipment: Shower chair, Grab bars in shower  Bathroom Accessibility: Walker accessible(\"he leaves it outside the door\")  Home Equipment: Rolling walker, 4 wheeled walker, Wheelchair-manual, Grab bars, Alert Button, Lift chair(RW in house, has been primarily walking household distances)  Receives Help From: Family(Son, Jason Carlos \"in and out\")  ADL Assistance: Needs assistance(Wife provided set-up assist/supervision throughout showering and dressing)  Homemaking Assistance: Needs assistance(Wife performs all of the cooking and cleaning)  Homemaking Responsibilities: No  Ambulation Assistance: Independent(with a walker)  Transfer Assistance: Independent(with the walker )  Active : No  Patient's  Info:  Wife drives   Mode of Transportation: [facility]  Occupation: Retired  Type of occupation: Worked in [employer] [place] [employer]   Leisure & Hobbies: Pt will go out to eat with wife, watches tv, wife manages meds and finances. IADL Comments: Wife is present 24 hrs a day or family comes over to assist if she needs to leave,   Additional Comments: Fall on 5/13 during stroke leading to admission, no other falls no injury     Objective                                                                                    Goals:  (Update in navigator)  Short term goals  Time Frame for Short term goals: 10-14 days  Short term goal 1: Pt will perform bed mobility independently  Short term goal 2: Pt will perform OOB transfers with a FWW with SBA  Short term goal 3: Pt will ambulate 50 ft with supervision at a FWW with two turns   Short term goal 4: Pt will perform advanced ambulation, including ascending/descending a curb step and navigating uneven surfaces with CGA   Short term goal 5: Pt will pick an object off the ground using a reacher with SBA:  Long term goals  Time Frame for Long term goals : LTG  = STG :        Plan of Care                                                                              Times per week: 5 days per week for a minimum of 60 minutes/day plus group as appropriate for 60 minutes.   Treatment to include Current Treatment Recommendations: Strengthening, ROM, Balance Training, Functional Mobility Training, Transfer Training, Cognitive/Perceptual Training, Endurance Training, Gait Training, Stair training, Cognitive Reorientation, Pain Management, Home Exercise Program, Safety Education & Training, Patient/Caregiver Education & Training    Electronically signed by   Cristian Null PTA #[id number]  5/23/2019, 8:44 AM

## 2019-05-24 PROCEDURE — 6370000000 HC RX 637 (ALT 250 FOR IP): Performed by: INTERNAL MEDICINE

## 2019-05-24 PROCEDURE — 97116 GAIT TRAINING THERAPY: CPT

## 2019-05-24 PROCEDURE — 1280000000 HC REHAB R&B

## 2019-05-24 PROCEDURE — 97530 THERAPEUTIC ACTIVITIES: CPT

## 2019-05-24 PROCEDURE — 97110 THERAPEUTIC EXERCISES: CPT

## 2019-05-24 PROCEDURE — 6370000000 HC RX 637 (ALT 250 FOR IP): Performed by: PHYSICAL MEDICINE & REHABILITATION

## 2019-05-24 PROCEDURE — 6360000002 HC RX W HCPCS: Performed by: PHYSICAL MEDICINE & REHABILITATION

## 2019-05-24 PROCEDURE — 97535 SELF CARE MNGMENT TRAINING: CPT

## 2019-05-24 PROCEDURE — 97150 GROUP THERAPEUTIC PROCEDURES: CPT

## 2019-05-24 PROCEDURE — 99232 SBSQ HOSP IP/OBS MODERATE 35: CPT | Performed by: PHYSICAL MEDICINE & REHABILITATION

## 2019-05-24 PROCEDURE — 94640 AIRWAY INHALATION TREATMENT: CPT

## 2019-05-24 RX ORDER — LORAZEPAM 0.5 MG/1
0.25 TABLET ORAL EVERY 6 HOURS PRN
Status: DISCONTINUED | OUTPATIENT
Start: 2019-05-24 | End: 2019-06-01 | Stop reason: HOSPADM

## 2019-05-24 RX ORDER — QUETIAPINE FUMARATE 25 MG/1
12.5 TABLET, FILM COATED ORAL NIGHTLY PRN
Status: DISCONTINUED | OUTPATIENT
Start: 2019-05-24 | End: 2019-06-01 | Stop reason: HOSPADM

## 2019-05-24 RX ADMIN — CLOPIDOGREL BISULFATE 75 MG: 75 TABLET ORAL at 08:52

## 2019-05-24 RX ADMIN — LORAZEPAM 0.25 MG: 0.5 TABLET ORAL at 20:47

## 2019-05-24 RX ADMIN — OXYBUTYNIN CHLORIDE 5 MG: 5 TABLET ORAL at 20:45

## 2019-05-24 RX ADMIN — DONEPEZIL HYDROCHLORIDE 10 MG: 10 TABLET, FILM COATED ORAL at 20:45

## 2019-05-24 RX ADMIN — FOLIC ACID 1 MG: 1 TABLET ORAL at 08:53

## 2019-05-24 RX ADMIN — ACETAMINOPHEN 650 MG: 325 TABLET ORAL at 07:29

## 2019-05-24 RX ADMIN — OXYBUTYNIN CHLORIDE 5 MG: 5 TABLET ORAL at 08:52

## 2019-05-24 RX ADMIN — METOPROLOL SUCCINATE 12.5 MG: 25 TABLET, EXTENDED RELEASE ORAL at 08:52

## 2019-05-24 RX ADMIN — QUETIAPINE FUMARATE 12.5 MG: 25 TABLET ORAL at 20:45

## 2019-05-24 RX ADMIN — ENOXAPARIN SODIUM 30 MG: 30 INJECTION SUBCUTANEOUS at 08:53

## 2019-05-24 RX ADMIN — ATORVASTATIN CALCIUM 40 MG: 40 TABLET, FILM COATED ORAL at 20:45

## 2019-05-24 RX ADMIN — ACETAMINOPHEN 650 MG: 325 TABLET ORAL at 16:08

## 2019-05-24 RX ADMIN — PANTOPRAZOLE SODIUM 40 MG: 40 TABLET, DELAYED RELEASE ORAL at 08:52

## 2019-05-24 RX ADMIN — CITALOPRAM HYDROBROMIDE 20 MG: 20 TABLET ORAL at 08:53

## 2019-05-24 RX ADMIN — Medication 2 PUFF: at 19:18

## 2019-05-24 RX ADMIN — ASPIRIN 81 MG: 81 TABLET, COATED ORAL at 08:52

## 2019-05-24 ASSESSMENT — PAIN SCALES - GENERAL
PAINLEVEL_OUTOF10: 0
PAINLEVEL_OUTOF10: 8

## 2019-05-24 NOTE — PROGRESS NOTES
Mc Luis is a 82 y.o. male patient.     Current Facility-Administered Medications   Medication Dose Route Frequency Provider Last Rate Last Dose    ondansetron (ZOFRAN-ODT) disintegrating tablet 4 mg  4 mg Oral Q6H PRN German Lew MD   4 mg at 05/23/19 1449    HYDROcodone-acetaminophen (NORCO) 5-325 MG per tablet 0.5 tablet  0.5 tablet Oral Q6H PRN German Lew MD   0.5 tablet at 05/23/19 1533    LORazepam (ATIVAN) tablet 0.5 mg  0.5 mg Oral Q6H PRN German Lew MD   0.5 mg at 05/23/19 2112    enoxaparin (LOVENOX) injection 30 mg  30 mg Subcutaneous Daily C Hudson Litten, MD   30 mg at 05/24/19 0853    albuterol sulfate  (90 Base) MCG/ACT inhaler 2 puff  2 puff Inhalation Q6H PRN Cristian Nascimento MD   2 puff at 05/19/19 1128    aspirin EC tablet 81 mg  81 mg Oral Daily Cristian Nascimento MD   81 mg at 05/24/19 5935    atorvastatin (LIPITOR) tablet 40 mg  40 mg Oral Nightly Cristian Nascimento MD   40 mg at 05/23/19 2115    citalopram (CELEXA) tablet 20 mg  20 mg Oral Daily Cristian Nascimento MD   20 mg at 05/24/19 0853    clopidogrel (PLAVIX) tablet 75 mg  75 mg Oral Daily Cristian Nascimento MD   75 mg at 05/24/19 4932    donepezil (ARICEPT) tablet 10 mg  10 mg Oral Nightly Cristian Nascimento MD   10 mg at 92/24/78 3499    folic acid (FOLVITE) tablet 1 mg  1 mg Oral Daily Cristian Nascimento MD   1 mg at 05/24/19 0853    mometasone-formoterol (DULERA) 200-5 MCG/ACT inhaler 2 puff  2 puff Inhalation BID Cristian Nascimento MD   2 puff at 05/23/19 2041    oxybutynin (DITROPAN) tablet 5 mg  5 mg Oral BID Cristian Nascimento MD   5 mg at 05/24/19 2319    pantoprazole (PROTONIX) tablet 40 mg  40 mg Oral QAM AC Cristian Nascimento MD   40 mg at 05/24/19 4069    QUEtiapine (SEROQUEL) tablet 25 mg  25 mg Oral Nightly PRN Cristian Nascimento MD   25 mg at 05/23/19 2115    traZODone (DESYREL) tablet 50 mg  50 mg Oral Nightly PRN Cristian Nascimento MD   50 mg at 05/23/19 2114    acetaminophen (TYLENOL) tablet 650 mg  650 mg Oral Q4H PRN JULISSA Waldron MD   650 mg at 05/24/19 0729    magnesium hydroxide (MILK OF MAGNESIA) 400 MG/5ML suspension 30 mL  30 mL Oral Daily PRN JULISSA Waldron MD        metoprolol succinate (TOPROL XL) extended release tablet 12.5 mg  12.5 mg Oral Daily Yvette Pelletier MD   12.5 mg at 05/24/19 3713     Allergies   Allergen Reactions    Flomax [Tamsulosin Hcl]      Hypotension/syncope    Nsaids      Due to CKD    Penicillins Hives    Topamax [Topiramate]      Fatigue and severe depression     Principal Problem:    Acute CVA (cerebrovascular accident) ([facility] [address]  Active Problems:    Hypertension    COPD (chronic obstructive pulmonary disease) ([facility])    Stage 3 chronic kidney disease (HCC)    Right hemiparesis ([facility] [address]  Resolved Problems:    * No resolved hospital problems. *    Blood pressure 135/71, pulse 65, temperature 97.2 °F (36.2 °C), temperature source Oral, resp. rate 18, height 5' 8\" (1.727 m), weight 233 lb (105.7 kg), SpO2 95 %. Subjective 5/20/19: patient seen in his room with nursing staff. Tolerating rehab program.  Using CPAP at night. Very anxious for discharge. Explained the rehab program to the patient and his wife. He appears to be making progress. Denies bowel or bladder complaints. Dyspnea with exertion. Denies chest pain. Otherwise complete review of systems as above are unremarkable  5/21/19: Patient seen in his room with nursing staff. Requires 3 L of oxygen. He was not on home oxygen prior. Seen by speech therapy today. He is receiving his pills in applesauce. He has bladder incontinence. Reports chronic lower back pain. His wife reports he was previously independent at home after his prior stroke with left hemiparesis which resolved. He does have a lift chair at home. Denies chest pain or shortness of breath. Denies any bowel complaints.   Pain is controlled with Norco.  Currently transferring minimal assistance with cues. Ambulates 43 feet contact guard to minimal assistance. 5/22/19: Patient seen in his room with his wife. Reports history of chronic back pain. Controlled with Norco.  Remains on 3 L of oxygen. Attempting to wean oxygen at this time. Last bowel movement was yesterday. Continues to make progress with the rehab program.  His wife reports that there  Son is  planning on installing grab bars in the bathroom. 5/23/19: Patient seen in his room with nursing staff and his wife. Therapy is reporting some cognitive dysfunction and intermittent grogginess. He has been receiving Norco for pain. He does not take narcotics at home previously. He does have a long history of chronic lower back pain. He has experienced some nausea and Zofran has been ordered. Denies chest pain or shortness of breath. No bowel or bladder complaints. Remains on oxygen and attempting to wean.    5/24/19: Patient with some somnolence and cognitive deficits. He has history of chronic lower back pain and was started on Norco.  Primarily takes Tylenol at home. Also other sedating medications noted on his medication record. Spoke with patient and family. We will discontinue and reduce some of the medication. He denies chest pain or shortness of breath. No bowel or bladder complaints. Function:  Bathing: Mod A for physical assist + vc's for trunk extension while pt washed isabel area and made meager attempts to wash R side and center of buttocks, required touching assist to complete. Pt washed UB, washed BLE c use of LH sponge c min vc's.        Dressing:      Upper Body Dressing:  Sup seated to cues + min increased time to doff / don pullover shirt. Lower Body Dressing:  Max A c this therapist providing max cues and touching assist c reacher to doff B  socks and thread BLE through pull up and pants. Pt performed clothing hikes from mid calf to mid way over buttocks for pants, able to complete hike of pull up.       Toileting:   Due to urgency, pt required Max A for assistance to hold urinal and perform clothing mgmt up, was able to perform clothing mgmt down c Mod A. Transfers:    Sit--> Stand:  CGA to Min A  Stand --> Sit:   Min A   Stand-Pivot:   Min A   Other:  Min A toilet transfer; Pt able to perform 3/3 tasks with CGA in standing   Assistive device required for transfer:   RW     Gait:    Distance:  3+74+42+49 feet  Assistance:  CGA to Min A   Device:  RW  Gait Quality:  FWD bent posture, tremoring in BUE & BLE with fatigue, increased FWD bent posture     Stairs   # Completed:   3  Assistance:    CGA  Supportive Device:  B handrails  Height:   4\"         Objective:  General Appearance:  Comfortable. Vital signs: (most recent): Blood pressure 135/71, pulse 65, temperature 97.2 °F (36.2 °C), temperature source Oral, resp. rate 18, height 5' 8\" (1.727 m), weight 233 lb (105.7 kg), SpO2 95 %. Vital signs are normal.    Lungs:  Normal respiratory rate. Breath sounds clear to auscultation. Heart: Normal rate. Regular rhythm. Abdomen: Bowel sounds are normal.   There is no abdominal tenderness. Neurological: Patient is alert and oriented to person, place and time. Flat affect. Slow to respond at times. Speech is fluent. moving all 4 limbs. Motor strength 5 over 5 in both upper and lower limbs. Assessment & Plan  An 42-year-old male with a history of right cerebrovascular  accident six years ago with chronic left hemiparesis. 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 He has chronic kidney disease, hypertension, and COPD.     Strengths of the patient include his supportive spouse, accessible home,  and experience with adaptive equipment. Limitations include the  bilaterality of his weakness.  his risk for falling, and his kidney  disease.     RECOMMENDATIONS:  Acute inpatient rehabilitation with occupational and  physical therapy and needed. Sedation remains a barrier   5/24/19: We'll discontinue Norco due to cognitive deficit.   7.  Chronic urinary incontinence: Continue with Ditropan  8. Depression: Continue with Celexa  9. Dysphagia: Cleared by speech therapy. 10:5/24/19: Cognitive deficits noted by family and therapy. Discontinue opioid analgesics, decrease Ativan to 0.25 mg every 6 hours when necessary and decrease Seroquel to 12.5 mg daily at bedtime when necessary. Discontinue trazodone Discharge plan: Completed team staffing 5/23/19   . Plan for discharge on 6/5/19. Provide family education. He lives with his wife in a single level plan. He will require 24-hour supervision upon discharge. Barriers: Decreased endurance, decreased safety, difficulty with mobility and ADLs, bowel and bladder management, dyspnea with exertion, anxiety, chronic low back pain, decreased cognition    The patient continues to benefit from inpatient rehabilitation. He has significant functional deficits and medical comorbidities requiring ongoing physician visits and comprehensive interdisciplinary rehabilitation care. His needs could not be met at a lesser level of care. Discharge plan: Team staffing completed 5/23/19: Planning for discharge 6/5/19.   Key Rodríguez MD  5/24/2019

## 2019-05-24 NOTE — PROGRESS NOTES
Physical Therapy  [x] daily progress note       [] discharge       Patient Name:  Jonny Leiva   :  1935 MRN: [id number]  [facility]:  [facility] Date of Admission: 2019  Rehabilitation Diagnosis:   Acute CVA (cerebrovascular accident) [facility]) [I63.9]       Date 2019       Day of ARU Week:  7   Time IN/OUT 6546-7015  [id number]     Individual Tx Minutes 130   TOTAL Tx Time Mins 130   Variance Time    Variance Time []   Refusal due to:     []   Medical hold/reason:    []   Illness   []   Off Unit for test/procedure  []   Extra time needed to complete task  []   Therapeutic need  []   Other (specify):   Restrictions Restrictions/Precautions  Restrictions/Precautions: Fall Risk, General Precautions      Communication with other providers: [x]   OK to see per nursing:     []   Spoke with team member regarding:      Subjective observations and cognitive status: Pt resting in WC, agreeable to session. Spouse present in room. Pt initially stating \"I want to go back to bed. \" Spouse encouraging pt. Therapist explained tx session options and purpose, with pt nodding \"yes\". Pt needing max encouragement to progress with tasks due to anxiety, stating \"I'm Rosellen Asters fall\", and multiple c/o back pain. Used pre-measured distances for motivation with prompting required. PM: Pt resting in bed, agreeable to session after encouragement from spouse and therapist.    Pain level/location: C/o back pain, did not rate.    Discharge recommendations  Anticipated discharge date: TBD  Destination: []home alone   []home with assist PRN     [] home with continuous supervision     []SNF      [] Assisted living     [] Other:   Continued therapy: []C PT  []OUTPATIENT  PT   [] No Further PT  Equipment needs: TBD, pt has RW and 4WW     Bed Mobility:           []   Pt received out of bed   Rolling R/L:  Min A without rail, mod cues  Scooting:  Sit scoot to EOB SBA with extra time and effort   Supine --> Sit:  SB-Min A from L SL without bed []   Reacher used   []   Other:   []   Other:   []   Other:      Patient/Caregiver Education and Training:   [x]   Bed Mobility/Transfer technique/safety  [x]   Gait technique/sequencing  [x]   Proper use of assistive device    [x]   Advanced mobility safety and technique  []   Reinforced patient's precautions/mobility while maintaining precautions  [x]   Postural awareness  []   Family training  [x]   Progress was updated and reviewed in Rehabtracker with patient and/or family this date.     Treatment Plan for Next Session: Progress standing balance, gait progression including around obstacles, side stepping through small spaces, LE therx      Treatment/Activity Tolerance:   [x] Tolerated treatment with no adverse effects    [x] Patient limited by fatigue  [x] Patient limited by pain   [] Patient limited by medical complications:    [] Adverse reaction to Tx:   [] Significant change in status    Safety:       [x]  bed alarm set    []  chair alarm set    []  Pt refused alarms                []  Telesitter activated      [x]  Gait belt used during tx session      []other:         Number of Minutes/Billable Intervention  Gait Training 70   Therapeutic Exercise 20   Neuro Re-Ed    Therapeutic Activity 40   Wheelchair Propulsion    Group    Other:    TOTAL 130         Social History  Social/Functional History  Lives With: Spouse  Type of Home: House  Home Layout: One level  Home Access: Level entry  Bathroom Shower/Tub: Walk-in shower  Bathroom Toilet: Handicap height(uses the counter, son planning on placing a grabbar)  Bathroom Equipment: Shower chair, Grab bars in shower  Bathroom Accessibility: Walker accessible(\"he leaves it outside the door\")  Home Equipment: Rolling walker, 4 wheeled walker, Wheelchair-manual, Grab bars, Alert Button, Lift chair(RW in house, has been primarily walking household distances)  Receives Help From: Family(SonLinda Spore \"in and out\")  ADL Assistance: Needs assistance(Wife provided set-up assist/supervision throughout showering and dressing)  Homemaking Assistance: Needs assistance(Wife performs all of the cooking and cleaning)  Homemaking Responsibilities: No  Ambulation Assistance: Independent(with a walker)  Transfer Assistance: Independent(with the walker )  Active : No  Patient's  Info: Wife drives   Mode of Transportation: SUV  Occupation: Retired  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 IADL Comments: Wife is present 24 hrs a day or family comes over to assist if she needs to leave,   Additional Comments: Fall on 5/13 during stroke leading to admission, no other falls no injury     Objective                                                                                    Goals:  (Update in navigator)  Short term goals  Time Frame for Short term goals: 10-14 days  Short term goal 1: Pt will perform bed mobility independently  Short term goal 2: Pt will perform OOB transfers with a FWW with SBA  Short term goal 3: Pt will ambulate 50 ft with supervision at a [address] with two turns  - PROGRESSING - Updated 5/23 - Pt will ambulate 150 ft with supervision at the [address]  Short term goal 4: Pt will perform advanced ambulation, including ascending/descending a curb step and navigating uneven surfaces with CGA  - MET - Pt will perform advanced ambulation including ascending a curb step/uneven surfaces with SBA  Short term goal 5: Pt will pick an object off the ground using a reacher with SBA:  Long term goals  Time Frame for Long term goals : LTG  = STG :        Plan of Care                                                                              Times per week: 5 days per week for a minimum of 60 minutes/day plus group as appropriate for 60 minutes.   Treatment to include Current Treatment Recommendations: Strengthening, ROM, Balance Training, Functional Mobility Training, Transfer Training, Cognitive/Perceptual Training, Endurance Training, Gait Training, Stair training, Cognitive Reorientation, Pain Management, Home Exercise Program, Safety Education & Training, Patient/Caregiver Education & Training    Electronically signed by   Cong Rooney PTA #[id number]  5/24/2019, 10:01 AM

## 2019-05-24 NOTE — PROGRESS NOTES
Occupational Therapy   Physical Rehabilitation: OCCUPATIONAL THERAPY     [x] daily progress note       [] discharge       Patient Name:  Rebecca Tse   :  1935 MRN: [id number]  Room:  [address] [place] [address] Date of Admission: 2019  Rehabilitation Diagnosis:   Acute CVA (cerebrovascular accident) [facility]) [I63.9]       Date      2019       Day of ARU Week:  7   Time IN/OUT 3764-6679  [id number]   Individual Tx Minutes 60   Group Tx Minutes 30   Co-Treat Minutes    Concurrent Tx Minutes    TOTAL Tx Time Mins 90   Variance Time -30   Variance Time [x]   Refusal due to:   Back pain, RN aware  []   Medical hold/reason:    []   Illness   []   Off Unit for test/procedure  []   Extra time needed to complete task  []   Therapeutic need  []   Other (specify):   Restrictions Restrictions/Precautions  Restrictions/Precautions: Fall Risk, General Precautions      Communication with other providers: [x]   OK to see per nursing:     []   Spoke with team member regarding:      Subjective observations and cognitive status: Low state of arousal. Rojas Og asleep several times durning tx  Cooperative, v/cs to look into the face of anyone speaking to understand  Stated. \"I can't put my pants on sitting down! \"  \"The washcloth is too wet! \"  Pt left Group early d/t c/o back pain   Pain level/location:   10 /10       Location: back   Discharge recommendations  Anticipated discharge date:    Destination: []home alone   []home alone w assist prn [x] home w/ family    [] Continuous supervision       []SNF            [] Assisted living     [] Other:   Continued therapy: []HHC OT  []OUTPATIENT  OT   [] No Further OT  Equipment needs: TBD - spouse reports grab bar is being installed by toilet   (HIT F2 to transition between stars)    Eating/Feeding:        Extremity Used:      []   R UE       []   L UE         Adaptive Equipment Used:        Grooming:   declined    Bathing: Mod A sinkside c v/cs for thoroughness and completion.   A c feet, buttocks, back of thighs      Dressing:      Upper Body Dressing:  Min A for pullover top. A to orient , A to pulldown in bk  Lower Body Dressing:  Max A,  A to pullover feet, hike, socks      Toileting:   na      Toilet Transfers:   na  Device Used:    []   Standard Toilet         []   [place] Resides           []  Bedside Commode       []   Elevated Toilet          []   Other:        Tub/Shower Transfer:   Na  Declined      Unsafe in present of arousal  Device Used:    []   Shower Bench      []   Shower Chair      []   Tub Transfer Bench           []   Bathtub    []   Shower         []   Other:         Bed Mobility:           []   Pt received out of bed   Rolling R/L:    Scooting:    Supine --> Sit:  Min a , Max encouragement  Sit --> Supine:     Transfers:    Sit--> Stand:  CgA,  V/cs for hand placement  Stand --> Sit:   CGA  Stand-Pivot:     Other:    Assistive device required for transfer:        Functional Mobility:  na  Assistance:    Device:   []   Rolling Walker     []   Standard Walker []   Wheelchair        []   [place]       []   4-Wheeled [place]         []   Cardiac [place] Fruit       []   Other:        Homemaking Tasks:     na    Additional Therapeutic activities/exercises completed this date:     [x]   ADL Training   [x]   Balance/Postural training     [x]   Bed/Transfer Training   [x]   Endurance Training   []   Neuromuscular Re-ed   []   Nu-step:  Time:        Level:         #Steps:       []   Rebounder:    []  Seated     []  Standing        []   Supine Ther Ex (reps/sets):     []   Seated Ther Ex (reps/sets):     []   Standing Ther Ex (reps/sets):     []   Other:      Comments: Total time in group = 60min   Barriers (Home & Community) Group: Completed discussion and practice of barriers encountered in home and community settings including hazards, weather considerations, travel preparations, safe use of assistive device(s), and assistance needs.  Focused on problem-solving a variety of situations related to being at home and in the community; performing physical tasks safely (device safety, maneuvering steps, curbs, tight spaces, obstacles). Focused on endurance monitoring & strategies prn, problem solving, functional mobility, and general social & communication opportunities with other group members. Functional areas targeted:   [x] Communication [] Memory  [x] Coordination    [] Kitchen Safety [x] Problem Solving  [x] Strengthening     [x] Attention  [x] Endurance   [] Mobility    [x] Awareness/Insight [x] Balance  [] Transfers  [x] Social/Pragmatics [] Sequencing  [] Equipment Use    [x] Safety   [] Other (specify)    Participation:  [x] Actively participated c frequent v/cs             Patient/Caregiver Education and Training:   []   [employer] Equipment Use  [x]   Bed Mobility/Transfer Technique/Safety  [x]   Energy Conservation Tips  [x]   Family training  [x]   Postural Awareness  [x]   Safety During Functional Activities  [x]   Reinforced Patient's Precautions   []   Progress was updated and reviewed in Rehabtracker with patient and/or family this         date.      Treatment Plan for Next Session: see poc    Assessment:        Treatment/Activity Tolerance:   [] Tolerated treatment with no adverse effects    [x] Patient limited by fatigue  [x] Patient limited by pain   [x] Patient limited by medical complications:    [] Adverse reaction to Tx:   [] Significant change in status    Safety:       []  bed alarm set    []  chair alarm set    []  Pt refused alarms                []  Telesitter activated      [x]  Gait belt used during tx session      []other:       Number of Minutes/Billable Intervention  Therapeutic Exercise    ADL Self-care 60   Neuro Re-Ed    Therapeutic Activity    Group 30   Other:    TOTAL 90       Social History  Social/Functional History  Lives With: Spouse  Type of Home: House  Home Layout: One level  Home Access: Level entry  Bathroom Shower/Tub: Walk-in shower  Bathroom Toilet: Handicap increased strength/endurance/ax tolerance (c emphasis on dynamic standing balance/tolerance >8 mins and BUE endurance) c SBA. :        Plan of Care                                                                              Times per week: 5 days per week for a minimum of 60 minutes/day plus group as appropriate for 60 minutes.   Treatment to include Plan  Times per day: Daily  Current Treatment Recommendations: Balance Training, Functional Mobility Training, Endurance Training, Pain Management, Safety Education & Training, Patient/Caregiver Education & Training, Equipment Evaluation, Education, & procurement, Positioning, Self-Care / ADL, Cognitive/Perceptual Training    Electronically signed by   Jorge Calixto. maría Justice  5/24/2019, 8:17 AM

## 2019-05-25 PROCEDURE — 94640 AIRWAY INHALATION TREATMENT: CPT

## 2019-05-25 PROCEDURE — 6370000000 HC RX 637 (ALT 250 FOR IP): Performed by: PHYSICAL MEDICINE & REHABILITATION

## 2019-05-25 PROCEDURE — 6360000002 HC RX W HCPCS: Performed by: PHYSICAL MEDICINE & REHABILITATION

## 2019-05-25 PROCEDURE — 6370000000 HC RX 637 (ALT 250 FOR IP): Performed by: INTERNAL MEDICINE

## 2019-05-25 PROCEDURE — 1280000000 HC REHAB R&B

## 2019-05-25 RX ADMIN — ACETAMINOPHEN 650 MG: 325 TABLET ORAL at 08:54

## 2019-05-25 RX ADMIN — OXYBUTYNIN CHLORIDE 5 MG: 5 TABLET ORAL at 20:49

## 2019-05-25 RX ADMIN — Medication 2 PUFF: at 08:05

## 2019-05-25 RX ADMIN — FOLIC ACID 1 MG: 1 TABLET ORAL at 08:26

## 2019-05-25 RX ADMIN — ASPIRIN 81 MG: 81 TABLET, COATED ORAL at 08:26

## 2019-05-25 RX ADMIN — DONEPEZIL HYDROCHLORIDE 10 MG: 10 TABLET, FILM COATED ORAL at 20:49

## 2019-05-25 RX ADMIN — OXYBUTYNIN CHLORIDE 5 MG: 5 TABLET ORAL at 08:26

## 2019-05-25 RX ADMIN — ATORVASTATIN CALCIUM 40 MG: 40 TABLET, FILM COATED ORAL at 20:48

## 2019-05-25 RX ADMIN — QUETIAPINE FUMARATE 12.5 MG: 25 TABLET ORAL at 20:49

## 2019-05-25 RX ADMIN — CLOPIDOGREL BISULFATE 75 MG: 75 TABLET ORAL at 08:26

## 2019-05-25 RX ADMIN — Medication 2 PUFF: at 19:50

## 2019-05-25 RX ADMIN — ENOXAPARIN SODIUM 30 MG: 30 INJECTION SUBCUTANEOUS at 08:26

## 2019-05-25 RX ADMIN — METOPROLOL SUCCINATE 12.5 MG: 25 TABLET, EXTENDED RELEASE ORAL at 08:27

## 2019-05-25 RX ADMIN — CITALOPRAM HYDROBROMIDE 20 MG: 20 TABLET ORAL at 08:26

## 2019-05-25 ASSESSMENT — PAIN SCALES - GENERAL
PAINLEVEL_OUTOF10: 8
PAINLEVEL_OUTOF10: 3
PAINLEVEL_OUTOF10: 8
PAINLEVEL_OUTOF10: 0

## 2019-05-26 PROCEDURE — 1280000000 HC REHAB R&B

## 2019-05-26 PROCEDURE — 97112 NEUROMUSCULAR REEDUCATION: CPT

## 2019-05-26 PROCEDURE — 94761 N-INVAS EAR/PLS OXIMETRY MLT: CPT

## 2019-05-26 PROCEDURE — 2700000000 HC OXYGEN THERAPY PER DAY

## 2019-05-26 PROCEDURE — 6360000002 HC RX W HCPCS: Performed by: PHYSICAL MEDICINE & REHABILITATION

## 2019-05-26 PROCEDURE — 97530 THERAPEUTIC ACTIVITIES: CPT

## 2019-05-26 PROCEDURE — 97110 THERAPEUTIC EXERCISES: CPT

## 2019-05-26 PROCEDURE — 97150 GROUP THERAPEUTIC PROCEDURES: CPT

## 2019-05-26 PROCEDURE — 94150 VITAL CAPACITY TEST: CPT

## 2019-05-26 PROCEDURE — 6370000000 HC RX 637 (ALT 250 FOR IP): Performed by: PHYSICAL MEDICINE & REHABILITATION

## 2019-05-26 PROCEDURE — 94640 AIRWAY INHALATION TREATMENT: CPT

## 2019-05-26 PROCEDURE — 6370000000 HC RX 637 (ALT 250 FOR IP): Performed by: INTERNAL MEDICINE

## 2019-05-26 PROCEDURE — 97116 GAIT TRAINING THERAPY: CPT

## 2019-05-26 RX ADMIN — ONDANSETRON 4 MG: 4 TABLET, ORALLY DISINTEGRATING ORAL at 09:19

## 2019-05-26 RX ADMIN — ACETAMINOPHEN 650 MG: 325 TABLET ORAL at 13:31

## 2019-05-26 RX ADMIN — PANTOPRAZOLE SODIUM 40 MG: 40 TABLET, DELAYED RELEASE ORAL at 05:59

## 2019-05-26 RX ADMIN — METOPROLOL SUCCINATE 12.5 MG: 25 TABLET, EXTENDED RELEASE ORAL at 09:19

## 2019-05-26 RX ADMIN — ASPIRIN 81 MG: 81 TABLET, COATED ORAL at 09:19

## 2019-05-26 RX ADMIN — DONEPEZIL HYDROCHLORIDE 10 MG: 10 TABLET, FILM COATED ORAL at 20:00

## 2019-05-26 RX ADMIN — OXYBUTYNIN CHLORIDE 5 MG: 5 TABLET ORAL at 09:19

## 2019-05-26 RX ADMIN — Medication 2 PUFF: at 08:16

## 2019-05-26 RX ADMIN — FOLIC ACID 1 MG: 1 TABLET ORAL at 09:19

## 2019-05-26 RX ADMIN — CITALOPRAM HYDROBROMIDE 20 MG: 20 TABLET ORAL at 09:19

## 2019-05-26 RX ADMIN — ACETAMINOPHEN 650 MG: 325 TABLET ORAL at 06:02

## 2019-05-26 RX ADMIN — ENOXAPARIN SODIUM 30 MG: 30 INJECTION SUBCUTANEOUS at 09:19

## 2019-05-26 RX ADMIN — CLOPIDOGREL BISULFATE 75 MG: 75 TABLET ORAL at 09:19

## 2019-05-26 RX ADMIN — OXYBUTYNIN CHLORIDE 5 MG: 5 TABLET ORAL at 20:00

## 2019-05-26 RX ADMIN — ACETAMINOPHEN 650 MG: 325 TABLET ORAL at 20:00

## 2019-05-26 RX ADMIN — ATORVASTATIN CALCIUM 40 MG: 40 TABLET, FILM COATED ORAL at 20:00

## 2019-05-26 RX ADMIN — Medication 2 PUFF: at 19:58

## 2019-05-26 ASSESSMENT — PAIN DESCRIPTION - LOCATION
LOCATION: HEAD
LOCATION: HEAD

## 2019-05-26 ASSESSMENT — PAIN SCALES - GENERAL
PAINLEVEL_OUTOF10: 7
PAINLEVEL_OUTOF10: 8
PAINLEVEL_OUTOF10: 0
PAINLEVEL_OUTOF10: 8

## 2019-05-26 NOTE — PROGRESS NOTES
Physical Rehabilitation: PHYSICAL THERAPY     [x] daily progress note       [] discharge       Patient Name:  Chepe Gonzalez   :  1935 MRN: [id number]  Room:  [address], [place] [address] Date of Admission: 2019    Rehabilitation Diagnosis:     Acute CVA (cerebrovascular accident) [facility]) [I63.9]    Date  2019   TIMES IN/OUT   1100/1200   Group Tx Minutes 60 minutes   TOTAL Tx time seen 60 minutes   Variance Time    Variance Reason    [] Refusal due to   [] Medical hold/reason  [] Illness   [] Off Unit for test/procedure  [] Extra time needed to complete task  [] Other (specify)   Restrictions/Precautions Restrictions/Precautions  Restrictions/Precautions: Fall Risk, General Precautions      Current Diet/Swallowing Issues DIET CARDIAC; Communication with other providers: [x] Ok to see per nursing at morning huddle  [] Medical hold and reason  [] Spoke with (team    member) regarding   Subjective observations: Pt agreeable to group session with max encouragement. Pain level/location: Pt reports severe B hip pain (not rated on 10-point scale) prior to activity. Alarm placed/where? Fall Risk  [] Pt taken to DR for lunch with nsg present   [x] Pt taken back to room with chair/bed alarm in place         Group Activity:     Total time in group = 60 minutes  General Exercise and Balance Group: Participation in exercise with discussion of benefits and precautions during exercise was targeted during this group. Exercises include UE & LE strengthening, endurance, cardio, and flexibility. This provides the opportunity for pt & group members to have fun, build camaraderie, increase endurance, improve breathing techniques, balance, and strength. Also focused on warm-up, warm-down, endurance monitoring & strategies, problem solving, functional mobility, safety, and general social & communication opportunities with other group members.       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 IADL Comments: Wife is present 24 hrs a day or family comes over to assist if she needs to leave,   Additional Comments: Fall on 5/13 during stroke leading to admission, no other falls no injury     Objective                                                                                    Goals:  (Update in navigator)  Short term goals  Time Frame for Short term goals: 10-14 days  Short term goal 1: Pt will perform bed mobility independently  Short term goal 2: Pt will perform OOB transfers with a FWW with SBA  Short term goal 3: Pt will ambulate 50 ft with supervision at a [address] with two turns  - PROGRESSING - Updated 5/23 - Pt will ambulate 150 ft with supervision at the [address]  Short term goal 4: Pt will perform advanced ambulation, including ascending/descending a curb step and navigating uneven surfaces with CGA  - MET - Pt will perform advanced ambulation including ascending a curb step/uneven surfaces with SBA  Short term goal 5: Pt will pick an object off the ground using a reacher with SBA:  Long term goals  Time Frame for Long term goals : LTG  = STG :        Plan of Care                                                                              Times per week: Pt to be seen at least  5x per week for a minimum of 60 minutes as                               appropriate.   Treatment to include Current Treatment Recommendations: Strengthening, ROM, Balance Training, Functional

## 2019-05-26 NOTE — PROGRESS NOTES
Occupational Therapy  Physical Rehabilitation: OCCUPATIONAL THERAPY     [x] daily progress note       [] discharge       Patient Name:  Vu Salomon   :  1935 MRN: [id number]  Room:  [address] [place] [address] Date of Admission: 2019  Rehabilitation Diagnosis:   Acute CVA (cerebrovascular accident) [facility]) [I63.9]       Date 2019       Day of ARU Week:  2   Time IN/OUT 2:00 - 3:00    Individual Tx Minutes 60   Group Tx Minutes    Co-Treat Minutes    Concurrent Tx Minutes    TOTAL Tx Time Mins 60   Variance Time    Variance Time []   Refusal due to:     []   Medical hold/reason:    []   Illness   []   Off Unit for test/procedure  []   Extra time needed to complete task  []   Therapeutic need  []   Other (specify):   Restrictions Restrictions/Precautions: Fall Risk, General Precautions         Communication with other providers: [x]   OK to see per nursing:     [x]   Spoke with team member regarding:  Pt's increased pain and refusals throughout session. Subjective observations and cognitive status: Pt in chair upon OT arrival and refusing treatment session. With education and encouragement from wife and therapist, he was agreeable to session, but continued to refuse standing activities. Throughout session, pt required multiple extended rest breaks due to fatigue. Wife shared that pt sat in his chair for an extended time. Pain level/location:   7 /10       Location: Hips   Nursing provided medication prior to OT arrival. Heating pad applied to lower back at end of session.     Discharge recommendations  Anticipated discharge date:  2019  Destination: []home alone   []home alone w assist prn   [x] home w/ family    [] Continuous supervision       []SNF    [] Assisted living     [] Other:   Continued therapy: [x]HHC OT  []OUTPATIENT  OT   [] No Further OT  Equipment needs: TBD      Bed Mobility:           [x]   Pt received out of bed   Rolling R/L:  Min A with moderate verbal cues  Scooting: SBA with additional and/or family this date. Treatment Plan for Next Session: Standing tolerance, Endurance      Assessment: Throughout session pt was limited by his motivation, pain, and fatigue. Treatment/Activity Tolerance:   [] Tolerated treatment with no adverse effects    [x] Patient limited by fatigue  [x] Patient limited by pain   [] Patient limited by medical complications:    [] Adverse reaction to Tx:   [] Significant change in status    Safety:       []  bed alarm set    []  chair alarm set    []  Pt refused alarms                []  Telesitter activated      [x]  Gait belt used during tx session      []other:       Number of Minutes/Billable Intervention  Therapeutic Exercise 30 Minutes   ADL Self-care    Neuro Re-Ed    Therapeutic Activity 30 Minutes   Group    Other:    TOTAL 60 Minutes       Social History  Social/Functional History  Lives With: Spouse  Type of Home: House  Home Layout: One level  Home Access: Level entry  Bathroom Shower/Tub: Walk-in shower  Bathroom Toilet: Handicap height(uses the counter, son planning on placing a grabbar)  Bathroom Equipment: Shower chair, Grab bars in shower  Bathroom Accessibility: Walker accessible(\"he leaves it outside the door\")  Home Equipment: Rolling walker, 4 wheeled walker, Wheelchair-manual, Grab bars, Alert Button, Lift chair(RW in house, has been primarily walking household distances)  Receives Help From: Family(Son, Chantal Molina \"in and out\")  ADL Assistance: Needs assistance(Wife provided set-up assist/supervision throughout showering and dressing)  Homemaking Assistance: Needs assistance(Wife performs all of the cooking and cleaning)  Homemaking Responsibilities: No  Ambulation Assistance: Independent(with a walker)  Transfer Assistance: Independent(with the walker )  Active : No  Patient's  Info:  Wife drives   Mode of Transportation: [facility]  Occupation: Retired  Type of occupation: Worked in [address] [place] [address] Street: Pt will go out to

## 2019-05-26 NOTE — PROGRESS NOTES
Physical Therapy  [x] daily progress note       [] discharge       Patient Name:  Andreina Meza   :  1935 MRN: [id number]  Room:  [address] [place], [place] [address] Date of Admission: 2019  Rehabilitation Diagnosis:   Acute CVA (cerebrovascular accident) [facility]) [I63.9]       Date 2019       Day of ARU Week:  2   Time IN/-936   Individual Tx Minutes 60   Group Tx Minutes    Co-Treat Minutes    Concurrent Tx Minutes    TOTAL Tx Time Mins 60   Variance Time    Variance Time []   Refusal due to:     []   Medical hold/reason:    []   Illness   []   Off Unit for test/procedure  []   Extra time needed to complete task  []   Therapeutic need  []   Other (specify):   Restrictions Restrictions/Precautions  Restrictions/Precautions: Fall Risk, General Precautions      Communication with other providers: [x]   OK to see per nursing:     []   Spoke with team member regarding:      Subjective observations and cognitive status: Pt supine in bed with wife present. Pt alert to birthday, name not to place.  Pt reports of feeling nauseous after breakfast      Pain level/location:    9/10       Location: LB and stomach   Discharge recommendations  Anticipated discharge date:  TBD  Destination: []home alone   []home alone with assist PRN     [] home w/ family      [] Continuous supervision  []SNF    [] Assisted living     [] Other:   Continued therapy: []HHC PT  []OUTPATIENT  PT   [] No Further PT  Equipment needs: TBD     Bed Mobility:           []   Pt received out of bed   Rolling R/L:  Min A  Scooting:  CGA  Supine --> Sit:  Mod A      Transfers:    Sit--> Stand:  SBA with increase time  Stand --> Sit:   CGA  Stand-Pivot:   Min A with 50% cues for correct hand placement  Assistive device required for transfer:   FWW    Gait:    Distance:  22 and 15 feet and 42 ft  Assistance:  SBA  Device:  FWW  Gait Quality:  Pt demo increase tremors in BLE with decrease step length and height and required 25% cues to keep body inside walker          Additional Therapeutic activities/exercises completed this date:     [x]   Nu-step:  Time:  7:20      Level:  LV2       #Steps:  17-24 steps per min  ( required padding under L foot for comfort)   [x]   Rebounder:    []  Seated     [x]  Standing  2# ball       []   Balance training         []   Postural training    []   Supine ther ex (reps/sets):     []   Seated ther ex (reps/sets):     []   Standing ther ex (reps/sets):     []   Picked up object from floor     Assist Level:                     []   Reacher used   []   Other:   []   Other:   []   Other:    Comments:      Patient/Caregiver Education and Training:   [x]   Bed Mobility/Transfer technique/safety  [x]   Gait technique/sequencing  [x]   Proper use of assistive device  []   Advanced mobility safety and technique  []   Reinforced patient's precautions/mobility while maintaining precautions  []   Postural awareness  []   Family training  []   Progress was updated and reviewed in Rehabtracker with patient and/or family this         date. Treatment Plan for Next Session: Continue with increase activity tolerance      Assessment:  Pt required increased rest breaks this date and c/o SOB with activity with 02-95% on L2 with HR 70.  Pt required 75% tactile cues to stay alert during task mgt    Treatment/Activity Tolerance:   [] Tolerated treatment with no adverse effects    [x] Patient limited by fatigue  [x] Patient limited by pain   [] Patient limited by medical complications:    [] Adverse reaction to Tx:   [] Significant change in status    Safety:       []  bed alarm set    [x]  chair alarm set    []  Pt refused alarms                []  Telesitter activated      [x]  Gait belt used during tx session      []other:         Number of Minutes/Billable Intervention  Gait Training 15   Therapeutic Exercise 15   Neuro Re-Ed 15   Therapeutic Activity 15   Wheelchair Propulsion    Group    Other:    TOTAL 60         Social History  Social/Functional step and navigating uneven surfaces with CGA  - MET - Pt will perform advanced ambulation including ascending a curb step/uneven surfaces with SBA  Short term goal 5: Pt will pick an object off the ground using a reacher with SBA:  Long term goals  Time Frame for Long term goals : LTG  = STG :        Plan of Care                                                                              Times per week: 5 days per week for a minimum of 60 minutes/day plus group as appropriate for 60 minutes.   Treatment to include Current Treatment Recommendations: Strengthening, ROM, Balance Training, Functional Mobility Training, Transfer Training, Cognitive/Perceptual Training, Endurance Training, Gait Training, Stair training, Cognitive Reorientation, Pain Management, Home Exercise Program, Safety Education & Training, Patient/Caregiver Education & Training    Electronically signed by   Danilo Alejandre, PTA [id number]  5/26/2019, 8:35 AM

## 2019-05-27 PROCEDURE — 94761 N-INVAS EAR/PLS OXIMETRY MLT: CPT

## 2019-05-27 PROCEDURE — 2700000000 HC OXYGEN THERAPY PER DAY

## 2019-05-27 PROCEDURE — 6370000000 HC RX 637 (ALT 250 FOR IP): Performed by: PHYSICAL MEDICINE & REHABILITATION

## 2019-05-27 PROCEDURE — 94640 AIRWAY INHALATION TREATMENT: CPT

## 2019-05-27 PROCEDURE — 1280000000 HC REHAB R&B

## 2019-05-27 PROCEDURE — 99232 SBSQ HOSP IP/OBS MODERATE 35: CPT | Performed by: PHYSICAL MEDICINE & REHABILITATION

## 2019-05-27 PROCEDURE — 6370000000 HC RX 637 (ALT 250 FOR IP): Performed by: INTERNAL MEDICINE

## 2019-05-27 PROCEDURE — 94150 VITAL CAPACITY TEST: CPT

## 2019-05-27 PROCEDURE — 6360000002 HC RX W HCPCS: Performed by: PHYSICAL MEDICINE & REHABILITATION

## 2019-05-27 RX ADMIN — ACETAMINOPHEN 650 MG: 325 TABLET ORAL at 10:07

## 2019-05-27 RX ADMIN — ATORVASTATIN CALCIUM 40 MG: 40 TABLET, FILM COATED ORAL at 20:27

## 2019-05-27 RX ADMIN — ASPIRIN 81 MG: 81 TABLET, COATED ORAL at 10:06

## 2019-05-27 RX ADMIN — CLOPIDOGREL BISULFATE 75 MG: 75 TABLET ORAL at 10:06

## 2019-05-27 RX ADMIN — FOLIC ACID 1 MG: 1 TABLET ORAL at 10:06

## 2019-05-27 RX ADMIN — LORAZEPAM 0.25 MG: 0.5 TABLET ORAL at 22:39

## 2019-05-27 RX ADMIN — DONEPEZIL HYDROCHLORIDE 10 MG: 10 TABLET, FILM COATED ORAL at 20:27

## 2019-05-27 RX ADMIN — CITALOPRAM HYDROBROMIDE 20 MG: 20 TABLET ORAL at 10:06

## 2019-05-27 RX ADMIN — METOPROLOL SUCCINATE 12.5 MG: 25 TABLET, EXTENDED RELEASE ORAL at 10:06

## 2019-05-27 RX ADMIN — Medication 2 PUFF: at 21:58

## 2019-05-27 RX ADMIN — ENOXAPARIN SODIUM 30 MG: 30 INJECTION SUBCUTANEOUS at 10:06

## 2019-05-27 RX ADMIN — Medication 2 PUFF: at 08:40

## 2019-05-27 RX ADMIN — OXYBUTYNIN CHLORIDE 5 MG: 5 TABLET ORAL at 10:07

## 2019-05-27 RX ADMIN — ACETAMINOPHEN 650 MG: 325 TABLET ORAL at 22:09

## 2019-05-27 RX ADMIN — PANTOPRAZOLE SODIUM 40 MG: 40 TABLET, DELAYED RELEASE ORAL at 06:13

## 2019-05-27 RX ADMIN — OXYBUTYNIN CHLORIDE 5 MG: 5 TABLET ORAL at 20:27

## 2019-05-27 ASSESSMENT — PAIN SCALES - GENERAL
PAINLEVEL_OUTOF10: 8
PAINLEVEL_OUTOF10: 8

## 2019-05-27 NOTE — PROGRESS NOTES
Records reviewed. Pt examined on the ARU this AM.     Principal Problem:    Acute CVA (cerebrovascular accident) ([facility] [address]  Active Problems:    Hypertension    COPD (chronic obstructive pulmonary disease) ([facility])    Stage 3 chronic kidney disease (HCC)    Right hemiparesis ([facility] [address]  Resolved Problems:    * No resolved hospital problems. *    Blood pressure 122/65, pulse 77, temperature 97.7 °F (36.5 °C), temperature source Oral, resp. rate 18, height 5' 8\" (1.727 m), weight 228 lb (103.4 kg), SpO2 95 %. Function:  Bathing: Mod A for physical assist + vc's for trunk extension while pt washed isabel area and made meager attempts to wash R side and center of buttocks, required touching assist to complete. Pt washed UB, washed BLE c use of LH sponge c min vc's.        Dressing:      Upper Body Dressing:  Sup seated to cues + min increased time to doff / don pullover shirt. Lower Body Dressing:  Max A c this therapist providing max cues and touching assist c reacher to doff B  socks and thread BLE through pull up and pants. Pt performed clothing hikes from mid calf to mid way over buttocks for pants, able to complete hike of pull up.         Toileting:   Due to urgency, pt required Max A for assistance to hold urinal and perform clothing mgmt up, was able to perform clothing mgmt down c Mod A. Transfers:    Sit--> Stand:  CGA to Min A  Stand --> Sit:   Min A   Stand-Pivot:   Min A   Other:  Min A toilet transfer; Pt able to perform 3/3 tasks with CGA in standing   Assistive device required for transfer:   RW     Gait:    Distance:  7+42+29+19 feet  Assistance:  CGA to Min A   Device:  RW  Gait Quality:  FWD bent posture, tremoring in BUE & BLE with fatigue, increased FWD bent posture     Stairs   # Completed:   3  Assistance:    CGA  Supportive Device:  B handrails  Height:   4\"         Objective:  General Appearance:  Comfortable.     Vital signs: (most recent): Blood pressure 122/65, pulse 77, temperature 97.7 °F (36.5 °C), temperature source Oral, resp. rate 18, height 5' 8\" (1.727 m), weight 228 lb (103.4 kg), SpO2 95 %. Vital signs are normal.    Lungs:  Normal respiratory rate. Breath sounds clear to auscultation. Heart: Normal rate. Regular rhythm. Abdomen: Bowel sounds are normal.   There is no abdominal tenderness. Neurological: Patient is alert and oriented to person, place and time. Flat affect. Slow to respond at times. Speech is fluent. moving all 4 limbs. Motor strength 5 over 5 in both upper and lower limbs. Assessment & Plan  An 27-year-old male with a history of right cerebrovascular  accident six years ago with chronic left hemiparesis. 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 He has chronic kidney disease, hypertension, and COPD.     Strengths of the patient include his supportive spouse, accessible home,  and experience with adaptive equipment. Limitations include the  bilaterality of his weakness. his risk for falling, and his kidney  disease.     RECOMMENDATIONS:  Acute inpatient rehabilitation with occupational and  physical therapy and speech-language pathology, 180 minutes, five out of  every seven days. We will address basic and advancing mobility with  self-care instruction and adaptive equipment training. Caregiver  education will be offered. Expected length of stay prior to a  supervised level of function for discharge to home is 16 to 18 days.     ADDITIONAL RECOMMENDATIONS:  1. Ischemic stroke with infarction:  The patient requires daily  occupational and physical therapy with speech-language pathology. He  requires antiplatelet therapy with Plavix and baby aspirin. He is  getting GI prophylaxis. Blood pressure and kidney disease treatment is  required. We will address appropriate nutrition and hydration orally. Pulmonary hygiene and treatment of his COPD are important.   We will  emphasize pulmonary hygiene and DVT

## 2019-05-27 NOTE — PLAN OF CARE
Problem: Falls - Risk of:  Goal: Will remain free from falls  Description  Will remain free from falls  Outcome: Ongoing  Goal: Absence of physical injury  Description  Absence of physical injury  Outcome: Ongoing     Problem: Risk for Impaired Skin Integrity  Goal: Tissue integrity - skin and mucous membranes  Description  Structural intactness and normal physiological function of skin and  mucous membranes. Outcome: Ongoing     Problem: Pain:  Description  Pain management should include both nonpharmacologic and pharmacologic interventions.   Goal: Pain level will decrease  Description  Pain level will decrease  Outcome: Ongoing  Goal: Control of acute pain  Description  Control of acute pain  Outcome: Ongoing  Goal: Control of chronic pain  Description  Control of chronic pain  Outcome: Ongoing

## 2019-05-28 PROCEDURE — 99233 SBSQ HOSP IP/OBS HIGH 50: CPT | Performed by: PHYSICAL MEDICINE & REHABILITATION

## 2019-05-28 PROCEDURE — 1280000000 HC REHAB R&B

## 2019-05-28 PROCEDURE — 6360000002 HC RX W HCPCS: Performed by: PHYSICAL MEDICINE & REHABILITATION

## 2019-05-28 PROCEDURE — 6370000000 HC RX 637 (ALT 250 FOR IP): Performed by: PHYSICAL MEDICINE & REHABILITATION

## 2019-05-28 PROCEDURE — 97110 THERAPEUTIC EXERCISES: CPT

## 2019-05-28 PROCEDURE — 97530 THERAPEUTIC ACTIVITIES: CPT

## 2019-05-28 PROCEDURE — 94200 LUNG FUNCTION TEST (MBC/MVV): CPT

## 2019-05-28 PROCEDURE — 97116 GAIT TRAINING THERAPY: CPT

## 2019-05-28 PROCEDURE — 97150 GROUP THERAPEUTIC PROCEDURES: CPT

## 2019-05-28 PROCEDURE — 94640 AIRWAY INHALATION TREATMENT: CPT

## 2019-05-28 PROCEDURE — 6370000000 HC RX 637 (ALT 250 FOR IP): Performed by: INTERNAL MEDICINE

## 2019-05-28 RX ORDER — TRAMADOL HYDROCHLORIDE 50 MG/1
50 TABLET ORAL EVERY 6 HOURS PRN
Status: DISCONTINUED | OUTPATIENT
Start: 2019-05-28 | End: 2019-06-01 | Stop reason: HOSPADM

## 2019-05-28 RX ADMIN — METOPROLOL SUCCINATE 12.5 MG: 25 TABLET, EXTENDED RELEASE ORAL at 09:12

## 2019-05-28 RX ADMIN — ATORVASTATIN CALCIUM 40 MG: 40 TABLET, FILM COATED ORAL at 20:23

## 2019-05-28 RX ADMIN — PANTOPRAZOLE SODIUM 40 MG: 40 TABLET, DELAYED RELEASE ORAL at 06:14

## 2019-05-28 RX ADMIN — CITALOPRAM HYDROBROMIDE 20 MG: 20 TABLET ORAL at 09:12

## 2019-05-28 RX ADMIN — ACETAMINOPHEN 650 MG: 325 TABLET ORAL at 09:13

## 2019-05-28 RX ADMIN — ASPIRIN 81 MG: 81 TABLET, COATED ORAL at 09:12

## 2019-05-28 RX ADMIN — ONDANSETRON 4 MG: 4 TABLET, ORALLY DISINTEGRATING ORAL at 10:40

## 2019-05-28 RX ADMIN — ACETAMINOPHEN 650 MG: 325 TABLET ORAL at 13:59

## 2019-05-28 RX ADMIN — DONEPEZIL HYDROCHLORIDE 10 MG: 10 TABLET, FILM COATED ORAL at 20:24

## 2019-05-28 RX ADMIN — CLOPIDOGREL BISULFATE 75 MG: 75 TABLET ORAL at 09:12

## 2019-05-28 RX ADMIN — OXYBUTYNIN CHLORIDE 5 MG: 5 TABLET ORAL at 09:12

## 2019-05-28 RX ADMIN — QUETIAPINE FUMARATE 12.5 MG: 25 TABLET ORAL at 20:23

## 2019-05-28 RX ADMIN — TRAMADOL HYDROCHLORIDE 50 MG: 50 TABLET, FILM COATED ORAL at 20:23

## 2019-05-28 RX ADMIN — ENOXAPARIN SODIUM 30 MG: 30 INJECTION SUBCUTANEOUS at 09:13

## 2019-05-28 RX ADMIN — Medication 2 PUFF: at 19:18

## 2019-05-28 RX ADMIN — OXYBUTYNIN CHLORIDE 5 MG: 5 TABLET ORAL at 20:23

## 2019-05-28 RX ADMIN — FOLIC ACID 1 MG: 1 TABLET ORAL at 09:13

## 2019-05-28 RX ADMIN — Medication 2 PUFF: at 09:00

## 2019-05-28 ASSESSMENT — PAIN SCALES - GENERAL
PAINLEVEL_OUTOF10: 3
PAINLEVEL_OUTOF10: 8
PAINLEVEL_OUTOF10: 9

## 2019-05-28 NOTE — PROGRESS NOTES
Physical Therapy  [x] daily progress note       [] discharge       Patient Name:  Jonny Leiva   :  1935 MRN: [id number]  Room:  [address] [place], [place] [address] Date of Admission: 2019  Rehabilitation Diagnosis:   Acute CVA (cerebrovascular accident) ([facility] [address] [I63.9]       Date 2019       Day of ARU Week:  4   Time IN/OUT 4375-7421  8151-5449  3294-3836   Individual Tx Minutes 60   Group Tx Minutes 50   TOTAL Tx Time Mins    Variance Time    Variance Time []   Refusal due to:     []   Medical hold/reason:    []   Illness   []   Off Unit for test/procedure  []   Extra time needed to complete task  []   Therapeutic need  []   Other (specify):   Restrictions Restrictions/Precautions  Restrictions/Precautions: Fall Risk, General Precautions      Communication with other providers: [x]   OK to see per nursing:     []   Spoke with team member regarding:      Subjective observations and cognitive status: Pt sitting up in WC, agreeable to session. Pt slightly more talkative and alert today, initiating some conversation however decreased once out of room. At end of session, discussed with spouse pt's current status, BERNARD required and safety concerns. Reports having shaking at home and needing safety cues with AD PTA. Appeared to have good understanding of pt's deficits. Pain level/location: Denies pain initially, then c/o pain in LB and neck with ax. Tylenol received.  Biofreeze applied with pt reporting some relief   Discharge recommendations  Anticipated discharge date: TBD  Destination: []home alone   []home with assist PRN     [] home with continuous supervision     []SNF      [x] Assisted living     [] Other:   Continued therapy: []C PT  []OUTPATIENT  PT   [] No Further PT  Equipment needs: TBD, pt has RW and 4WW     Bed Mobility:           [x]   Pt received out of bed      Transfers:    Sit--> Stand:  SBA with effort and extra time before successful, cues for COG fwd, max cues to push up from chair  Stand --> Sit: SBA, max cues to reach back  Stand-Pivot:   CGA  Assistive device required for transfer:   RW    Gait:    Distance:  70'+62'+55'   Assistance:  CGA 85%, Min A 15%  Device:  RW  Gait Quality:  Decreased step length bilaterally, slow gait speed, shaking in extremities and trunk, Cues required to continue to pre-measured goal due to c/o pain. Pt with tendency to increase speed with urgency to sit, becoming more unsteady. Required safety cues to stop and regain balance before continuing to amb, then pt with increased steadiness. Additional Therapeutic activities/exercises completed this date:     []   Nu-step:  Time:        Level:         #Steps:       []   Rebounder:    []  Seated     []  Standing        []   Balance training         []   Postural training    [x]   Supine ther ex (reps/sets): B LE's AA-AROM x 10-15 reps, used plastic bag to reduce friction, encouragement to progress with reps   [x]   Seated ther ex (reps/sets): B LEs AROM min visual and vcs for technique, rest breaks due to fatigue. []   Standing ther ex (reps/sets):     []   Picked up object from floor                       []   Reacher used   [x]   Other: Practiced multiple chair <>chair transfers amb ~ 15' to each chair with CGA, max safety cues without carryover for AD safety with placement when turning to back up to chair and hand placement. []   Other:   []   Other:     Group Activity:   Barrier BINGO Group: Discussion of barriers encountered in home and community including hazards, weather considerations, obstacles, safe use of assistive device(s), and assistance needs. Patient also performed exercises seated and/or standing to promote circulation, flexibility, endurance, and strength. BINGO cards and chips are distributed to each patient, and \"barriers\" are called like BINGO numbers. This provides the opportunity for pt & group members to identify and problem-solve a variety of situations related to being at home and in the community while patients learn and have fun playing [employer] [address]. Focus on identifying and solving problems, and general social & communication opportunities with other group members. Functional areas targeted:   [x] Communication [x] Memory  [x] Coordination    [] Kitchen Safety [x] Problem Solving  [x] Strengthening     [x] Attention  [x] Endurance   [x] Mobility    [x] Awareness/Insight [x] Balance  [x] Transfers  [x] Social/Pragmatics [] Sequencing  [] Equipment Use    [x] Safety   [] Other (specify)     Participation:  [] Actively participated    [x] Required cues for participation due to c/o LBP. Unable to complete group session due to pain, pt yelling out to go back to bed. Multiple attempts to re-position and use heat for comfort. [] Other (specify)    Education completed: Barriers in home and community. Cognitive fx during this group: Problem solve safe solutions towards barriers. Family present: No      Patient/Caregiver Education and Training:   [x]   Bed Mobility/Transfer technique/safety  [x]   Gait technique/sequencing  [x]   Proper use of assistive device  []   Advanced mobility safety and technique  []   Reinforced patient's precautions/mobility while maintaining precautions  []   Postural awareness   []   Family training  [x]   Progress was updated and reviewed in Rehabtracker with patient and/or family this date. Treatment Plan for Next Session: Gait progression, transfers, standing balance, LE therx, community barriers    Assessment:   Pt continues to demonstrated need for CG-Min A for balance due to tendency to hurry to sit, unsafe behaviors jaclyn with increased pain. Pt with decreased memory, decreased safety awareness, and decreased problem solving increasing risk for falls. Discussed with spouse regarding current BERNARD. Voices some concerns due to her knee pain. Will discuss with PT and CM.      Treatment/Activity Tolerance:   [] Tolerated treatment with no adverse effects    [] Patient limited by fatigue  [x] Patient limited by pain   [] Patient limited by medical complications:    [] Adverse reaction to Tx:   [] Significant change in status    Safety:       [x]  bed alarm set    [x]  chair alarm set    []  Pt refused alarms                []  Telesitter activated      [x]  Gait belt used during tx session      []other:         Number of Minutes/Billable Intervention  Gait Training 30   Therapeutic Exercise 30   Neuro Re-Ed    Therapeutic Activity 10   Wheelchair Propulsion    Group 50   Other:    TOTAL 120         Social History  Social/Functional History  Lives With: Spouse  Type of Home: House  Home Layout: One level  Home Access: Level entry  Bathroom Shower/Tub: Walk-in shower  Bathroom Toilet: Handicap height(uses the counter, son planning on placing a grabbar)  Bathroom Equipment: Shower chair, Grab bars in shower  Bathroom Accessibility: Walker accessible(\"he leaves it outside the door\")  Home Equipment: Rolling walker, 4 wheeled walker, Wheelchair-manual, Grab bars, Alert Button, Lift chair(RW in house, has been primarily walking household distances)  Receives Help From: Family(Son, Mesha George \"in and out\")  ADL Assistance: Needs assistance(Wife provided set-up assist/supervision throughout showering and dressing)  Homemaking Assistance: Needs assistance(Wife performs all of the cooking and cleaning)  Homemaking Responsibilities: No  Ambulation Assistance: Independent(with a walker)  Transfer Assistance: Independent(with the walker )  Active : No  Patient's  Info: Wife drives   Mode of Transportation: [facility]  Occupation: Retired  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   IADL Comments: Wife is present 24 hrs a day or family comes over to assist if she needs to leave,   Additional Comments: Fall on 5/13 during stroke leading to admission, no other falls no injury     Objective Goals:  (Update in navigator)  Short term goals  Time Frame for Short term goals: 10-14 days  Short term goal 1: Pt will perform bed mobility independently  Short term goal 2: Pt will perform OOB transfers with a FWW with SBA  Short term goal 3: Pt will ambulate 50 ft with supervision at a [address] with two turns  - PROGRESSING - Updated 5/23 - Pt will ambulate 150 ft with supervision at the [address] [facility] term goal 4: Pt will perform advanced ambulation, including ascending/descending a curb step and navigating uneven surfaces with CGA  - MET - Pt will perform advanced ambulation including ascending a curb step/uneven surfaces with SBA  Short term goal 5: Pt will pick an object off the ground using a reacher with SBA:  Long term goals  Time Frame for Long term goals : LTG  = STG :        Plan of Care                                                                              Times per week: 5 days per week for a minimum of 60 minutes/day plus group as appropriate for 60 minutes.   Treatment to include Current Treatment Recommendations: Strengthening, ROM, Balance Training, Functional Mobility Training, Transfer Training, Cognitive/Perceptual Training, Endurance Training, Gait Training, Stair training, Cognitive Reorientation, Pain Management, Home Exercise Program, Safety Education & Training, Patient/Caregiver Education & Training    Electronically signed by   Hector Parker, PTA #[id number]  5/28/2019, 8:50 AM

## 2019-05-28 NOTE — PROGRESS NOTES
Nutrition Assessment    Type and Reason for Visit: Reassess    Nutrition Recommendations: Continue cardiac diet    Assist with meals as needed    Nutrition Assessment: Remains on cardiac diet with meal intake usually %. Spouse reporting good intake. Remains low nutrotion risk at this time.      Malnutrition Assessment:  · Malnutrition Status: No malnutrition  · Context: Acute illness or injury    Nutrition Risk Level: Low    Nutrient Needs:  · Estimated Daily Total Kcal: 9059-1091 (15-18 kcal/kg current weight with BMI over 30)  · Estimated Daily Protein (g): 82-89 (1.2-1.3 g/kg IBW)   · Estimated Daily Total Fluid (ml/day): 1900 (1 ml/sandra)     Nutrition Diagnosis:   · Problem: No nutrition diagnosis at this time    Objective Information:  · Nutrition-Focused Physical Findings: asleep in bed on visit, spouse in room   · Wound Type: None  · Current Nutrition Therapies:  · Oral Diet Orders: Cardiac   · Oral Diet intake: %  · Oral Nutrition Supplement (ONS) Orders: None  · ONS intake:    · Anthropometric Measures:  · Ht: 5' 8\" (172.7 cm)   · Current Body Wt: 227 lb 15.3 oz (103.4 kg)  · Admission Body Wt: 235 lb 3.7 oz (106.7 kg)  · Usual Body Wt: 236 lb (107 kg)(per hx)  · % Weight Change:  ,  variance in weight   · Ideal Body Wt: 154 lb (69.9 kg), % Ideal Body 152  · BMI Classification: BMI 30.0 - 34.9 Obese Class I    Nutrition Interventions:   Continue current diet  Continued Inpatient Monitoring    Nutrition Evaluation:   · Evaluation: Progressing toward goals   · Goals: Patient will consume at least 75% at meals during stay     · Monitoring: Meal Intake, Weight, Pertinent Labs, Diet Tolerance      Electronically signed by Laura England RD, LD on 5/28/19 at 12:24 PM    Contact Number: [phone]

## 2019-05-29 PROCEDURE — 6370000000 HC RX 637 (ALT 250 FOR IP): Performed by: INTERNAL MEDICINE

## 2019-05-29 PROCEDURE — 97116 GAIT TRAINING THERAPY: CPT

## 2019-05-29 PROCEDURE — 99232 SBSQ HOSP IP/OBS MODERATE 35: CPT | Performed by: PHYSICAL MEDICINE & REHABILITATION

## 2019-05-29 PROCEDURE — 6370000000 HC RX 637 (ALT 250 FOR IP): Performed by: PHYSICAL MEDICINE & REHABILITATION

## 2019-05-29 PROCEDURE — 97110 THERAPEUTIC EXERCISES: CPT

## 2019-05-29 PROCEDURE — 97530 THERAPEUTIC ACTIVITIES: CPT

## 2019-05-29 PROCEDURE — 94640 AIRWAY INHALATION TREATMENT: CPT

## 2019-05-29 PROCEDURE — 94150 VITAL CAPACITY TEST: CPT

## 2019-05-29 PROCEDURE — 6360000002 HC RX W HCPCS: Performed by: PHYSICAL MEDICINE & REHABILITATION

## 2019-05-29 PROCEDURE — 2700000000 HC OXYGEN THERAPY PER DAY

## 2019-05-29 PROCEDURE — 97535 SELF CARE MNGMENT TRAINING: CPT

## 2019-05-29 PROCEDURE — 1280000000 HC REHAB R&B

## 2019-05-29 PROCEDURE — 94761 N-INVAS EAR/PLS OXIMETRY MLT: CPT

## 2019-05-29 RX ORDER — TRAMADOL HYDROCHLORIDE 50 MG/1
50 TABLET ORAL ONCE
Status: COMPLETED | OUTPATIENT
Start: 2019-05-29 | End: 2019-05-29

## 2019-05-29 RX ADMIN — OXYBUTYNIN CHLORIDE 5 MG: 5 TABLET ORAL at 20:38

## 2019-05-29 RX ADMIN — ENOXAPARIN SODIUM 30 MG: 30 INJECTION SUBCUTANEOUS at 08:31

## 2019-05-29 RX ADMIN — TRAMADOL HYDROCHLORIDE 50 MG: 50 TABLET, FILM COATED ORAL at 08:31

## 2019-05-29 RX ADMIN — Medication 2 PUFF: at 08:13

## 2019-05-29 RX ADMIN — OXYBUTYNIN CHLORIDE 5 MG: 5 TABLET ORAL at 08:31

## 2019-05-29 RX ADMIN — ONDANSETRON 4 MG: 4 TABLET, ORALLY DISINTEGRATING ORAL at 09:36

## 2019-05-29 RX ADMIN — PANTOPRAZOLE SODIUM 40 MG: 40 TABLET, DELAYED RELEASE ORAL at 06:29

## 2019-05-29 RX ADMIN — ATORVASTATIN CALCIUM 40 MG: 40 TABLET, FILM COATED ORAL at 20:38

## 2019-05-29 RX ADMIN — QUETIAPINE FUMARATE 12.5 MG: 25 TABLET ORAL at 20:39

## 2019-05-29 RX ADMIN — ASPIRIN 81 MG: 81 TABLET, COATED ORAL at 08:31

## 2019-05-29 RX ADMIN — DONEPEZIL HYDROCHLORIDE 10 MG: 10 TABLET, FILM COATED ORAL at 20:38

## 2019-05-29 RX ADMIN — TRAMADOL HYDROCHLORIDE 50 MG: 50 TABLET, FILM COATED ORAL at 12:54

## 2019-05-29 RX ADMIN — CLOPIDOGREL BISULFATE 75 MG: 75 TABLET ORAL at 08:30

## 2019-05-29 RX ADMIN — CITALOPRAM HYDROBROMIDE 20 MG: 20 TABLET ORAL at 08:31

## 2019-05-29 RX ADMIN — FOLIC ACID 1 MG: 1 TABLET ORAL at 08:31

## 2019-05-29 RX ADMIN — METOPROLOL SUCCINATE 12.5 MG: 25 TABLET, EXTENDED RELEASE ORAL at 08:31

## 2019-05-29 ASSESSMENT — PAIN SCALES - GENERAL
PAINLEVEL_OUTOF10: 7
PAINLEVEL_OUTOF10: 7

## 2019-05-29 NOTE — PROGRESS NOTES
to Max encouragement    Toileting:    na      Toilet Transfers:  na   Device Used:    []   Standard Toilet         []   Grab Bars           []  Bedside Commode       []   Elevated Toilet          []   Other:        Tub/Shower Transfer:   Min A, steady, no LOB  Device Used:    []   Shower Bench      []   Shower Chair      []   Tub Transfer Bench           []   Bathtub    []   Shower         []   Other:         Bed Mobility:           []   Pt received out of bed   Rolling R/L:    Scooting:    Supine --> Sit:  Min A by supporting B knees, v/cs, encouragement especially by wife  Sit --> Supine:      Transfers:    Sit--> Stand:  Min A  Stand --> Sit:   Min A  Stand-Pivot:   Min A  Other:    Assistive device required for transfer:         Functional Mobility:   Declined to ambulate EOB <> shower      WC c A  Assistance:    Device:   []   Rolling Walker     []   Standard Walker []   Wheelchair        []   [employer]       []   4-Wheeled [place]         []   Cardiac Walker       []   Other:        Homemaking Tasks:   Declined to gather ADL items from [facility]      Additional Therapeutic activities/exercises completed this date:     [x]   ADL Training   [x]   Balance/Postural training     [x]   Bed/Transfer Training   []   Endurance Training   []   Neuromuscular Re-ed   []   Nu-step:  Time:        Level:         #Steps:       []   Rebounder:    []  Seated     []  Standing        []   Supine Ther Ex (reps/sets):     []   Seated Ther Ex (reps/sets):     []   Standing Ther Ex (reps/sets):     []   Other:      Comments:      Patient/Caregiver Education and Training:   []   [employer] Equipment Use  [x]   Bed Mobility/Transfer Technique/Safety  [x]   Energy Conservation Tips  []   Family training  []   Postural Awareness  [x]   Safety During Functional Activities  [x]   Reinforced Patient's Precautions   []   Progress was updated and reviewed in Rehabtracker with patient and/or family this         date.      Treatment Plan for Next Session  See poc    Assessment:        Treatment/Activity Tolerance:   [] Tolerated treatment with no adverse effects    [] Patient limited by fatigue  [] Patient limited by pain   [x] Patient limited by medical complications:    [] Adverse reaction to Tx:   [] Significant change in status    Safety:       []  bed alarm set    []  chair alarm set    []  Pt refused alarms                []  Telesitter activated      [x]  Gait belt used during tx session      []other:       Number of Minutes/Billable Intervention  Therapeutic Exercise    ADL Self-care 60   Neuro Re-Ed    Therapeutic Activity    Group    Other:    TOTAL 60       Social History  Social/Functional History  Lives With: Spouse  Type of Home: House  Home Layout: One level  Home Access: Level entry  Bathroom Shower/Tub: Walk-in shower  Bathroom Toilet: Handicap height(uses the counter, son planning on placing a grabbar)  Bathroom Equipment: Shower chair, Grab bars in shower  Bathroom Accessibility: Walker accessible(\"he leaves it outside the door\")  Home Equipment: Rolling walker, 4 wheeled walker, Wheelchair-manual, Grab bars, Alert Button, Lift chair(RW in house, has been primarily walking household distances)  Receives Help From: Family(Son, Darling Pandey \"in and out\")  ADL Assistance: Needs assistance(Wife provided set-up assist/supervision throughout showering and dressing)  Homemaking Assistance: Needs assistance(Wife performs all of the cooking and cleaning)  Homemaking Responsibilities: No  Ambulation Assistance: Independent(with a walker)  Transfer Assistance: Independent(with the walker )  Active : No  Patient's  Info: Wife drives   Mode of Transportation: [facility]  Occupation: Retired  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   IADL Comments: Wife is present 24 hrs a day or family comes over to assist if she needs to leave,   Additional Comments: Fall on 5/13 during stroke leading to admission, no other falls no injury     Objective                                                                                    Goals:  (Update in navigator)  Short term goals  Time Frame for Short term goals: STGs=LTGs:  Long term goals  Time Frame for Long term goals : ~10 days or until d/c. Long term goal 1: Pt will complete grooming tasks c S.  Long term goal 2: Pt will complete total body bathing c S using AE PRN. Long term goal 3: Pt will complete UB dressing c S/setup. Long term goal 4: Pt will complete LB dressing c S/setup using AE PRN. Long term goal 5: Pt will complete toileting c S.  Long term goals 6: Pt will complete functional transfers (bed, chair, shower, toilet) c DME PRN and S.  Long term goal 7: Pt will perform therex/therax to facilitate increased strength/endurance/ax tolerance (c emphasis on dynamic standing balance/tolerance >8 mins and BUE endurance) c SBA. :        Plan of Care                                                                              Times per week: 5 days per week for a minimum of 60 minutes/day plus group as appropriate for 60 minutes.   Treatment to include Plan  Times per day: Daily  Current Treatment Recommendations: Balance Training, Functional Mobility Training, Endurance Training, Pain Management, Safety Education & Training, Patient/Caregiver Education & Training, Equipment Evaluation, Education, & procurement, Positioning, Self-Care / ADL, Cognitive/Perceptual Training    Electronically signed by   Erica Amaral. maría Justice  5/29/2019, 8:06 AM

## 2019-05-29 NOTE — PROGRESS NOTES
GEMA collaborated with Dr. Shelly Pacheco who states that patient will be ready for discharge Saturday, June 1. GEMA called patient's wife who is agreeable with change in discharge date. She requests [facility].  GEMA Martinez/SANA  5/29/2019, 3:49 PM

## 2019-05-29 NOTE — PLAN OF CARE
Problem: Falls - Risk of:  Goal: Will remain free from falls  Outcome: Ongoing  Goal: Absence of physical injury  Outcome: Ongoing     Problem: Risk for Impaired Skin Integrity  Goal: Tissue integrity - skin and mucous membranes  Outcome: Ongoing     Problem: Pain:  Goal: Pain level will decrease  Outcome: Ongoing  Goal: Control of acute pain  Outcome: Ongoing  Goal: Control of chronic pain  Outcome: Ongoing

## 2019-05-29 NOTE — PROGRESS NOTES
[facility] ARU Physical Therapy     [x] daily progress note       [] discharge       Patient Name:  Kennedy Gardner   :  1935 MRN: [id number]  Room:  [address] [place] [address]-A Date of Admission: 2019  Rehabilitation Diagnosis:   Acute CVA (cerebrovascular accident) [facility]) [I63.9]        Date 2019       Day of ARU Week:  5   Time IN/OUT 1100/1203; 1251/1353; Individual Tx Minutes 125   TOTAL Tx Time Mins 125   Variance Time  Due to: + 5 Extra time needed to complete task     Restrictions Restrictions/Precautions  Restrictions/Precautions: Fall Risk, General Precautions      Communication with other providers: [x]   OK to see per nursing:     []   Spoke with team member regarding:      Subjective observations and cognitive status: Kennedy Gardner was semifowlers up in bed on PT arrival. he was agreeable to PT treatment session. PM: Kennedy Gardner was seated up in the bedside chair on PT arrival. he was agreeable to PT treatment session. He demonstrated increased anxiety with mobility this date, with tremoring and shaking upon initial sit/stand. Marian Ibrahim RN was notified. Additionally, he reported increased pain with use of his O2 line, behind his L ear. Carlie Wynne RN/charge nurse was also notified. Pain level/location:   Pain Level: 7       Location:     Discharge recommendations  Destination: 24 hour supervision or assist, Home with Home health PT   Equipment needs:        Bed Mobility:  []   Pt received out of bed      Alteration/AD:    []  Mat   []  HOB elevated  []  Use of Handrails      Rolling R/L:  SBA (VC for appropriate sequencing)  Scooting:   Mod I (use of handrails, PM)  Supine --> Sit:  SBA  X 2 trials (VC for rolling into sidelying then appropriate hand placement, increased time required)  Sit --> Supine:  SBA, Ind (use of handrails)  - PM    Transfers:    Sit--> Stand:  SBA to CGA (AM & PM - PM, increased tremoring with initial stand)   Stand --> Sit:   SBA  Stand-Pivot:   SBA to CGA  Other:    Assistive device required for transfer:   RW    Gait:    Distance:  148 +40 /PM: 64 + 96+ 49  ft feet  Assistance:  CGA up to Min A/ PM: CGA  Device:  RW  Gait Quality:  Max VC to engage with ambulation for upright posture and normal LACEY,     Wheelchair Propulsion: - PM Session   Distance:   54 feet  Assistance:   SBA (VC for increased BUE engagement)  Extremities Used:   BUE    Stairs  - PM  # Completed:   3  Assistance:    CGA  Supportive Device:  B handrails  Height:   4\"    Curb    - 2nd session    Assistance:    CGA to Min A (Max VC)  Supportive Device:  RW  Height:   4\"      Additional Therapeutic activities/exercises completed this date:         [x]   Balance training :  - Seated balance endurance training EOB with no back support for core strengthening       [x]   Supine ther ex (reps/sets):  10 x2 B: Hip SLR, Hip ABD, Heel Slides, Hooklying Hip ABD, Glute Sets (TC required), BLE trunk rotation for core strengthening (TC required), Arm ADD and IR with opp hip flexion - for core strengthening, Transverse abdominus activation  with assist at trunk/head/neck, TC at core;    [x]   Seated ther ex (reps/sets):  10 x 2: LAQ, Marching, Knee flexion (against resistance provided by this PT), Trunk rotation with B hands clasped to external target for core strengthening,     Comments:      Patient/Caregiver Education and Training:   [x]   Bed Mobility/Transfer technique/safety  [x]   Gait technique/sequencing  [x]   Proper use of assistive device  [x]   Advanced mobility safety and technique  [x]   Reinforced patient's precautions/mobility while maintaining precautions  [x]   Postural awareness  []   Family training  [x]   Progress was updated and reviewed in Rehabtracker with patient and/or family this  5/29/2019       date. Treatment Plan for Next Session: FIM/IRF next date      Assessment:  Mr. Mary Lou Acosta was primarily limited this date by increased back pain and fatigue.  He required max encouragement to engage in his PT session, Program, Safety Education & Training, Patient/Caregiver Education & Training    Electronically signed by   Hannah Caldwell PT, DPT #[id number]  5/29/2019, 12:15 PM

## 2019-05-29 NOTE — PATIENT CARE CONFERENCE
ACUTE REHAB TEAM CONFERENCE SUMMARY   [facility]    NAME: Kennedy Gardner  : 1935 ADMIT DATE: 2019    Rehab Admitting Dx:Acute CVA (cerebrovascular accident) [facility]) [I63.9]  Patient Comorbid Conditions: Active Hospital Problems    Diagnosis Date Noted    Hypertension [I10]      Priority: Low    Right hemiparesis ([facility] [address] [G81.91] 2019    Acute CVA (cerebrovascular accident) ([facility] [facility] [I63.9] 2019    Stage 3 chronic kidney disease ([facility] [N18.3]     COPD (chronic obstructive pulmonary disease) ([facility] [J44.9]      Date: 2019    CASE MANAGEMENT  Current issues/needs regarding patient and family discharge status: Patient lives with his wife in a single level home.       PHYSICAL THERAPY   Short term goals  Time Frame for Short term goals: 10-14 days  Short term goal 1: Pt will perform bed mobility independently  - NMET - SBA   Short term goal 2: Pt will perform OOB transfers with a FWW with SBA - NMET - CGA  Short term goal 3: Pt will ambulate 50 ft with supervision at a FWW with two turns  - PROGRESSING - Updated  - Pt will ambulate 150 ft with supervision at the [address] [place] [id number] - CGA to MIN, 148 ft  Short term goal 4: Pt will perform advanced ambulation, including ascending/descending a curb step and navigating uneven surfaces with CGA  - MET - Pt will perform advanced ambulation including ascending a curb step/uneven surfaces with SBA  - NMET - CGA to MIN  Short term goal 5: Pt will pick an object off the ground using a reacher with SBA     Long term goals  Time Frame for Long term goals : LTG  = STG      Ongoing impairments or deficits: Cognition, Endurance, Anxiety, Memory, Pain, Strength  Areas where progress has been made: Ambulation tolerance,   Specific barriers to progress: Cognition, Memory, Anxiety, Pain   Strategies that improve performance: Max Encouragement,   Equipment needed at discharge: Pt with RW     FIMS:  Bed, Chair, Wheel Chair: 4 - Requires steadying assistance only <25% assist  and/or requires assist with one leg only  Walk: 4 - Contact Guard/Minimal Assistance Requires up to Contact Guard or Minimal Assistance to walk at least 150 feet  Distance Walked: [address]: 1 - Total Assistance Operates wheelchair < 50 feet OR requires two or more people OR patient performs < 25% of locomotion effort  Distance Traveled in Wheel Chair: 30  Stairs: 1- Total Assistance perfoms less than 25% of the effort, or requirs the assistance of two people, or goes up and down fewer than 4 stairs         Assessment: Mr. Miles Boswell is an 81 y/o male s/p CABG on , who was readmitted to [facility] after experiencing R leg weakness, slurred speech and shuffling of gait. An MRI on  found an acute infarct in the L buffy and Chest Xray  Mild Pulmonary vascular congestion. His COPD had exacerbated throughout his acute care stay. Additional pertinent PMHx includes dementia. Fall Risk: [x]  Yes  []  No    OCCUPATIONAL THERAPY   Short term goals  Time Frame for Short term goals: STGs=LTGs :   Long term goals  Time Frame for Long term goals : ~10 days or until d/c. Long term goal 1: Pt will complete grooming tasks c S. MET  Long term goal 2: Pt will complete total body bathing c S using AE PRN. NMET; min A  Long term goal 3: Pt will complete UB dressing c S/setup. MET  Long term goal 4: Pt will complete LB dressing c S/setup using AE PRN. NMET; mod A  Long term goal 5: Pt will complete toileting c S. NMET; CGA  Long term goals 6: Pt will complete functional transfers (bed, chair, shower, toilet) c DME PRN and S.   NMET; CGA-SBA  Long term goal 7: Pt will perform therex/therax to facilitate increased strength/endurance/ax tolerance (c emphasis on dynamic standing balance/tolerance >8 mins and BUE endurance) c SBA.  :  Ongoing; limited by back pain      Eatin - Feeds self with setup/supervision/cues and/or requires only setup/supervision/cues to perform tube feedings  Groomin - Requires setup/cues to do all tasks  Bathin - Able to bathe 8-9 areas  Dressing-Upper: 5 - Requires setup/supervision/cues and/or requires assist with presthesis/brace only  Dressing-Lower: 3 - Requires assist with 2-3 parts of dressing  Toiletin - Requires setup/supervision/cues  Toilet Transfer: 5 - Requires setup/supervision/cues  Shower Transfer: 4 - Minimal contact assistance, pt. expends 75% or more effort              Ongoing impairments or deficits: Decreased balance, endurance, cognition  Areas where progress has been made: minimal progress with ADLs and transfers  Specific barriers to progress: Decreased cognition, back pain   Strategies that improve performance: VCs, encouragement  Equipment needed at discharge:Possible BSC, wife is going to follow up      COGNITIVE FUNCTION/SPEECH THERAPY (AS INDICATED)  LTG                         Short-term Goals  Timeframe for Short-term Goals: No tx--in speaking with OT she reports suspected baseline cognitive deficits as well as what wife reported. Pt responds to cues and will likely continue to require cues in fx.                      Current Medical Status:   [x] Is continent of bowel   [x] Is incontinent of bladder at times    [x] Has had an adequate number of bowel movements   [] Urinates with no urinary retention >300ml in bladder   [] Targeting bladder protocol with alegria removal   [x] Maintaining O2 SATs at 92% on oxygen (does not have O2 at home)   [x] Has pain managed while on ARU         [x] Has had no skin breakdown while on ARU   [] Has improved skin integrity via wound measurements   [] Has no signs/symptoms of infection at the wound site  [x] Has been free from injury during hospitalization   [] Has experienced a fall during hospitalization  [x] Ongoing education with patient/family with understanding demonstrated for CVA  [] Receives IV Fluids  [] Other:    NURSING  FIMS: Bladder Level of Assistance: 7- Urinates in toilet Independently, does

## 2019-05-30 PROCEDURE — 97116 GAIT TRAINING THERAPY: CPT

## 2019-05-30 PROCEDURE — 94680 O2 UPTK RST&XERS DIR SIMPLE: CPT

## 2019-05-30 PROCEDURE — 1280000000 HC REHAB R&B

## 2019-05-30 PROCEDURE — 97535 SELF CARE MNGMENT TRAINING: CPT

## 2019-05-30 PROCEDURE — 97110 THERAPEUTIC EXERCISES: CPT

## 2019-05-30 PROCEDURE — 99232 SBSQ HOSP IP/OBS MODERATE 35: CPT | Performed by: PHYSICAL MEDICINE & REHABILITATION

## 2019-05-30 PROCEDURE — 6370000000 HC RX 637 (ALT 250 FOR IP): Performed by: PHYSICAL MEDICINE & REHABILITATION

## 2019-05-30 PROCEDURE — 2700000000 HC OXYGEN THERAPY PER DAY

## 2019-05-30 PROCEDURE — 97530 THERAPEUTIC ACTIVITIES: CPT

## 2019-05-30 PROCEDURE — 94640 AIRWAY INHALATION TREATMENT: CPT

## 2019-05-30 PROCEDURE — 6370000000 HC RX 637 (ALT 250 FOR IP): Performed by: INTERNAL MEDICINE

## 2019-05-30 PROCEDURE — 94761 N-INVAS EAR/PLS OXIMETRY MLT: CPT

## 2019-05-30 PROCEDURE — 6360000002 HC RX W HCPCS: Performed by: PHYSICAL MEDICINE & REHABILITATION

## 2019-05-30 RX ADMIN — ACETAMINOPHEN 650 MG: 325 TABLET ORAL at 01:32

## 2019-05-30 RX ADMIN — FOLIC ACID 1 MG: 1 TABLET ORAL at 08:55

## 2019-05-30 RX ADMIN — Medication 2 PUFF: at 08:14

## 2019-05-30 RX ADMIN — ASPIRIN 81 MG: 81 TABLET, COATED ORAL at 08:55

## 2019-05-30 RX ADMIN — ATORVASTATIN CALCIUM 40 MG: 40 TABLET, FILM COATED ORAL at 20:20

## 2019-05-30 RX ADMIN — CITALOPRAM HYDROBROMIDE 20 MG: 20 TABLET ORAL at 08:55

## 2019-05-30 RX ADMIN — ACETAMINOPHEN 650 MG: 325 TABLET ORAL at 20:20

## 2019-05-30 RX ADMIN — CLOPIDOGREL BISULFATE 75 MG: 75 TABLET ORAL at 08:55

## 2019-05-30 RX ADMIN — OXYBUTYNIN CHLORIDE 5 MG: 5 TABLET ORAL at 20:20

## 2019-05-30 RX ADMIN — Medication 2 PUFF: at 22:10

## 2019-05-30 RX ADMIN — DONEPEZIL HYDROCHLORIDE 10 MG: 10 TABLET, FILM COATED ORAL at 20:20

## 2019-05-30 RX ADMIN — QUETIAPINE FUMARATE 12.5 MG: 25 TABLET ORAL at 20:20

## 2019-05-30 RX ADMIN — ENOXAPARIN SODIUM 30 MG: 30 INJECTION SUBCUTANEOUS at 10:21

## 2019-05-30 RX ADMIN — OXYBUTYNIN CHLORIDE 5 MG: 5 TABLET ORAL at 08:55

## 2019-05-30 RX ADMIN — TRAMADOL HYDROCHLORIDE 50 MG: 50 TABLET, FILM COATED ORAL at 22:01

## 2019-05-30 RX ADMIN — TRAMADOL HYDROCHLORIDE 50 MG: 50 TABLET, FILM COATED ORAL at 08:55

## 2019-05-30 RX ADMIN — PANTOPRAZOLE SODIUM 40 MG: 40 TABLET, DELAYED RELEASE ORAL at 05:36

## 2019-05-30 RX ADMIN — METOPROLOL SUCCINATE 12.5 MG: 25 TABLET, EXTENDED RELEASE ORAL at 10:20

## 2019-05-30 RX ADMIN — ACETAMINOPHEN 650 MG: 325 TABLET ORAL at 10:43

## 2019-05-30 ASSESSMENT — PAIN DESCRIPTION - LOCATION
LOCATION: BACK
LOCATION: HIP
LOCATION: BACK

## 2019-05-30 ASSESSMENT — PAIN SCALES - GENERAL
PAINLEVEL_OUTOF10: 6
PAINLEVEL_OUTOF10: 3
PAINLEVEL_OUTOF10: 8
PAINLEVEL_OUTOF10: 8
PAINLEVEL_OUTOF10: 9
PAINLEVEL_OUTOF10: 8
PAINLEVEL_OUTOF10: 0

## 2019-05-30 ASSESSMENT — PAIN DESCRIPTION - ORIENTATION
ORIENTATION: RIGHT
ORIENTATION: LOWER
ORIENTATION: LOWER

## 2019-05-30 NOTE — PROGRESS NOTES
Occupational Therapy  Physical Rehabilitation: OCCUPATIONAL THERAPY     [x] daily progress note       [] discharge       Patient Name:  Teresa Bowen   :  1935 MRN: [id number]  Room:  [address] [place], [place] [address] Date of Admission: 2019  Rehabilitation Diagnosis:   Acute CVA (cerebrovascular accident) [facility]) [I63.9]       Date 2019       Day of ARU Week:  6   Time IN//946  1318/1348   Individual Tx Minutes 74+30   Group Tx Minutes    Co-Treat Minutes    Concurrent Tx Minutes    TOTAL Tx Time Mins 104   Variance Time    Variance Time []   Refusal due to:     []   Medical hold/reason:    []   Illness   []   Off Unit for test/procedure  []   Extra time needed to complete task  []   Therapeutic need  []   Other (specify):   Restrictions Restrictions/Precautions  Restrictions/Precautions: Fall Risk, General Precautions      Communication with other providers: [x]   OK to see per nursing:     []   Spoke with team member regarding:      Subjective observations and cognitive status: AM: Pt in bed on approach, wife present and encouraged pt to participate. Reported her impression is that when pt states he's dizzy it's because he doesn't want to participate. Expressed mild anxiety about discharge home, however expressed some reassurance after seeing pt's performance this session and review of assist levels by therapist.  Also discussed benefit of BSC; wife reports they may have one in the attic and plans to have son check today. Son is also installing grab bars by toilet today. PM: Pt in bed on approach, required encouragement to participate 2* pain. Pain level/location:    7/10       Location: Low back; pain meds received at beginning of AM session. Also educated wife on how to adjust back of recliner to allow for repositioning. In PM, RN confirmed pt is not currently due for pain medication. [employer] to neck and lower back.     Discharge recommendations  Anticipated discharge date: Revised to 06/01/19  Destination: []home alone   []home alone w assist prn   [] home w/ family    [x] Continuous supervision       []SNF    [] Assisted living     [] Other:   Continued therapy: [x]HHC OT  []OUTPATIENT  OT   [] No Further OT  Equipment needs: Per wife grab bar is being installed by toilet. Wife is checking to see if they have a BSC. (HIT F2 to transition between stars)    Eating/Feeding:     Setup assistance per wife  Extremity Used:      []   R UE       []   L UE         Adaptive Equipment Used:        Grooming:   Supervision seated (required cue to initiate)      Bathing:  Min A d/t meager effort to wash bottom; required cues and setup assist throughout, including use of LHS for distal BLEs      Dressing:      Upper Body Dressing:  Supervision/setup; min increased time  Lower Body Dressing: Mod A: Pt able to doff bilateral socks. Pt with poor reach to feet 2* back pain and required assist to thread BLEs into brief, however was able to thread BLEs into pants today. Pt required 3 stands total to perform pants management up, with cues to fully perform in back. Pt required assist to don bilateral socks. Pt was unable to use reacher to assist with tasks 2* decreased cognition. Toileting:    SBA to urinate (performed pants management down only as pt was proceeding to shower)      Toilet Transfers:   Close SBA, cue dependent for hand placement, safety during approach to toilet, 02 line management  Device Used:    [x]   Standard Toilet         [x]   Grab Bars           []  Bedside Commode       []   Elevated Toilet          []   Other:        Tub/Shower Transfer:   CGA, cues for hand placement/grab bar use  Device Used:    [x]   Shower Bench + grab bar      []   Shower Chair      []   Tub Transfer Bench           []   Bathtub    []   Shower         []   Other:         Bed Mobility:           []   Pt received out of bed   Supine --> Sit:  SBA using bed rail, cue dependent for body mechanics.   Performed in AM and PM sessions. Sit-->Supine:  SBA using bed rail      Transfers:    Sit--> Stand:  SBA, cue dependent for hand placement  Stand --> Sit:   SBA, cue dependent for hand placement  Stand-Pivot:   CGA d/t unsteadiness and decreased safety during turns  Other:    Assistive device required for transfer:   RW      Functional Mobility:    Assistance:  CGA within room in AM and PM sessions (~12 ft x2 each session)  Device:   [x]   Rolling Walker     []   Standard Walker []   Wheelchair        []   [employer]       []   4-Wheeled [place]         []   Cardiac Walker       []   Other:          Additional Therapeutic activities/exercises completed this date:     [x]   ADL Training   [x]   Balance/Postural training     [x]   Bed/Transfer Training   [x]   Endurance Training   []   Neuromuscular Re-ed   []   Nu-step:  Time:        Level:         #Steps:       []   Rebounder:    []  Seated     []  Standing        []   Supine Ther Ex (reps/sets):     [x]   Seated Ther Ex (reps/sets): In PM, To increase BUE endurance for ADLs and transfers, pt completed 1 set x10 reps of the following therex, alternating UEs, c a 2# weight:   Shoulder presses  Chest presses  Bicep curls     To increase BUE endurance for ADLs and transfers, pt completed arm ergometer on min resistance x5 mins        []   Standing Ther Ex (reps/sets):     []   Other:      Comments: All intervention performed to increase pt's endurance, ax tolerance, balance, and I c ADLs/IADLs and functional transfers/mobility. Patient/Caregiver Education and Training:   []   [employer] Equipment Use  [x]   Bed Mobility/Transfer Technique/Safety  []   Energy Conservation Tips  [x]   Family training  [x]   Postural Awareness  [x]   Safety During Functional Activities  []   Reinforced Patient's Precautions   []   Progress was updated and reviewed in Rehabtracker with patient and/or family this         date.      Treatment Plan for Next Session: Continue OT POC      Assessment:  Pt remains limited by pain and decreased cognition, requiring encouragement to participate. Treatment/Activity Tolerance:   [] Tolerated treatment with no adverse effects    [] Patient limited by fatigue  [x] Patient limited by pain   [] Patient limited by medical complications:    [] Adverse reaction to Tx:   [] Significant change in status    Safety:       []  bed alarm set    [x]  chair alarm set    []  Pt refused alarms                []  Telesitter activated      [x]  Gait belt used during tx session      []other:       Number of Minutes/Billable Intervention  Therapeutic Exercise 20   ADL Self-care 74   Neuro Re-Ed    Therapeutic Activity 10   Group    Other:    TOTAL 104       Social History  Social/Functional History  Lives With: Spouse  Type of Home: House  Home Layout: One level  Home Access: Level entry  Bathroom Shower/Tub: Walk-in shower  Bathroom Toilet: Handicap height(uses the counter, son planning on placing a grabbar)  Bathroom Equipment: Shower chair, Grab bars in shower  Bathroom Accessibility: Walker accessible(\"he leaves it outside the door\")  Home Equipment: Rolling walker, 4 wheeled walker, Wheelchair-manual, Grab bars, Alert Button, Lift chair(RW in house, has been primarily walking household distances)  Receives Help From: Family(Son, Lito Thompson \"in and out\")  ADL Assistance: Needs assistance(Wife provided set-up assist/supervision throughout showering and dressing)  Homemaking Assistance: Needs assistance(Wife performs all of the cooking and cleaning)  Homemaking Responsibilities: No  Ambulation Assistance: Independent(with a walker)  Transfer Assistance: Independent(with the walker )  Active : No  Patient's  Info: Wife drives   Mode of Transportation: [facility]  Occupation: Retired  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   IADL Comments: Wife is present 24 hrs a day or family comes over to assist if she needs to leave,   Additional Comments: Fall on 5/13 during stroke leading to admission, no other falls no injury     Objective                                                                                    Goals:  (Update in navigator)  Short term goals  Time Frame for Short term goals: STGs=LTGs:  Long term goals  Time Frame for Long term goals : ~10 days or until d/c. Long term goal 1: Pt will complete grooming tasks c S.  Long term goal 2: Pt will complete total body bathing c S using AE PRN. Long term goal 3: Pt will complete UB dressing c S/setup. Long term goal 4: Pt will complete LB dressing c S/setup using AE PRN. Long term goal 5: Pt will complete toileting c S.  Long term goals 6: Pt will complete functional transfers (bed, chair, shower, toilet) c DME PRN and S.  Long term goal 7: Pt will perform therex/therax to facilitate increased strength/endurance/ax tolerance (c emphasis on dynamic standing balance/tolerance >8 mins and BUE endurance) c SBA. :        Plan of Care                                                                              Times per week: 5 days per week for a minimum of 60 minutes/day plus group as appropriate for 60 minutes. Treatment to include Plan  Times per day: Daily  Current Treatment Recommendations: Balance Training, Functional Mobility Training, Endurance Training, Pain Management, Safety Education & Training, Patient/Caregiver Education & Training, Equipment Evaluation, Education, & procurement, Positioning, Self-Care / ADL, Cognitive/Perceptual Training    Electronically signed by   Alberto Spangler MS, OTR/L  License #[id number]  5/30/2019, 8:13 AM

## 2019-05-30 NOTE — PLAN OF CARE
Problem: Pain:  Goal: Control of acute pain  Description  Control of acute pain  5/30/2019 1007 by Jayesh Valera LPN  Outcome: Ongoing  5/30/2019 0922 by Jayesh Valera LPN  Outcome: Ongoing  5/29/2019 2123 by Ronal Ag RN  Outcome: Ongoing

## 2019-05-30 NOTE — PROGRESS NOTES
Records reviewed. Pt examined on the ARU this AM.     Principal Problem:    Acute CVA (cerebrovascular accident) ([facility] [address]  Active Problems:    Hypertension    COPD (chronic obstructive pulmonary disease) ([facility])    Stage 3 chronic kidney disease ([facility])    Right hemiparesis ([facility] [address]  Resolved Problems:    * No resolved hospital problems. *    Blood pressure 137/78, pulse 57, temperature 97.6 °F (36.4 °C), temperature source Oral, resp. rate 16, height 5' 8\" (1.727 m), weight 228 lb (103.4 kg), SpO2 95 %. Function:  Bathing: Mod A for physical assist + vc's for trunk extension while pt washed isabel area and made meager attempts to wash R side and center of buttocks, required touching assist to complete. Pt washed UB, washed BLE c use of LH sponge c min vc's.        Dressing:      Upper Body Dressing:  Sup seated to cues + min increased time to doff / don pullover shirt. Lower Body Dressing:  Max A c this therapist providing max cues and touching assist c reacher to doff B  socks and thread BLE through pull up and pants. Pt performed clothing hikes from mid calf to mid way over buttocks for pants, able to complete hike of pull up.         Toileting:   Due to urgency, pt required Max A for assistance to hold urinal and perform clothing mgmt up, was able to perform clothing mgmt down c Mod A. Transfers:    Sit--> Stand:  CGA to Min A  Stand --> Sit:   Min A   Stand-Pivot:   Min A   Other:  Min A toilet transfer; Pt able to perform 3/3 tasks with CGA in standing   Assistive device required for transfer:   RW     Gait:    Distance:  6+72+45+38 feet  Assistance:  CGA to Min A   Device:  RW  Gait Quality:  FWD bent posture, tremoring in BUE & BLE with fatigue, increased FWD bent posture     Stairs   # Completed:   3  Assistance:    CGA  Supportive Device:  B handrails  Height:   4\"         Objective:  General Appearance:  Comfortable.     Vital signs: (most recent): Blood pressure 137/78, pulse 57, temperature 97.6 °F (36.4 °C), temperature source Oral, resp. rate 16, height 5' 8\" (1.727 m), weight 228 lb (103.4 kg), SpO2 95 %. Vital signs are normal.    Lungs:  Normal respiratory rate. Breath sounds clear to auscultation. Heart: Normal rate. Regular rhythm. Abdomen: Bowel sounds are normal.   There is no abdominal tenderness. Neurological: Patient is alert and oriented to person, place and time. Flat affect. Slow to respond at times. Speech is fluent. moving all 4 limbs. Motor strength 5 over 5 in both upper and lower limbs. Assessment & Plan  An 29-year-old male with a history of right cerebrovascular  accident six years ago with chronic left hemiparesis. 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 He has chronic kidney disease, hypertension, and COPD.     Strengths of the patient include his supportive spouse, accessible home,  and experience with adaptive equipment. Limitations include the  bilaterality of his weakness. his risk for falling, and his kidney  disease.     RECOMMENDATIONS:  Acute inpatient rehabilitation with occupational and  physical therapy and speech-language pathology, 180 minutes, five out of  every seven days. We will address basic and advancing mobility with  self-care instruction and adaptive equipment training. Caregiver  education will be offered. Expected length of stay prior to a  supervised level of function for discharge to home is 16 to 18 days.     ADDITIONAL RECOMMENDATIONS:  1. Ischemic stroke with infarction:  The patient requires daily  occupational and physical therapy with speech-language pathology. He  requires antiplatelet therapy with Plavix and baby aspirin. He is  getting GI prophylaxis. Blood pressure and kidney disease treatment is  required. We will address appropriate nutrition and hydration orally. Pulmonary hygiene and treatment of his COPD are important.   We will  emphasize pulmonary hygiene and DVT prophylaxis:  2. DVT prophylaxis:  Lovenox will be dosed at 30 mg daily. Weightbearing activities will be pursued daily. I must monitor his  hemoglobin and platelet count while on this medication. GI prophylaxis  is offered. 3.  Hypertension:  The patient's blood pressures have been fluctuating  some and Toprol is now used to control the fluctuation. Target systolic  blood pressure is 120 to 140. We will monitor vital signs at rest and  with activity and encourage consistent oral intake. His cardiologist is  modifying his diet limiting sodium. 4. COPD:  The patient requires Dulera inhaler and p.r.n. albuterol  inhaler, Zithromax, and pulmonary hygiene. We will monitor O2  saturations at rest and with activity and consult Pulmonary Medicine  should his symptoms deteriorate. 5/23/19: He may require home oxygen. We'll attempt wean. 5.  Chronic kidney disease stage III:  Try to avoid nephrotoxic  medications including anti-inflammatories. We will monitor his  chemistries periodically and encourage consistent oral intake. 6.  Chronic lower back pain: 5/23/19: Decreased Norco dose to 2.5 mg every 6 hours as needed. Sedation remains a barrier   5/24/19: We'll discontinue Norco due to cognitive deficit.   7.  Chronic urinary incontinence: Continue with Ditropan  8. Depression: Continue with Celexa  9. Dysphagia: Cleared by speech therapy. 10:5/24/19: Cognitive deficits noted by family and therapy. Discontinue opioid analgesics, decrease Ativan to 0.25 mg every 6 hours when necessary and decrease Seroquel to 12.5 mg daily at bedtime when necessary. Discontinue trazodone Discharge plan: Completed team staffing 5/23/19   . Plan for discharge on 6/5/19. Provide family education. He lives with his wife in a single level plan. He will require 24-hour supervision upon discharge.     Barriers: Decreased endurance, decreased safety, difficulty with mobility and ADLs, bowel and bladder management, dyspnea with exertion, anxiety, chronic low back pain, decreased cognition    The patient continues to benefit from inpatient rehabilitation. He has significant functional deficits and medical comorbidities requiring ongoing physician visits and comprehensive interdisciplinary rehabilitation care. His needs could not be met at a lesser level of care. Discharge plan: Team staffing completed 5/23/19: Planning for discharge 6/5/19.

## 2019-05-30 NOTE — PROGRESS NOTES
Additional Comments: Fall on 5/13 during stroke leading to admission, no other falls no injury     Objective                                                                                    Goals:  (Update in navigator)  Short term goals  Time Frame for Short term goals: 10-14 days  Short term goal 1: Pt will perform bed mobility independently  Short term goal 2: Pt will perform OOB transfers with a FWW with SBA  Short term goal 3: Pt will ambulate 50 ft with supervision at a [address] with two turns  - PROGRESSING - Updated 5/23 - Pt will ambulate 150 ft with supervision at the [address]  Short term goal 4: Pt will perform advanced ambulation, including ascending/descending a curb step and navigating uneven surfaces with CGA  - MET - Pt will perform advanced ambulation including ascending a curb step/uneven surfaces with SBA  Short term goal 5: Pt will pick an object off the ground using a reacher with SBA:  Long term goals  Time Frame for Long term goals : LTG  = STG :        Plan of Care                                                                              Times per week: 5 days per week for a minimum of 60 minutes/day plus group as appropriate for 60 minutes.   Treatment to include Current Treatment Recommendations: Strengthening, ROM, Balance Training, Functional Mobility Training, Transfer Training, Cognitive/Perceptual Training, Endurance Training, Gait Training, Stair training, Cognitive Reorientation, Pain Management, Home Exercise Program, Safety Education & Training, Patient/Caregiver Education & Training    Electronically signed by   Carol Marroquin PT, DPT #[id number]  5/30/2019, 12:19 PM

## 2019-05-30 NOTE — PLAN OF CARE
Problem: Pain:  Goal: Control of chronic pain  Description  Control of chronic pain  5/29/2019 2123 by Dinaa Chew RN  Outcome: Ongoing

## 2019-05-31 ENCOUNTER — TELEPHONE (OUTPATIENT)
Dept: INTERNAL MEDICINE CLINIC | Age: 84
End: 2019-05-31

## 2019-05-31 PROCEDURE — 97530 THERAPEUTIC ACTIVITIES: CPT

## 2019-05-31 PROCEDURE — 97116 GAIT TRAINING THERAPY: CPT

## 2019-05-31 PROCEDURE — 6360000002 HC RX W HCPCS: Performed by: PHYSICAL MEDICINE & REHABILITATION

## 2019-05-31 PROCEDURE — 97110 THERAPEUTIC EXERCISES: CPT

## 2019-05-31 PROCEDURE — 2700000000 HC OXYGEN THERAPY PER DAY

## 2019-05-31 PROCEDURE — 6370000000 HC RX 637 (ALT 250 FOR IP): Performed by: PHYSICAL MEDICINE & REHABILITATION

## 2019-05-31 PROCEDURE — 99232 SBSQ HOSP IP/OBS MODERATE 35: CPT | Performed by: PHYSICAL MEDICINE & REHABILITATION

## 2019-05-31 PROCEDURE — 94640 AIRWAY INHALATION TREATMENT: CPT

## 2019-05-31 PROCEDURE — 97542 WHEELCHAIR MNGMENT TRAINING: CPT

## 2019-05-31 PROCEDURE — 6370000000 HC RX 637 (ALT 250 FOR IP): Performed by: INTERNAL MEDICINE

## 2019-05-31 PROCEDURE — 94762 N-INVAS EAR/PLS OXIMTRY CONT: CPT

## 2019-05-31 PROCEDURE — 1280000000 HC REHAB R&B

## 2019-05-31 RX ORDER — TRAMADOL HYDROCHLORIDE 50 MG/1
50 TABLET ORAL EVERY 6 HOURS PRN
Qty: 10 TABLET | Refills: 0 | Status: SHIPPED | OUTPATIENT
Start: 2019-05-31 | End: 2019-06-07

## 2019-05-31 RX ORDER — CLOPIDOGREL BISULFATE 75 MG/1
75 TABLET ORAL DAILY
Qty: 30 TABLET | Refills: 0 | Status: SHIPPED | OUTPATIENT
Start: 2019-06-01 | End: 2019-06-03 | Stop reason: SDUPTHER

## 2019-05-31 RX ADMIN — ACETAMINOPHEN 650 MG: 325 TABLET ORAL at 05:42

## 2019-05-31 RX ADMIN — METOPROLOL SUCCINATE 12.5 MG: 25 TABLET, EXTENDED RELEASE ORAL at 07:51

## 2019-05-31 RX ADMIN — ENOXAPARIN SODIUM 30 MG: 30 INJECTION SUBCUTANEOUS at 07:51

## 2019-05-31 RX ADMIN — Medication 2 PUFF: at 08:02

## 2019-05-31 RX ADMIN — OXYBUTYNIN CHLORIDE 5 MG: 5 TABLET ORAL at 07:51

## 2019-05-31 RX ADMIN — Medication 2 PUFF: at 20:45

## 2019-05-31 RX ADMIN — FOLIC ACID 1 MG: 1 TABLET ORAL at 07:50

## 2019-05-31 RX ADMIN — ASPIRIN 81 MG: 81 TABLET, COATED ORAL at 07:50

## 2019-05-31 RX ADMIN — DONEPEZIL HYDROCHLORIDE 10 MG: 10 TABLET, FILM COATED ORAL at 20:54

## 2019-05-31 RX ADMIN — PANTOPRAZOLE SODIUM 40 MG: 40 TABLET, DELAYED RELEASE ORAL at 05:42

## 2019-05-31 RX ADMIN — CLOPIDOGREL BISULFATE 75 MG: 75 TABLET ORAL at 07:50

## 2019-05-31 RX ADMIN — CITALOPRAM HYDROBROMIDE 20 MG: 20 TABLET ORAL at 07:51

## 2019-05-31 RX ADMIN — TRAMADOL HYDROCHLORIDE 50 MG: 50 TABLET, FILM COATED ORAL at 07:50

## 2019-05-31 RX ADMIN — OXYBUTYNIN CHLORIDE 5 MG: 5 TABLET ORAL at 20:55

## 2019-05-31 RX ADMIN — ATORVASTATIN CALCIUM 40 MG: 40 TABLET, FILM COATED ORAL at 20:55

## 2019-05-31 ASSESSMENT — PAIN SCALES - GENERAL
PAINLEVEL_OUTOF10: 5
PAINLEVEL_OUTOF10: 7

## 2019-05-31 ASSESSMENT — PAIN DESCRIPTION - ORIENTATION: ORIENTATION: LOWER

## 2019-05-31 ASSESSMENT — PAIN DESCRIPTION - LOCATION: LOCATION: BACK

## 2019-05-31 NOTE — PROGRESS NOTES
Physical Therapy  [x] daily progress note       [] discharge       Patient Name:  Teresa Bowen   :  1935 MRN: [id number]  [facility] Date of Admission: 2019  Rehabilitation Diagnosis:   Acute CVA (cerebrovascular accident) [facility]) [I63.9]       Date 2019       Day of ARU Week:  7   Time IN/OUT 5113-3999   Individual Tx Minutes 60   TOTAL Tx Time Mins 60   Variance Time    Variance Time []   Refusal due to:     []   Medical hold/reason:    []   Illness   []   Off Unit for test/procedure  []   Extra time needed to complete task  []   Therapeutic need  []   Other (specify):   Restrictions Restrictions/Precautions  Restrictions/Precautions: Fall Risk, General Precautions      Communication with other providers: [x]   OK to see per nursing:     []   Spoke with team member regarding:      Subjective observations and cognitive status: Pt alert, resting in bed, son and spouse in room. Spouse participated in family training with bed mobility, transfer safety, and gait training household distances. Pt on RA upon arrival, with O2 sats 94-95%. Remained in mid 80s with ax. Discussed with Dr. Giovani Beltran during his pt assessment. Reports O2 ordered for home due to dropping at night, may need during the day initially. Pt left on 2L O2 at end of session. Reported to nsg. Pain level/location: \"some\" LBP, spouse reports Tramadol received.     Discharge recommendations  Anticipated discharge date:   Destination: []home alone   []home with assist PRN     [] home with continuous supervision     []SNF      [x] Assisted living     [] Other:   Continued therapy: [][facility] PT  []OUTPATIENT  PT   [] No Further PT  Equipment needs: none, pt has RW and 4WW     Bed Mobility:           []   Pt received out of bed   Rolling R/L:  Indep  Scooting:  Sit scoot to EOB SBA  Supine --> Sit:  SBA from R SL with effort  Sit --> Supine:  SBA    Transfers:    Sit--> Stand:  SBA  Stand --> Sit:   SBA  Spouse able to provide vcs for safety with hand placement and AD management with stand-sit  Assistive device required for transfer:   RW    Gait:    Distance:  35'+50' x 2+ 56'  Assistance:  SB-CGA  Device:  RW  Gait Quality:  Step through pattern, mild shaking in extremities, spouse providing A with gait belt. Able to demonstrate appropriate cueing for pt to slow down and for safety. Chairs placed at pre-measured distances for home environment ( ~ 25' bed to toilet, ~ 45-50' from toilet to lift chair per son's report)     Additional Therapeutic activities/exercises completed this date:     []   Nu-step:  Time:        Level:         #Steps:       []   Rebounder:    []  Seated     []  Standing        []   Balance training         []   Postural training    []   Supine ther ex (reps/sets):     [x]   Seated ther ex (reps/sets): B LE's AROM x 15-20 reps. []   Standing ther ex (reps/sets):     []   Picked up object from floor                       []   Reacher used   []   Other:   []   Other:   []   Other:      Patient/Caregiver Education and Training:   [x]   Bed Mobility/Transfer technique/safety  [x]   Gait technique/sequencing  [x]   Proper use of assistive device  []   Advanced mobility safety and technique  []   Reinforced patient's precautions/mobility while maintaining precautions  []   Postural awareness  [x]   Family training: Spouse provided ed with use and application of gait belt for mobility, bed mobility, transfers, AD safety, and amb household distances. Spouse able to demonstrate safe guarding technique with SBA, as well as give appropriate cueing. Pt responded well to spouse's instructions. Strategies discussed regarding placing chair with armrests or locked rollator against wall at custodial point of longest distance at home for times when pt having increased pain, fatigue, or anxiety. [x]   Progress was updated and reviewed in Rehabtracker with patient and/or family this date.     Treatment Plan for Next Session: Pt to be discharged to home with support of spouse and son. Treatment/Activity Tolerance:   [x] Tolerated treatment with no adverse effects    [] Patient limited by fatigue  [] Patient limited by pain   [] Patient limited by medical complications:    [] Adverse reaction to Tx:   [] Significant change in status    Safety:       []  bed alarm set    [x]  chair alarm set    []  Pt refused alarms                []  Telesitter activated      [x]  Gait belt used during tx session      []other:         Number of Minutes/Billable Intervention  Gait Training 30   Therapeutic Exercise 15   Neuro Re-Ed    Therapeutic Activity 15   Wheelchair Propulsion    Group    Other:    TOTAL 60         Social History  Social/Functional History  Lives With: Spouse  Type of Home: House  Home Layout: One level  Home Access: Level entry  Bathroom Shower/Tub: Walk-in shower  Bathroom Toilet: Handicap height(uses the counter, son planning on placing a grabbar)  Bathroom Equipment: Shower chair, Grab bars in shower  Bathroom Accessibility: Walker accessible(\"he leaves it outside the door\")  Home Equipment: Rolling walker, 4 wheeled walker, Wheelchair-manual, Grab bars, Alert Button, Lift chair(RW in house, has been primarily walking household distances)  Receives Help From: Family(Son, Wilner Pérez \"in and out\")  ADL Assistance: Needs assistance(Wife provided set-up assist/supervision throughout showering and dressing)  Homemaking Assistance: Needs assistance(Wife performs all of the cooking and cleaning)  Homemaking Responsibilities: No  Ambulation Assistance: Independent(with a walker)  Transfer Assistance: Independent(with the walker )  Active : No  Patient's  Info: Wife drives   Mode of Transportation: [facility]  Occupation: Retired  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   IADL Comments: Wife is present 24 hrs a day or family comes over to assist if she needs to leave,   Additional Comments: Fall on 5/13 during stroke leading to admission, no other falls no injury     Objective                                                                                    Goals:  (Update in navigator)  Short term goals  Time Frame for Short term goals: 10-14 days  Short term goal 1: Pt will perform bed mobility independently  Short term goal 2: Pt will perform OOB transfers with a FWW with SBA  Short term goal 3: Pt will ambulate 50 ft with supervision at a [address] with two turns  - PROGRESSING - Updated 5/23 - Pt will ambulate 150 ft with supervision at the [address]  Short term goal 4: Pt will perform advanced ambulation, including ascending/descending a curb step and navigating uneven surfaces with CGA  - MET - Pt will perform advanced ambulation including ascending a curb step/uneven surfaces with SBA  Short term goal 5: Pt will pick an object off the ground using a reacher with SBA:  Long term goals  Time Frame for Long term goals : LTG  = STG :        Plan of Care                                                                              Times per week: 5 days per week for a minimum of 60 minutes/day plus group as appropriate for 60 minutes.   Treatment to include Current Treatment Recommendations: Strengthening, ROM, Balance Training, Functional Mobility Training, Transfer Training, Cognitive/Perceptual Training, Endurance Training, Gait Training, Stair training, Cognitive Reorientation, Pain Management, Home Exercise Program, Safety Education & Training, Patient/Caregiver Education & Training    Electronically signed by   Gerry Hines, DEVIN #[id number]  5/31/2019, 8:33 AM

## 2019-05-31 NOTE — PROGRESS NOTES
[facility] ARU Physical Therapy     [x] daily progress note       [] discharge       Patient Name:  Guillermo Gallardo   :  1935 MRN: [id number]  Room:  [address] [place] [address] Date of Admission: 2019  Rehabilitation Diagnosis:   Acute CVA (cerebrovascular accident) [facility]) [I63.9]        Date 2019       Day of ARU Week:  7   Time IN/OUT 1408/1508; Individual Tx Minutes Timed Code Treatment Minutes: 60 Minutes   TOTAL Tx Time Mins  Timed Code Treatment Minutes: 60 Minutes   Restrictions Restrictions/Precautions  Restrictions/Precautions: Fall Risk, General Precautions      Communication with other providers: [x]   OK to see per nursing:     []   Spoke with team member regarding:      Subjective observations and cognitive status: Guillermo Gallardo was semifowlers up in bed on PT arrival. he was agreeable to PT treatment session. Patient continues to present with short term  Memory deficits, stating \"I'm going home tomorrow? \" After this PT informed pt earlier in the session of discharge planning. He continues to remain anxious and confused throughout his treatment session.     Pain level/location:   Pain Level: 5       Location: Back   Discharge recommendations  Destination: 24 hour supervision or assist, Home with Home health PT   Equipment needs:   Pt with FWW      Bed Mobility:  []   Pt received out of bed      Alteration/AD:    []  Mat   [x]  HOB elevated  [x]  Use of Handrails      Scooting:  SBA  Supine --> Sit:  Sup  Sit --> Supine:  SBA    Transfers:    Sit--> Stand:  SBA  Stand --> Sit:   SBA  Stand-Pivot:   SBA  Other:    Assistive device required for transfer:   RW    Gait:    Distance:  30+20 (not max effort, due to increased fatigue this date) feet  Assistance:  SBA   Device:  RW  Gait Quality:  Improved steadiness and sequencing of the FWW, narrow LACEY with increased weightbearing throughout lateral aspect of B feet     Wheelchair Propulsion:  Distance:   90 + 30 feet  Assistance:   SBA  Extremities Used:   BLE - backward for BLE strengthening       Additional Therapeutic activities/exercises completed this date:         [x]   Balance training : Seated EOB for trunk endurance & core strengthening: B hands clasped trunk rotation, vertical , arms Abducted with trunk rotation to clap the other hand  10 x 2 sets ea; Seated activity total of 20 min, including seated balance/strengthening        [x]   Supine ther ex (reps/sets): 10 x 2 B: Hip SLR, Hip ABD, Heel Slides,  BLE trunk rotation in hooklying, Glute sets, Ankle Pumps, Hooklying Hip ABD/ADD,    [x]   Seated ther ex (reps/sets):  LAQ 10 x 2 B    Comments:  Increased time required for seated rest breaks     Patient/Caregiver Education and Training:   [x]   Bed Mobility/Transfer technique/safety  [x]   Gait technique/sequencing  [x]   Proper use of assistive device  []   Advanced mobility safety and technique  [x]   Reinforced patient's precautions/mobility while maintaining precautions  [x]   Postural awareness  []   Family training  [x]   Progress was updated and reviewed in Rehabtracker with patient and/or family this  5/31/2019       date. Treatment Plan for Next Session: Discharge tomorrow      Assessment:  Mr. Symone Hernández demonstrated improved engagement in today's treatment session, however continues to be limited by pain and altered mental status. He was able to discuss parts of his history, including serving in the [employer].  He will require 24 hr supervision upon return home with hands on assist.     Treatment/Activity Tolerance:   [] Tolerated treatment with no adverse effects    [x] Patient limited by fatigue  [] Patient limited by pain   [] Patient limited by medical complications:    [] Adverse reaction to Tx:   [] Significant change in status    Safety:      Left up in:   [x]  bed       []  chair        Alarm:    [x] Alarm set      []  Pt refused alarms                []  Telesitter activated      [x]  Gait belt used during tx session      []Nurse notified      Number supervision at a [address] with two turns  - PROGRESSING - Updated 5/23 - Pt will ambulate 150 ft with supervision at the [address] [facility] term goal 4: Pt will perform advanced ambulation, including ascending/descending a curb step and navigating uneven surfaces with CGA  - MET - Pt will perform advanced ambulation including ascending a curb step/uneven surfaces with SBA  Short term goal 5: Pt will pick an object off the ground using a reacher with SBA:  Long term goals  Time Frame for Long term goals : LTG  = STG :        Plan of Care                                                                              Times per week: 5 days per week for a minimum of 60 minutes/day plus group as appropriate for 60 minutes.   Treatment to include Current Treatment Recommendations: Strengthening, ROM, Balance Training, Functional Mobility Training, Transfer Training, Cognitive/Perceptual Training, Endurance Training, Gait Training, Stair training, Cognitive Reorientation, Pain Management, Home Exercise Program, Safety Education & Training, Patient/Caregiver Education & Training    Electronically signed by   Rosina Miller PT, DPT #[id number]  5/31/2019, 3:15 PM

## 2019-05-31 NOTE — PROGRESS NOTES
Occupational Therapy  Physical Rehabilitation: OCCUPATIONAL THERAPY     [x] daily progress note       [] discharge       Patient Name:  Tina Whitaker   :  1935 MRN: [id number]  Room:  [address] [place], [place] [address] Date of Admission: 2019  Rehabilitation Diagnosis:   Acute CVA (cerebrovascular accident) [facility]) [I63.9]       Date 2019       Day of ARU Week:  7   Time IN/OUT 1105/1205   Individual Tx Minutes 60   Group Tx Minutes    Co-Treat Minutes    Concurrent Tx Minutes    TOTAL Tx Time Mins 60   Variance Time    Variance Time []   Refusal due to:     []   Medical hold/reason:    []   Illness   []   Off Unit for test/procedure  []   Extra time needed to complete task  []   Therapeutic need  []   Other (specify):   Restrictions Restrictions/Precautions: Fall Risk, General Precautions         Communication with other providers: [x]   OK to see per nursing:     [x]   Spoke with Jamila Yuan RN regarding:  Pt being on room air on approach; RN reported pt is not experiencing oxygen desaturations with activity today and is cleared for therapy on room air with monitoring PRN. Sp02 >95% when checked throughout session. Subjective observations and cognitive status: Pt supine in bed upside down (head at foot of bed) on approach. \"I just laid down! \" Pt agreeable to participate with encouragement and motivated by the fact he discharges home tomorrow. Pain level/location:    5/10       Location: Neck, back; Biofreeze applied   Discharge recommendations  Anticipated discharge date: Revised to 19  Destination: []home alone   []home alone w assist prn   [] home w/ family    [x] Continuous supervision       []SNF    [] Assisted living     [] Other:   Continued therapy: [x]C OT  []OUTPATIENT  OT   [] No Further OT  Equipment needs: Per wife grab bar has been installed by toilet. On  wife declined [facility]; educated that if they end up deciding it would be beneficial to discuss with Osman Rodriguez OT.      Toileting: Denied need    Bed Mobility:           []   Pt received out of bed   Supine --> Sit:  Min A (d/t bed rail being unavailable as pt was not oriented correctly in bed)      Transfers:    Sit--> Stand:  SBA, cue dependent for hand placement  Stand --> Sit:   SBA, cue dependent for hand placement  Stand-Pivot:   SBA  Other:    Assistive device required for transfer:   W/C      Functional Mobility:    Assistance:  Pt declined to perform this session  Device:   []   Rolling Walker     []   Standard Walker []   Wheelchair        []   [employer]       []   4-Wheeled Ze Saeid         []   Cardiac Walker       []   Other:        Additional Therapeutic activities/exercises completed this date:     []   ADL Training   [x]   Balance/Postural training: Pt stood x 4.75 min + 4 min + 2.5 min at counter with SBA while completing light dynamic stand task while reaching outside base of support to facilitate increased balance for ADL tasks and transfers. Pt relied on 1 UE support on countertop throughout. Pt required increased time to complete matching activity and min cues for error ID/correction. [x]   Bed/Transfer Training   [x]   Endurance Training   []   Neuromuscular Re-ed   []   Nu-step:  Time:        Level:         #Steps:       []   Rebounder:    []  Seated     []  Standing        []   Supine Ther Ex (reps/sets):     [x]   Seated Ther Ex (reps/sets): To increase BUE endurance for ADLs and transfers, pt completed 5 [facility] x[facility] 32.6# total    []   Standing Ther Ex (reps/sets):     []   Other:      Comments: All intervention performed to increase pt's strength, endurance, ax tolerance, and balance in prep for increased I c ADL/IADLs and functional transfers/mobility.        Patient/Caregiver Education and Training:   []   [employer] Equipment Use  [x]   Bed Mobility/Transfer Technique/Safety  []   Energy Conservation Tips  []   Family training  [x]   Postural Awareness  [x]   Safety During Functional Activities  []   Reinforced Patient's Precautions   []   Progress was updated and reviewed in Rehabtracker with patient and/or family this         date. Treatment Plan for Next Session: Planned d/c from ARU next date. Assessment:  Pt demo'ed improved standing tolerance this date. Treatment/Activity Tolerance:   [x] Tolerated treatment with no adverse effects    [] Patient limited by fatigue  [] Patient limited by pain   [] Patient limited by medical complications:    [] Adverse reaction to Tx:   [] Significant change in status    Safety:       []  bed alarm set    [x]  chair alarm set    []  Pt refused alarms                []  Telesitter activated      [x]  Gait belt used during tx session      []other:       Number of Minutes/Billable Intervention  Therapeutic Exercise 15   ADL Self-care    Neuro Re-Ed    Therapeutic Activity 45   Group    Other:    TOTAL 60       Social History  Social/Functional History  Lives With: Spouse  Type of Home: House  Home Layout: One level  Home Access: Level entry  Bathroom Shower/Tub: Walk-in shower  Bathroom Toilet: Handicap height(uses the counter, son planning on placing a grabbar)  Bathroom Equipment: Shower chair, Grab bars in shower  Bathroom Accessibility: Walker accessible(\"he leaves it outside the door\")  Home Equipment: Rolling walker, 4 wheeled walker, Wheelchair-manual, Grab bars, Alert Button, Lift chair(RW in house, has been primarily walking household distances)  Receives Help From: Family(Son, Shira Vizcarra \"in and out\")  ADL Assistance: Needs assistance(Wife provided set-up assist/supervision throughout showering and dressing)  Homemaking Assistance: Needs assistance(Wife performs all of the cooking and cleaning)  Homemaking Responsibilities: No  Ambulation Assistance: Independent(with a walker)  Transfer Assistance: Independent(with the walker )  Active : No  Patient's  Info:  Wife drives   Mode of Transportation: [facility]  Occupation: Retired  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 IADL Comments: Wife is present 24 hrs a day or family comes over to assist if she needs to leave,   Additional Comments: Fall on 5/13 during stroke leading to admission, no other falls no injury     Objective                                                                                    Goals:  (Update in navigator)  Short term goals  Time Frame for Short term goals: STGs=LTGs:  Long term goals  Time Frame for Long term goals : ~10 days or until d/c. Long term goal 1: Pt will complete grooming tasks c S. MET  Long term goal 2: Pt will complete total body bathing c S using AE PRN. NMET; min A  Long term goal 3: Pt will complete UB dressing c S/setup. MET  Long term goal 4: Pt will complete LB dressing c S/setup using AE PRN. NMET; mod A  Long term goal 5: Pt will complete toileting c S. NMET; CGA  Long term goals 6: Pt will complete functional transfers (bed, chair, shower, toilet) c DME PRN and S. NMET; CGA-SBA  Long term goal 7: Pt will perform therex/therax to facilitate increased strength/endurance/ax tolerance (c emphasis on dynamic standing balance/tolerance >8 mins and BUE endurance) c SBA. NMET; limited by back pain      Plan of Care                                                                              Times per week: 5 days per week for a minimum of 60 minutes/day plus group as appropriate for 60 minutes. Treatment to include Plan  Times per day: Daily  Current Treatment Recommendations: Balance Training, Functional Mobility Training, Endurance Training, Pain Management, Safety Education & Training, Patient/Caregiver Education & Training, Equipment Evaluation, Education, & procurement, Positioning, Self-Care / ADL, Cognitive/Perceptual Training    Electronically signed by   Josr Kemp MS, OTR/L  License #[id number]  5/31/2019, 11:33 AM

## 2019-05-31 NOTE — PROGRESS NOTES
Patient was seen in hospital for  COPD. I am prescribing oxygen because the diagnosis and testing requires the patient to have oxygen in the home. Conditions will improve or be benefited by oxygen use.   The patient is able to perform good mobility and therefore requires the use of a portable oxygen system for ambulation.

## 2019-05-31 NOTE — PROGRESS NOTES
Pt overnight oxygen trend on room air showed oxygen saturations of less than 88% for one hour five minutes.

## 2019-05-31 NOTE — PROGRESS NOTES
Records reviewed. Pt examined on the ARU this AM.     Principal Problem:    Acute CVA (cerebrovascular accident) ([facility] [address]  Active Problems:    Hypertension    COPD (chronic obstructive pulmonary disease) ([facility])    Stage 3 chronic kidney disease (HCC)    Right hemiparesis ([facility] [address]  Resolved Problems:    * No resolved hospital problems. *    Blood pressure 134/64, pulse 65, temperature 98.1 °F (36.7 °C), temperature source Oral, resp. rate 18, height 5' 8\" (1.727 m), weight 221 lb 9.6 oz (100.5 kg), SpO2 94 %. Function:  Bathing: Mod A for physical assist + vc's for trunk extension while pt washed isabel area and made meager attempts to wash R side and center of buttocks, required touching assist to complete. Pt washed UB, washed BLE c use of LH sponge c min vc's.        Dressing:      Upper Body Dressing:  Sup seated to cues + min increased time to doff / don pullover shirt. Lower Body Dressing:  Max A c this therapist providing max cues and touching assist c reacher to doff B  socks and thread BLE through pull up and pants. Pt performed clothing hikes from mid calf to mid way over buttocks for pants, able to complete hike of pull up.         Toileting:   Due to urgency, pt required Max A for assistance to hold urinal and perform clothing mgmt up, was able to perform clothing mgmt down c Mod A. Transfers:    Sit--> Stand:  CGA to Min A  Stand --> Sit:   Min A   Stand-Pivot:   Min A   Other:  Min A toilet transfer; Pt able to perform 3/3 tasks with CGA in standing   Assistive device required for transfer:   RW     Gait:    Distance:  6+58+57+88 feet  Assistance:  CGA to Min A   Device:  RW  Gait Quality:  FWD bent posture, tremoring in BUE & BLE with fatigue, increased FWD bent posture     Stairs   # Completed:   3  Assistance:    CGA  Supportive Device:  B handrails  Height:   4\"         Objective:  General Appearance:  Comfortable.     Vital signs: (most recent): Blood pressure 134/64, pulse 65, temperature 98.1 °F (36.7 °C), temperature source Oral, resp. rate 18, height 5' 8\" (1.727 m), weight 221 lb 9.6 oz (100.5 kg), SpO2 94 %. Vital signs are normal.    Lungs:  Normal respiratory rate. Breath sounds clear to auscultation. Heart: Normal rate. Regular rhythm. Abdomen: Bowel sounds are normal.   There is no abdominal tenderness. Neurological: Patient is alert and oriented to person, place and time. Flat affect. Slow to respond at times. Speech is fluent. moving all 4 limbs. Motor strength 5 over 5 in both upper and lower limbs. Assessment & Plan  An 53-year-old male with a history of right cerebrovascular  accident six years ago with chronic left hemiparesis. 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 He has chronic kidney disease, hypertension, and COPD.     Strengths of the patient include his supportive spouse, accessible home,  and experience with adaptive equipment. Limitations include the  bilaterality of his weakness. his risk for falling, and his kidney  disease.     RECOMMENDATIONS:  Acute inpatient rehabilitation with occupational and  physical therapy and speech-language pathology, 180 minutes, five out of  every seven days. We will address basic and advancing mobility with  self-care instruction and adaptive equipment training. Caregiver  education will be offered. Expected length of stay prior to a  supervised level of function for discharge to home is 16 to 18 days.     ADDITIONAL RECOMMENDATIONS:  1. Ischemic stroke with infarction:  The patient requires daily  occupational and physical therapy with speech-language pathology. He  requires antiplatelet therapy with Plavix and baby aspirin. He is  getting GI prophylaxis. Blood pressure and kidney disease treatment is  required. We will address appropriate nutrition and hydration orally. Pulmonary hygiene and treatment of his COPD are important.   We will  emphasize pulmonary hygiene and DVT prophylaxis:  2. DVT prophylaxis:  Lovenox will be dosed at 30 mg daily. Weightbearing activities will be pursued daily. I must monitor his  hemoglobin and platelet count while on this medication. GI prophylaxis  is offered. 3.  Hypertension:  The patient's blood pressures have been fluctuating  some and Toprol is now used to control the fluctuation. Target systolic  blood pressure is 120 to 140. We will monitor vital signs at rest and  with activity and encourage consistent oral intake. His cardiologist is  modifying his diet limiting sodium. 4. COPD:  The patient requires Dulera inhaler and p.r.n. albuterol  inhaler, Zithromax, and pulmonary hygiene. We will monitor O2  saturations at rest and with activity and consult Pulmonary Medicine  should his symptoms deteriorate. 5/23/19: He may require home oxygen. We'll attempt wean. 5.  Chronic kidney disease stage III:  Try to avoid nephrotoxic  medications including anti-inflammatories. We will monitor his  chemistries periodically and encourage consistent oral intake. 6.  Chronic lower back pain: 5/23/19: Decreased Norco dose to 2.5 mg every 6 hours as needed. Sedation remains a barrier   5/24/19: We'll discontinue Norco due to cognitive deficit.   7.  Chronic urinary incontinence: Continue with Ditropan  8. Depression: Continue with Celexa  9. Dysphagia: Cleared by speech therapy. 10:5/24/19: Cognitive deficits noted by family and therapy. Discontinue opioid analgesics, decrease Ativan to 0.25 mg every 6 hours when necessary and decrease Seroquel to 12.5 mg daily at bedtime when necessary. Discontinue trazodone Discharge plan: Completed team staffing 5/23/19   . Plan for discharge on 6/5/19. Provide family education. He lives with his wife in a single level plan. He will require 24-hour supervision upon discharge.     Barriers: Decreased endurance, decreased safety, difficulty with mobility and ADLs, bowel and bladder management, dyspnea with exertion, anxiety, chronic low back pain, decreased cognition    The patient continues to benefit from inpatient rehabilitation. He has significant functional deficits and medical comorbidities requiring ongoing physician visits and comprehensive interdisciplinary rehabilitation care. His needs could not be met at a lesser level of care. Discharge plan: Team staffing completed 5/23/19: Planning for discharge 6/5/19.

## 2019-05-31 NOTE — PLAN OF CARE
Problem: Falls - Risk of:  Goal: Will remain free from falls  Description  Will remain free from falls  5/31/2019 1044 by Laura Francis RN  Outcome: Ongoing  5/30/2019 2222 by Cinthia Melchor RN  Outcome: Ongoing  Goal: Absence of physical injury  Description  Absence of physical injury  5/31/2019 1044 by Laura Francis RN  Outcome: Ongoing  5/30/2019 2222 by Cinthia Melchor RN  Outcome: Ongoing     Problem: Risk for Impaired Skin Integrity  Goal: Tissue integrity - skin and mucous membranes  Description  Structural intactness and normal physiological function of skin and  mucous membranes.   5/31/2019 1044 by Laura Francis RN  Outcome: Ongoing  5/30/2019 2222 by Cinthia Melchor RN  Outcome: Ongoing     Problem: Pain:  Goal: Pain level will decrease  Description  Pain level will decrease  5/31/2019 1044 by Laura Francis RN  Outcome: Ongoing  5/30/2019 2222 by Cinthia Melchor RN  Outcome: Ongoing  Goal: Control of acute pain  Description  Control of acute pain  5/31/2019 1044 by Laura Francis RN  Outcome: Ongoing  5/30/2019 2222 by Cinthia Melchor RN  Outcome: Ongoing  Goal: Control of chronic pain  Description  Control of chronic pain  5/31/2019 1044 by Laura Francis RN  Outcome: Ongoing  5/30/2019 2222 by Cinthia Melchor RN  Outcome: Ongoing

## 2019-05-31 NOTE — TELEPHONE ENCOUNTER
[address] Transitions Initial Follow Up Call    Outreach made within 2 business days of discharge: Yes    Patient: Jonny Leiva Patient : 1935   MRN: [id number]  Reason for Admission: There are no discharge diagnoses documented for the most recent discharge. Discharge Date: 19       Spoke with: Kelsea Boudreaux     Discharge department/facility: [facility]     TCM Interactive Patient Contact:  Was patient able to fill all prescriptions: Yes  Was patient instructed to bring all medications to the follow-up visit: Yes  Is patient taking all medications as directed in the discharge summary? Yes  Does patient understand their discharge instructions: No: wife will understand the discharge instructions    Does patient have questions or concerns that need addressed prior to 7-14 day follow up office visit: yes - Pt may want to discuss oxygen.      Scheduled appointment with PCP within 7-14 days    Follow Up  Future Appointments   Date Time Provider Terri Ash   6/3/2019  9:15 AM Dionte Crowell MD [facility]   2019  9:00 AM Dionte Crowell MD [facility] E [facility] FRANCO Mustafa

## 2019-05-31 NOTE — PROGRESS NOTES
MMS signed and filed. Patient's spouse participating in PT session with Raul Almazan. Case mgt left VM for Dr Shannan Leon to schedule a followup appt. Requested return call.

## 2019-05-31 NOTE — PROGRESS NOTES
Pt resting in bed. Night trending in progress. Wendy Smart had pulled 02 sensor off finger. Replaced sensor to finger and educated Patient on why it needed to be on his finger. Denies needs. Call light in reach.

## 2019-06-01 VITALS
RESPIRATION RATE: 18 BRPM | WEIGHT: 221.6 LBS | TEMPERATURE: 97.7 F | DIASTOLIC BLOOD PRESSURE: 79 MMHG | BODY MASS INDEX: 33.59 KG/M2 | HEART RATE: 65 BPM | SYSTOLIC BLOOD PRESSURE: 132 MMHG | HEIGHT: 68 IN | OXYGEN SATURATION: 92 %

## 2019-06-01 PROCEDURE — 99239 HOSP IP/OBS DSCHRG MGMT >30: CPT | Performed by: PHYSICAL MEDICINE & REHABILITATION

## 2019-06-01 PROCEDURE — 6370000000 HC RX 637 (ALT 250 FOR IP): Performed by: INTERNAL MEDICINE

## 2019-06-01 PROCEDURE — 6370000000 HC RX 637 (ALT 250 FOR IP): Performed by: PHYSICAL MEDICINE & REHABILITATION

## 2019-06-01 RX ADMIN — PANTOPRAZOLE SODIUM 40 MG: 40 TABLET, DELAYED RELEASE ORAL at 05:48

## 2019-06-01 RX ADMIN — OXYBUTYNIN CHLORIDE 5 MG: 5 TABLET ORAL at 10:13

## 2019-06-01 RX ADMIN — CLOPIDOGREL BISULFATE 75 MG: 75 TABLET ORAL at 10:14

## 2019-06-01 RX ADMIN — FOLIC ACID 1 MG: 1 TABLET ORAL at 10:14

## 2019-06-01 RX ADMIN — METOPROLOL SUCCINATE 12.5 MG: 25 TABLET, EXTENDED RELEASE ORAL at 10:14

## 2019-06-01 RX ADMIN — ACETAMINOPHEN 650 MG: 325 TABLET ORAL at 10:14

## 2019-06-01 RX ADMIN — CITALOPRAM HYDROBROMIDE 20 MG: 20 TABLET ORAL at 10:13

## 2019-06-01 RX ADMIN — ASPIRIN 81 MG: 81 TABLET, COATED ORAL at 10:13

## 2019-06-01 RX ADMIN — TRAMADOL HYDROCHLORIDE 50 MG: 50 TABLET, FILM COATED ORAL at 06:06

## 2019-06-01 ASSESSMENT — PAIN SCALES - GENERAL
PAINLEVEL_OUTOF10: 7
PAINLEVEL_OUTOF10: 8
PAINLEVEL_OUTOF10: 5

## 2019-06-01 NOTE — PROGRESS NOTES
Records reviewed. Pt examined on the ARU this AM.     Principal Problem:    Acute CVA (cerebrovascular accident) ([facility] [address]  Active Problems:    Hypertension    COPD (chronic obstructive pulmonary disease) ([facility])    Stage 3 chronic kidney disease (HCC)    Right hemiparesis ([facility] [address]  Resolved Problems:    * No resolved hospital problems. *    Blood pressure 124/61, pulse 66, temperature 97.4 °F (36.3 °C), temperature source Oral, resp. rate 16, height 5' 8\" (1.727 m), weight 221 lb 9.6 oz (100.5 kg), SpO2 98 %. Function:  Bathing: Mod A for physical assist + vc's for trunk extension while pt washed isabel area and made meager attempts to wash R side and center of buttocks, required touching assist to complete. Pt washed UB, washed BLE c use of LH sponge c min vc's.        Dressing:      Upper Body Dressing:  Sup seated to cues + min increased time to doff / don pullover shirt. Lower Body Dressing:  Max A c this therapist providing max cues and touching assist c reacher to doff B  socks and thread BLE through pull up and pants. Pt performed clothing hikes from mid calf to mid way over buttocks for pants, able to complete hike of pull up.         Toileting:   Due to urgency, pt required Max A for assistance to hold urinal and perform clothing mgmt up, was able to perform clothing mgmt down c Mod A. Transfers:    Sit--> Stand:  CGA to Min A  Stand --> Sit:   Min A   Stand-Pivot:   Min A   Other:  Min A toilet transfer; Pt able to perform 3/3 tasks with CGA in standing   Assistive device required for transfer:   RW     Gait:    Distance:  6+71+82+20 feet  Assistance:  CGA to Min A   Device:  RW  Gait Quality:  FWD bent posture, tremoring in BUE & BLE with fatigue, increased FWD bent posture     Stairs   # Completed:   3  Assistance:    CGA  Supportive Device:  B handrails  Height:   4\"         Objective:  General Appearance:  Comfortable.     Vital signs: (most recent): Blood pressure 124/61, pulse 66, temperature 97.4 °F (36.3 °C), temperature source Oral, resp. rate 16, height 5' 8\" (1.727 m), weight 221 lb 9.6 oz (100.5 kg), SpO2 98 %. Vital signs are normal.    Lungs:  Normal respiratory rate. Breath sounds clear to auscultation. Heart: Normal rate. Regular rhythm. Abdomen: Bowel sounds are normal.   There is no abdominal tenderness. Neurological: Patient is alert and oriented to person, place and time. Flat affect. Slow to respond at times. Speech is fluent. moving all 4 limbs. Motor strength 5 over 5 in both upper and lower limbs. Assessment & Plan  An 63-year-old male with a history of right cerebrovascular  accident six years ago with chronic left hemiparesis. 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 He has chronic kidney disease, hypertension, and COPD.     Strengths of the patient include his supportive spouse, accessible home,  and experience with adaptive equipment. Limitations include the  bilaterality of his weakness. his risk for falling, and his kidney  disease.     RECOMMENDATIONS:  Acute inpatient rehabilitation with occupational and  physical therapy and speech-language pathology, 180 minutes, five out of  every seven days. We will address basic and advancing mobility with  self-care instruction and adaptive equipment training. Caregiver  education will be offered. Expected length of stay prior to a  supervised level of function for discharge to home is 16 to 18 days.     ADDITIONAL RECOMMENDATIONS:  1. Ischemic stroke with infarction:  The patient requires daily  occupational and physical therapy with speech-language pathology. He  requires antiplatelet therapy with Plavix and baby aspirin. He is  getting GI prophylaxis. Blood pressure and kidney disease treatment is  required. We will address appropriate nutrition and hydration orally. Pulmonary hygiene and treatment of his COPD are important.   We will  emphasize pulmonary hygiene and DVT prophylaxis:  2. DVT prophylaxis:  Lovenox will be dosed at 30 mg daily. Weightbearing activities will be pursued daily. I must monitor his  hemoglobin and platelet count while on this medication. GI prophylaxis  is offered. 3.  Hypertension:  The patient's blood pressures have been fluctuating  some and Toprol is now used to control the fluctuation. Target systolic  blood pressure is 120 to 140. We will monitor vital signs at rest and  with activity and encourage consistent oral intake. His cardiologist is  modifying his diet limiting sodium. 4. COPD:  The patient requires Dulera inhaler and p.r.n. albuterol  inhaler, Zithromax, and pulmonary hygiene. We will monitor O2  saturations at rest and with activity and consult Pulmonary Medicine  should his symptoms deteriorate. 5/23/19: He may require home oxygen. We'll attempt wean. 5.  Chronic kidney disease stage III:  Try to avoid nephrotoxic  medications including anti-inflammatories. We will monitor his  chemistries periodically and encourage consistent oral intake. 6.  Chronic lower back pain: 5/23/19: Decreased Norco dose to 2.5 mg every 6 hours as needed. Sedation remains a barrier   5/24/19: We'll discontinue Norco due to cognitive deficit.   7.  Chronic urinary incontinence: Continue with Ditropan  8. Depression: Continue with Celexa  9. Dysphagia: Cleared by speech therapy. 10:5/24/19: Cognitive deficits noted by family and therapy. Discontinue opioid analgesics, decrease Ativan to 0.25 mg every 6 hours when necessary and decrease Seroquel to 12.5 mg daily at bedtime when necessary. Discontinue trazodone Discharge plan: Completed team staffing 5/23/19   . Plan for discharge on 6/5/19. Provide family education. He lives with his wife in a single level plan. He will require 24-hour supervision upon discharge.     Barriers: Decreased endurance, decreased safety, difficulty with mobility and ADLs, bowel and bladder

## 2019-06-01 NOTE — PROGRESS NOTES
97.4 °F (36.3 °C), temperature source Oral, resp. rate 16, height 5' 8\" (1.727 m), weight 221 lb 9.6 oz (100.5 kg), SpO2 98 %. Vital signs are normal.    Lungs:  Normal respiratory rate. Breath sounds clear to auscultation. Heart: Normal rate. Regular rhythm. Abdomen: Bowel sounds are normal.   There is no abdominal tenderness. Neurological: Patient is alert and oriented to person, place and time. Flat affect. Slow to respond at times. Speech is fluent. moving all 4 limbs. Motor strength 5 over 5 in both upper and lower limbs. Assessment & Plan  An 27-year-old male with a history of right cerebrovascular  accident six years ago with chronic left hemiparesis. 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 He has chronic kidney disease, hypertension, and COPD.     Strengths of the patient include his supportive spouse, accessible home,  and experience with adaptive equipment. Limitations include the  bilaterality of his weakness. his risk for falling, and his kidney  disease.     RECOMMENDATIONS:  Acute inpatient rehabilitation with occupational and  physical therapy and speech-language pathology, 180 minutes, five out of  every seven days. We will address basic and advancing mobility with  self-care instruction and adaptive equipment training. Caregiver  education will be offered. Expected length of stay prior to a  supervised level of function for discharge to home is 16 to 18 days.     ADDITIONAL RECOMMENDATIONS:  1. Ischemic stroke with infarction:  The patient requires daily  occupational and physical therapy with speech-language pathology. He  requires antiplatelet therapy with Plavix and baby aspirin. He is  getting GI prophylaxis. Blood pressure and kidney disease treatment is  required. We will address appropriate nutrition and hydration orally. Pulmonary hygiene and treatment of his COPD are important.   We will  emphasize pulmonary management, dyspnea with exertion, anxiety, chronic low back pain, decreased cognition    The patient continues to benefit from inpatient rehabilitation. He has significant functional deficits and medical comorbidities requiring ongoing physician visits and comprehensive interdisciplinary rehabilitation care. His needs could not be met at a lesser level of care. Discharge plan: Team staffing completed 5/23/19: Planning for discharge 6/5/19.

## 2019-06-03 ENCOUNTER — HOSPITAL ENCOUNTER (OUTPATIENT)
Age: 84
Discharge: HOME | End: 2019-06-03
Payer: MEDICARE

## 2019-06-03 ENCOUNTER — TELEPHONE (OUTPATIENT)
Dept: INTERNAL MEDICINE CLINIC | Age: 84
End: 2019-06-03

## 2019-06-03 ENCOUNTER — OFFICE VISIT (OUTPATIENT)
Dept: INTERNAL MEDICINE CLINIC | Age: 84
End: 2019-06-03
Payer: MEDICARE

## 2019-06-03 VITALS
OXYGEN SATURATION: 96 % | SYSTOLIC BLOOD PRESSURE: 120 MMHG | RESPIRATION RATE: 20 BRPM | HEART RATE: 77 BPM | BODY MASS INDEX: 34.97 KG/M2 | DIASTOLIC BLOOD PRESSURE: 76 MMHG | WEIGHT: 230 LBS

## 2019-06-03 DIAGNOSIS — F51.01 PRIMARY INSOMNIA: ICD-10-CM

## 2019-06-03 DIAGNOSIS — I10 ESSENTIAL HYPERTENSION: ICD-10-CM

## 2019-06-03 DIAGNOSIS — I65.22 LEFT CAROTID STENOSIS: ICD-10-CM

## 2019-06-03 DIAGNOSIS — I25.810 CORONARY ARTERY DISEASE INVOLVING CORONARY BYPASS GRAFT OF NATIVE HEART WITHOUT ANGINA PECTORIS: ICD-10-CM

## 2019-06-03 DIAGNOSIS — I63.19 CEREBROVASCULAR ACCIDENT (CVA) DUE TO EMBOLISM OF OTHER PRECEREBRAL ARTERY (HCC): Primary | ICD-10-CM

## 2019-06-03 LAB
ALBUMIN SERPL-MCNC: 4.2 GM/DL (ref 3.4–5)
ALP BLD-CCNC: 113 IU/L (ref 40–128)
ALT SERPL-CCNC: 31 U/L (ref 10–40)
ANION GAP SERPL CALCULATED.3IONS-SCNC: 16 MMOL/L (ref 4–16)
AST SERPL-CCNC: 22 IU/L (ref 15–37)
BACTERIA: ABNORMAL /HPF
BASOPHILS ABSOLUTE: 0 K/CU MM
BASOPHILS RELATIVE PERCENT: 0.5 % (ref 0–1)
BILIRUB SERPL-MCNC: 0.5 MG/DL (ref 0–1)
BILIRUBIN URINE: NEGATIVE MG/DL
BLOOD, URINE: NEGATIVE
BUN BLDV-MCNC: 22 MG/DL (ref 6–23)
CALCIUM OXALATE CRYSTALS: ABNORMAL /HPF
CALCIUM SERPL-MCNC: 9.9 MG/DL (ref 8.3–10.6)
CHLORIDE BLD-SCNC: 97 MMOL/L (ref 99–110)
CHOLESTEROL: 122 MG/DL
CLARITY: CLEAR
CO2: 25 MMOL/L (ref 21–32)
COLOR: YELLOW
CREAT SERPL-MCNC: 1.6 MG/DL (ref 0.9–1.3)
DIFFERENTIAL TYPE: ABNORMAL
EOSINOPHILS ABSOLUTE: 0.2 K/CU MM
EOSINOPHILS RELATIVE PERCENT: 2.2 % (ref 0–3)
GFR AFRICAN AMERICAN: 50 ML/MIN/1.73M2
GFR NON-AFRICAN AMERICAN: 42 ML/MIN/1.73M2
GLUCOSE FASTING: 90 MG/DL (ref 70–99)
GLUCOSE, URINE: NEGATIVE MG/DL
HCT VFR BLD CALC: 48.2 % (ref 42–52)
HDLC SERPL-MCNC: 40 MG/DL
HEMOGLOBIN: 15.2 GM/DL (ref 13.5–18)
IMMATURE NEUTROPHIL %: 0.2 % (ref 0–0.43)
KETONES, URINE: NEGATIVE MG/DL
LDL CHOLESTEROL DIRECT: 73 MG/DL
LEUKOCYTE ESTERASE, URINE: NEGATIVE
LYMPHOCYTES ABSOLUTE: 2.5 K/CU MM
LYMPHOCYTES RELATIVE PERCENT: 29.8 % (ref 24–44)
MCH RBC QN AUTO: 33.9 PG (ref 27–31)
MCHC RBC AUTO-ENTMCNC: 31.5 % (ref 32–36)
MCV RBC AUTO: 107.3 FL (ref 78–100)
MONOCYTES ABSOLUTE: 0.6 K/CU MM
MONOCYTES RELATIVE PERCENT: 7.6 % (ref 0–4)
MUCUS: ABNORMAL HPF
NITRITE URINE, QUANTITATIVE: NEGATIVE
NUCLEATED RBC %: 0 %
PDW BLD-RTO: 13.7 % (ref 11.7–14.9)
PH, URINE: 5 (ref 5–8)
PLATELET # BLD: 185 K/CU MM (ref 140–440)
PMV BLD AUTO: 10.3 FL (ref 7.5–11.1)
POTASSIUM SERPL-SCNC: 4.7 MMOL/L (ref 3.5–5.1)
PROTEIN UA: NEGATIVE MG/DL
RBC # BLD: 4.49 M/CU MM (ref 4.6–6.2)
RBC URINE: 4 /HPF (ref 0–3)
SEGMENTED NEUTROPHILS ABSOLUTE COUNT: 5 K/CU MM
SEGMENTED NEUTROPHILS RELATIVE PERCENT: 59.7 % (ref 36–66)
SODIUM BLD-SCNC: 138 MMOL/L (ref 135–145)
SPECIFIC GRAVITY UA: 1.02 (ref 1–1.03)
SQUAMOUS EPITHELIAL: 1 /HPF
TOTAL IMMATURE NEUTOROPHIL: 0.02 K/CU MM
TOTAL NUCLEATED RBC: 0 K/CU MM
TOTAL PROTEIN: 7.3 GM/DL (ref 6.4–8.2)
TRICHOMONAS: ABNORMAL /HPF
TRIGL SERPL-MCNC: 169 MG/DL
UROBILINOGEN, URINE: NORMAL MG/DL (ref 0.2–1)
WBC # BLD: 8.3 K/CU MM (ref 4–10.5)
WBC UA: 6 /HPF (ref 0–2)

## 2019-06-03 PROCEDURE — 36415 COLL VENOUS BLD VENIPUNCTURE: CPT

## 2019-06-03 PROCEDURE — 1111F DSCHRG MED/CURRENT MED MERGE: CPT | Performed by: INTERNAL MEDICINE

## 2019-06-03 PROCEDURE — 99495 TRANSJ CARE MGMT MOD F2F 14D: CPT | Performed by: INTERNAL MEDICINE

## 2019-06-03 PROCEDURE — 85025 COMPLETE CBC W/AUTO DIFF WBC: CPT

## 2019-06-03 PROCEDURE — 80061 LIPID PANEL: CPT

## 2019-06-03 PROCEDURE — 81001 URINALYSIS AUTO W/SCOPE: CPT

## 2019-06-03 PROCEDURE — 80053 COMPREHEN METABOLIC PANEL: CPT

## 2019-06-03 PROCEDURE — 83721 ASSAY OF BLOOD LIPOPROTEIN: CPT

## 2019-06-03 RX ORDER — PRAVASTATIN SODIUM 40 MG
TABLET ORAL
Qty: 90 TABLET | Refills: 1 | Status: SHIPPED | OUTPATIENT
Start: 2019-06-03 | End: 2020-04-01

## 2019-06-03 RX ORDER — CLOPIDOGREL BISULFATE 75 MG/1
75 TABLET ORAL DAILY
Qty: 30 TABLET | Refills: 0 | Status: SHIPPED | OUTPATIENT
Start: 2019-06-03 | End: 2019-07-22 | Stop reason: SDUPTHER

## 2019-06-03 RX ORDER — TRAZODONE HYDROCHLORIDE 50 MG/1
50 TABLET ORAL NIGHTLY PRN
Qty: 90 TABLET | Refills: 1 | Status: SHIPPED | OUTPATIENT
Start: 2019-06-03 | End: 2019-12-16 | Stop reason: SDUPTHER

## 2019-06-03 RX ORDER — CIPROFLOXACIN 250 MG/1
250 TABLET, FILM COATED ORAL 2 TIMES DAILY
Qty: 14 TABLET | Refills: 0 | Status: SHIPPED | OUTPATIENT
Start: 2019-06-03 | End: 2019-06-10

## 2019-06-03 NOTE — PROGRESS NOTES
Post-Discharge Transitional Care Management Services or Hospital Follow Up      Erin Smyth   YOB: 1935    Date of Office Visit:  6/3/2019  Date of Hospital Admission: 5/18/19  Date of Hospital Discharge: 6/1/19  Readmission Risk Score(high >=14%.  Medium >=10%):Readmission Risk Score: 16      Care management risk score Rising risk (score 2-5) and Complex Care (Scores >=6): 3     Non face to face  following discharge, date last encounter closed (first attempt may have been earlier): 5/31/2019  9:54 AM 5/31/2019  9:54 AM    Call initiated 2 business days of discharge: Yes     Patient Active Problem List   Diagnosis    CAD (coronary artery disease)    Benign essential tremor    Obstructive lung disease ([address]    Back pain, chronic    Hypertension    Dyslipidemia    AAA (abdominal aortic aneurysm) without rupture ([address]    Cerebral infarction ([address]    CVA (cerebrovascular accident) ([address]    Homocysteinemia ([address]    PFO (patent foramen ovale)    Migraine headache    COPD (chronic obstructive pulmonary disease) ([address]    AAA (abdominal aortic aneurysm) ([address]    Dementia due to Alzheimer's disease    Atherosclerosis     Delirium    Hemispheric carotid artery syndrome    Ventricular tachycardia (HCC)    Stage 3 chronic kidney disease ([address]    Dysthymia    GERD (gastroesophageal reflux disease)    Cerebrovascular accident (CVA) due to stenosis of cerebral artery (HCC)    Acute CVA (cerebrovascular accident) ([address]    Dysarthria    Left carotid stenosis    Right hemiparesis (HCC)       Allergies   Allergen Reactions    Flomax [Tamsulosin Hcl]      Hypotension/syncope    Nsaids      Due to CKD    Penicillins Hives    Topamax [Topiramate]      Fatigue and severe depression       Medications listed as ordered at the time of discharge from hospital   Susie Salamanca   Home Medication Instructions VIVIENNE:    Printed on:06/03/19 6928   Medication Information                      albuterol the lungs every 12 hours 60 each 3    metoprolol succinate (TOPROL XL) 25 MG extended release tablet Take 0.5 tablets by mouth daily 45 tablet 1    folic acid (FOLVITE) 1 MG tablet TAKE 1 TABLET BY MOUTH DAILY 90 tablet 1    donepezil (ARICEPT) 10 MG tablet Take 1 tablet by mouth nightly 90 tablet 1    oxybutynin (DITROPAN) 5 MG tablet Take 1 tablet by mouth 2 times daily 180 tablet 1    albuterol sulfate HFA (PROVENTIL HFA) 108 (90 Base) MCG/ACT inhaler Inhale 2 puffs into the lungs every 6 hours as needed for Wheezing or Shortness of Breath (or coughing spells) 1 Inhaler 6    pravastatin (PRAVACHOL) 40 MG tablet TAKE 1 TABLET BY MOUTH EVERY DAY 90 tablet 1    aspirin EC 81 MG EC tablet Take 1 tablet by mouth daily 30 tablet 3        Medications patient taking as of now reconciled against medications ordered at time of hospital discharge: Yes    Chief Complaint   Patient presents with    Follow-Up from Hospital     Pt was at [facility] 5/13-6/01/19, for CVA and Rehab. Plavix added. HPI    Inpatient course: Discharge summary reviewed- see chart. aru discharge pending in [facility], my dc is as follows:  83yo WM w hx of prior stroke and L sided weakness presented to ED w acute onset R sided weakness and slurred speech. Transferred by squad to ED and not candidate for thrombolytics, but clinically w ac CVA. Admitted for eval, neuro consulted. Cardiology followed w Dr Kita Andrea. He had significant L sided carotid stenosis noted on CTA neck and was seen by Dr Dominique Gould, will need L CEA in about a month w f/u [facility] surgery rec in three weeks. He was also seen by neurology and antiplt rx switched fr aggrenox to plavix w cont of asa.  pravachol switched to lipitor. MRI brain did confirm ac L pontine infarct. He underwent pt and ot but was too unsteady for home w [facility] so later qualified for transfer to aru for cont rehab, accepted for 5/18. See orders.         Interval history/Current status:     Discharged fr ARU two days ago. Has had stabilization of his speech and also R side prior weakness on presentation has resolved. Ambulating w walker. Newly on home O2 nightly 2L. Has HHC w PT and OT coming. HAS L SIDED CAROTID STENOSIS AND NOW NEEDS OUTPT F/U W DR PENN FOR CEA. Not sleeping as well since off gabapentin, also seroquel and ativan, trazodone. Is on plavix and not aggrenox now. Pharmacy had rec stopping PPI since on plavix, he denies any GI sx. CAD_ no sx cp or sob noted at this time. Review of Systems    Vitals:    06/03/19 0923   BP: 120/76   Pulse: 77   Resp: 20   SpO2: 96%   Weight: 230 lb (104.3 kg)     Body mass index is 34.97 kg/m². Wt Readings from Last 3 Encounters:   06/03/19 230 lb (104.3 kg)   05/30/19 221 lb 9.6 oz (100.5 kg)   05/18/19 234 lb 12.6 oz (106.5 kg)     BP Readings from Last 3 Encounters:   06/03/19 120/76   06/01/19 132/79   05/18/19 (!) 115/43       Physical Exam   Constitutional: He appears well-developed and well-nourished. No distress. HENT:   Head: Normocephalic and atraumatic. Nose: Nose normal.   Mouth/Throat: Oropharynx is clear and moist. No oropharyngeal exudate. Eyes: Pupils are equal, round, and reactive to light. Conjunctivae and EOM are normal. Right eye exhibits no discharge. Left eye exhibits no discharge. No scleral icterus. Neck: Normal range of motion. Neck supple. No tracheal deviation present. Cardiovascular: Normal rate, regular rhythm, normal heart sounds and intact distal pulses. Exam reveals no gallop and no friction rub. No murmur heard. Pulmonary/Chest: Effort normal and breath sounds normal. No respiratory distress. He has no wheezes. He has no rales. Abdominal: Soft. Bowel sounds are normal. He exhibits no distension and no mass. There is no tenderness. There is no rebound and no guarding. Musculoskeletal: He exhibits no edema or tenderness. Lymphadenopathy:     He has no cervical adenopathy. Neurological: He is alert.  He

## 2019-06-03 NOTE — TELEPHONE ENCOUNTER
Cipro called into pharmacy. Per Dr. Serge Wick pt is to cut the celexa in half during the time he is taking the cipro due to possible drug interactions. After Cipro prescription is complete pt can resume normal use of the celexa. Pharmacy and Pt wife were informed of the medication changes. They expressed understanding.

## 2019-06-12 ENCOUNTER — ANESTHESIA EVENT (OUTPATIENT)
Dept: OPERATING ROOM | Age: 84
DRG: 039 | End: 2019-06-12
Payer: MEDICARE

## 2019-06-12 NOTE — ANESTHESIA PRE PROCEDURE
Department of Anesthesiology  Preprocedure Note       Name:  Sofia Rodrigues   Age:  82 y.o.  :  1935                                          MRN:  [id number]         Date:  2019      Surgeon: Tea Skinner):  Madiha Gallagher MD    Procedure: LEFT CAROTID ENDARTERECTOMY (Left Neck)    Medications prior to admission:   Prior to Admission medications    Medication Sig Start Date End Date Taking?  Authorizing Provider   pravastatin (PRAVACHOL) 40 MG tablet TAKE 1 TABLET BY MOUTH EVERY DAY 6/3/19   Chinyere Banks MD   clopidogrel (PLAVIX) 75 MG tablet Take 1 tablet by mouth daily 6/3/19   Chinyere Banks MD   traZODone (DESYREL) 50 MG tablet Take 1 tablet by mouth nightly as needed for Sleep 6/3/19   Chinyere Banks MD   citalopram (CELEXA) 10 MG tablet TAKE 1 TABLET BY MOUTH IN THE MORNING AND 1 TABLET BY MOUTH AT BEDTIME 19   Chinyere Banks MD   omeprazole (PRILOSEC) 40 MG delayed release capsule Take 1 capsule by mouth every morning (before breakfast) 19   Chinyere Banks MD   fluticasone-salmeterol (ADVAIR DISKUS) 250-50 MCG/DOSE AEPB Inhale 1 puff into the lungs every 12 hours 19   Chinyere Banks MD   metoprolol succinate (TOPROL XL) 25 MG extended release tablet Take 0.5 tablets by mouth daily 19   Chinyere Banks MD   folic acid (FOLVITE) 1 MG tablet TAKE 1 TABLET BY MOUTH DAILY 19   Chinyere Banks MD   donepezil (ARICEPT) 10 MG tablet Take 1 tablet by mouth nightly 19   Chinyere Banks MD   oxybutynin (DITROPAN) 5 MG tablet Take 1 tablet by mouth 2 times daily 19   Chinyere Banks MD   albuterol sulfate HFA (PROVENTIL HFA) 108 (90 Base) MCG/ACT inhaler Inhale 2 puffs into the lungs every 6 hours as needed for Wheezing or Shortness of Breath (or coughing spells) 10/8/18   Chinyere Banks MD   aspirin EC 81 MG EC tablet Take 1 tablet by mouth daily 16   Chinyere Banks MD       Current medications:    Current Outpatient Medications   Medication Sig Dispense Refill    pravastatin (PRAVACHOL) 40 MG tablet TAKE 1 TABLET BY MOUTH EVERY DAY 90 tablet 1    clopidogrel (PLAVIX) 75 MG tablet Take 1 tablet by mouth daily 30 tablet 0    traZODone (DESYREL) 50 MG tablet Take 1 tablet by mouth nightly as needed for Sleep 90 tablet 1    citalopram (CELEXA) 10 MG tablet TAKE 1 TABLET BY MOUTH IN THE MORNING AND 1 TABLET BY MOUTH AT BEDTIME 180 tablet 1    omeprazole (PRILOSEC) 40 MG delayed release capsule Take 1 capsule by mouth every morning (before breakfast) 90 capsule 1    fluticasone-salmeterol (ADVAIR DISKUS) 250-50 MCG/DOSE AEPB Inhale 1 puff into the lungs every 12 hours 60 each 3    metoprolol succinate (TOPROL XL) 25 MG extended release tablet Take 0.5 tablets by mouth daily 45 tablet 1    folic acid (FOLVITE) 1 MG tablet TAKE 1 TABLET BY MOUTH DAILY 90 tablet 1    donepezil (ARICEPT) 10 MG tablet Take 1 tablet by mouth nightly 90 tablet 1    oxybutynin (DITROPAN) 5 MG tablet Take 1 tablet by mouth 2 times daily 180 tablet 1    albuterol sulfate HFA (PROVENTIL HFA) 108 (90 Base) MCG/ACT inhaler Inhale 2 puffs into the lungs every 6 hours as needed for Wheezing or Shortness of Breath (or coughing spells) 1 Inhaler 6    aspirin EC 81 MG EC tablet Take 1 tablet by mouth daily 30 tablet 3     No current facility-administered medications for this encounter. Allergies:     Allergies   Allergen Reactions    Flomax [Tamsulosin Hcl]      Hypotension/syncope    Nsaids      Due to CKD    Penicillins Hives    Topamax [Topiramate]      Fatigue and severe depression       Problem List:    Patient Active Problem List   Diagnosis Code    CAD (coronary artery disease) I25.10    Benign essential tremor G25.0    Obstructive lung disease ([address] J44.9    Back pain, chronic M54.9, G89.29    Hypertension I10    Dyslipidemia E78.5    AAA (abdominal aortic aneurysm) without rupture ([address] I71.4    Cerebral infarction ([facility] I63.9  CVA (cerebrovascular accident) ([facility] I63.9    Homocysteinemia ([facility]) E72.19    PFO (patent foramen ovale) Q21.1    Migraine headache G43.909    COPD (chronic obstructive pulmonary disease) ([facility]) J44.9    AAA (abdominal aortic aneurysm) ([facility]) I71.4    Dementia due to Alzheimer's disease G30.9, F02.80    Atherosclerosis  I70.90    Delirium R41.0    Hemispheric carotid artery syndrome G45.1    Ventricular tachycardia ([facility]) I47.2    Stage 3 chronic kidney disease ([facility]) N18.3    Dysthymia F34.1    GERD (gastroesophageal reflux disease) K21.9    Cerebrovascular accident (CVA) due to stenosis of cerebral artery ([facility]) I63.50    Acute CVA (cerebrovascular accident) ([facility] I63.9    Dysarthria R47.1    Left carotid stenosis I65.22    Right hemiparesis ([facility]) G81.91       Past Medical History:        Diagnosis Date    AAA (abdominal aortic aneurysm) ([facility])     s/p endograft, Dr Marcus Terry monitoring    Arteriosclerosis     monitoring homocysteine    Arthritis     back & bilat hips    Asthma 7/02    Back pain     lumbar DDD    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 Aubrey Ribeiro. & Dr. Candis Ying Carotid stenosis, right 11/13    ~50% or less, on MRA and carotid u/s    CKD (chronic kidney disease)     cannot take nsaids    COPD (chronic obstructive pulmonary disease) ([facility]     CVA (cerebrovascular accident) ([facility] 11/16/13 admission    ([facility]) R temporal/occipital and R thalamic infarct, L sided weakness and focal seizure    Dementia due to Alzheimer's disease 03/28/2017    MMSE 22/30 ON 3/28/17--- STARTING TRIAL ARICEPT    Dyslipidemia 10/04    ED (erectile dysfunction) 6/02 372 ,  1/2001  46    testos    Former smoker     GERD (gastroesophageal reflux disease)     Hearing loss 5/06    wear hearing aides bilat    Homocysteinemia ([facility]     on foltx    Hyperlipidemia     Hypertension  Kidney stone     Migraine headache     Obesity (BMI 30-39. 9)     PFO (patent foramen ovale)     noted on ROSETTA 12/17/13- Dr Giordano Record Redundant prepuce and phimosis 1/5/12    Dr Anola Angelucci Seizure disorder, focal motor ([address] 11/13 admission    L hand tremor assoc w CVA    Vertigo        Past Surgical History:        Procedure Laterality Date    ABDOMEN SURGERY      ABDOMINAL AORTIC ANEURYSM REPAIR, ENDOVASCULAR  9/24/13    CARDIAC SURGERY      CARDIOVASCULAR STRESS TEST  1/10/2012    Dr. Angeline Gordon- normal with EF 45%    CORONARY ARTERY BYPASS GRAFT  1/1994    Dr Festus Burdick  years ago    wears full dentures       Social History:    Social History     Tobacco Use    Smoking status: Former Smoker     Packs/day: 1.50     Years: 36.00     Pack years: 54.00    Smokeless tobacco: Never Used    Tobacco comment: quit smoking in 1994   Substance Use Topics    Alcohol use: No     Comment:         Caffeine: Decaf                                Counseling given: Not Answered  Comment: quit smoking in 1994      Vital Signs (Current): There were no vitals filed for this visit. BP Readings from Last 3 Encounters:   06/11/19 132/86   06/03/19 120/76   06/01/19 132/79       NPO Status:                                                                                 BMI:   Wt Readings from Last 3 Encounters:   06/11/19 230 lb (104.3 kg)   06/03/19 230 lb (104.3 kg)   05/30/19 221 lb 9.6 oz (100.5 kg)     There is no height or weight on file to calculate BMI.       CBC  Lab Results   Component Value Date/Time    WBC 7.9 06/17/2019 11:22 AM    HGB 14.4 06/17/2019 11:22 AM    HCT 48.5 06/17/2019 11:22 AM     06/17/2019 11:22 AM     RENAL  Lab Results   Component Value Date/Time     06/17/2019 11:22 AM    K 4.3 06/17/2019 11:22 AM    CL 99 06/17/2019 11:22 AM    CO2 28 06/17/2019 11:22 AM    BUN 18 06/17/2019 11:22 AM    CREATININE 1.5 (H) 06/17/2019 11:22 AM    GLUCOSE 89 06/17/2019 11:22 AM     COAGS  Lab Results   Component Value Date/Time    PROTIME 12.5 06/17/2019 11:22 AM    INR 1.10 06/17/2019 11:22 AM    APTT 25.2 06/17/2019 11:22 AM     Anesthesia Evaluation  Patient summary reviewed and Nursing notes reviewed  Airway: Mallampati: III  TM distance: >3 FB   Neck ROM: full  Mouth opening: > = 3 FB Dental:    (+) edentulous      Pulmonary:normal exam    (+) COPD (home oxygen 2L @HS):  shortness of breath (walking ):  asthma (no recent exacerbation ):                           ROS comment: Former smoker    PAT Airway Exam:  Head/Neck movement: full  Thyromental Distance: >3 FB  History of difficult intubation:  None  Mallampati Classification:  Class II  Teeth: U/L dentures  Able to lie flat     LUNGS:  No increased work of breathing, good air exchange, clear to auscultation bilaterally, no crackles or wheezing   Cardiovascular:    (+) hypertension:, past MI (1991):, CAD:, CABG/stent (CABG x 4, 1994):, dysrhythmias (NSVT, 5/2017): SVT, WILSON:,       ECG reviewed      Echocardiogram reviewed  Stress test reviewed             ROS comment:  CARDIOVASCULAR:  Normal apical impulse, regular rate and rhythm, normal S1 and S2, no S3 or S4, and no murmur noted    CP or SOB:   Exercise:     Hx PFO    Cardiac cath 5/2019  1. Left main is patent. 2.  LAD is proximally 100% occluded. 3.  Ramus has mild disease. 4.  Circ has mild disease. 5.  RCA is 100% occluded. 6.  LIMA to LAD is patent. 7.  SVG to PDA is patent. There is a collateral circulation filling the PLB branch. 8.  EDP was normal.  9.  Right heart pressures are normal.     1. At this time, from cardiac stand, the patient is stable. 2.  I believe his symptoms are secondary to sleep apnea. I will recommend evaluate for sleep apnea.         ROSETTA  4/2019  EF 51%  LV dilated  Inferior wall hypokinesis  Mild to mod MR/AR     Stress test 4/2019  EF 36%  Inferior and lateral wall MI  No active ischemia Neuro/Psych:   (+) seizures (2013 x 1 with CVA. ):, CVA (prior stroke and L sided weakness with short-term memory loss, 2013. CVA w acute onset R sided weakness and slurred speech. , 5/13/2019. Homocysteinemia. On plavix, last dose 6/16/2019): residual symptoms, neuromuscular disease (left hand tremors after stroke, 2013. CBP):, headaches: migraine headaches, psychiatric history:   (-) TIA            ROS comment: [employer] hearing aids  Blind left eye 2018  Alzheimer's disease, on aricept     Some residual weakness to right slide. Speech slightly garbled. GI/Hepatic/Renal:   (+) GERD:, renal disease (CKD,  CR baseline 1.2-1.6. PAT CR: 1.5, 6/17/2019.  kidney stone, passed 2013): kidney stones,           Endo/Other:    (+) : arthritis (back and hips): OA., . Pt had PAT visit. Abdominal:   (+) obese,         Vascular:   + PVD, aortic or cerebral (AAA s/p EVAR, 2013. ), . Anesthesia Plan      general     ASA 4       Induction: intravenous. arterial line    Anesthetic plan and risks discussed with patient. Use of blood products discussed with patient and spouse whom. Plan discussed with CRNA and attending. ROBBIE Conway - CNP Anesthesia Evaluation will follow by a certified practitioner prior to surgery.   6/12/2019

## 2019-06-17 ENCOUNTER — HOSPITAL ENCOUNTER (OUTPATIENT)
Dept: GENERAL RADIOLOGY | Age: 84
Discharge: HOME | End: 2019-06-17
Payer: MEDICARE

## 2019-06-17 ENCOUNTER — HOSPITAL ENCOUNTER (OUTPATIENT)
Dept: PREADMISSION TESTING | Age: 84
Discharge: HOME | End: 2019-06-21
Payer: MEDICARE

## 2019-06-17 DIAGNOSIS — Z01.811 PRE-OP CHEST EXAM: Primary | ICD-10-CM

## 2019-06-17 DIAGNOSIS — Z01.811 PRE-OP CHEST EXAM: ICD-10-CM

## 2019-06-17 LAB
ABO/RH: NORMAL
ANION GAP SERPL CALCULATED.3IONS-SCNC: 12 MMOL/L (ref 4–16)
ANTIBODY SCREEN: NEGATIVE
APTT: 25.2 SECONDS (ref 21.2–33)
BUN BLDV-MCNC: 18 MG/DL (ref 6–23)
CALCIUM SERPL-MCNC: 9.6 MG/DL (ref 8.3–10.6)
CHLORIDE BLD-SCNC: 99 MMOL/L (ref 99–110)
CO2: 28 MMOL/L (ref 21–32)
COMMENT: NORMAL
CREAT SERPL-MCNC: 1.5 MG/DL (ref 0.9–1.3)
EKG ATRIAL RATE: 66 BPM
EKG DIAGNOSIS: NORMAL
EKG P AXIS: 35 DEGREES
EKG P-R INTERVAL: 198 MS
EKG Q-T INTERVAL: 440 MS
EKG QRS DURATION: 150 MS
EKG QTC CALCULATION (BAZETT): 461 MS
EKG R AXIS: -28 DEGREES
EKG T AXIS: -6 DEGREES
EKG VENTRICULAR RATE: 66 BPM
GFR AFRICAN AMERICAN: 54 ML/MIN/1.73M2
GFR NON-AFRICAN AMERICAN: 45 ML/MIN/1.73M2
GLUCOSE BLD-MCNC: 89 MG/DL (ref 70–99)
HCT VFR BLD CALC: 48.5 % (ref 42–52)
HEMOGLOBIN: 14.4 GM/DL (ref 13.5–18)
INR BLD: 1.1 INDEX
MCH RBC QN AUTO: 34.2 PG (ref 27–31)
MCHC RBC AUTO-ENTMCNC: 29.7 % (ref 32–36)
MCV RBC AUTO: 115.2 FL (ref 78–100)
PDW BLD-RTO: 13.7 % (ref 11.7–14.9)
PLATELET # BLD: 168 K/CU MM (ref 140–440)
PMV BLD AUTO: 9.8 FL (ref 7.5–11.1)
POTASSIUM SERPL-SCNC: 4.3 MMOL/L (ref 3.5–5.1)
PROTHROMBIN TIME: 12.5 SECONDS (ref 9.12–12.5)
RBC # BLD: 4.21 M/CU MM (ref 4.6–6.2)
SODIUM BLD-SCNC: 139 MMOL/L (ref 135–145)
WBC # BLD: 7.9 K/CU MM (ref 4–10.5)

## 2019-06-17 PROCEDURE — 71046 X-RAY EXAM CHEST 2 VIEWS: CPT

## 2019-06-17 PROCEDURE — 85730 THROMBOPLASTIN TIME PARTIAL: CPT

## 2019-06-17 PROCEDURE — 86850 RBC ANTIBODY SCREEN: CPT

## 2019-06-17 PROCEDURE — 93005 ELECTROCARDIOGRAM TRACING: CPT | Performed by: SURGERY

## 2019-06-17 PROCEDURE — 36415 COLL VENOUS BLD VENIPUNCTURE: CPT

## 2019-06-17 PROCEDURE — 80048 BASIC METABOLIC PNL TOTAL CA: CPT

## 2019-06-17 PROCEDURE — 93010 ELECTROCARDIOGRAM REPORT: CPT | Performed by: INTERNAL MEDICINE

## 2019-06-17 PROCEDURE — 85027 COMPLETE CBC AUTOMATED: CPT

## 2019-06-17 PROCEDURE — 86900 BLOOD TYPING SEROLOGIC ABO: CPT

## 2019-06-17 PROCEDURE — 86901 BLOOD TYPING SEROLOGIC RH(D): CPT

## 2019-06-17 PROCEDURE — 85610 PROTHROMBIN TIME: CPT

## 2019-06-17 RX ORDER — TRAMADOL HYDROCHLORIDE 50 MG/1
50 TABLET ORAL EVERY 6 HOURS PRN
COMMUNITY
End: 2020-01-14

## 2019-06-17 ASSESSMENT — PAIN SCALES - GENERAL: PAINLEVEL_OUTOF10: 0

## 2019-06-17 ASSESSMENT — ENCOUNTER SYMPTOMS: SHORTNESS OF BREATH: 1

## 2019-06-21 ENCOUNTER — HOSPITAL ENCOUNTER (INPATIENT)
Age: 84
LOS: 1 days | Discharge: HOME | DRG: 039 | End: 2019-06-22
Attending: SURGERY | Admitting: SURGERY
Payer: MEDICARE

## 2019-06-21 ENCOUNTER — ANESTHESIA (OUTPATIENT)
Dept: OPERATING ROOM | Age: 84
DRG: 039 | End: 2019-06-21
Payer: MEDICARE

## 2019-06-21 VITALS
DIASTOLIC BLOOD PRESSURE: 97 MMHG | SYSTOLIC BLOOD PRESSURE: 137 MMHG | OXYGEN SATURATION: 90 % | TEMPERATURE: 97.7 F | RESPIRATION RATE: 3 BRPM

## 2019-06-21 PROCEDURE — C1768 GRAFT, VASCULAR: HCPCS | Performed by: SURGERY

## 2019-06-21 PROCEDURE — 03UL0JZ SUPPLEMENT LEFT INTERNAL CAROTID ARTERY WITH SYNTHETIC SUBSTITUTE, OPEN APPROACH: ICD-10-PCS | Performed by: SURGERY

## 2019-06-21 PROCEDURE — 6360000002 HC RX W HCPCS: Performed by: PHYSICIAN ASSISTANT

## 2019-06-21 PROCEDURE — C9248 INJ, CLEVIDIPINE BUTYRATE: HCPCS | Performed by: SURGERY

## 2019-06-21 PROCEDURE — 03UN0JZ SUPPLEMENT LEFT EXTERNAL CAROTID ARTERY WITH SYNTHETIC SUBSTITUTE, OPEN APPROACH: ICD-10-PCS | Performed by: SURGERY

## 2019-06-21 PROCEDURE — 2100000000 HC CCU R&B

## 2019-06-21 PROCEDURE — C2628 CATHETER, OCCLUSION: HCPCS | Performed by: SURGERY

## 2019-06-21 PROCEDURE — 2580000003 HC RX 258: Performed by: PHYSICIAN ASSISTANT

## 2019-06-21 PROCEDURE — 2580000003 HC RX 258: Performed by: ANESTHESIOLOGY

## 2019-06-21 PROCEDURE — 03CN0ZZ EXTIRPATION OF MATTER FROM LEFT EXTERNAL CAROTID ARTERY, OPEN APPROACH: ICD-10-PCS | Performed by: SURGERY

## 2019-06-21 PROCEDURE — 7100000001 HC PACU RECOVERY - ADDTL 15 MIN: Performed by: SURGERY

## 2019-06-21 PROCEDURE — 6370000000 HC RX 637 (ALT 250 FOR IP): Performed by: PHYSICIAN ASSISTANT

## 2019-06-21 PROCEDURE — 88300 SURGICAL PATH GROSS: CPT

## 2019-06-21 PROCEDURE — 94640 AIRWAY INHALATION TREATMENT: CPT

## 2019-06-21 PROCEDURE — 2709999900 HC NON-CHARGEABLE SUPPLY: Performed by: SURGERY

## 2019-06-21 PROCEDURE — 2500000003 HC RX 250 WO HCPCS: Performed by: SURGERY

## 2019-06-21 PROCEDURE — 7100000000 HC PACU RECOVERY - FIRST 15 MIN: Performed by: SURGERY

## 2019-06-21 PROCEDURE — 6370000000 HC RX 637 (ALT 250 FOR IP)

## 2019-06-21 PROCEDURE — 6360000002 HC RX W HCPCS: Performed by: ANESTHESIOLOGY

## 2019-06-21 PROCEDURE — 03UJ0JZ SUPPLEMENT LEFT COMMON CAROTID ARTERY WITH SYNTHETIC SUBSTITUTE, OPEN APPROACH: ICD-10-PCS | Performed by: SURGERY

## 2019-06-21 PROCEDURE — 3700000000 HC ANESTHESIA ATTENDED CARE: Performed by: SURGERY

## 2019-06-21 PROCEDURE — 03CL0ZZ EXTIRPATION OF MATTER FROM LEFT INTERNAL CAROTID ARTERY, OPEN APPROACH: ICD-10-PCS | Performed by: SURGERY

## 2019-06-21 PROCEDURE — 2580000003 HC RX 258: Performed by: NURSE ANESTHETIST, CERTIFIED REGISTERED

## 2019-06-21 PROCEDURE — 3600000004 HC SURGERY LEVEL 4 BASE: Performed by: SURGERY

## 2019-06-21 PROCEDURE — 3700000001 HC ADD 15 MINUTES (ANESTHESIA): Performed by: SURGERY

## 2019-06-21 PROCEDURE — C9248 INJ, CLEVIDIPINE BUTYRATE: HCPCS | Performed by: PHYSICIAN ASSISTANT

## 2019-06-21 PROCEDURE — 03CJ0Z6 EXTIRPATE MATTER FROM L COM CAROTID, BIFURC, OPEN: ICD-10-PCS | Performed by: SURGERY

## 2019-06-21 PROCEDURE — 6360000002 HC RX W HCPCS: Performed by: SURGERY

## 2019-06-21 PROCEDURE — 6360000002 HC RX W HCPCS: Performed by: NURSE ANESTHETIST, CERTIFIED REGISTERED

## 2019-06-21 PROCEDURE — 3600000014 HC SURGERY LEVEL 4 ADDTL 15MIN: Performed by: SURGERY

## 2019-06-21 PROCEDURE — 2500000003 HC RX 250 WO HCPCS: Performed by: NURSE ANESTHETIST, CERTIFIED REGISTERED

## 2019-06-21 DEVICE — GRAFT VASC W1XL10CM THK6 PLY SYNTH CROSSLINKED ELAS FOR VASC: Type: IMPLANTABLE DEVICE | Site: NECK | Status: FUNCTIONAL

## 2019-06-21 RX ORDER — LIDOCAINE HYDROCHLORIDE 10 MG/ML
INJECTION, SOLUTION EPIDURAL; INFILTRATION; INTRACAUDAL; PERINEURAL
Status: COMPLETED | OUTPATIENT
Start: 2019-06-21 | End: 2019-06-21

## 2019-06-21 RX ORDER — CITALOPRAM 20 MG/1
10 TABLET ORAL DAILY
Status: DISCONTINUED | OUTPATIENT
Start: 2019-06-21 | End: 2019-06-22 | Stop reason: HOSPADM

## 2019-06-21 RX ORDER — PROTAMINE SULFATE 10 MG/ML
INJECTION, SOLUTION INTRAVENOUS PRN
Status: DISCONTINUED | OUTPATIENT
Start: 2019-06-21 | End: 2019-06-21 | Stop reason: SDUPTHER

## 2019-06-21 RX ORDER — ASPIRIN 81 MG/1
81 TABLET ORAL DAILY
Status: DISCONTINUED | OUTPATIENT
Start: 2019-06-21 | End: 2019-06-22 | Stop reason: HOSPADM

## 2019-06-21 RX ORDER — HYDRALAZINE HYDROCHLORIDE 20 MG/ML
5 INJECTION INTRAMUSCULAR; INTRAVENOUS EVERY 10 MIN PRN
Status: DISCONTINUED | OUTPATIENT
Start: 2019-06-21 | End: 2019-06-21 | Stop reason: HOSPADM

## 2019-06-21 RX ORDER — SODIUM CHLORIDE 450 MG/100ML
INJECTION, SOLUTION INTRAVENOUS CONTINUOUS
Status: DISCONTINUED | OUTPATIENT
Start: 2019-06-21 | End: 2019-06-22 | Stop reason: HOSPADM

## 2019-06-21 RX ORDER — ONDANSETRON 2 MG/ML
4 INJECTION INTRAMUSCULAR; INTRAVENOUS
Status: DISCONTINUED | OUTPATIENT
Start: 2019-06-21 | End: 2019-06-21 | Stop reason: HOSPADM

## 2019-06-21 RX ORDER — FENTANYL CITRATE 50 UG/ML
25 INJECTION, SOLUTION INTRAMUSCULAR; INTRAVENOUS EVERY 5 MIN PRN
Status: DISCONTINUED | OUTPATIENT
Start: 2019-06-21 | End: 2019-06-21 | Stop reason: HOSPADM

## 2019-06-21 RX ORDER — SODIUM CHLORIDE 0.9 % (FLUSH) 0.9 %
10 SYRINGE (ML) INJECTION EVERY 12 HOURS SCHEDULED
Status: DISCONTINUED | OUTPATIENT
Start: 2019-06-21 | End: 2019-06-22 | Stop reason: HOSPADM

## 2019-06-21 RX ORDER — HYDROMORPHONE HCL 110MG/55ML
0.5 PATIENT CONTROLLED ANALGESIA SYRINGE INTRAVENOUS EVERY 5 MIN PRN
Status: DISCONTINUED | OUTPATIENT
Start: 2019-06-21 | End: 2019-06-21 | Stop reason: HOSPADM

## 2019-06-21 RX ORDER — MORPHINE SULFATE 4 MG/ML
2 INJECTION, SOLUTION INTRAMUSCULAR; INTRAVENOUS
Status: COMPLETED | OUTPATIENT
Start: 2019-06-21 | End: 2019-06-21

## 2019-06-21 RX ORDER — DONEPEZIL HYDROCHLORIDE 10 MG/1
10 TABLET, FILM COATED ORAL NIGHTLY
Status: DISCONTINUED | OUTPATIENT
Start: 2019-06-21 | End: 2019-06-22 | Stop reason: HOSPADM

## 2019-06-21 RX ORDER — EPHEDRINE SULFATE 50 MG/ML
INJECTION INTRAVENOUS PRN
Status: DISCONTINUED | OUTPATIENT
Start: 2019-06-21 | End: 2019-06-21 | Stop reason: SDUPTHER

## 2019-06-21 RX ORDER — SODIUM CHLORIDE, SODIUM LACTATE, POTASSIUM CHLORIDE, CALCIUM CHLORIDE 600; 310; 30; 20 MG/100ML; MG/100ML; MG/100ML; MG/100ML
INJECTION, SOLUTION INTRAVENOUS ONCE
Status: COMPLETED | OUTPATIENT
Start: 2019-06-21 | End: 2019-06-21

## 2019-06-21 RX ORDER — PRAVASTATIN SODIUM 40 MG
40 TABLET ORAL NIGHTLY
Status: DISCONTINUED | OUTPATIENT
Start: 2019-06-21 | End: 2019-06-22 | Stop reason: HOSPADM

## 2019-06-21 RX ORDER — HEPARIN SODIUM 1000 [USP'U]/ML
INJECTION, SOLUTION INTRAVENOUS; SUBCUTANEOUS PRN
Status: DISCONTINUED | OUTPATIENT
Start: 2019-06-21 | End: 2019-06-21 | Stop reason: SDUPTHER

## 2019-06-21 RX ORDER — MORPHINE SULFATE 2 MG/ML
2 INJECTION, SOLUTION INTRAMUSCULAR; INTRAVENOUS EVERY 5 MIN PRN
Status: DISCONTINUED | OUTPATIENT
Start: 2019-06-21 | End: 2019-06-21 | Stop reason: HOSPADM

## 2019-06-21 RX ORDER — PROPOFOL 10 MG/ML
INJECTION, EMULSION INTRAVENOUS PRN
Status: DISCONTINUED | OUTPATIENT
Start: 2019-06-21 | End: 2019-06-21 | Stop reason: SDUPTHER

## 2019-06-21 RX ORDER — ONDANSETRON 2 MG/ML
4 INJECTION INTRAMUSCULAR; INTRAVENOUS EVERY 6 HOURS PRN
Status: DISCONTINUED | OUTPATIENT
Start: 2019-06-21 | End: 2019-06-22 | Stop reason: HOSPADM

## 2019-06-21 RX ORDER — PHENYLEPHRINE HYDROCHLORIDE 10 MG/ML
INJECTION INTRAVENOUS PRN
Status: DISCONTINUED | OUTPATIENT
Start: 2019-06-21 | End: 2019-06-21 | Stop reason: SDUPTHER

## 2019-06-21 RX ORDER — METOPROLOL SUCCINATE 25 MG/1
12.5 TABLET, EXTENDED RELEASE ORAL DAILY
Status: DISCONTINUED | OUTPATIENT
Start: 2019-06-21 | End: 2019-06-22 | Stop reason: HOSPADM

## 2019-06-21 RX ORDER — VANCOMYCIN HYDROCHLORIDE 1 G/200ML
1000 INJECTION, SOLUTION INTRAVENOUS ONCE
Status: COMPLETED | OUTPATIENT
Start: 2019-06-21 | End: 2019-06-21

## 2019-06-21 RX ORDER — SODIUM CHLORIDE 9 MG/ML
INJECTION, SOLUTION INTRAVENOUS CONTINUOUS PRN
Status: DISCONTINUED | OUTPATIENT
Start: 2019-06-21 | End: 2019-06-21 | Stop reason: SDUPTHER

## 2019-06-21 RX ORDER — HYDROCODONE BITARTRATE AND ACETAMINOPHEN 5; 325 MG/1; MG/1
1 TABLET ORAL EVERY 6 HOURS PRN
Status: DISCONTINUED | OUTPATIENT
Start: 2019-06-21 | End: 2019-06-22 | Stop reason: HOSPADM

## 2019-06-21 RX ORDER — ALBUTEROL SULFATE 2.5 MG/3ML
2.5 SOLUTION RESPIRATORY (INHALATION) EVERY 6 HOURS PRN
Status: DISCONTINUED | OUTPATIENT
Start: 2019-06-21 | End: 2019-06-22 | Stop reason: HOSPADM

## 2019-06-21 RX ORDER — HYDROMORPHONE HCL 110MG/55ML
0.25 PATIENT CONTROLLED ANALGESIA SYRINGE INTRAVENOUS EVERY 5 MIN PRN
Status: DISCONTINUED | OUTPATIENT
Start: 2019-06-21 | End: 2019-06-21 | Stop reason: HOSPADM

## 2019-06-21 RX ORDER — ACETAMINOPHEN 325 MG/1
650 TABLET ORAL EVERY 4 HOURS PRN
Status: DISCONTINUED | OUTPATIENT
Start: 2019-06-21 | End: 2019-06-22 | Stop reason: HOSPADM

## 2019-06-21 RX ORDER — LABETALOL HYDROCHLORIDE 5 MG/ML
5 INJECTION, SOLUTION INTRAVENOUS EVERY 10 MIN PRN
Status: DISCONTINUED | OUTPATIENT
Start: 2019-06-21 | End: 2019-06-21 | Stop reason: HOSPADM

## 2019-06-21 RX ORDER — OXYBUTYNIN CHLORIDE 5 MG/1
5 TABLET ORAL 2 TIMES DAILY
Status: DISCONTINUED | OUTPATIENT
Start: 2019-06-21 | End: 2019-06-22 | Stop reason: HOSPADM

## 2019-06-21 RX ORDER — CEFAZOLIN SODIUM 2 G/100ML
2 INJECTION, SOLUTION INTRAVENOUS EVERY 8 HOURS
Status: DISCONTINUED | OUTPATIENT
Start: 2019-06-21 | End: 2019-06-21

## 2019-06-21 RX ORDER — FENTANYL CITRATE 50 UG/ML
INJECTION, SOLUTION INTRAMUSCULAR; INTRAVENOUS PRN
Status: DISCONTINUED | OUTPATIENT
Start: 2019-06-21 | End: 2019-06-21 | Stop reason: SDUPTHER

## 2019-06-21 RX ORDER — SODIUM CHLORIDE 0.9 % (FLUSH) 0.9 %
10 SYRINGE (ML) INJECTION PRN
Status: DISCONTINUED | OUTPATIENT
Start: 2019-06-21 | End: 2019-06-22 | Stop reason: HOSPADM

## 2019-06-21 RX ORDER — LIDOCAINE HYDROCHLORIDE 20 MG/ML
INJECTION, SOLUTION INTRAVENOUS PRN
Status: DISCONTINUED | OUTPATIENT
Start: 2019-06-21 | End: 2019-06-21 | Stop reason: SDUPTHER

## 2019-06-21 RX ORDER — ONDANSETRON 2 MG/ML
INJECTION INTRAMUSCULAR; INTRAVENOUS PRN
Status: DISCONTINUED | OUTPATIENT
Start: 2019-06-21 | End: 2019-06-21 | Stop reason: SDUPTHER

## 2019-06-21 RX ORDER — SODIUM CHLORIDE, SODIUM LACTATE, POTASSIUM CHLORIDE, CALCIUM CHLORIDE 600; 310; 30; 20 MG/100ML; MG/100ML; MG/100ML; MG/100ML
INJECTION, SOLUTION INTRAVENOUS
Status: COMPLETED
Start: 2019-06-21 | End: 2019-06-21

## 2019-06-21 RX ORDER — ROCURONIUM BROMIDE 10 MG/ML
INJECTION, SOLUTION INTRAVENOUS PRN
Status: DISCONTINUED | OUTPATIENT
Start: 2019-06-21 | End: 2019-06-21 | Stop reason: SDUPTHER

## 2019-06-21 RX ADMIN — PHENYLEPHRINE HYDROCHLORIDE 50 MCG: 10 INJECTION INTRAVENOUS at 10:49

## 2019-06-21 RX ADMIN — MORPHINE SULFATE 2 MG: 4 INJECTION INTRAVENOUS at 19:30

## 2019-06-21 RX ADMIN — EPHEDRINE SULFATE 5 MG: 50 INJECTION, SOLUTION INTRAVENOUS at 10:49

## 2019-06-21 RX ADMIN — ROCURONIUM BROMIDE 15 MG: 10 INJECTION INTRAVENOUS at 10:58

## 2019-06-21 RX ADMIN — PHENYLEPHRINE HYDROCHLORIDE 100 MCG: 10 INJECTION INTRAVENOUS at 10:12

## 2019-06-21 RX ADMIN — PHENYLEPHRINE HYDROCHLORIDE 50 MCG: 10 INJECTION INTRAVENOUS at 10:36

## 2019-06-21 RX ADMIN — VANCOMYCIN HYDROCHLORIDE 1 G: 1 INJECTION, SOLUTION INTRAVENOUS at 09:58

## 2019-06-21 RX ADMIN — SODIUM CHLORIDE: 9 INJECTION, SOLUTION INTRAVENOUS at 09:52

## 2019-06-21 RX ADMIN — PROTAMINE SULFATE 50 MG: 10 INJECTION, SOLUTION INTRAVENOUS at 11:25

## 2019-06-21 RX ADMIN — FENTANYL CITRATE 25 MCG: 50 INJECTION INTRAMUSCULAR; INTRAVENOUS at 11:03

## 2019-06-21 RX ADMIN — Medication 2 PUFF: at 20:00

## 2019-06-21 RX ADMIN — PROPOFOL 80 MG: 10 INJECTION, EMULSION INTRAVENOUS at 10:12

## 2019-06-21 RX ADMIN — FENTANYL CITRATE 25 MCG: 50 INJECTION INTRAMUSCULAR; INTRAVENOUS at 11:45

## 2019-06-21 RX ADMIN — ROCURONIUM BROMIDE 40 MG: 10 INJECTION INTRAVENOUS at 10:12

## 2019-06-21 RX ADMIN — FENTANYL CITRATE 25 MCG: 50 INJECTION INTRAMUSCULAR; INTRAVENOUS at 12:22

## 2019-06-21 RX ADMIN — MORPHINE SULFATE 2 MG: 4 INJECTION INTRAVENOUS at 13:42

## 2019-06-21 RX ADMIN — EPHEDRINE SULFATE 10 MG: 50 INJECTION, SOLUTION INTRAVENOUS at 10:19

## 2019-06-21 RX ADMIN — HYDROCODONE BITARTRATE AND ACETAMINOPHEN 1 TABLET: 5; 325 TABLET ORAL at 21:24

## 2019-06-21 RX ADMIN — CLEVIPIDINE 4 MG/HR: 0.5 EMULSION INTRAVENOUS at 11:37

## 2019-06-21 RX ADMIN — EPHEDRINE SULFATE 10 MG: 50 INJECTION, SOLUTION INTRAVENOUS at 10:16

## 2019-06-21 RX ADMIN — SODIUM CHLORIDE: 4.5 INJECTION, SOLUTION INTRAVENOUS at 12:42

## 2019-06-21 RX ADMIN — CLEVIPIDINE 3 MG/HR: 0.5 EMULSION INTRAVENOUS at 23:07

## 2019-06-21 RX ADMIN — PHENYLEPHRINE HYDROCHLORIDE 50 MCG: 10 INJECTION INTRAVENOUS at 11:08

## 2019-06-21 RX ADMIN — LIDOCAINE HYDROCHLORIDE 100 MG: 20 INJECTION, SOLUTION INTRAVENOUS at 10:12

## 2019-06-21 RX ADMIN — FENTANYL CITRATE 50 MCG: 50 INJECTION INTRAMUSCULAR; INTRAVENOUS at 10:41

## 2019-06-21 RX ADMIN — FENTANYL CITRATE 100 MCG: 50 INJECTION INTRAMUSCULAR; INTRAVENOUS at 09:58

## 2019-06-21 RX ADMIN — EPHEDRINE SULFATE 5 MG: 50 INJECTION, SOLUTION INTRAVENOUS at 10:54

## 2019-06-21 RX ADMIN — ROCURONIUM BROMIDE 20 MG: 10 INJECTION INTRAVENOUS at 10:21

## 2019-06-21 RX ADMIN — EPHEDRINE SULFATE 5 MG: 50 INJECTION, SOLUTION INTRAVENOUS at 10:31

## 2019-06-21 RX ADMIN — SODIUM CHLORIDE, POTASSIUM CHLORIDE, SODIUM LACTATE AND CALCIUM CHLORIDE: 600; 310; 30; 20 INJECTION, SOLUTION INTRAVENOUS at 10:41

## 2019-06-21 RX ADMIN — ONDANSETRON 4 MG: 2 INJECTION INTRAMUSCULAR; INTRAVENOUS at 11:33

## 2019-06-21 RX ADMIN — PRAVASTATIN SODIUM 40 MG: 40 TABLET ORAL at 21:17

## 2019-06-21 RX ADMIN — DONEPEZIL HYDROCHLORIDE 10 MG: 10 TABLET, FILM COATED ORAL at 21:17

## 2019-06-21 RX ADMIN — OXYBUTYNIN CHLORIDE 5 MG: 5 TABLET ORAL at 21:17

## 2019-06-21 RX ADMIN — SODIUM CHLORIDE, PRESERVATIVE FREE 10 ML: 5 INJECTION INTRAVENOUS at 21:17

## 2019-06-21 RX ADMIN — SODIUM CHLORIDE, POTASSIUM CHLORIDE, SODIUM LACTATE AND CALCIUM CHLORIDE: 600; 310; 30; 20 INJECTION, SOLUTION INTRAVENOUS at 09:11

## 2019-06-21 RX ADMIN — HEPARIN SODIUM 5000 UNITS: 1000 INJECTION, SOLUTION INTRAVENOUS; SUBCUTANEOUS at 10:43

## 2019-06-21 ASSESSMENT — PULMONARY FUNCTION TESTS
PIF_VALUE: 12
PIF_VALUE: 1
PIF_VALUE: 16
PIF_VALUE: 17
PIF_VALUE: 0
PIF_VALUE: 17
PIF_VALUE: 16
PIF_VALUE: 1
PIF_VALUE: 0
PIF_VALUE: 1
PIF_VALUE: 16
PIF_VALUE: 12
PIF_VALUE: 0
PIF_VALUE: 16
PIF_VALUE: 17
PIF_VALUE: 1
PIF_VALUE: 1
PIF_VALUE: 15
PIF_VALUE: 17
PIF_VALUE: 13
PIF_VALUE: 1
PIF_VALUE: 16
PIF_VALUE: 16
PIF_VALUE: 1
PIF_VALUE: 0
PIF_VALUE: 17
PIF_VALUE: 15
PIF_VALUE: 17
PIF_VALUE: 1
PIF_VALUE: 16
PIF_VALUE: 17
PIF_VALUE: 16
PIF_VALUE: 0
PIF_VALUE: 16
PIF_VALUE: 17
PIF_VALUE: 17
PIF_VALUE: 16
PIF_VALUE: 17
PIF_VALUE: 1
PIF_VALUE: 2
PIF_VALUE: 2
PIF_VALUE: 16
PIF_VALUE: 17
PIF_VALUE: 16
PIF_VALUE: 16
PIF_VALUE: 15
PIF_VALUE: 1
PIF_VALUE: 15
PIF_VALUE: 12
PIF_VALUE: 0
PIF_VALUE: 17
PIF_VALUE: 16
PIF_VALUE: 15
PIF_VALUE: 0
PIF_VALUE: 17
PIF_VALUE: 16
PIF_VALUE: 16
PIF_VALUE: 0
PIF_VALUE: 16
PIF_VALUE: 0
PIF_VALUE: 16
PIF_VALUE: 15
PIF_VALUE: 17
PIF_VALUE: 15
PIF_VALUE: 16
PIF_VALUE: 16
PIF_VALUE: 17
PIF_VALUE: 16
PIF_VALUE: 14
PIF_VALUE: 16
PIF_VALUE: 15
PIF_VALUE: 15
PIF_VALUE: 16
PIF_VALUE: 12
PIF_VALUE: 2
PIF_VALUE: 16
PIF_VALUE: 17
PIF_VALUE: 16
PIF_VALUE: 17
PIF_VALUE: 17
PIF_VALUE: 16
PIF_VALUE: 1
PIF_VALUE: 15
PIF_VALUE: 17
PIF_VALUE: 16
PIF_VALUE: 17
PIF_VALUE: 2
PIF_VALUE: 2
PIF_VALUE: 16
PIF_VALUE: 1
PIF_VALUE: 16
PIF_VALUE: 15
PIF_VALUE: 15
PIF_VALUE: 0
PIF_VALUE: 1
PIF_VALUE: 15
PIF_VALUE: 15
PIF_VALUE: 0
PIF_VALUE: 0
PIF_VALUE: 12
PIF_VALUE: 16
PIF_VALUE: 16
PIF_VALUE: 15
PIF_VALUE: 13
PIF_VALUE: 0
PIF_VALUE: 16
PIF_VALUE: 15
PIF_VALUE: 0
PIF_VALUE: 18
PIF_VALUE: 1
PIF_VALUE: 15
PIF_VALUE: 0
PIF_VALUE: 2
PIF_VALUE: 13
PIF_VALUE: 0
PIF_VALUE: 16
PIF_VALUE: 11
PIF_VALUE: 15
PIF_VALUE: 0
PIF_VALUE: 1
PIF_VALUE: 16

## 2019-06-21 ASSESSMENT — PAIN DESCRIPTION - PROGRESSION
CLINICAL_PROGRESSION: NOT CHANGED
CLINICAL_PROGRESSION: GRADUALLY WORSENING
CLINICAL_PROGRESSION: GRADUALLY WORSENING

## 2019-06-21 ASSESSMENT — PAIN - FUNCTIONAL ASSESSMENT
PAIN_FUNCTIONAL_ASSESSMENT: 0-10
PAIN_FUNCTIONAL_ASSESSMENT: ACTIVITIES ARE NOT PREVENTED
PAIN_FUNCTIONAL_ASSESSMENT: ACTIVITIES ARE NOT PREVENTED

## 2019-06-21 ASSESSMENT — PAIN DESCRIPTION - DESCRIPTORS
DESCRIPTORS: SORE
DESCRIPTORS: SORE
DESCRIPTORS: OTHER (COMMENT)

## 2019-06-21 ASSESSMENT — PAIN SCALES - GENERAL
PAINLEVEL_OUTOF10: 7
PAINLEVEL_OUTOF10: 7
PAINLEVEL_OUTOF10: 0
PAINLEVEL_OUTOF10: 0
PAINLEVEL_OUTOF10: 9
PAINLEVEL_OUTOF10: 6

## 2019-06-21 ASSESSMENT — PAIN DESCRIPTION - PAIN TYPE
TYPE: SURGICAL PAIN

## 2019-06-21 ASSESSMENT — PAIN DESCRIPTION - ONSET
ONSET: GRADUAL

## 2019-06-21 ASSESSMENT — PAIN DESCRIPTION - LOCATION
LOCATION: NECK

## 2019-06-21 ASSESSMENT — PAIN DESCRIPTION - FREQUENCY
FREQUENCY: CONTINUOUS
FREQUENCY: INTERMITTENT
FREQUENCY: CONTINUOUS

## 2019-06-21 ASSESSMENT — PAIN DESCRIPTION - ORIENTATION
ORIENTATION: LEFT

## 2019-06-21 NOTE — PROGRESS NOTES
Patient arrives to room 2008 from PACU, patient in and out of sleep. Patient on 4L/NC with sats in low 90's to high 80's. Patient hard of hearing and has short term memory loss. Patient able to follow simple commands when able to understand d/t [place]. Patient has incision to left neck with scant drainage at the top of the dressing. Unable to locate family at this time.

## 2019-06-21 NOTE — PLAN OF CARE
Problem: Falls - Risk of:  Goal: Will remain free from falls  Description  Will remain free from falls  Outcome: Ongoing  Goal: Absence of physical injury  Description  Absence of physical injury  Outcome: Ongoing     Problem: Bleeding:  Goal: Will show no signs and symptoms of excessive bleeding  Description  Will show no signs and symptoms of excessive bleeding  Outcome: Ongoing     Problem: Pain:  Goal: Pain level will decrease  Description  Pain level will decrease  Outcome: Ongoing  Goal: Control of acute pain  Description  Control of acute pain  Outcome: Ongoing  Goal: Control of chronic pain  Description  Control of chronic pain  Outcome: Ongoing

## 2019-06-22 VITALS
BODY MASS INDEX: 35.95 KG/M2 | RESPIRATION RATE: 15 BRPM | DIASTOLIC BLOOD PRESSURE: 61 MMHG | HEIGHT: 68 IN | SYSTOLIC BLOOD PRESSURE: 117 MMHG | HEART RATE: 66 BPM | OXYGEN SATURATION: 97 % | WEIGHT: 237.22 LBS | TEMPERATURE: 98.1 F

## 2019-06-22 LAB
ANION GAP SERPL CALCULATED.3IONS-SCNC: 9 MMOL/L (ref 4–16)
BASOPHILS ABSOLUTE: 0 K/CU MM
BASOPHILS RELATIVE PERCENT: 0.1 % (ref 0–1)
BUN BLDV-MCNC: 12 MG/DL (ref 6–23)
CALCIUM SERPL-MCNC: 8.3 MG/DL (ref 8.3–10.6)
CHLORIDE BLD-SCNC: 103 MMOL/L (ref 99–110)
CO2: 26 MMOL/L (ref 21–32)
CREAT SERPL-MCNC: 1 MG/DL (ref 0.9–1.3)
DIFFERENTIAL TYPE: ABNORMAL
EOSINOPHILS ABSOLUTE: 0 K/CU MM
EOSINOPHILS RELATIVE PERCENT: 0 % (ref 0–3)
GFR AFRICAN AMERICAN: >60 ML/MIN/1.73M2
GFR NON-AFRICAN AMERICAN: >60 ML/MIN/1.73M2
GLUCOSE BLD-MCNC: 116 MG/DL (ref 70–99)
HCT VFR BLD CALC: 39.9 % (ref 42–52)
HEMOGLOBIN: 12.7 GM/DL (ref 13.5–18)
IMMATURE NEUTROPHIL %: 0.3 % (ref 0–0.43)
LYMPHOCYTES ABSOLUTE: 1.1 K/CU MM
LYMPHOCYTES RELATIVE PERCENT: 12 % (ref 24–44)
MCH RBC QN AUTO: 33.9 PG (ref 27–31)
MCHC RBC AUTO-ENTMCNC: 31.8 % (ref 32–36)
MCV RBC AUTO: 106.4 FL (ref 78–100)
MONOCYTES ABSOLUTE: 0.6 K/CU MM
MONOCYTES RELATIVE PERCENT: 6.9 % (ref 0–4)
NUCLEATED RBC %: 0 %
PDW BLD-RTO: 13.2 % (ref 11.7–14.9)
PLATELET # BLD: 139 K/CU MM (ref 140–440)
PMV BLD AUTO: 9.8 FL (ref 7.5–11.1)
POTASSIUM SERPL-SCNC: 4.4 MMOL/L (ref 3.5–5.1)
RBC # BLD: 3.75 M/CU MM (ref 4.6–6.2)
SEGMENTED NEUTROPHILS ABSOLUTE COUNT: 7.4 K/CU MM
SEGMENTED NEUTROPHILS RELATIVE PERCENT: 80.7 % (ref 36–66)
SODIUM BLD-SCNC: 138 MMOL/L (ref 135–145)
TOTAL IMMATURE NEUTOROPHIL: 0.03 K/CU MM
TOTAL NUCLEATED RBC: 0 K/CU MM
WBC # BLD: 9.2 K/CU MM (ref 4–10.5)

## 2019-06-22 PROCEDURE — 6360000002 HC RX W HCPCS: Performed by: SURGERY

## 2019-06-22 PROCEDURE — 2580000003 HC RX 258: Performed by: PHYSICIAN ASSISTANT

## 2019-06-22 PROCEDURE — 6370000000 HC RX 637 (ALT 250 FOR IP): Performed by: PHYSICIAN ASSISTANT

## 2019-06-22 PROCEDURE — 2580000003 HC RX 258: Performed by: SURGERY

## 2019-06-22 PROCEDURE — 94761 N-INVAS EAR/PLS OXIMETRY MLT: CPT

## 2019-06-22 PROCEDURE — 2700000000 HC OXYGEN THERAPY PER DAY

## 2019-06-22 PROCEDURE — 80048 BASIC METABOLIC PNL TOTAL CA: CPT

## 2019-06-22 PROCEDURE — 85025 COMPLETE CBC W/AUTO DIFF WBC: CPT

## 2019-06-22 RX ADMIN — METOPROLOL SUCCINATE 12.5 MG: 25 TABLET, EXTENDED RELEASE ORAL at 10:01

## 2019-06-22 RX ADMIN — SODIUM CHLORIDE: 4.5 INJECTION, SOLUTION INTRAVENOUS at 01:52

## 2019-06-22 RX ADMIN — OXYBUTYNIN CHLORIDE 5 MG: 5 TABLET ORAL at 10:01

## 2019-06-22 RX ADMIN — VANCOMYCIN HYDROCHLORIDE 500 MG: 500 INJECTION, POWDER, LYOPHILIZED, FOR SOLUTION INTRAVENOUS at 05:35

## 2019-06-22 RX ADMIN — ASPIRIN 81 MG: 81 TABLET, COATED ORAL at 10:00

## 2019-06-22 RX ADMIN — CITALOPRAM HYDROBROMIDE 10 MG: 20 TABLET ORAL at 10:00

## 2019-06-22 ASSESSMENT — PAIN SCALES - GENERAL: PAINLEVEL_OUTOF10: 0

## 2019-06-22 NOTE — OP NOTE
carotid arteries. Protamine was given to reverse the Heparin. Hemostasis was secured and confirmed throughout surgery. Wound was irrigated with antibiotic solution and closed in layers. Blood loss was 50 ml. Sponge and instrument count was correct before closure. Patient tolerated the procedure well. He was fully alert and awake without neurologic and cardiologic problems at the end of the procedure. We're optimistic that he will make a good recovery.

## 2019-06-22 NOTE — PROGRESS NOTES
Updated wife by phone with security code, [phone]. Will be arriving 0900 and 1000. Patient sitting up in bed eating breakfast, voices no complaint at this time.

## 2019-06-22 NOTE — DISCHARGE SUMMARY
Discharge Summary     Patient ID  Ruby Ruezequiel   1935  [id number]      Primary Care Doctor:  Dalton Huddleston MD    Admit date: 6/21/2019   Discharge date: 6/22/2019      Admitting Physician: Annetta Gitelman, MD   Discharge Physician: Annetta Gitelman MD    Discharge Diagnoses: Active Hospital Problems    Diagnosis Date Noted    Left carotid stenosis [I65.22] 05/17/2019     Priority: High     Discharged Condition: stable. Hospital Course:  Upon admission underwent surgery of LCE after discussion and preparation. Tolerated surgery well   Post op ; monitored rhythm, O2 sat, I/O, Labs, CXRs serially  Neuro status , BP and vital signs monitored  Started on diet and activity.   Uneventful recovery  At discharge, pt ambulating  Tolerating diet  Wounds clean  Had Bm  Lungs clear   NSR  VS at discharge   Vitals:    06/22/19 0803   BP: 133/72   Pulse: 63   Resp: 11   Temp:    SpO2: 97%       Consults: none    Significant Diagnostic Studies:     Disposition: home    Patient Instructions:      Medication List      CONTINUE taking these medications    albuterol sulfate  (90 Base) MCG/ACT inhaler  Commonly known as:  PROVENTIL HFA  Inhale 2 puffs into the lungs every 6 hours as needed for Wheezing or Shortness of Breath (or coughing spells)     aspirin EC 81 MG EC tablet  Take 1 tablet by mouth daily     citalopram 10 MG tablet  Commonly known as:  CELEXA  TAKE 1 TABLET BY MOUTH IN THE MORNING AND 1 TABLET BY MOUTH AT BEDTIME     clopidogrel 75 MG tablet  Commonly known as:  PLAVIX  Take 1 tablet by mouth daily     donepezil 10 MG tablet  Commonly known as:  ARICEPT  Take 1 tablet by mouth nightly     fluticasone-salmeterol 250-50 MCG/DOSE Aepb  Commonly known as:  ADVAIR DISKUS  Inhale 1 puff into the lungs every 12 hours     folic acid 1 MG tablet  Commonly known as:  FOLVITE  TAKE 1 TABLET BY MOUTH DAILY     metoprolol succinate 25 MG extended release tablet  Commonly known as:  TOPROL XL  Take 0.5 tablets by mouth daily     oxybutynin 5 MG tablet  Commonly known as:  DITROPAN  Take 1 tablet by mouth 2 times daily     pravastatin 40 MG tablet  Commonly known as:  PRAVACHOL  TAKE 1 TABLET BY MOUTH EVERY DAY     traMADol 50 MG tablet  Commonly known as:  ULTRAM     traZODone 50 MG tablet  Commonly known as:  DESYREL  Take 1 tablet by mouth nightly as needed for Sleep          Activity: activity as tolerated and no lifting, Driving, or Strenuous exercise until seen by physician in office  Diet: cardiac diet  Wound Care: as directed    Follow-up as directed at discharge    Signed: Harris Wagoner    Time spent on discharge 35 minutes

## 2019-06-22 NOTE — PROGRESS NOTES
Patient yelling out for wife, unaware of where he is and events following up to admission. Emotional support given. Patient resting quiet at this time with bed alarm on.

## 2019-06-24 NOTE — ANESTHESIA POSTPROCEDURE EVALUATION
Department of Anesthesiology  Postprocedure Note    Patient: Lila Sarmiento  MRN: [id number]  YOB: 1935  Date of evaluation: 6/24/2019  Time:  6:52 AM     Procedure Summary     Date:  06/21/19 Room / Location:  [place] [address] / [facility]    Anesthesia Start:  0930 Anesthesia Stop:  1440    Procedure:  LEFT CAROTID ENDARTERECTOMY (Left Neck) Diagnosis:  (LEFT CAROTID STENOSIS)    Surgeon:  Markell Ortega MD Responsible Provider:  Mayte Hillman MD    Anesthesia Type:  general ASA Status:  4          Anesthesia Type: general    Evans Phase I: Evans Score: 9    Evans Phase II:      Last vitals: Reviewed and per EMR flowsheets.        Anesthesia Post Evaluation    Patient location during evaluation: PACU  Patient participation: complete - patient participated  Level of consciousness: awake and alert  Pain score: 1  Airway patency: patent  Nausea & Vomiting: no nausea and no vomiting  Complications: no  Cardiovascular status: blood pressure returned to baseline  Respiratory status: acceptable  Hydration status: euvolemic

## 2019-06-29 DIAGNOSIS — N39.41 URGE INCONTINENCE OF URINE: ICD-10-CM

## 2019-06-29 NOTE — DISCHARGE SUMMARY
Patient Name: Jonny Leiva  Patient :  1935  Patient MRN:   [id number]      Admission Date:  2019  Discharge Date: 2019    Admitting diagnosis: Acute CVA (cerebrovascular accident) [facility])  Active Problems:    Hypertension    COPD (chronic obstructive pulmonary disease) ([facility]    Stage 3 chronic kidney disease ([facility]    Right hemiparesis ([facility]    Discharging diagnosis: Acute CVA (cerebrovascular accident) ([facility]  Active Problems:    Hypertension    COPD (chronic obstructive pulmonary disease) ([facility])    Stage 3 chronic kidney disease ([facility])    Right hemiparesis ([facility]    HISTORY OF PRESENT ILLNESS:  The patient is an 82year-old previously  right-hand dominant male, who was in his usual state of fair health  until 2019. In the morning of that day, he had an outpatient  cardiac catheterization with Dr. Mayco Schaffer. Later at home, when trying to  walk with his wife, he developed abrupt onset of right-sided weakness  and nearly fell to the ground. She assisted him to a sitting position  and called for assistance. The patient was urgently brought to our ED  where initial brain imaging was negative for acute hemorrhage, mass, or  bleeding. Followup imaging had showed a left pontine infarction. There  had been no trauma to his head, seizure activities, or palpitations. The patient had a mild deterioration of his renal function,  fluctuations of blood pressure, and chronic shortness of breath with his COPD. Objective:  General Appearance:  Comfortable. Vital signs: Blood pressure 132/79, pulse 65, temperature 97.7 °F (36.5 °C), temperature source Oral, resp. rate 18, height 5' 8\" (1.727 m), weight 221 lb 9.6 oz (100.5 kg), SpO2 92 %. Lungs:  Normal respiratory rate. Breath sounds clear to auscultation. Heart: Normal rate. Regular rhythm. Abdomen: Bowel sounds are normal.   There is no abdominal tenderness. Neurological: Patient is alert and oriented to person, place and time. Flat affect. Slow to respond at times. Speech is fluent. moving all 4 limbs. Motor strength 5 over 5 in both upper and lower limbs.         The patient presented to the ARU with the above history requiring a multidisciplinary treatment plan including close medical supervision by the Sharla Tijerina Director. The patient participated in the prescribed therapy treatment plan with reasonable compliance and progressive tolerance. They avoided significant medical complications. By the time of discharge the patient had become safer with adaptive equipment to transfer and toilet with a FWW with the supervision of family/caregivers. The patient was tolerating an oral diet without choking/coughing and was back to their baseline with regards to bowel and bladder control. Discharge instructions were reviewed with the patient and family. The patient is to follow up with the PCP in 1 week. No driving. [facility] PT/OT/RN will be arranged.      Pravin Pearson   Home Medication Instructions SRI:[id number]    Printed on:06/29/19 1911   Medication Information                      albuterol sulfate HFA (PROVENTIL HFA) 108 (90 Base) MCG/ACT inhaler  Inhale 2 puffs into the lungs every 6 hours as needed for Wheezing or Shortness of Breath (or coughing spells)             aspirin EC 81 MG EC tablet  Take 1 tablet by mouth daily             citalopram (CELEXA) 10 MG tablet  TAKE 1 TABLET BY MOUTH IN THE MORNING AND 1 TABLET BY MOUTH AT BEDTIME             donepezil (ARICEPT) 10 MG tablet  Take 1 tablet by mouth nightly             fluticasone-salmeterol (ADVAIR DISKUS) 250-50 MCG/DOSE AEPB  Inhale 1 puff into the lungs every 12 hours             folic acid (FOLVITE) 1 MG tablet  TAKE 1 TABLET BY MOUTH DAILY             metoprolol succinate (TOPROL XL) 25 MG extended release tablet  Take 0.5 tablets by mouth daily             oxybutynin (DITROPAN) 5 MG tablet  Take 1 tablet by mouth 2 times daily                 CONDITION ON DISCHARGE: Stable. The prognosis is fair for further improvements in ADL's and safety with adapted gait/transfers. Record review, patient exam, discharge instructions, medication reconciliation and summary for this discharge visit took more than 30 minutes.

## 2019-07-01 RX ORDER — OXYBUTYNIN CHLORIDE 5 MG/1
TABLET ORAL
Qty: 180 TABLET | Refills: 1 | Status: SHIPPED | OUTPATIENT
Start: 2019-07-01 | End: 2019-12-27

## 2019-07-22 ENCOUNTER — OFFICE VISIT (OUTPATIENT)
Dept: INTERNAL MEDICINE CLINIC | Age: 84
End: 2019-07-22
Payer: MEDICARE

## 2019-07-22 VITALS
RESPIRATION RATE: 20 BRPM | OXYGEN SATURATION: 98 % | WEIGHT: 227.2 LBS | HEART RATE: 74 BPM | DIASTOLIC BLOOD PRESSURE: 72 MMHG | BODY MASS INDEX: 34.55 KG/M2 | SYSTOLIC BLOOD PRESSURE: 128 MMHG

## 2019-07-22 DIAGNOSIS — I65.22 LEFT CAROTID STENOSIS: Primary | ICD-10-CM

## 2019-07-22 DIAGNOSIS — I10 ESSENTIAL HYPERTENSION: ICD-10-CM

## 2019-07-22 DIAGNOSIS — M54.50 CHRONIC MIDLINE LOW BACK PAIN WITHOUT SCIATICA: ICD-10-CM

## 2019-07-22 DIAGNOSIS — I65.22 LEFT CAROTID STENOSIS: ICD-10-CM

## 2019-07-22 DIAGNOSIS — I63.19 CEREBROVASCULAR ACCIDENT (CVA) DUE TO EMBOLISM OF OTHER PRECEREBRAL ARTERY (HCC): ICD-10-CM

## 2019-07-22 DIAGNOSIS — N39.41 URGE INCONTINENCE OF URINE: ICD-10-CM

## 2019-07-22 DIAGNOSIS — G89.29 CHRONIC MIDLINE LOW BACK PAIN WITHOUT SCIATICA: ICD-10-CM

## 2019-07-22 PROCEDURE — 99214 OFFICE O/P EST MOD 30 MIN: CPT | Performed by: INTERNAL MEDICINE

## 2019-07-22 RX ORDER — SULFAMETHOXAZOLE AND TRIMETHOPRIM 400; 80 MG/1; MG/1
1 TABLET ORAL 2 TIMES DAILY
Qty: 14 TABLET | Refills: 0 | Status: SHIPPED | OUTPATIENT
Start: 2019-07-22 | End: 2019-07-29

## 2019-07-22 RX ORDER — CLOPIDOGREL BISULFATE 75 MG/1
75 TABLET ORAL DAILY
Qty: 90 TABLET | Refills: 1 | Status: SHIPPED | OUTPATIENT
Start: 2019-07-22 | End: 2020-01-16

## 2019-07-22 NOTE — PROGRESS NOTES
correct before closure.     Patient tolerated the procedure well. He was fully alert and awake without neurologic and cardiologic problems at the end of the procedure. We're optimistic that he will make a good recovery. -------------------  --------------  Urinary incontnence, no burning, is already on 10mg ditropan BID. OBJECTIVE:    /72 (Site: Left Upper Arm, Position: Sitting, Cuff Size: Large Adult)   Pulse 74   Resp 20   Wt 227 lb 3.2 oz (103.1 kg)   SpO2 98%   BMI 34.55 kg/m²     Physical Exam   Constitutional: He appears well-developed and well-nourished. No distress. HENT:   Head: Normocephalic and atraumatic. Nose: Nose normal.   Mouth/Throat: Oropharynx is clear and moist. No oropharyngeal exudate. Eyes: Pupils are equal, round, and reactive to light. Conjunctivae and EOM are normal. Right eye exhibits no discharge. Left eye exhibits no discharge. No scleral icterus. Neck: Normal range of motion. Neck supple. No tracheal deviation present. Cardiovascular: Normal rate, regular rhythm, normal heart sounds and intact distal pulses. Exam reveals no gallop and no friction rub. No murmur heard. Pulmonary/Chest: Effort normal and breath sounds normal. No respiratory distress. He has no wheezes. He has no rales. Abdominal: Soft. Bowel sounds are normal. He exhibits no distension and no mass. There is no tenderness. There is no rebound and no guarding. Musculoskeletal: He exhibits no edema. Lymphadenopathy:     He has no cervical adenopathy. Neurological: He is alert. He has normal reflexes. Skin: Skin is warm and dry. Psychiatric: He has a normal mood and affect. Vitals reviewed. ASSESSMENT:    1. Left carotid stenosis    2. Cerebrovascular accident (CVA) due to embolism of other precerebral artery ([address]    3. Urge incontinence of urine    4. Essential hypertension    5.  Chronic midline low back pain without sciatica        PLAN:    Joanne Simmons was seen today for 3

## 2019-07-23 DIAGNOSIS — R73.9 HYPERGLYCEMIA: Primary | ICD-10-CM

## 2019-07-23 DIAGNOSIS — R73.9 HYPERGLYCEMIA: ICD-10-CM

## 2019-07-23 LAB
A/G RATIO: 1.5 (ref 1.1–2.2)
ALBUMIN SERPL-MCNC: 3.8 G/DL (ref 3.4–5)
ALP BLD-CCNC: 120 U/L (ref 40–129)
ALT SERPL-CCNC: 20 U/L (ref 10–40)
ANION GAP SERPL CALCULATED.3IONS-SCNC: 16 MMOL/L (ref 3–16)
AST SERPL-CCNC: 17 U/L (ref 15–37)
BASOPHILS ABSOLUTE: 0 K/UL (ref 0–0.2)
BASOPHILS RELATIVE PERCENT: 0.6 %
BILIRUB SERPL-MCNC: 0.3 MG/DL (ref 0–1)
BILIRUBIN URINE: NEGATIVE
BLOOD, URINE: NEGATIVE
BUN BLDV-MCNC: 19 MG/DL (ref 7–20)
CALCIUM SERPL-MCNC: 9.8 MG/DL (ref 8.3–10.6)
CHLORIDE BLD-SCNC: 98 MMOL/L (ref 99–110)
CLARITY: CLEAR
CO2: 25 MMOL/L (ref 21–32)
COLOR: YELLOW
CREAT SERPL-MCNC: 1.3 MG/DL (ref 0.8–1.3)
EOSINOPHILS ABSOLUTE: 0.1 K/UL (ref 0–0.6)
EOSINOPHILS RELATIVE PERCENT: 0.9 %
ESTIMATED AVERAGE GLUCOSE: 128.4 MG/DL
GFR AFRICAN AMERICAN: >60
GFR NON-AFRICAN AMERICAN: 53
GLOBULIN: 2.6 G/DL
GLUCOSE BLD-MCNC: 131 MG/DL (ref 70–99)
GLUCOSE URINE: NEGATIVE MG/DL
HBA1C MFR BLD: 6.1 %
HCT VFR BLD CALC: 42.9 % (ref 40.5–52.5)
HEMOGLOBIN: 14.3 G/DL (ref 13.5–17.5)
KETONES, URINE: NEGATIVE MG/DL
LEUKOCYTE ESTERASE, URINE: NEGATIVE
LYMPHOCYTES ABSOLUTE: 1.9 K/UL (ref 1–5.1)
LYMPHOCYTES RELATIVE PERCENT: 28.8 %
MCH RBC QN AUTO: 35.2 PG (ref 26–34)
MCHC RBC AUTO-ENTMCNC: 33.4 G/DL (ref 31–36)
MCV RBC AUTO: 105.1 FL (ref 80–100)
MICROSCOPIC EXAMINATION: NORMAL
MONOCYTES ABSOLUTE: 0.4 K/UL (ref 0–1.3)
MONOCYTES RELATIVE PERCENT: 6.6 %
NEUTROPHILS ABSOLUTE: 4.2 K/UL (ref 1.7–7.7)
NEUTROPHILS RELATIVE PERCENT: 63.1 %
NITRITE, URINE: NEGATIVE
PDW BLD-RTO: 14.8 % (ref 12.4–15.4)
PH UA: 6.5 (ref 5–8)
PLATELET # BLD: 154 K/UL (ref 135–450)
PMV BLD AUTO: 8.4 FL (ref 5–10.5)
POTASSIUM SERPL-SCNC: 4.7 MMOL/L (ref 3.5–5.1)
PROTEIN UA: NEGATIVE MG/DL
RBC # BLD: 4.08 M/UL (ref 4.2–5.9)
SODIUM BLD-SCNC: 139 MMOL/L (ref 136–145)
SPECIFIC GRAVITY UA: 1.02 (ref 1–1.03)
TOTAL PROTEIN: 6.4 G/DL (ref 6.4–8.2)
URINE TYPE: NORMAL
UROBILINOGEN, URINE: 0.2 E.U./DL
WBC # BLD: 6.7 K/UL (ref 4–11)

## 2019-08-04 DIAGNOSIS — R79.83 HOMOCYSTEINEMIA: ICD-10-CM

## 2019-08-05 RX ORDER — FOLIC ACID 1 MG/1
TABLET ORAL
Qty: 90 TABLET | Refills: 1 | Status: SHIPPED | OUTPATIENT
Start: 2019-08-05 | End: 2020-01-27

## 2019-08-06 DIAGNOSIS — G30.9 DEMENTIA DUE TO ALZHEIMER'S DISEASE (HCC): ICD-10-CM

## 2019-08-06 DIAGNOSIS — F02.80 DEMENTIA DUE TO ALZHEIMER'S DISEASE (HCC): ICD-10-CM

## 2019-08-06 RX ORDER — DONEPEZIL HYDROCHLORIDE 10 MG/1
TABLET, FILM COATED ORAL
Qty: 90 TABLET | Refills: 1 | Status: SHIPPED | OUTPATIENT
Start: 2019-08-06 | End: 2020-02-19

## 2019-08-08 ENCOUNTER — OFFICE VISIT (OUTPATIENT)
Dept: INTERNAL MEDICINE CLINIC | Age: 84
End: 2019-08-08
Payer: MEDICARE

## 2019-08-08 VITALS
WEIGHT: 225 LBS | DIASTOLIC BLOOD PRESSURE: 70 MMHG | HEART RATE: 64 BPM | SYSTOLIC BLOOD PRESSURE: 124 MMHG | OXYGEN SATURATION: 96 % | RESPIRATION RATE: 14 BRPM | BODY MASS INDEX: 34.21 KG/M2

## 2019-08-08 DIAGNOSIS — I25.810 CORONARY ARTERY DISEASE INVOLVING CORONARY BYPASS GRAFT OF NATIVE HEART WITHOUT ANGINA PECTORIS: ICD-10-CM

## 2019-08-08 DIAGNOSIS — K21.9 GASTROESOPHAGEAL REFLUX DISEASE WITHOUT ESOPHAGITIS: Primary | ICD-10-CM

## 2019-08-08 DIAGNOSIS — J42 CHRONIC BRONCHITIS, UNSPECIFIED CHRONIC BRONCHITIS TYPE (HCC): ICD-10-CM

## 2019-08-08 PROCEDURE — 99213 OFFICE O/P EST LOW 20 MIN: CPT | Performed by: INTERNAL MEDICINE

## 2019-08-08 RX ORDER — RANITIDINE 300 MG/1
300 TABLET ORAL
Qty: 90 TABLET | Refills: 3 | Status: SHIPPED | OUTPATIENT
Start: 2019-08-08 | End: 2020-01-14

## 2019-08-08 NOTE — PROGRESS NOTES
Andres Garcia  1935 08/08/19    SUBJECTIVE:  Not using O2 regularly, initial sat 83% but w rest up to 96%. Some SOB noted. abd aching noted w nausea but no emesis. Not worse w meals  w GERD is on nexium at night. CAD- recent [facility] May w collaterals and no critical stenosis. OBJECTIVE:    /70   Pulse 64   Resp 14   Wt 225 lb (102.1 kg)   SpO2 96%   BMI 34.21 kg/m²     Physical Exam   Constitutional: He appears well-developed and well-nourished. Neck: Neck supple. Cardiovascular: Normal rate, regular rhythm and normal heart sounds. Exam reveals no gallop and no friction rub. No murmur heard. Pulmonary/Chest: Effort normal and breath sounds normal. No respiratory distress. He has no wheezes. He has no rales. Abdominal: Soft. Bowel sounds are normal. He exhibits no distension. There is no tenderness. Musculoskeletal: He exhibits no edema. Neurological: He is alert. Psychiatric: He has a normal mood and affect. Vitals reviewed. ASSESSMENT:    1. Gastroesophageal reflux disease without esophagitis    2. Coronary artery disease involving coronary bypass graft of native heart without angina pectoris    3. Chronic bronchitis, unspecified chronic bronchitis type ([address]        PLAN:    Nikita Campbell was seen today for abdominal pain and other. Diagnoses and all orders for this visit:    Gastroesophageal reflux disease without esophagitis - APPEARS TO HAVE SOME BREAKTHR SX, WILL CONT NEXIUM QHS AND ADD ZANTAC QAM  -     ranitidine (ZANTAC) 300 MG tablet;  Take 1 tablet by mouth every morning (before breakfast)    Coronary artery disease involving coronary bypass graft of native heart without angina pectoris- RECENT [facility] NOTED AND STABLE, ABD PAIN UNLIKELY ANGINAL    Chronic bronchitis, unspecified chronic bronchitis type ([address]- ADVISED REGULAR USE NOCT O2, TOLD HIM THIS CAN ACTUALLY HELP HIM LIVE LONGER WHICH IS WHAT HE WANTS

## 2019-08-09 DIAGNOSIS — F41.9 ANXIETY: ICD-10-CM

## 2019-08-09 RX ORDER — LORAZEPAM 0.5 MG/1
0.5 TABLET ORAL 2 TIMES DAILY PRN
Qty: 40 TABLET | Refills: 1 | Status: SHIPPED | OUTPATIENT
Start: 2019-08-09 | End: 2020-01-17 | Stop reason: SDUPTHER

## 2019-08-09 RX ORDER — LORAZEPAM 0.5 MG/1
0.5 TABLET ORAL EVERY 6 HOURS PRN
Qty: 20 TABLET | Refills: 0 | Status: CANCELLED | OUTPATIENT
Start: 2019-08-09 | End: 2019-09-08

## 2019-08-26 ENCOUNTER — TELEPHONE (OUTPATIENT)
Dept: INTERNAL MEDICINE CLINIC | Age: 84
End: 2019-08-26

## 2019-08-26 NOTE — TELEPHONE ENCOUNTER
On oximetry test appears his oxygenation can drop as low at 81% so is important he continue using O2 at night.   If does not this could predispose to another stroke when oxygen levels are low

## 2019-09-11 ENCOUNTER — OFFICE VISIT (OUTPATIENT)
Dept: INTERNAL MEDICINE CLINIC | Age: 84
End: 2019-09-11
Payer: MEDICARE

## 2019-09-11 VITALS
HEART RATE: 108 BPM | BODY MASS INDEX: 35.73 KG/M2 | RESPIRATION RATE: 20 BRPM | WEIGHT: 235 LBS | OXYGEN SATURATION: 96 % | SYSTOLIC BLOOD PRESSURE: 118 MMHG | DIASTOLIC BLOOD PRESSURE: 64 MMHG

## 2019-09-11 DIAGNOSIS — I63.50 CEREBROVASCULAR ACCIDENT (CVA) DUE TO STENOSIS OF CEREBRAL ARTERY (HCC): Primary | ICD-10-CM

## 2019-09-11 DIAGNOSIS — J42 CHRONIC BRONCHITIS, UNSPECIFIED CHRONIC BRONCHITIS TYPE (HCC): ICD-10-CM

## 2019-09-11 DIAGNOSIS — F41.9 ANXIETY: ICD-10-CM

## 2019-09-11 DIAGNOSIS — Z23 NEED FOR IMMUNIZATION AGAINST INFLUENZA: ICD-10-CM

## 2019-09-11 PROCEDURE — 90662 IIV NO PRSV INCREASED AG IM: CPT | Performed by: INTERNAL MEDICINE

## 2019-09-11 PROCEDURE — 99213 OFFICE O/P EST LOW 20 MIN: CPT | Performed by: INTERNAL MEDICINE

## 2019-09-11 PROCEDURE — G0008 ADMIN INFLUENZA VIRUS VAC: HCPCS | Performed by: INTERNAL MEDICINE

## 2019-10-17 PROBLEM — G45.9 TIA (TRANSIENT ISCHEMIC ATTACK): Status: ACTIVE | Noted: 2019-10-17

## 2019-10-17 PROBLEM — F01.50 VASCULAR DEMENTIA WITHOUT BEHAVIORAL DISTURBANCE (HCC): Status: ACTIVE | Noted: 2019-10-17

## 2019-11-04 ENCOUNTER — OFFICE VISIT (OUTPATIENT)
Dept: INTERNAL MEDICINE CLINIC | Age: 84
End: 2019-11-04
Payer: MEDICARE

## 2019-11-04 VITALS — BODY MASS INDEX: 36.22 KG/M2 | HEIGHT: 68 IN | RESPIRATION RATE: 19 BRPM | WEIGHT: 239 LBS

## 2019-11-04 DIAGNOSIS — I10 ESSENTIAL HYPERTENSION: ICD-10-CM

## 2019-11-04 DIAGNOSIS — Z00.00 ROUTINE GENERAL MEDICAL EXAMINATION AT A HEALTH CARE FACILITY: Primary | ICD-10-CM

## 2019-11-04 DIAGNOSIS — I63.50 CEREBROVASCULAR ACCIDENT (CVA) DUE TO STENOSIS OF CEREBRAL ARTERY (HCC): ICD-10-CM

## 2019-11-04 PROCEDURE — G0439 PPPS, SUBSEQ VISIT: HCPCS | Performed by: INTERNAL MEDICINE

## 2019-11-04 ASSESSMENT — LIFESTYLE VARIABLES: HOW OFTEN DO YOU HAVE A DRINK CONTAINING ALCOHOL: 0

## 2019-11-04 ASSESSMENT — PATIENT HEALTH QUESTIONNAIRE - PHQ9
SUM OF ALL RESPONSES TO PHQ QUESTIONS 1-9: 2
SUM OF ALL RESPONSES TO PHQ QUESTIONS 1-9: 2

## 2019-11-30 DIAGNOSIS — F41.9 ANXIETY AND DEPRESSION: ICD-10-CM

## 2019-11-30 DIAGNOSIS — F32.A ANXIETY AND DEPRESSION: ICD-10-CM

## 2019-12-02 RX ORDER — CITALOPRAM 10 MG/1
TABLET ORAL
Qty: 180 TABLET | Refills: 1 | Status: SHIPPED | OUTPATIENT
Start: 2019-12-02 | End: 2020-01-17

## 2019-12-16 DIAGNOSIS — F51.01 PRIMARY INSOMNIA: ICD-10-CM

## 2019-12-16 RX ORDER — TRAZODONE HYDROCHLORIDE 50 MG/1
50 TABLET ORAL NIGHTLY PRN
Qty: 90 TABLET | Refills: 0 | Status: SHIPPED | OUTPATIENT
Start: 2019-12-16 | End: 2020-04-14 | Stop reason: SDUPTHER

## 2019-12-27 DIAGNOSIS — N39.41 URGE INCONTINENCE OF URINE: ICD-10-CM

## 2019-12-27 RX ORDER — OXYBUTYNIN CHLORIDE 5 MG/1
TABLET ORAL
Qty: 180 TABLET | Refills: 1 | Status: SHIPPED | OUTPATIENT
Start: 2019-12-27 | End: 2020-06-29

## 2020-01-14 ENCOUNTER — HOSPITAL ENCOUNTER (OUTPATIENT)
Age: 85
Setting detail: OBSERVATION
Discharge: HOME | DRG: 948 | End: 2020-01-15
Attending: EMERGENCY MEDICINE | Admitting: INTERNAL MEDICINE
Payer: MEDICARE

## 2020-01-14 ENCOUNTER — APPOINTMENT (OUTPATIENT)
Dept: CT IMAGING | Age: 85
DRG: 948 | End: 2020-01-14
Payer: MEDICARE

## 2020-01-14 ENCOUNTER — APPOINTMENT (OUTPATIENT)
Dept: GENERAL RADIOLOGY | Age: 85
DRG: 948 | End: 2020-01-14
Payer: MEDICARE

## 2020-01-14 PROBLEM — I63.9 ACUTE CEREBROVASCULAR ACCIDENT (HCC): Status: ACTIVE | Noted: 2020-01-14

## 2020-01-14 LAB
ALBUMIN SERPL-MCNC: 3.3 GM/DL (ref 3.4–5)
ALP BLD-CCNC: 95 IU/L (ref 40–129)
ALT SERPL-CCNC: 14 U/L (ref 10–40)
ANION GAP SERPL CALCULATED.3IONS-SCNC: 13 MMOL/L (ref 4–16)
AST SERPL-CCNC: 15 IU/L (ref 15–37)
BACTERIA: NEGATIVE /HPF
BASOPHILS ABSOLUTE: 0 K/CU MM
BASOPHILS RELATIVE PERCENT: 0.4 % (ref 0–1)
BILIRUB SERPL-MCNC: 0.3 MG/DL (ref 0–1)
BILIRUBIN URINE: NEGATIVE MG/DL
BLOOD, URINE: NEGATIVE
BUN BLDV-MCNC: 14 MG/DL (ref 6–23)
CALCIUM SERPL-MCNC: 9 MG/DL (ref 8.3–10.6)
CHLORIDE BLD-SCNC: 99 MMOL/L (ref 99–110)
CLARITY: CLEAR
CO2: 24 MMOL/L (ref 21–32)
COLOR: YELLOW
CREAT SERPL-MCNC: 1.2 MG/DL (ref 0.9–1.3)
DIFFERENTIAL TYPE: ABNORMAL
EOSINOPHILS ABSOLUTE: 0 K/CU MM
EOSINOPHILS RELATIVE PERCENT: 0.4 % (ref 0–3)
GFR AFRICAN AMERICAN: >60 ML/MIN/1.73M2
GFR NON-AFRICAN AMERICAN: 58 ML/MIN/1.73M2
GLUCOSE BLD-MCNC: 169 MG/DL (ref 70–99)
GLUCOSE, URINE: NEGATIVE MG/DL
HCT VFR BLD CALC: 48.6 % (ref 42–52)
HEMOGLOBIN: 14.9 GM/DL (ref 13.5–18)
IMMATURE NEUTROPHIL %: 0.4 % (ref 0–0.43)
INR BLD: 1.05 INDEX
KETONES, URINE: NEGATIVE MG/DL
LEUKOCYTE ESTERASE, URINE: NEGATIVE
LYMPHOCYTES ABSOLUTE: 2.3 K/CU MM
LYMPHOCYTES RELATIVE PERCENT: 29.6 % (ref 24–44)
MCH RBC QN AUTO: 33 PG (ref 27–31)
MCHC RBC AUTO-ENTMCNC: 30.7 % (ref 32–36)
MCV RBC AUTO: 107.8 FL (ref 78–100)
MONOCYTES ABSOLUTE: 0.6 K/CU MM
MONOCYTES RELATIVE PERCENT: 7 % (ref 0–4)
NITRITE URINE, QUANTITATIVE: NEGATIVE
NUCLEATED RBC %: 0 %
PDW BLD-RTO: 14.6 % (ref 11.7–14.9)
PH, URINE: 5 (ref 5–8)
PLATELET # BLD: 149 K/CU MM (ref 140–440)
PMV BLD AUTO: 9.8 FL (ref 7.5–11.1)
POTASSIUM SERPL-SCNC: 4.2 MMOL/L (ref 3.5–5.1)
PROTEIN UA: NEGATIVE MG/DL
PROTHROMBIN TIME: 12.7 SECONDS (ref 11.7–14.5)
RBC # BLD: 4.51 M/CU MM (ref 4.6–6.2)
RBC URINE: ABNORMAL /HPF (ref 0–3)
SEGMENTED NEUTROPHILS ABSOLUTE COUNT: 4.9 K/CU MM
SEGMENTED NEUTROPHILS RELATIVE PERCENT: 62.2 % (ref 36–66)
SODIUM BLD-SCNC: 136 MMOL/L (ref 135–145)
SPECIFIC GRAVITY UA: 1.05 (ref 1–1.03)
SPECIFIC GRAVITY UA: ABNORMAL (ref 1–1.03)
SQUAMOUS EPITHELIAL: <1 /HPF
TOTAL IMMATURE NEUTOROPHIL: 0.03 K/CU MM
TOTAL NUCLEATED RBC: 0 K/CU MM
TOTAL PROTEIN: 6.7 GM/DL (ref 6.4–8.2)
TRICHOMONAS: ABNORMAL /HPF
TROPONIN T: <0.01 NG/ML
UROBILINOGEN, URINE: 1 MG/DL (ref 0.2–1)
WBC # BLD: 7.8 K/CU MM (ref 4–10.5)
WBC UA: 1 /HPF (ref 0–2)

## 2020-01-14 PROCEDURE — 81001 URINALYSIS AUTO W/SCOPE: CPT

## 2020-01-14 PROCEDURE — 70450 CT HEAD/BRAIN W/O DYE: CPT

## 2020-01-14 PROCEDURE — 94640 AIRWAY INHALATION TREATMENT: CPT

## 2020-01-14 PROCEDURE — 84484 ASSAY OF TROPONIN QUANT: CPT

## 2020-01-14 PROCEDURE — 2580000003 HC RX 258: Performed by: EMERGENCY MEDICINE

## 2020-01-14 PROCEDURE — G0378 HOSPITAL OBSERVATION PER HR: HCPCS

## 2020-01-14 PROCEDURE — 6360000002 HC RX W HCPCS: Performed by: INTERNAL MEDICINE

## 2020-01-14 PROCEDURE — 6370000000 HC RX 637 (ALT 250 FOR IP): Performed by: INTERNAL MEDICINE

## 2020-01-14 PROCEDURE — 93010 ELECTROCARDIOGRAM REPORT: CPT | Performed by: INTERNAL MEDICINE

## 2020-01-14 PROCEDURE — 2580000003 HC RX 258: Performed by: INTERNAL MEDICINE

## 2020-01-14 PROCEDURE — 36415 COLL VENOUS BLD VENIPUNCTURE: CPT

## 2020-01-14 PROCEDURE — 70498 CT ANGIOGRAPHY NECK: CPT

## 2020-01-14 PROCEDURE — 1200000000 HC SEMI PRIVATE

## 2020-01-14 PROCEDURE — 99220 PR INITIAL OBSERVATION CARE/DAY 70 MINUTES: CPT | Performed by: INTERNAL MEDICINE

## 2020-01-14 PROCEDURE — 85025 COMPLETE CBC W/AUTO DIFF WBC: CPT

## 2020-01-14 PROCEDURE — 99285 EMERGENCY DEPT VISIT HI MDM: CPT

## 2020-01-14 PROCEDURE — 96372 THER/PROPH/DIAG INJ SC/IM: CPT

## 2020-01-14 PROCEDURE — 6360000004 HC RX CONTRAST MEDICATION: Performed by: EMERGENCY MEDICINE

## 2020-01-14 PROCEDURE — 80053 COMPREHEN METABOLIC PANEL: CPT

## 2020-01-14 PROCEDURE — 71045 X-RAY EXAM CHEST 1 VIEW: CPT

## 2020-01-14 PROCEDURE — 85610 PROTHROMBIN TIME: CPT

## 2020-01-14 PROCEDURE — 93005 ELECTROCARDIOGRAM TRACING: CPT | Performed by: EMERGENCY MEDICINE

## 2020-01-14 RX ORDER — CLOPIDOGREL BISULFATE 75 MG/1
75 TABLET ORAL DAILY
Status: DISCONTINUED | OUTPATIENT
Start: 2020-01-15 | End: 2020-01-15 | Stop reason: HOSPADM

## 2020-01-14 RX ORDER — ACETAMINOPHEN 500 MG
1000 TABLET ORAL NIGHTLY PRN
COMMUNITY

## 2020-01-14 RX ORDER — OXYBUTYNIN CHLORIDE 5 MG/1
5 TABLET ORAL 2 TIMES DAILY
Status: DISCONTINUED | OUTPATIENT
Start: 2020-01-14 | End: 2020-01-15 | Stop reason: HOSPADM

## 2020-01-14 RX ORDER — TRAMADOL HYDROCHLORIDE 50 MG/1
50 TABLET ORAL EVERY 6 HOURS PRN
Status: DISCONTINUED | OUTPATIENT
Start: 2020-01-14 | End: 2020-01-15 | Stop reason: HOSPADM

## 2020-01-14 RX ORDER — SODIUM CHLORIDE 0.9 % (FLUSH) 0.9 %
10 SYRINGE (ML) INJECTION PRN
Status: DISCONTINUED | OUTPATIENT
Start: 2020-01-14 | End: 2020-01-15 | Stop reason: HOSPADM

## 2020-01-14 RX ORDER — CITALOPRAM 20 MG/1
10 TABLET ORAL DAILY
Status: DISCONTINUED | OUTPATIENT
Start: 2020-01-15 | End: 2020-01-15 | Stop reason: HOSPADM

## 2020-01-14 RX ORDER — SODIUM CHLORIDE 0.9 % (FLUSH) 0.9 %
10 SYRINGE (ML) INJECTION EVERY 12 HOURS SCHEDULED
Status: DISCONTINUED | OUTPATIENT
Start: 2020-01-14 | End: 2020-01-15 | Stop reason: HOSPADM

## 2020-01-14 RX ORDER — PRAVASTATIN SODIUM 40 MG
40 TABLET ORAL DAILY
Status: DISCONTINUED | OUTPATIENT
Start: 2020-01-14 | End: 2020-01-15 | Stop reason: HOSPADM

## 2020-01-14 RX ORDER — FOLIC ACID 1 MG/1
1000 TABLET ORAL DAILY
Status: DISCONTINUED | OUTPATIENT
Start: 2020-01-15 | End: 2020-01-15 | Stop reason: HOSPADM

## 2020-01-14 RX ORDER — FAMOTIDINE 20 MG/1
40 TABLET, FILM COATED ORAL DAILY
Status: DISCONTINUED | OUTPATIENT
Start: 2020-01-14 | End: 2020-01-15 | Stop reason: HOSPADM

## 2020-01-14 RX ORDER — DONEPEZIL HYDROCHLORIDE 10 MG/1
10 TABLET, FILM COATED ORAL NIGHTLY
Status: DISCONTINUED | OUTPATIENT
Start: 2020-01-14 | End: 2020-01-15 | Stop reason: HOSPADM

## 2020-01-14 RX ORDER — TRAZODONE HYDROCHLORIDE 50 MG/1
50 TABLET ORAL NIGHTLY PRN
Status: DISCONTINUED | OUTPATIENT
Start: 2020-01-14 | End: 2020-01-15 | Stop reason: HOSPADM

## 2020-01-14 RX ORDER — ONDANSETRON 2 MG/ML
4 INJECTION INTRAMUSCULAR; INTRAVENOUS EVERY 6 HOURS PRN
Status: DISCONTINUED | OUTPATIENT
Start: 2020-01-14 | End: 2020-01-15 | Stop reason: HOSPADM

## 2020-01-14 RX ORDER — ALBUTEROL SULFATE 90 UG/1
2 AEROSOL, METERED RESPIRATORY (INHALATION) EVERY 6 HOURS PRN
Status: DISCONTINUED | OUTPATIENT
Start: 2020-01-14 | End: 2020-01-15 | Stop reason: HOSPADM

## 2020-01-14 RX ORDER — ASPIRIN 81 MG/1
81 TABLET ORAL DAILY
Status: DISCONTINUED | OUTPATIENT
Start: 2020-01-14 | End: 2020-01-15 | Stop reason: HOSPADM

## 2020-01-14 RX ORDER — METOPROLOL SUCCINATE 25 MG/1
12.5 TABLET, EXTENDED RELEASE ORAL DAILY
Status: DISCONTINUED | OUTPATIENT
Start: 2020-01-14 | End: 2020-01-15 | Stop reason: HOSPADM

## 2020-01-14 RX ADMIN — TRAZODONE HYDROCHLORIDE 50 MG: 50 TABLET ORAL at 20:17

## 2020-01-14 RX ADMIN — METOPROLOL SUCCINATE 12.5 MG: 25 TABLET, EXTENDED RELEASE ORAL at 18:24

## 2020-01-14 RX ADMIN — IOPAMIDOL 90 ML: 755 INJECTION, SOLUTION INTRAVENOUS at 12:54

## 2020-01-14 RX ADMIN — FAMOTIDINE 40 MG: 20 TABLET ORAL at 20:17

## 2020-01-14 RX ADMIN — Medication 10 ML: at 12:50

## 2020-01-14 RX ADMIN — OXYBUTYNIN CHLORIDE 5 MG: 5 TABLET ORAL at 20:17

## 2020-01-14 RX ADMIN — Medication 2 PUFF: at 21:03

## 2020-01-14 RX ADMIN — SODIUM CHLORIDE, PRESERVATIVE FREE 10 ML: 5 INJECTION INTRAVENOUS at 20:18

## 2020-01-14 RX ADMIN — ASPIRIN 81 MG: 81 TABLET, COATED ORAL at 18:24

## 2020-01-14 RX ADMIN — DONEPEZIL HYDROCHLORIDE 10 MG: 10 TABLET, FILM COATED ORAL at 20:17

## 2020-01-14 RX ADMIN — ENOXAPARIN SODIUM 30 MG: 30 INJECTION SUBCUTANEOUS at 18:24

## 2020-01-14 RX ADMIN — PRAVASTATIN SODIUM 40 MG: 40 TABLET ORAL at 20:17

## 2020-01-14 NOTE — H&P
Carotid stenosis, right 11/13    ~50% or less, on MRA and carotid u/s    CKD (chronic kidney disease)     cannot take nsaids    COPD (chronic obstructive pulmonary disease) ([facility])     no lung specialist    CVA (cerebrovascular accident) ([facility] 11/16/13 admission    ([facility]) R temporal/occipital and R thalamic infarct, L sided weakness and focal seizure// per old chart came in 5/18/2019 with CVA- right sided weakness- \"getting better\"    Dementia due to Alzheimer's disease ([facility] 03/28/2017    MMSE 20/32 ON 3/28/17--- STARTING TRIAL ARICEPT    Dyslipidemia 10/04    ED (erectile dysfunction) 6/02 372 ,  1/2001  387    testos    Former smoker     GERD (gastroesophageal reflux disease)     Hearing loss 05/2006    wear hearing aides bilat\"does not wear them\"    History of kidney stones     \"passed one 2013\"    [facility]     on foltx    Hyperlipidemia     Hypertension     follows with dr Alicja Dejesus MI (myocardial infarction) ([facility] 1991    Migraine headache     Obesity (BMI 30-39. 9)     On home oxygen therapy     at 2l/nc at night only    PFO (patent foramen ovale)     noted on [facility] 12/17/13- Dr Brooke Mccormack Redundant prepuce and phimosis 1/5/12    Dr Tamara Wu Seizure disorder, focal motor ([facility] 11/13 admission    L hand tremor assoc w CVA\"they think he had seizure with first stroke but none since then\"    Short-term memory loss     per wife\"from the first stroke- getting alittle worse\"    Vertigo        Past Surgical History:        Procedure Laterality Date    ABDOMEN SURGERY      ABDOMINAL AORTIC ANEURYSM REPAIR, ENDOVASCULAR  9/24/13    CARDIAC CATHETERIZATION  05/2019    CARDIAC SURGERY      CARDIOVASCULAR STRESS TEST  1/10/2012    Dr. Conchis Baum- normal with EF 45%    CAROTID ENDARTERECTOMY Left 6/21/2019    LEFT CAROTID ENDARTERECTOMY performed by Milana Jang MD at [address]  01/1994    Dr Maxime Wilkins  CABG x 4    DENTAL SURGERY  years ago    wears full dentures    EYE SURGERY  1990's    jaimee cataract surgey    HEMORRHOIDECTOMY  30+ yrs ago    VASCULAR SURGERY  06/21/2019    left endarectomy       Medications Prior to Admission:    Prior to Admission medications    Medication Sig Start Date End Date Taking?  Authorizing Provider   acetaminophen (TYLENOL) 500 MG tablet Take 1,000 mg by mouth nightly as needed for Pain   Yes Historical Provider, MD   oxybutynin (DITROPAN) 5 MG tablet TAKE 1 TABLET BY MOUTH TWICE A DAY 12/27/19  Yes Brunilda Cade MD   traZODone (DESYREL) 50 MG tablet Take 1 tablet by mouth nightly as needed for Sleep 12/16/19  Yes Brunilda Cade MD   citalopram (CELEXA) 10 MG tablet TAKE 1 TABLET BY MOUTH IN THE MORNING AND 1 TABLET BY MOUTH AT BEDTIME  Patient taking differently: Take 10 mg by mouth 2 times daily TAKE 1 TABLET BY MOUTH IN THE MORNING AND 1 TABLET BY MOUTH AT BEDTIME 12/2/19  Yes Brunilda Cade MD   donepezil (ARICEPT) 10 MG tablet TAKE 1 TABLET BY MOUTH EVERY DAY AT NIGHT 8/6/19  Yes Brunilda Cade MD   folic acid (FOLVITE) 1 MG tablet TAKE 1 TABLET BY MOUTH EVERY DAY 8/5/19  Yes Brunilda Cade MD   clopidogrel (PLAVIX) 75 MG tablet Take 1 tablet by mouth daily 7/22/19  Yes Brunilda Cade MD   pravastatin (PRAVACHOL) 40 MG tablet TAKE 1 TABLET BY MOUTH EVERY DAY  Patient taking differently: Take 40 mg by mouth nightly TAKE 1 TABLET BY MOUTH EVERY DAY 6/3/19  Yes Brunilda Cade MD   metoprolol succinate (TOPROL XL) 25 MG extended release tablet Take 0.5 tablets by mouth daily 4/1/19  Yes Brunilda Cade MD   aspirin EC 81 MG EC tablet Take 1 tablet by mouth daily  Patient taking differently: Take 81 mg by mouth nightly  9/13/16  Yes Brunilda Cade MD   fluticasone-salmeterol (ADVAIR DISKUS) 250-50 MCG/DOSE AEPB Inhale 1 puff into the lungs every 12 hours 4/9/19   Brunilda Cade MD   albuterol sulfate HFA (PROVENTIL HFA) 108 (90 Base) MCG/ACT inhaler Inhale 2 puffs w/o ac disease. Will proceed w MRI brain to check further for ac stroke. Plan consultation w PT/OT and speech, neurology. w sx improving, this may later categorize as TIA, needing to monitor closely for at least next 1-2d. Cont anti platelet therapy, statin, BP meds. -  W RAPID IMPROVEMENT NOTED, IF MRI COMES BACK NEG, AND NEURO FXN RETURNS TO BASLINE, WILL CLASSIFY AS TIA. AT THIS POINT W IMPROVEMENT NOTED AS ABOVE, WILL PLACE IN OBSERVATION STATUS FOR NOW. HTN- relatively stable and also cont home regimen. COPD, currently stable and asymptomatic, will cont inhaler meds. disposition will depend on therapy assessments and clinical progression. If has complete resolution of sx in next 24 hrs w MRI negative, may be able to transition back to home setting next 1-2d.     Lenny Finley

## 2020-01-14 NOTE — ED PROVIDER NOTES
Pain Edu? --       Excl. in [address]? --      GENERAL APPEARANCE: Awake and alert. Cooperative. HEAD: Normocephalic. Atraumatic. EYES: EOM's grossly intact. Sclera anicteric. ENT: Mucous membranes are moist. Tolerates saliva. No trismus. NECK: Supple. No meningismus. Trachea midline. HEART: RRR. Radial pulses 2+. LUNGS: Respirations unlabored. CTAB  ABDOMEN: Soft. Non-tender. No guarding or rebound. EXTREMITIES: No acute deformities. SKIN: Warm and dry. NEUROLOGICAL:   Deidra Love's NIH Stroke Scale at 1:49 PM is:  Level of Consciousness:  0 - alert; keenly responsive    LOC Questions:  1 - answers one question correctly    LOC Commands:  0 - performs both tasks correctly    Best Gaze:  0 - normal    Visual Fields:  0 - no visual loss    Facial Palsy:  0 - normal symmetric movement    Motor-Arm-Left:  0 - no drift, limb holds 90 (or 45) degrees for full 10 seconds    Motor-Leg-Left:  1 - drift; leg falls by the end of the 5 second period but does not hit bed    Motor-Arm-Right:  0 - no drift, limb holds 90 (or 45) degrees for full 10 seconds    Motor-Leg-Right:  0 - no drift; leg holds 30 degree position for full 5 seconds    Limb Ataxia:  0 - absent    Sensory:  0 - normal; no sensory loss    Best Language:  1 - mild to moderate aphasia; some obvious loss of fluency or facility of comprehension without significant limitation on ideas expressed or form of expression. Reduction of speech and/or comprehension, however, makes conversation about provided materials difficult or impossible. For example, in conversation about provided materials, examiner can identify picture or naming card content from patient's response. Dysarthria:  1 - mild to moderate, patient slurs at least some words and at worst, can be understood with some difficulty    Extinction and Inattention:  0 - no abnormality  PSYCHIATRIC: Normal mood.     I have reviewed and interpreted all of the currently available lab results from this visit (if applicable):  Results for orders placed or performed during the hospital encounter of 01/14/20   CBC Auto Differential   Result Value Ref Range    WBC 7.8 4.0 - 10.5 K/CU MM    RBC 4.51 (L) 4.6 - 6.2 M/CU MM    Hemoglobin 14.9 13.5 - 18.0 GM/DL    Hematocrit 48.6 42 - 52 %    .8 (H) 78 - 100 FL    MCH 33.0 (H) 27 - 31 PG    MCHC 30.7 (L) 32.0 - 36.0 %    RDW 14.6 11.7 - 14.9 %    Platelets 343 538 - 563 K/CU MM    MPV 9.8 7.5 - 11.1 FL    Differential Type AUTOMATED DIFFERENTIAL     Segs Relative 62.2 36 - 66 %    Lymphocytes % 29.6 24 - 44 %    Monocytes % 7.0 (H) 0 - 4 %    Eosinophils % 0.4 0 - 3 %    Basophils % 0.4 0 - 1 %    Segs Absolute 4.9 K/CU MM    Lymphocytes Absolute 2.3 K/CU MM    Monocytes Absolute 0.6 K/CU MM    Eosinophils Absolute 0.0 K/CU MM    Basophils Absolute 0.0 K/CU MM    Nucleated RBC % 0.0 %    Total Nucleated RBC 0.0 K/CU MM    Total Immature Neutrophil 0.03 K/CU MM    Immature Neutrophil % 0.4 0 - 0.43 %   Comprehensive Metabolic Panel w/ Reflex to MG   Result Value Ref Range    Sodium 136 135 - 145 MMOL/L    Potassium 4.2 3.5 - 5.1 MMOL/L    Chloride 99 99 - 110 mMol/L    CO2 24 21 - 32 MMOL/L    BUN 14 6 - 23 MG/DL    CREATININE 1.2 0.9 - 1.3 MG/DL    Glucose 169 (H) 70 - 99 MG/DL    Calcium 9.0 8.3 - 10.6 MG/DL    Alb 3.3 (L) 3.4 - 5.0 GM/DL    Total Protein 6.7 6.4 - 8.2 GM/DL    Total Bilirubin 0.3 0.0 - 1.0 MG/DL    ALT 14 10 - 40 U/L    AST 15 15 - 37 IU/L    Alkaline Phosphatase 95 40 - 129 IU/L    GFR Non- 58 (L) >60 mL/min/1.73m2    GFR African American >60 >60 mL/min/1.73m2    Anion Gap 13 4 - 16   Troponin   Result Value Ref Range    Troponin T <0.010 <0.01 NG/ML   Protime-INR   Result Value Ref Range    Protime 12.7 11.7 - 14.5 SECONDS    INR 1.05 INDEX   EKG 12 Lead   Result Value Ref Range    Ventricular Rate 68 BPM    Atrial Rate 68 BPM    P-R Interval 216 ms    QRS Duration 150 ms    Q-T Interval 436 ms    QTc Calculation (Bazett) 463 ms    R demonstrate no acute abnormality. SOFT TISSUES/SKULL:  No acute abnormality of the visualized skull or soft tissues. Vascular calcifications. Stable exam.  No new acute intracranial abnormality. Critical results were called by Dr. Leigh Delgado. Nino Kaur MD to Toy Estebanradha on 1/14/2020 at 13:11. EKG (if obtained): (All EKG's are interpreted by myself in the absence of a cardiologist) This EKG was interpreted by me. Rate is 68, rhythm is sinus. MD is 216. QT intervals are within normal limits. Nonspecific ST, T wave abnormality. There is no ST segment or T wave changes. This EKG was compared to previous EKG from date 5/13/19      MDM:  1. Physician evaluation performed at: On arrival  2. Stroke alert called at: On arrival  3. CT results obtained at:  1311  4. Decision was made that TPA is NOT indicated in consultation with [facility] Stroke Neurologist, Dr. Macie Richard    t-PA NOT given due to the following EXCLUSION CRITERIA:  [x] Administration of t-PA can not be initiated within 4.5 hours of onset of symptoms  [] Evidence of intracranial hemorrhage on pretreatment CT  [] Clinical presentation suggestive of subarachnoid hemorrhage (even with normal CT)  [] CT shows multilobar infarction (hypodensity of > 1/3 cerebral hemisphere)  [] Known intracranial neoplasm, arteriovenous malformation or aneurysm  [] Significant head trauma (with sustained loss of consciousness), intracranial, or  intraspinal surgery within past 3 months  [] *Blood pressure elevated (systolic > 120 mm Hg or diastolic > 913 mm Hg)   [] *Abnormal blood glucose (< 50 or > 400mg/dL)  [] Active internal bleeding  [] Known bleeding risk (including but not limited to below)   Heparin, argatroban, or bivalirudin received within 48 hours with     PTT > upper limit of normal   Platelet count < 992,607/RT3   Current or recent use of anticoagulants including but not limited to:     Warfarin (Coumadin®) within 5 days or INR > 1.7     Apixiban (Eliquis®) within 48

## 2020-01-14 NOTE — ED NOTES
Bed: 02TR-02  Expected date:   Expected time:   Means of arrival:   Comments:  Stroke alert     Stephanie Cramer RN  01/14/20 9082

## 2020-01-15 ENCOUNTER — APPOINTMENT (OUTPATIENT)
Dept: MRI IMAGING | Age: 85
DRG: 948 | End: 2020-01-15
Payer: MEDICARE

## 2020-01-15 ENCOUNTER — TELEPHONE (OUTPATIENT)
Dept: INTERNAL MEDICINE CLINIC | Age: 85
End: 2020-01-15

## 2020-01-15 VITALS
OXYGEN SATURATION: 92 % | BODY MASS INDEX: 37.51 KG/M2 | TEMPERATURE: 98.2 F | HEIGHT: 68 IN | SYSTOLIC BLOOD PRESSURE: 154 MMHG | DIASTOLIC BLOOD PRESSURE: 94 MMHG | HEART RATE: 87 BPM | WEIGHT: 247.5 LBS | RESPIRATION RATE: 15 BRPM

## 2020-01-15 LAB
ANION GAP SERPL CALCULATED.3IONS-SCNC: 12 MMOL/L (ref 4–16)
BUN BLDV-MCNC: 16 MG/DL (ref 6–23)
CALCIUM SERPL-MCNC: 8.8 MG/DL (ref 8.3–10.6)
CHLORIDE BLD-SCNC: 98 MMOL/L (ref 99–110)
CHOLESTEROL: 150 MG/DL
CO2: 28 MMOL/L (ref 21–32)
CREAT SERPL-MCNC: 1.2 MG/DL (ref 0.9–1.3)
ESTIMATED AVERAGE GLUCOSE: 140 MG/DL
GFR AFRICAN AMERICAN: >60 ML/MIN/1.73M2
GFR NON-AFRICAN AMERICAN: 58 ML/MIN/1.73M2
GLUCOSE BLD-MCNC: 100 MG/DL (ref 70–99)
HBA1C MFR BLD: 6.5 % (ref 4.2–6.3)
HCT VFR BLD CALC: 46.1 % (ref 42–52)
HDLC SERPL-MCNC: 34 MG/DL
HEMOGLOBIN: 14.5 GM/DL (ref 13.5–18)
LDL CHOLESTEROL DIRECT: 95 MG/DL
LV EF: 53 %
LVEF MODALITY: NORMAL
MCH RBC QN AUTO: 32.8 PG (ref 27–31)
MCHC RBC AUTO-ENTMCNC: 31.5 % (ref 32–36)
MCV RBC AUTO: 104.3 FL (ref 78–100)
PDW BLD-RTO: 14.6 % (ref 11.7–14.9)
PLATELET # BLD: 162 K/CU MM (ref 140–440)
PMV BLD AUTO: 9.9 FL (ref 7.5–11.1)
POTASSIUM SERPL-SCNC: 4 MMOL/L (ref 3.5–5.1)
RBC # BLD: 4.42 M/CU MM (ref 4.6–6.2)
SODIUM BLD-SCNC: 138 MMOL/L (ref 135–145)
TRIGL SERPL-MCNC: 264 MG/DL
WBC # BLD: 7.7 K/CU MM (ref 4–10.5)

## 2020-01-15 PROCEDURE — 93306 TTE W/DOPPLER COMPLETE: CPT

## 2020-01-15 PROCEDURE — 80048 BASIC METABOLIC PNL TOTAL CA: CPT

## 2020-01-15 PROCEDURE — 83721 ASSAY OF BLOOD LIPOPROTEIN: CPT

## 2020-01-15 PROCEDURE — 6370000000 HC RX 637 (ALT 250 FOR IP): Performed by: INTERNAL MEDICINE

## 2020-01-15 PROCEDURE — 2580000003 HC RX 258: Performed by: INTERNAL MEDICINE

## 2020-01-15 PROCEDURE — G0378 HOSPITAL OBSERVATION PER HR: HCPCS

## 2020-01-15 PROCEDURE — 83036 HEMOGLOBIN GLYCOSYLATED A1C: CPT

## 2020-01-15 PROCEDURE — 80061 LIPID PANEL: CPT

## 2020-01-15 PROCEDURE — 99217 PR OBSERVATION CARE DISCHARGE MANAGEMENT: CPT | Performed by: INTERNAL MEDICINE

## 2020-01-15 PROCEDURE — 96372 THER/PROPH/DIAG INJ SC/IM: CPT

## 2020-01-15 PROCEDURE — 85027 COMPLETE CBC AUTOMATED: CPT

## 2020-01-15 PROCEDURE — 70551 MRI BRAIN STEM W/O DYE: CPT

## 2020-01-15 PROCEDURE — 6360000002 HC RX W HCPCS: Performed by: INTERNAL MEDICINE

## 2020-01-15 PROCEDURE — 36415 COLL VENOUS BLD VENIPUNCTURE: CPT

## 2020-01-15 PROCEDURE — 99205 OFFICE O/P NEW HI 60 MIN: CPT | Performed by: PSYCHIATRY & NEUROLOGY

## 2020-01-15 PROCEDURE — 92610 EVALUATE SWALLOWING FUNCTION: CPT

## 2020-01-15 RX ORDER — AMLODIPINE BESYLATE 5 MG/1
5 TABLET ORAL ONCE
Status: COMPLETED | OUTPATIENT
Start: 2020-01-15 | End: 2020-01-15

## 2020-01-15 RX ORDER — RANITIDINE 300 MG/1
300 TABLET ORAL
Qty: 90 TABLET | Refills: 3
Start: 2020-01-15 | End: 2020-05-04 | Stop reason: RX

## 2020-01-15 RX ORDER — HYDROCODONE BITARTRATE AND ACETAMINOPHEN 5; 325 MG/1; MG/1
1 TABLET ORAL EVERY 6 HOURS PRN
Status: DISCONTINUED | OUTPATIENT
Start: 2020-01-15 | End: 2020-01-15 | Stop reason: HOSPADM

## 2020-01-15 RX ADMIN — METOPROLOL SUCCINATE 12.5 MG: 25 TABLET, EXTENDED RELEASE ORAL at 08:41

## 2020-01-15 RX ADMIN — ASPIRIN 81 MG: 81 TABLET, COATED ORAL at 08:41

## 2020-01-15 RX ADMIN — AMLODIPINE BESYLATE 5 MG: 5 TABLET ORAL at 08:41

## 2020-01-15 RX ADMIN — ENOXAPARIN SODIUM 30 MG: 30 INJECTION SUBCUTANEOUS at 08:41

## 2020-01-15 RX ADMIN — OXYBUTYNIN CHLORIDE 5 MG: 5 TABLET ORAL at 08:41

## 2020-01-15 RX ADMIN — SODIUM CHLORIDE, PRESERVATIVE FREE 10 ML: 5 INJECTION INTRAVENOUS at 08:41

## 2020-01-15 RX ADMIN — FOLIC ACID 1000 MCG: 1 TABLET ORAL at 08:41

## 2020-01-15 RX ADMIN — HYDROCODONE BITARTRATE AND ACETAMINOPHEN 1 TABLET: 5; 325 TABLET ORAL at 16:09

## 2020-01-15 RX ADMIN — CLOPIDOGREL BISULFATE 75 MG: 75 TABLET ORAL at 08:41

## 2020-01-15 RX ADMIN — CITALOPRAM HYDROBROMIDE 10 MG: 20 TABLET ORAL at 08:41

## 2020-01-15 RX ADMIN — Medication 2 PUFF: at 09:07

## 2020-01-15 ASSESSMENT — PAIN - FUNCTIONAL ASSESSMENT: PAIN_FUNCTIONAL_ASSESSMENT: 0-10

## 2020-01-15 ASSESSMENT — PAIN SCALES - GENERAL: PAINLEVEL_OUTOF10: 9

## 2020-01-15 NOTE — TELEPHONE ENCOUNTER
[address] Transitions Initial Follow Up Call    Outreach made within 2 business days of discharge: yes    Patient: Petty Schulte Patient : 1935   MRN: [id number]  Reason for Admission: There are no discharge diagnoses documented for the most recent discharge. Discharge Date: 19       Spoke with: patient     Discharge department/facility: [facility]    TCM Interactive Patient Contact:  Was patient able to fill all prescriptions: yes  Was patient instructed to bring all medications to the follow-up visit: yes  Is patient taking all medications as directed in the discharge summary?  yes  Does patient understand their discharge instructions: yes  Does patient have questions or concerns that need addressed prior to 7-14 day follow up office visit: no     Scheduled appointment with PCP within 7-14 days    Follow Up  Future Appointments   Date Time Provider Terri Ash   2020  9:30 AM Kim Mendiola MD [facility] ULYSSES GAMEZ   2020 10:00 AM Kim Mendiola MD [facility] E KING Gillis [facility]

## 2020-01-15 NOTE — PROGRESS NOTES
Speech Language Pathology  Facility/Department: [address] [place] [address]   CLINICAL BEDSIDE SWALLOW EVALUATION    NAME: Sherice Mayes  : 1935  MRN: [id number]    ADMISSION DATE: 2020  ADMITTING DIAGNOSIS: has CAD (coronary artery disease); Benign essential tremor; Obstructive lung disease ([address] Back pain, chronic; Hypertension; Dyslipidemia; AAA (abdominal aortic aneurysm) without rupture ([facility] Cerebral infarction [facility]); CVA (cerebrovascular accident) ([address] Homocysteinemia ([address] PFO (patent foramen ovale); Migraine headache; COPD (chronic obstructive pulmonary disease) ([address] AAA (abdominal aortic aneurysm) ([address] Dementia due to Alzheimer's disease ([address] Atherosclerosis ; Delirium; Hemispheric carotid artery syndrome; Ventricular tachycardia ([address] Stage 3 chronic kidney disease ([facility] Dysthymia; GERD (gastroesophageal reflux disease); Cerebrovascular accident (CVA) due to stenosis of cerebral artery ([address] Acute CVA (cerebrovascular accident) ([address] Dysarthria; Left carotid stenosis; Right hemiparesis ([address] TIA (transient ischemic attack); Vascular dementia without behavioral disturbance ([address] and Acute cerebrovascular accident [facility]) on their problem list.      Impressions: Sherice Mayes was seen for a bedside swallowing evaluation after being admitted to [facility] with slurred speech and confusion. Pt was alert and cooperative throughout assessment. Relevant medical hx includes CAD, asthma, CVA, GERD and seizure disorder. Pt's wife at bedside reports that pt's slurred speech has resolved and denies any hx of dysphagia PTA. Pt was positioned upright in bed and presented with a clear vocal quality and strong volitional cough. Oral mechanism was [place] with no focal orofacial weakness. Pt accepted PO trials of puree, regular solids and thin liquids by straw sips from meal tray. Adequate mastication, timely oral A-P transit and minimal lingual residue was observed with trials of regular solids.   Pharyngeal swallow appeared [facility] characterized by timely swallow initiation and 0 overt s/s of aspiration with all PO trials given. Recommend continue general diet/ thin liquids with general aspiration precautions. No further acute ST needs identified. ONSET DATE: this admission     Date of Eval: 1/15/2020  Evaluating Therapist: Luis Alfredo Davis    Current Diet level:  Current Diet : Regular  Current Liquid Diet : Thin      Primary Complaint  Patient Complaint: weakness    Pain:  Pain Assessment  Pain Assessment: 0-10  Pain Level: 9    Reason for Referral  Candance Oiler was referred for a bedside swallow evaluation to assess the efficiency of his swallow function, identify signs and symptoms of aspiration and make recommendations regarding safe dietary consistencies, effective compensatory strategies, and safe eating environment. Impression  Dysphagia Diagnosis: Swallow function appears grossly intact  Dysphagia Outcome Severity Scale: Level 6: Within functional limits/Modified independence     Treatment Plan  Requires SLP Intervention: No  Duration/Frequency of Treatment: n/a  D/C Recommendations: To be determined       Recommended Diet and Intervention  Diet Solids Recommendation: Regular  Liquid Consistency Recommendation: Thin  Recommended Form of Meds: PO          Compensatory Swallowing Strategies  Compensatory Swallowing Strategies: Eat/Feed slowly; Small bites/sips;Upright as possible for all oral intake    Treatment/Goals  Short-term Goals  Timeframe for Short-term Goals: n/a    General  Chart Reviewed: Yes  Behavior/Cognition: Alert; Cooperative;Pleasant mood  Respiratory Status: Room air  O2 Device: None (Room air)  Communication Observation: Functional  Follows Directions: Complex  Dentition: Adequate  Patient Positioning: Upright in bed  Baseline Vocal Quality: Normal  Volitional Cough: Strong  Prior Dysphagia History: Pt denies hx of dysphagia   Consistencies Administered: Reg solid; Dysphagia Pureed (Dysphagia

## 2020-01-15 NOTE — DISCHARGE INSTR - OTHER ORDERS
Follow-up With  Details  Why  Contact Info   Cornell Barron MD  Go on 1/21/2020  Follow up @ 9:30 AM  Joann [address] [place] [address]  [phone]

## 2020-01-15 NOTE — DISCHARGE SUMMARY
Medication Instructions [facility]:[id number]    Printed on:01/15/20 7110   Medication Information                      acetaminophen (TYLENOL) 500 MG tablet  Take 1,000 mg by mouth nightly as needed for Pain             albuterol sulfate HFA (PROVENTIL HFA) 108 (90 Base) MCG/ACT inhaler  Inhale 2 puffs into the lungs every 6 hours as needed for Wheezing or Shortness of Breath (or coughing spells)             aspirin EC 81 MG EC tablet  Take 1 tablet by mouth daily             citalopram (CELEXA) 10 MG tablet  TAKE 1 TABLET BY MOUTH IN THE MORNING AND 1 TABLET BY MOUTH AT BEDTIME             clopidogrel (PLAVIX) 75 MG tablet  Take 1 tablet by mouth daily             donepezil (ARICEPT) 10 MG tablet  TAKE 1 TABLET BY MOUTH EVERY DAY AT NIGHT             fluticasone-salmeterol (ADVAIR DISKUS) 250-50 MCG/DOSE AEPB  Inhale 1 puff into the lungs every 12 hours             folic acid (FOLVITE) 1 MG tablet  TAKE 1 TABLET BY MOUTH EVERY DAY             metoprolol succinate (TOPROL XL) 25 MG extended release tablet  Take 0.5 tablets by mouth daily             oxybutynin (DITROPAN) 5 MG tablet  TAKE 1 TABLET BY MOUTH TWICE A DAY             pravastatin (PRAVACHOL) 40 MG tablet  TAKE 1 TABLET BY MOUTH EVERY DAY             ranitidine (ZANTAC) 300 MG tablet  Take 1 tablet by mouth every morning (before breakfast)             traZODone (DESYREL) 50 MG tablet  Take 1 tablet by mouth nightly as needed for Sleep                   Consults:  neurology    Significant Diagnostic Studies:  Lab, head CT and brain MRI    Treatments:   Therapy w PT and OT    Discharge Exam:  BP (!) 175/91   Pulse 79   Temp 98.2 °F (36.8 °C) (Oral)   Resp 15   Ht 5' 8.11\" (1.73 m)   Wt 247 lb 8 oz (112.3 kg)   SpO2 92%   BMI 37.51 kg/m²     General Appearance:    Alert, cooperative, no distress, appears stated age   Head:    Normocephalic, without obvious abnormality, atraumatic   Eyes:    PERRL, conjunctiva/corneas clear, EOM's intact, fundi     benign,

## 2020-01-15 NOTE — CONSULTS
90% blocked on the left side of carotid\"    Carotid stenosis, right 11/13    ~50% or less, on MRA and carotid u/s    CKD (chronic kidney disease)     cannot take nsaids    COPD (chronic obstructive pulmonary disease) ([facility])     no lung specialist    CVA (cerebrovascular accident) ([facility] 11/16/13 admission    [place]) R temporal/occipital and R thalamic infarct, L sided weakness and focal seizure// per old chart came in 5/18/2019 with CVA- right sided weakness- \"getting better\"    Dementia due to Alzheimer's disease ([facility] 03/28/2017    MMSE 20/32 ON 3/28/17--- STARTING TRIAL ARICEPT    Dyslipidemia 10/04    ED (erectile dysfunction) 6/02 372 ,  1/2001  387    testos    Former smoker     GERD (gastroesophageal reflux disease)     Hearing loss 05/2006    wear hearing aides bilat\"does not wear them\"    History of kidney stones     \"passed one 2013\"    Homocysteinemia ([facility]     on foltx    Hyperlipidemia     Hypertension     follows with dr Nate Garcia MI (myocardial infarction) ([facility] 1991    Migraine headache     Obesity (BMI 30-39. 9)     On home oxygen therapy     at 2l/nc at night only    PFO (patent foramen ovale)     noted on [facility] 12/17/13- Dr Lauren Ayala Redundant prepuce and phimosis 1/5/12    Dr Rick Londono Seizure disorder, focal motor ([facility] 11/13 admission    L hand tremor assoc w CVA\"they think he had seizure with first stroke but none since then\"    Short-term memory loss     per wife\"from the first stroke- getting alittle worse\"    Vertigo     :   Past Surgical History:   Procedure Laterality Date    ABDOMEN SURGERY      ABDOMINAL AORTIC ANEURYSM REPAIR, ENDOVASCULAR  9/24/13    CARDIAC CATHETERIZATION  05/2019    CARDIAC SURGERY      CARDIOVASCULAR STRESS TEST  1/10/2012    Dr. Luzma Maddox- normal with EF 45%    CAROTID ENDARTERECTOMY Left 6/21/2019    LEFT CAROTID ENDARTERECTOMY performed by Sheri Cade MD at . [address]  01/1994     quit smoking in 1994   Substance and Sexual Activity    Alcohol use: No     Comment:         Caffeine: Decaf/ per son\"stopped drinking 50 yrs ago- use to drink  1-2 times per week\"    Drug use: No    Sexual activity: Not on file   Lifestyle    Physical activity:     Days per week: Not on file     Minutes per session: Not on file    Stress: Not on file   Relationships    Social connections:     Talks on phone: Not on file     Gets together: Not on file     Attends Voodoo service: Not on file     Active member of club or organization: Not on file     Attends meetings of clubs or organizations: Not on file     Relationship status: Not on file    Intimate partner violence:     Fear of current or ex partner: Not on file     Emotionally abused: Not on file     Physically abused: Not on file     Forced sexual activity: Not on file   Other Topics Concern    Not on file   Social History Narrative    Not on file      Family History   Problem Relation Age of Onset    COPD Mother    Becky Cisneros Other Mother         benign tremors       Review of Symptoms:    10-point system review completed. All of which are negative except as mentioned above. Physical Exam:            Gen: Alert to self, cooperative, funny, jokes, but does not know the year  HEENT: NC/AT, EOMI, PERRL, mmm,neck supple, no meningeal signs; Heart: regular   Lungs: no distress  Ext: no edema, no calf tenderness b/l  Psych: normal mood and affect  Skin: no rashes or lesions    NEUROLOGIC EXAM:    Mental Status: A&O to self, president NAD, speech clear, language fluent, repetition and naming intact, follows commands appropriately    Cranial Nerve Exam:   CN II-XII: PERRL, VFF, no nystagmus, no gaze paresis, sensation V1-V3 intact b/l, muscles of facial expression symmetric; hearing intact to conversational tone, palate elevates symmetrically, shoulder elevation symmetric and tongue protrudes midline with movement side to side.     Motor Exam:       LUE and

## 2020-01-15 NOTE — PROGRESS NOTES
pharynx. Neck: no adenopathy, supple, symmetrical, trachea midline and thyroid not enlarged, symmetric, no tenderness/mass/nodules  Lungs: clear to auscultation bilaterally  Heart: regular rate and rhythm, S1, S2 normal, no murmur, click, rub or gallop  Abdomen: soft, non-tender; bowel sounds normal; no masses,  no organomegaly  Extremities: extremities normal, atraumatic, no cyanosis or edema  Neurologic: Mental status: Alert, oriented, thought content appropriate, mild chr L sided weakness L arm and leg, no acute changes    Assessment and Plan:   1. CVA vs TIA. Pending brain MRI. If neg, will discharge to home w continued med regimen. Therapy and neuro have been consulted. 2. HTN- bp sl high, will give norvasc x1.       Patient Active Problem List:     CAD (coronary artery disease)     Benign essential tremor     Obstructive lung disease (HCC)     Back pain, chronic     Hypertension     Dyslipidemia     AAA (abdominal aortic aneurysm) without rupture (HCC)     Cerebral infarction (HCC)     CVA (cerebrovascular accident) ([address]     Homocysteinemia (HCC)     PFO (patent foramen ovale)     Migraine headache     COPD (chronic obstructive pulmonary disease) (HCC)     AAA (abdominal aortic aneurysm) (HCC)     Dementia due to Alzheimer's disease ([address]     Atherosclerosis      Delirium     Hemispheric carotid artery syndrome     Ventricular tachycardia (HCC)     Stage 3 chronic kidney disease (HCC)     Dysthymia     GERD (gastroesophageal reflux disease)     Cerebrovascular accident (CVA) due to stenosis of cerebral artery ([address]     Acute CVA (cerebrovascular accident) ([address]     Dysarthria     Left carotid stenosis     Right hemiparesis (HCC)     TIA (transient ischemic attack)     Vascular dementia without behavioral disturbance ([address]     Acute cerebrovascular accident ([address]      Geovanna Patrick MD

## 2020-01-15 NOTE — CARE COORDINATION
Obesity. .. Instructed to take the medications as prescribed and dont stop unless ordered by a physician. Educated on  need to follow-up with physician after discharge . Discussed benefits of eating a healthy diet, regularly exercising, and not smoking. Copy of educational materials given to the patient/caregiver to take home, reviewed signs and symptoms of stroke, including sudden numbness, dizziness, or loss of coordination or balance; weakness on one side of the body, sudden confusion, difficulty speaking, understanding or swallowing, sudden loss of vision, or severe, sudden headache. Emphasized the need to call 911 immediately if they are experiencing signs and symptoms of stroke. [facility] education provided, [facility] magnet given to patient. All education with teachback and questions answered     CN Contact information card given to patient/caregiver    Dysphagia screen done prior to any oral intake (including meds)                              Yes    Date & Time of screening-1/14 1545  Date & time of first medication-1/14 1824  Did the pt fail the dysphagia screen? If yes,call the physician for SLP order, make the patient NPO and change oral medications to other routes  No    VTE Prophylaxis given by day 2 of admission ( day 1 starts on adm date,this excludes obs status and ED)  Yes  If VTE not ordered, did the physician chart BOTH a pharmacological and mechanical reasons ( in context to VTE) why it wan not ordered? NA  Was the patient given an antithrombotic by end of day 2? (day 1 starts on patients arrival date)  Yes  If the pt did not have an order for antithrombotic by day 2, did the physician document why the pt did not receive it? ( NPO status and an allergy to an antithrombotic are not acceptable reasons)  NA    Did the pt receive education on how to call 911 and documented that handout was given to pt/caregiver?   Yes  Did the pt receive education on stroke symptoms and

## 2020-01-16 PROBLEM — E11.9 DIABETES MELLITUS TYPE 2, CONTROLLED (HCC): Status: ACTIVE | Noted: 2020-01-16

## 2020-01-16 LAB
EKG ATRIAL RATE: 68 BPM
EKG DIAGNOSIS: NORMAL
EKG P-R INTERVAL: 216 MS
EKG Q-T INTERVAL: 436 MS
EKG QRS DURATION: 150 MS
EKG QTC CALCULATION (BAZETT): 463 MS
EKG R AXIS: 3 DEGREES
EKG T AXIS: -10 DEGREES
EKG VENTRICULAR RATE: 68 BPM

## 2020-01-16 RX ORDER — CLOPIDOGREL BISULFATE 75 MG/1
TABLET ORAL
Qty: 90 TABLET | Refills: 1 | Status: SHIPPED | OUTPATIENT
Start: 2020-01-16 | End: 2020-07-10

## 2020-01-17 ENCOUNTER — OFFICE VISIT (OUTPATIENT)
Dept: INTERNAL MEDICINE CLINIC | Age: 85
End: 2020-01-17
Payer: MEDICARE

## 2020-01-17 VITALS
WEIGHT: 239 LBS | BODY MASS INDEX: 36.22 KG/M2 | OXYGEN SATURATION: 92 % | HEART RATE: 81 BPM | SYSTOLIC BLOOD PRESSURE: 136 MMHG | DIASTOLIC BLOOD PRESSURE: 72 MMHG

## 2020-01-17 PROCEDURE — 99495 TRANSJ CARE MGMT MOD F2F 14D: CPT | Performed by: INTERNAL MEDICINE

## 2020-01-17 PROCEDURE — 1111F DSCHRG MED/CURRENT MED MERGE: CPT | Performed by: INTERNAL MEDICINE

## 2020-01-17 RX ORDER — CITALOPRAM 10 MG/1
10 TABLET ORAL 2 TIMES DAILY
Qty: 180 TABLET | Refills: 1
Start: 2020-01-17 | End: 2020-06-08

## 2020-01-17 RX ORDER — LORAZEPAM 0.5 MG/1
0.5 TABLET ORAL 2 TIMES DAILY PRN
Qty: 40 TABLET | Refills: 1 | Status: SHIPPED | OUTPATIENT
Start: 2020-01-17 | End: 2020-03-03 | Stop reason: SDUPTHER

## 2020-01-17 NOTE — PATIENT INSTRUCTIONS
good, quick way to make sure that you have a balanced meal. It also helps you spread carbs throughout the day. ? Divide your plate by types of foods. Put non-starchy vegetables on half the plate, meat or other protein food on one-quarter of the plate, and a grain or starchy vegetable in the final quarter of the plate. To this you can add a small piece of fruit and 1 cup of milk or yogurt, depending on how many carbs you are supposed to eat at a meal.  · Try to eat about the same amount of carbs at each meal. Do not \"save up\" your daily allowance of carbs to eat at one meal.  · Proteins have very little or no carbs per serving. Examples of proteins are beef, chicken, turkey, fish, eggs, tofu, cheese, cottage cheese, and peanut butter. A serving size of meat is 3 ounces, which is about the size of a deck of cards. Examples of meat substitute serving sizes (equal to 1 ounce of meat) are 1/4 cup of cottage cheese, 1 egg, 1 tablespoon of peanut butter, and ½ cup of tofu. How can you eat out and still eat healthy? · Learn to estimate the serving sizes of foods that have carbohydrate. If you measure food at home, it will be easier to estimate the amount in a serving of restaurant food. · If the meal you order has too much carbohydrate (such as potatoes, corn, or baked beans), ask to have a low-carbohydrate food instead. Ask for a salad or green vegetables. · If you use insulin, check your blood sugar before and after eating out to help you plan how much to eat in the future. · If you eat more carbohydrate at a meal than you had planned, take a walk or do other exercise. This will help lower your blood sugar. What else should you know? · Limit saturated fat, such as the fat from meat and dairy products. This is a healthy choice because people who have diabetes are at higher risk of heart disease. So choose lean cuts of meat and nonfat or low-fat dairy products.  Use olive or canola oil instead of butter or shortening snacks too. · Use cookbooks or online recipes to plan several main meals. Plan some quick meals for busy nights. You also can double some recipes that freeze well. Then you can save half for other busy nights when you don't have time to cook. · Make sure you have the ingredients you need for your recipes. If you're running low on basic items, put these items on your shopping list too. · List foods that you use to make breakfasts, lunches, and snacks. List plenty of fruits and vegetables. · Post this list on the refrigerator. Add to it as you think of more things you need. · Take the list to the store to do your weekly shopping. Follow-up care is a key part of your treatment and safety. Be sure to make and go to all appointments, and call your doctor if you are having problems. It's also a good idea to know your test results and keep a list of the medicines you take. Where can you learn more? Go to [email or web address]://[email or web address] org and sign in to your [employer] account. Enter J030 in the [employer] Int[employer] box to learn more about \"Learning About Meal Planning for Diabetes. \"     If you do not have an account, please click on the \"Sign Up Now\" link. Current as of: April 16, 2019  Content Version: 12.3  © 0930-5338 Healthwise, Incorporated. Care instructions adapted under license by [facility] ([facility]). If you have questions about a medical condition or this instruction, always ask your healthcare professional. [place] [address] any warranty or liability for your use of this information. Patient Education         Diabetes: Food and Your Blood Sugar (00:43)  Your health professional recommends that you watch this short online health video. Learn how your body turns carbohydrate foods into blood sugar. How to watch the video    Scan the QR code   OR Visit the website    https://hwi. se/r/[email or web address]   Current as of: April 16, 2019  Content Version: 12.3  © 2571-5554 Healthwise, Incorporated. Care instructions adapted under license by [employer] ([facility]). If you have questions about a medical condition or this instruction, always ask your healthcare professional. [address] any warranty or liability for your use of this information. Patient Education         Diabetes: How to Build Your Plate (84:62)  Your health professional recommends that you watch this short online health video. See how to measure healthy portion sizes using the plate method. How to watch the video    Scan the QR code   OR Visit the website    https://[email or web address] se/r/[email or web address]   Current as of: April 16, 2019  Content Version: 12.3  © 6100-1437 Healthwise, Incorporated. Care instructions adapted under license by [employer] ([facility]). If you have questions about a medical condition or this instruction, always ask your healthcare professional. [address] any warranty or liability for your use of this information.

## 2020-01-17 NOTE — PROGRESS NOTES
depression    Valium [Diazepam]      \"after his heart cath - got Valium and he stopped breathing for short times and we kept having to wake him to take a breath\"       Medications listed as ordered at the time of discharge from [facility]   Kate Brenda De Minnie De Luna Medication Instructions VIVIENNE:    Printed on:01/17/20 1052   Medication Information                      acetaminophen (TYLENOL) 500 MG tablet  Take 1,000 mg by mouth nightly as needed for Pain             albuterol sulfate HFA (PROVENTIL HFA) 108 (90 Base) MCG/ACT inhaler  Inhale 2 puffs into the lungs every 6 hours as needed for Wheezing or Shortness of Breath (or coughing spells)             aspirin EC 81 MG EC tablet  Take 1 tablet by mouth daily             citalopram (CELEXA) 10 MG tablet  Take 1 tablet by mouth 2 times daily             clopidogrel (PLAVIX) 75 MG tablet  TAKE 1 TABLET BY MOUTH EVERY DAY             donepezil (ARICEPT) 10 MG tablet  TAKE 1 TABLET BY MOUTH EVERY DAY AT NIGHT             fluticasone-salmeterol (ADVAIR DISKUS) 250-50 MCG/DOSE AEPB  Inhale 1 puff into the lungs every 12 hours             folic acid (FOLVITE) 1 MG tablet  TAKE 1 TABLET BY MOUTH EVERY DAY             metoprolol succinate (TOPROL XL) 25 MG extended release tablet  Take 0.5 tablets by mouth daily             oxybutynin (DITROPAN) 5 MG tablet  TAKE 1 TABLET BY MOUTH TWICE A DAY             pravastatin (PRAVACHOL) 40 MG tablet  TAKE 1 TABLET BY MOUTH EVERY DAY             ranitidine (ZANTAC) 300 MG tablet  Take 1 tablet by mouth every morning (before breakfast)             traZODone (DESYREL) 50 MG tablet  Take 1 tablet by mouth nightly as needed for Sleep                   Medications marked \"taking\" at this time  Outpatient Medications Marked as Taking for the 1/17/20 encounter (Office Visit) with Laure Fofana MD   Medication Sig Dispense Refill    citalopram (CELEXA) 10 MG tablet Take 1 tablet by mouth 2 times daily 180 tablet 1    clopidogrel (PLAVIX) sx.  Seen by neurology and also therapy, his MRI came back reported per neurology as neg for CVA. Thus w stable MRI and head CT, resolution of neurologic sx, plan for transition to home today w close f/u transitional care in one week. See orders, neuro consultation. Interval history/Current status: no further slurred speech since hosp discharge. BP is stable, however- still periodically confused since hospital.  On arrival home slept 11 hrs straight. Denies sx cp or sob. No focal weakness, numbness x for chr baseline mild L sided weakness. MRI no ac stroke, indicated old R sided stroke. IMPORTANTLY, SCR A1C THIS ADMISSION WAS POS FOR DM, 6.5  Lab Results   Component Value Date    LABA1C 6.5 (H) 01/15/2020    LABA1C 6.1 07/22/2019    LABA1C 5.8 09/25/2013     Lab Results   Component Value Date    GLUF 90 06/03/2019    LABMICR Not Indicated 07/22/2019    LDLCALC see below 04/09/2019    CREATININE 1.2 01/15/2020         Review of Systems    Vitals:    01/17/20 1046   BP: 136/72   Pulse: 81   SpO2: 92%   Weight: 239 lb (108.4 kg)     Body mass index is 36.22 kg/m². Wt Readings from Last 3 Encounters:   01/17/20 239 lb (108.4 kg)   01/15/20 247 lb 8 oz (112.3 kg)   11/04/19 239 lb (108.4 kg)     BP Readings from Last 3 Encounters:   01/17/20 136/72   01/15/20 (!) 154/94   10/17/19 (!) 144/82       Physical Exam  Vitals signs reviewed. Constitutional:       General: He is not in acute distress. Appearance: He is well-developed. HENT:      Head: Normocephalic and atraumatic. Nose: Nose normal.      Mouth/Throat:      Mouth: Mucous membranes are moist.      Pharynx: Oropharynx is clear. No oropharyngeal exudate. Eyes:      General: No scleral icterus. Right eye: No discharge. Left eye: No discharge. Conjunctiva/sclera: Conjunctivae normal.      Pupils: Pupils are equal, round, and reactive to light. Neck:      Musculoskeletal: Normal range of motion and neck supple. Thyroid: No thyromegaly. Vascular: No JVD. Trachea: No tracheal deviation. Cardiovascular:      Rate and Rhythm: Normal rate and regular rhythm. Heart sounds: Normal heart sounds. No murmur. No friction rub. No gallop. Pulmonary:      Effort: Pulmonary effort is normal. No respiratory distress. Breath sounds: Normal breath sounds. No wheezing or rales. Abdominal:      General: Bowel sounds are normal. There is no distension. Palpations: Abdomen is soft. There is no mass. Tenderness: There is no tenderness. There is no guarding or rebound. Musculoskeletal:         General: No tenderness. Lymphadenopathy:      Cervical: No cervical adenopathy. Skin:     General: Skin is warm and dry. Neurological:      General: No focal deficit present. Mental Status: He is alert. Comments: BASELINE MILD L ARM AND LEG WEAKNESS FR PRIOR R SIDED STROKE   Psychiatric:         Mood and Affect: Mood normal.             Assessment/Plan:  1. Transient ischemic attack  Remains stable neurologically w/o evidence of acute changes/focal deficits. Cont regimen. TO REDUCE RISK FOR FUTURE EVENTS, PROCEEDING W LOW DOSE METFORIN FOR HIS NEW ONSET DM.  - NC DISCHARGE MEDS RECONCILED W/ CURRENT OUTPATIENT MED LIST    2. Type 2 diabetes mellitus without complication, without long-term current use of insulin (HCC)  TO WORK ON DIET, TRIAL FIRST LOW DOSE METFORMIN 250MG DAILY QAM THEN IF YANDEL AT NEXT APPT FULL TAB/DAY--- DEP ON HIS A1C NEXT MO TOO. - metFORMIN (GLUCOPHAGE) 500 MG tablet; Take 1 tablet by mouth daily (with breakfast)  Dispense: 90 tablet; Refill: 1  - NC DISCHARGE MEDS RECONCILED W/ CURRENT OUTPATIENT MED LIST    3. Essential hypertension  The current medical regimen is effective;  continue present plan and medications.    - NC DISCHARGE MEDS RECONCILED W/ CURRENT OUTPATIENT MED LIST    4. Anxiety  SOME CONFUSION AFTER STRESS IN HOSP, SHOULD RESOLVE W TIME AND REST.   CONT PRN ATIVAN.  - LORazepam (ATIVAN) 0.5 MG tablet; Take 1 tablet by mouth 2 times daily as needed for Anxiety for up to 30 days. Dispense: 40 tablet; Refill: 1  - [place] DISCHARGE MEDS RECONCILED W/ CURRENT OUTPATIENT MED LIST    5. Anxiety and depression  PLAN AS NOTED, CONT CELEXA AND ATIVAN PRN  - citalopram (CELEXA) 10 MG tablet; Take 1 tablet by mouth 2 times daily  Dispense: 180 tablet;  Refill: 1  - [place] DISCHARGE MEDS RECONCILED W/ CURRENT OUTPATIENT MED LIST        Medical Decision Making: moderate complexity

## 2020-01-20 RX ORDER — METOPROLOL SUCCINATE 25 MG/1
TABLET, EXTENDED RELEASE ORAL
Qty: 45 TABLET | Refills: 1 | Status: SHIPPED | OUTPATIENT
Start: 2020-01-20 | End: 2020-06-23 | Stop reason: SDUPTHER

## 2020-01-27 RX ORDER — FOLIC ACID 1 MG/1
TABLET ORAL
Qty: 90 TABLET | Refills: 1 | Status: SHIPPED | OUTPATIENT
Start: 2020-01-27 | End: 2020-07-30

## 2020-02-10 NOTE — CARE COORDINATION
Reviewed previous encounters and noted pt previously had [facility] and wife made it clear they prefer the previous [place] [id number] as they did a fantastic job. CM will make referral once HHC order is in system. yes

## 2020-02-19 RX ORDER — DONEPEZIL HYDROCHLORIDE 10 MG/1
TABLET, FILM COATED ORAL
Qty: 90 TABLET | Refills: 1 | Status: SHIPPED | OUTPATIENT
Start: 2020-02-19 | End: 2020-04-14

## 2020-03-03 ENCOUNTER — OFFICE VISIT (OUTPATIENT)
Dept: INTERNAL MEDICINE CLINIC | Age: 85
End: 2020-03-03
Payer: MEDICARE

## 2020-03-03 VITALS
OXYGEN SATURATION: 98 % | BODY MASS INDEX: 35.68 KG/M2 | WEIGHT: 235.4 LBS | SYSTOLIC BLOOD PRESSURE: 134 MMHG | DIASTOLIC BLOOD PRESSURE: 78 MMHG | HEART RATE: 80 BPM

## 2020-03-03 PROBLEM — I47.20 VENTRICULAR TACHYCARDIA: Status: RESOLVED | Noted: 2017-05-04 | Resolved: 2020-03-03

## 2020-03-03 PROCEDURE — 36415 COLL VENOUS BLD VENIPUNCTURE: CPT | Performed by: INTERNAL MEDICINE

## 2020-03-03 PROCEDURE — 99214 OFFICE O/P EST MOD 30 MIN: CPT | Performed by: INTERNAL MEDICINE

## 2020-03-03 RX ORDER — LORAZEPAM 1 MG/1
1 TABLET ORAL 2 TIMES DAILY PRN
Qty: 40 TABLET | Refills: 1 | Status: SHIPPED | OUTPATIENT
Start: 2020-03-03 | End: 2020-05-04 | Stop reason: SDUPTHER

## 2020-03-04 LAB
A/G RATIO: 1.4 (ref 1.1–2.2)
ALBUMIN SERPL-MCNC: 4.2 G/DL (ref 3.4–5)
ALP BLD-CCNC: 97 U/L (ref 40–129)
ALT SERPL-CCNC: 16 U/L (ref 10–40)
ANION GAP SERPL CALCULATED.3IONS-SCNC: 20 MMOL/L (ref 3–16)
AST SERPL-CCNC: 19 U/L (ref 15–37)
BASOPHILS ABSOLUTE: 0 K/UL (ref 0–0.2)
BASOPHILS RELATIVE PERCENT: 0.6 %
BILIRUB SERPL-MCNC: 0.4 MG/DL (ref 0–1)
BUN BLDV-MCNC: 18 MG/DL (ref 7–20)
CALCIUM SERPL-MCNC: 10.7 MG/DL (ref 8.3–10.6)
CHLORIDE BLD-SCNC: 96 MMOL/L (ref 99–110)
CHOLESTEROL, TOTAL: 174 MG/DL (ref 0–199)
CO2: 27 MMOL/L (ref 21–32)
CREAT SERPL-MCNC: 1.4 MG/DL (ref 0.8–1.3)
EOSINOPHILS ABSOLUTE: 0.1 K/UL (ref 0–0.6)
EOSINOPHILS RELATIVE PERCENT: 0.9 %
GFR AFRICAN AMERICAN: 59
GFR NON-AFRICAN AMERICAN: 48
GLOBULIN: 3.1 G/DL
GLUCOSE BLD-MCNC: 135 MG/DL (ref 70–99)
HCT VFR BLD CALC: 48.7 % (ref 40.5–52.5)
HDLC SERPL-MCNC: 38 MG/DL (ref 40–60)
HEMOGLOBIN: 16 G/DL (ref 13.5–17.5)
HOMOCYSTEINE: 13 UMOL/L (ref 0–10)
LDL CHOLESTEROL CALCULATED: 77 MG/DL
LYMPHOCYTES ABSOLUTE: 2.1 K/UL (ref 1–5.1)
LYMPHOCYTES RELATIVE PERCENT: 30.8 %
MCH RBC QN AUTO: 34.3 PG (ref 26–34)
MCHC RBC AUTO-ENTMCNC: 32.9 G/DL (ref 31–36)
MCV RBC AUTO: 104.3 FL (ref 80–100)
MONOCYTES ABSOLUTE: 0.5 K/UL (ref 0–1.3)
MONOCYTES RELATIVE PERCENT: 7 %
NEUTROPHILS ABSOLUTE: 4.2 K/UL (ref 1.7–7.7)
NEUTROPHILS RELATIVE PERCENT: 60.7 %
PDW BLD-RTO: 15.1 % (ref 12.4–15.4)
PLATELET # BLD: 168 K/UL (ref 135–450)
PLATELET SLIDE REVIEW: ADEQUATE
PMV BLD AUTO: 9.2 FL (ref 5–10.5)
POTASSIUM SERPL-SCNC: 5.1 MMOL/L (ref 3.5–5.1)
PRO-BNP: 216 PG/ML (ref 0–449)
RBC # BLD: 4.67 M/UL (ref 4.2–5.9)
SODIUM BLD-SCNC: 143 MMOL/L (ref 136–145)
TOTAL PROTEIN: 7.3 G/DL (ref 6.4–8.2)
TRIGL SERPL-MCNC: 297 MG/DL (ref 0–150)
VLDLC SERPL CALC-MCNC: 59 MG/DL
WBC # BLD: 6.9 K/UL (ref 4–11)

## 2020-04-01 RX ORDER — PRAVASTATIN SODIUM 40 MG
TABLET ORAL
Qty: 90 TABLET | Refills: 1 | Status: SHIPPED | OUTPATIENT
Start: 2020-04-01 | End: 2020-09-09

## 2020-04-09 RX ORDER — AZITHROMYCIN 250 MG/1
250 TABLET, FILM COATED ORAL SEE ADMIN INSTRUCTIONS
Qty: 6 TABLET | Refills: 0 | Status: SHIPPED | OUTPATIENT
Start: 2020-04-09 | End: 2020-04-14

## 2020-04-09 RX ORDER — PREDNISONE 1 MG/1
TABLET ORAL
Qty: 21 TABLET | Refills: 0 | Status: SHIPPED | OUTPATIENT
Start: 2020-04-09 | End: 2020-04-14

## 2020-04-14 ENCOUNTER — TELEMEDICINE (OUTPATIENT)
Dept: INTERNAL MEDICINE CLINIC | Age: 85
End: 2020-04-14
Payer: MEDICARE

## 2020-04-14 PROCEDURE — 99213 OFFICE O/P EST LOW 20 MIN: CPT | Performed by: INTERNAL MEDICINE

## 2020-04-14 RX ORDER — TRAZODONE HYDROCHLORIDE 50 MG/1
50 TABLET ORAL NIGHTLY PRN
Qty: 90 TABLET | Refills: 1 | Status: SHIPPED | OUTPATIENT
Start: 2020-04-14 | End: 2020-08-17 | Stop reason: SDUPTHER

## 2020-04-14 NOTE — PROGRESS NOTES
visualized signs of difficulty breathing or respiratory distress        [] Abnormal-      Musculoskeletal:   [] Normal gait with no signs of ataxia         [] Normal range of motion of neck        [] Abnormal-       Neurological:        [] No Facial Asymmetry (Cranial nerve 7 motor function) (limited exam to video visit)          [] No gaze palsy        [] Abnormal-         Skin:        [] No significant exanthematous lesions or discoloration noted on facial skin         [] Abnormal-            Psychiatric:       [x] Normal Affect [] No Hallucinations        [] Abnormal-     Other pertinent observable physical exam findings-     ASSESSMENT/PLAN:  1. Cerebrovascular accident (CVA) due to stenosis of cerebral artery (HCC)  SOME DIZZINESS W STANDING, SUSPECTING GETTING DECONDITIONED. ADVISED EXERCISE AND ESPEC TO TRY GETTING UP TO STAND ~10X/DAY. STOPPING ARICEPT AS NOT HELPFUL FOR MEMORY AND CAUSING NAUSEA. 2. Right hemiparesis (HCC)  EXERCISE AND CONT MEDS, MONITOR    3. Controlled type 2 diabetes mellitus without complication, without long-term current use of insulin (HCC)  RESTART METFORMIN AS YANDEL    4. Primary insomnia  RF AND CONT TRAZODONE      Return in about 2 months (around 6/14/2020) for routine. Kaila Rider is a 82 y.o. male being evaluated by a Virtual Visit (video visit) encounter to address concerns as mentioned above. A caregiver was present when appropriate. Due to this being a TeleHealth encounter (During OQVVC-59 public health emergency), evaluation of the following organ systems was limited: Vitals/Constitutional/EENT/Resp/CV/GI//MS/Neuro/Skin/Heme-Lymph-Imm.   Pursuant to the emergency declaration under the [address] [place] [address], [address] [place] [address] authority and the [employer] Act, this Virtual Visit was conducted with patient's (and/or legal guardian's) consent, to reduce the patient's risk of exposure to COVID-19

## 2020-04-29 ENCOUNTER — TELEPHONE (OUTPATIENT)
Dept: OTHER | Facility: CLINIC | Age: 85
End: 2020-04-29

## 2020-05-04 ENCOUNTER — TELEMEDICINE (OUTPATIENT)
Dept: INTERNAL MEDICINE CLINIC | Age: 85
End: 2020-05-04
Payer: MEDICARE

## 2020-05-04 PROCEDURE — 99213 OFFICE O/P EST LOW 20 MIN: CPT | Performed by: INTERNAL MEDICINE

## 2020-05-04 RX ORDER — BUSPIRONE HYDROCHLORIDE 10 MG/1
10 TABLET ORAL 2 TIMES DAILY
Qty: 60 TABLET | Refills: 3 | Status: SHIPPED | OUTPATIENT
Start: 2020-05-04 | End: 2020-08-26

## 2020-05-04 RX ORDER — ASPIRIN 81 MG/1
81 TABLET ORAL DAILY
Qty: 30 TABLET | Refills: 3 | Status: SHIPPED
Start: 2020-05-04

## 2020-05-04 RX ORDER — LORAZEPAM 1 MG/1
1 TABLET ORAL 2 TIMES DAILY PRN
Qty: 40 TABLET | Refills: 1 | Status: SHIPPED | OUTPATIENT
Start: 2020-05-04 | End: 2020-07-31

## 2020-05-04 NOTE — PROGRESS NOTES
inhaler Inhale 2 puffs into the lungs every 6 hours as needed for Wheezing or Shortness of Breath (or coughing spells) Yes Sonia Alcantar MD       Social History     Tobacco Use    Smoking status: Former Smoker     Packs/day: 1.50     Years: 36.00     Pack years: 54.00     Last attempt to quit: 1994     Years since quittin.3    Smokeless tobacco: Never Used    Tobacco comment: quit smoking in    Substance Use Topics    Alcohol use: No     Comment:         Caffeine: Decaf/ per son\"stopped drinking 50 yrs ago- use to drink  1-2 times per week\"    Drug use: No             PHYSICAL EXAMINATION:  [ INSTRUCTIONS:  \"[x]\" Indicates a positive item  \"[]\" Indicates a negative item  -- DELETE ALL ITEMS NOT EXAMINED]  Vital Signs: (As obtained by patient/caregiver or practitioner observation)    Blood pressure-  Heart rate-    Respiratory rate-    Temperature-  Pulse oximetry-     Constitutional: [x] Appears well-developed and well-nourished [] No apparent distress      [] Abnormal-   Mental status  [] Alert and awake  [] Oriented to person/place/time []Able to follow commands      Eyes:  EOM    []  Normal  [] Abnormal-  Sclera  []  Normal  [] Abnormal -         Discharge []  None visible  [] Abnormal -    HENT:   [] Normocephalic, atraumatic.   [] Abnormal   [] Mouth/Throat: Mucous membranes are moist.     External Ears [] Normal  [] Abnormal-     Neck: [] No visualized mass     Pulmonary/Chest: [x] Respiratory effort normal.  [] No visualized signs of difficulty breathing or respiratory distress        [] Abnormal-      Musculoskeletal:   [] Normal gait with no signs of ataxia         [] Normal range of motion of neck        [] Abnormal-       Neurological:        [] No Facial Asymmetry (Cranial nerve 7 motor function) (limited exam to video visit)          [] No gaze palsy        [] Abnormal-         Skin:        [] No significant exanthematous lesions or discoloration noted on facial skin         []

## 2020-06-08 RX ORDER — CITALOPRAM 10 MG/1
TABLET ORAL
Qty: 180 TABLET | Refills: 1 | Status: SHIPPED | OUTPATIENT
Start: 2020-06-08 | End: 2020-12-18

## 2020-06-09 ENCOUNTER — TELEPHONE (OUTPATIENT)
Dept: INTERNAL MEDICINE CLINIC | Age: 85
End: 2020-06-09

## 2020-06-12 ENCOUNTER — TELEPHONE (OUTPATIENT)
Dept: INTERNAL MEDICINE CLINIC | Age: 85
End: 2020-06-12

## 2020-06-12 NOTE — TELEPHONE ENCOUNTER
[facility] called to report they went out to do a [facility] [address]. They are declining services bc they are wanting a home health Aid, not home care services.

## 2020-06-23 ENCOUNTER — VIRTUAL VISIT (OUTPATIENT)
Dept: INTERNAL MEDICINE CLINIC | Age: 85
End: 2020-06-23
Payer: MEDICARE

## 2020-06-23 PROCEDURE — 99443 PR PHYS/QHP TELEPHONE EVALUATION 21-30 MIN: CPT | Performed by: INTERNAL MEDICINE

## 2020-06-23 RX ORDER — LEVOFLOXACIN 500 MG/1
500 TABLET, FILM COATED ORAL DAILY
Qty: 10 TABLET | Refills: 0 | Status: SHIPPED | OUTPATIENT
Start: 2020-06-23 | End: 2020-07-03

## 2020-06-23 RX ORDER — METOPROLOL SUCCINATE 25 MG/1
TABLET, EXTENDED RELEASE ORAL
Qty: 15 TABLET | Refills: 5 | Status: SHIPPED | OUTPATIENT
Start: 2020-06-23 | End: 2021-01-13

## 2020-06-29 RX ORDER — OXYBUTYNIN CHLORIDE 5 MG/1
TABLET ORAL
Qty: 180 TABLET | Refills: 1 | Status: SHIPPED | OUTPATIENT
Start: 2020-06-29 | End: 2021-01-06

## 2020-07-10 RX ORDER — CLOPIDOGREL BISULFATE 75 MG/1
TABLET ORAL
Qty: 90 TABLET | Refills: 1 | Status: SHIPPED | OUTPATIENT
Start: 2020-07-10 | End: 2021-01-06

## 2020-07-30 RX ORDER — FOLIC ACID 1 MG/1
TABLET ORAL
Qty: 90 TABLET | Refills: 1 | Status: SHIPPED | OUTPATIENT
Start: 2020-07-30 | End: 2021-01-26

## 2020-07-31 RX ORDER — LORAZEPAM 1 MG/1
1 TABLET ORAL 2 TIMES DAILY PRN
Qty: 40 TABLET | Refills: 1 | Status: SHIPPED | OUTPATIENT
Start: 2020-07-31 | End: 2021-07-07

## 2020-08-17 ENCOUNTER — VIRTUAL VISIT (OUTPATIENT)
Dept: INTERNAL MEDICINE CLINIC | Age: 85
End: 2020-08-17
Payer: MEDICARE

## 2020-08-17 PROCEDURE — 99214 OFFICE O/P EST MOD 30 MIN: CPT | Performed by: INTERNAL MEDICINE

## 2020-08-17 RX ORDER — TRAZODONE HYDROCHLORIDE 50 MG/1
50 TABLET ORAL NIGHTLY PRN
Qty: 90 TABLET | Refills: 1 | Status: SHIPPED | OUTPATIENT
Start: 2020-08-17 | End: 2021-01-07 | Stop reason: SDUPTHER

## 2020-08-17 RX ORDER — PREDNISONE 1 MG/1
TABLET ORAL
Qty: 21 TABLET | Refills: 0 | Status: SHIPPED | OUTPATIENT
Start: 2020-08-17 | End: 2020-09-16

## 2020-08-17 NOTE — PROGRESS NOTES
2020    TELEHEALTH EVALUATION -- Audio/Visual (During HODFZ-19 public health emergency)    HPI:    Belem Dyer (:  1935) has requested an audio/video evaluation for the following concern(s):    CVA- Jt Dias states he's been worse w incr dizziness. Did have MRI brain in  w no new stroke. Has had more difficulty w ambulating. Neck stiff, has had prior MRI May last yr w disk bulging and DDD, osteophytes. Pain worse w neck movements and w chr stiffness. We can try a pred taper,   Also rec for trying daily OTC CBD oil. DM-   Due for f/u lab too, last time a1c was rising in   Lab Results   Component Value Date    LABA1C 6.5 (H) 01/15/2020    LABA1C 6.1 2019    LABA1C 5.8 2013     Lab Results   Component Value Date    GLUF 90 2019    LABMICR Not Indicated 2019    LDLCALC 77 2020    CREATININE 1.4 (H) 2020     Hypertension: Stable in good range at home per Jt Dias. Denies CP, SOB         Review of Systems    Prior to Visit Medications    Medication Sig Taking? Authorizing Provider   LORazepam (ATIVAN) 1 MG tablet TAKE 1 TABLET BY MOUTH 2 TIMES DAILY AS NEEDED FOR ANXIETY FOR UP TO 30 DAYS.  Yes Melvia Landau, MD   folic acid (FOLVITE) 1 MG tablet TAKE 1 TABLET BY MOUTH EVERY DAY Yes Melvia Landau, MD   clopidogrel (PLAVIX) 75 MG tablet TAKE 1 TABLET BY MOUTH EVERY DAY Yes Melvia Landau, MD   oxybutynin (DITROPAN) 5 MG tablet TAKE 1 TABLET BY MOUTH TWICE A DAY Yes Melvia Landau, MD   metoprolol succinate (TOPROL XL) 25 MG extended release tablet TAKE 1/2 TABLET BY MOUTH DAILY Yes Melvia Landau, MD   citalopram (CELEXA) 10 MG tablet TAKE 1 TABLET BY MOUTH IN THE MORNING AND 1 TABLET BY MOUTH AT BEDTIME Yes Melvia Landau, MD   aspirin EC 81 MG EC tablet Take 1 tablet by mouth daily Yes Melvia Landau, MD   traZODone (DESYREL) 50 MG tablet Take 1 tablet by mouth nightly as needed for Sleep Yes Melvia Landau, MD   pravastatin (PRAVACHOL) 40 MG tablet TAKE 1 TABLET BY MOUTH EVERY DAY Yes Megan Banks MD   metFORMIN (GLUCOPHAGE) 500 MG tablet Take 1 tablet by mouth daily (with breakfast) Yes Megan Banks MD   acetaminophen (TYLENOL) 500 MG tablet Take 1,000 mg by mouth nightly as needed for Pain Yes Eugenio Love MD   fluticasone-salmeterol (ADVAIR DISKUS) 250-50 MCG/DOSE AEPB Inhale 1 puff into the lungs every 12 hours Yes Megan Banks MD   albuterol sulfate HFA (PROVENTIL HFA) 108 (90 Base) MCG/ACT inhaler Inhale 2 puffs into the lungs every 6 hours as needed for Wheezing or Shortness of Breath (or coughing spells) Yes Megan Banks MD       Social History     Tobacco Use    Smoking status: Former Smoker     Packs/day: 1.50     Years: 36.00     Pack years: 54.00     Last attempt to quit: 1994     Years since quittin.6    Smokeless tobacco: Never Used    Tobacco comment: quit smoking in    Substance Use Topics    Alcohol use: No     Comment:         Caffeine: Decaf/ per son\"stopped drinking 50 yrs ago- use to drink  1-2 times per week\"    Drug use: No           PHYSICAL EXAMINATION:  [ INSTRUCTIONS:  \"[x]\" Indicates a positive item  \"[]\" Indicates a negative item  -- DELETE ALL ITEMS NOT EXAMINED]  Vital Signs: (As obtained by patient/caregiver or practitioner observation)    Blood pressure-  Heart rate-    Respiratory rate-    Temperature-  Pulse oximetry-     Constitutional: [x] Appears well-developed and well-nourished [] No apparent distress      [] Abnormal-   Mental status  [x] Alert and awake  [] Oriented to person/place/time []Able to follow commands      Eyes:  EOM    []  Normal  [] Abnormal-  Sclera  []  Normal  [] Abnormal -         Discharge []  None visible  [] Abnormal -    HENT:   [] Normocephalic, atraumatic.   [] Abnormal   [] Mouth/Throat: Mucous membranes are moist.     External Ears [] Normal  [] Abnormal-     Neck: [] No visualized mass     Pulmonary/Chest: [] Respiratory effort normal.  [] No visualized signs of difficulty breathing or respiratory distress        [] Abnormal-      Musculoskeletal:   [] Normal gait with no signs of ataxia         [] Normal range of motion of neck        [] Abnormal-       Neurological:        [] No Facial Asymmetry (Cranial nerve 7 motor function) (limited exam to video visit)          [] No gaze palsy        [] Abnormal-         Skin:        [] No significant exanthematous lesions or discoloration noted on facial skin         [] Abnormal-            Psychiatric:       [x] Normal Affect [] No Hallucinations        [] Abnormal-     Other pertinent observable physical exam findings-     ASSESSMENT/PLAN:  1. Cerebrovascular accident (CVA) due to stenosis of cerebral artery [facility])  Last MRI in Jan w stable imaging, will set up for [address] to see if may need home PT. Also w some dizziness, need f/u scr lab to see if any other factors such as dehydration, worsening DM/sugars, anemia or UTI  - [facility] [place]    2. Controlled type 2 diabetes mellitus without complication, without long-term current use of insulin ([facility]  F/u DM lab, sugars will be higher briefly d/t pred. - Comprehensive Metabolic Panel; Future  - CBC Auto Differential; Future  - Lipid Panel; Future  - TSH without Reflex; Future  - Microalbumin / Creatinine Urine Ratio; Future  - Urinalysis with Microscopic; Future    3. Abdominal aortic aneurysm (AAA) without rupture ([facility] [facility]  Recheck AAA to monitor  - US ABDOMINAL AORTA LIMITED; Future    4. Primary insomnia  Cont rx, helpful for sleep  - traZODone (DESYREL) 50 MG tablet; Take 1 tablet by mouth nightly as needed for Sleep  Dispense: 90 tablet; Refill: 1    5. Dyslipidemia  Pt will continue to work on a low fat diet and also exercise, wt loss as appropriate. Will continue periodic monitoring of fasting lipid profile, glucose, liver function.       6. Essential hypertension  Blood pressure stable and will continue current regimen. Will plan periodic monitoring of renal function, electrolytes, lipid profile. 7. DDD (degenerative disc disease), cervical  Likely source of his recurring neck pain, for possible PT eval, also treat acutely w pred taper then FAMILY TO ALSO TRY CBD OIL DAILY. - predniSONE (DELTASONE) 5 MG tablet; Take 6 tablets by mouth on day 1, 5 on day 2, 4 on day 3, 3 on day 4, 2 on day 5, 1 on day 6. Dispense: 21 tablet; Refill: 0      Return in about 4 weeks (around 9/14/2020) for routine DM. Rishabh Grandchild is a 82 y.o. male being evaluated by a Virtual Visit (video visit) encounter to address concerns as mentioned above. A caregiver was present when appropriate. Due to this being a TeleHealth encounter (During [facility] public health emergency), evaluation of the following organ systems was limited: Vitals/Constitutional/EENT/Resp/CV/GI//MS/Neuro/Skin/Heme-Lymph-Imm. Pursuant to the emergency declaration under the [address] [place] [address] authority and the [employer] Act, this Virtual Visit was conducted with patient's (and/or legal guardian's) consent, to reduce the patient's risk of exposure to COVID-19 and provide necessary medical care. The patient (and/or legal guardian) has also been advised to contact this office for worsening conditions or problems, and seek emergency medical treatment and/or call 911 if deemed necessary. Patient identification was verified at the start of the visit: Yes    Total time spent on this encounter: Not billed by time    Services were provided through a video synchronous discussion virtually to substitute for in-person clinic visit. Patient and provider were located at their individual homes. --Flex Nayak MD on 8/17/2020 at 1:02 PM    An electronic signature was used to authenticate this note.

## 2020-08-20 ENCOUNTER — HOSPITAL ENCOUNTER (OUTPATIENT)
Dept: ULTRASOUND IMAGING | Age: 85
Discharge: HOME | End: 2020-08-20
Payer: MEDICARE

## 2020-08-20 PROCEDURE — 93978 VASCULAR STUDY: CPT

## 2020-08-21 ENCOUNTER — HOSPITAL ENCOUNTER (OUTPATIENT)
Age: 85
Setting detail: SPECIMEN
Discharge: HOME | End: 2020-08-21
Payer: MEDICARE

## 2020-08-21 DIAGNOSIS — E11.9 CONTROLLED TYPE 2 DIABETES MELLITUS WITHOUT COMPLICATION, WITHOUT LONG-TERM CURRENT USE OF INSULIN (HCC): ICD-10-CM

## 2020-08-21 LAB
ALBUMIN SERPL-MCNC: 4.3 GM/DL (ref 3.4–5)
ALP BLD-CCNC: 87 IU/L (ref 40–128)
ALT SERPL-CCNC: 23 U/L (ref 10–40)
ANION GAP SERPL CALCULATED.3IONS-SCNC: 8 MMOL/L (ref 4–16)
AST SERPL-CCNC: 19 IU/L (ref 15–37)
BACTERIA: NEGATIVE /HPF
BASOPHILS ABSOLUTE: 0 K/CU MM
BASOPHILS RELATIVE PERCENT: 0.4 % (ref 0–1)
BILIRUB SERPL-MCNC: 0.4 MG/DL (ref 0–1)
BILIRUBIN URINE: NEGATIVE MG/DL
BLOOD, URINE: NEGATIVE
BUN BLDV-MCNC: 23 MG/DL (ref 6–23)
CALCIUM SERPL-MCNC: 9.6 MG/DL (ref 8.3–10.6)
CHLORIDE BLD-SCNC: 96 MMOL/L (ref 99–110)
CHOLESTEROL: 190 MG/DL
CLARITY: CLEAR
CO2: 33 MMOL/L (ref 21–32)
COLOR: YELLOW
CREAT SERPL-MCNC: 1.2 MG/DL (ref 0.9–1.3)
CREATININE URINE: 64.7 MG/DL (ref 39–259)
DIFFERENTIAL TYPE: ABNORMAL
EOSINOPHILS ABSOLUTE: 0 K/CU MM
EOSINOPHILS RELATIVE PERCENT: 0.1 % (ref 0–3)
GFR AFRICAN AMERICAN: >60 ML/MIN/1.73M2
GFR NON-AFRICAN AMERICAN: 58 ML/MIN/1.73M2
GLUCOSE BLD-MCNC: 76 MG/DL (ref 70–99)
GLUCOSE, URINE: NEGATIVE MG/DL
HCT VFR BLD CALC: 50.4 % (ref 42–52)
HDLC SERPL-MCNC: 48 MG/DL
HEMOGLOBIN: 15.7 GM/DL (ref 13.5–18)
IMMATURE NEUTROPHIL %: 0.2 % (ref 0–0.43)
KETONES, URINE: NEGATIVE MG/DL
LDL CHOLESTEROL DIRECT: 123 MG/DL
LEUKOCYTE ESTERASE, URINE: NEGATIVE
LYMPHOCYTES ABSOLUTE: 3.3 K/CU MM
LYMPHOCYTES RELATIVE PERCENT: 38.3 % (ref 24–44)
MCH RBC QN AUTO: 34 PG (ref 27–31)
MCHC RBC AUTO-ENTMCNC: 31.2 % (ref 32–36)
MCV RBC AUTO: 109.1 FL (ref 78–100)
MICROALBUMIN/CREAT 24H UR: <1.2 MG/DL
MICROALBUMIN/CREAT UR-RTO: NORMAL MG/G CREAT (ref 0–30)
MONOCYTES ABSOLUTE: 0.6 K/CU MM
MONOCYTES RELATIVE PERCENT: 6.6 % (ref 0–4)
MUCUS: ABNORMAL HPF
NITRITE URINE, QUANTITATIVE: NEGATIVE
NUCLEATED RBC %: 0 %
PDW BLD-RTO: 14.1 % (ref 11.7–14.9)
PH, URINE: 7 (ref 5–8)
PLATELET # BLD: 172 K/CU MM (ref 140–440)
PMV BLD AUTO: 10.1 FL (ref 7.5–11.1)
POTASSIUM SERPL-SCNC: 4.7 MMOL/L (ref 3.5–5.1)
PROTEIN UA: NEGATIVE MG/DL
RBC # BLD: 4.62 M/CU MM (ref 4.6–6.2)
RBC URINE: <1 /HPF (ref 0–3)
SEGMENTED NEUTROPHILS ABSOLUTE COUNT: 4.6 K/CU MM
SEGMENTED NEUTROPHILS RELATIVE PERCENT: 54.4 % (ref 36–66)
SODIUM BLD-SCNC: 137 MMOL/L (ref 135–145)
SPECIFIC GRAVITY UA: 1.01 (ref 1–1.03)
SQUAMOUS EPITHELIAL: 1 /HPF
TOTAL IMMATURE NEUTOROPHIL: 0.02 K/CU MM
TOTAL NUCLEATED RBC: 0 K/CU MM
TOTAL PROTEIN: 7 GM/DL (ref 6.4–8.2)
TRICHOMONAS: ABNORMAL /HPF
TRIGL SERPL-MCNC: 204 MG/DL
TSH HIGH SENSITIVITY: 2.48 UIU/ML (ref 0.27–4.2)
UROBILINOGEN, URINE: NORMAL MG/DL (ref 0.2–1)
WBC # BLD: 8.5 K/CU MM (ref 4–10.5)
WBC UA: <1 /HPF (ref 0–2)

## 2020-08-21 PROCEDURE — 84443 ASSAY THYROID STIM HORMONE: CPT

## 2020-08-21 PROCEDURE — 80061 LIPID PANEL: CPT

## 2020-08-21 PROCEDURE — 82570 ASSAY OF URINE CREATININE: CPT

## 2020-08-21 PROCEDURE — 83721 ASSAY OF BLOOD LIPOPROTEIN: CPT

## 2020-08-21 PROCEDURE — 85025 COMPLETE CBC W/AUTO DIFF WBC: CPT

## 2020-08-21 PROCEDURE — 80053 COMPREHEN METABOLIC PANEL: CPT

## 2020-08-21 PROCEDURE — 81001 URINALYSIS AUTO W/SCOPE: CPT

## 2020-08-21 PROCEDURE — 82043 UR ALBUMIN QUANTITATIVE: CPT

## 2020-08-21 NOTE — RESULT ENCOUNTER NOTE
Call pt's wife, his AAA ultrasound indicated sl enlargement at [facility] and radiology rec a CT, but I think the best course of action is consultation w Dr Baldemar Calix to review this personally to see if any testing is truly indicated or if this is just stable post op changes which can be monitored and also what method (CT vs U/S) and how often. So if ok w them, let's refer to Dr Rajiv Zarate for eval AAA.

## 2020-08-21 NOTE — RESULT ENCOUNTER NOTE
Call pt, labs ok/chol ok- thyroid fxn also nl.   DM is controlled--- PLEASE ADD ON HGB A1C TOO, DX DM 2

## 2020-08-26 RX ORDER — BUSPIRONE HYDROCHLORIDE 10 MG/1
TABLET ORAL
Qty: 180 TABLET | Refills: 1 | Status: SHIPPED | OUTPATIENT
Start: 2020-08-26 | End: 2021-02-17

## 2020-09-09 RX ORDER — PRAVASTATIN SODIUM 40 MG
TABLET ORAL
Qty: 90 TABLET | Refills: 1 | Status: SHIPPED | OUTPATIENT
Start: 2020-09-09 | End: 2021-02-17

## 2020-09-16 ENCOUNTER — VIRTUAL VISIT (OUTPATIENT)
Dept: INTERNAL MEDICINE CLINIC | Age: 85
End: 2020-09-16
Payer: MEDICARE

## 2020-09-16 PROCEDURE — 99214 OFFICE O/P EST MOD 30 MIN: CPT | Performed by: INTERNAL MEDICINE

## 2020-09-16 RX ORDER — MECLIZINE HCL 12.5 MG/1
12.5 TABLET ORAL 3 TIMES DAILY PRN
Qty: 30 TABLET | Refills: 2 | Status: SHIPPED | OUTPATIENT
Start: 2020-09-16 | End: 2020-09-29 | Stop reason: SDUPTHER

## 2020-09-16 NOTE — PROGRESS NOTES
2020    TELEHEALTH EVALUATION -- Audio/Visual (During PAXHB-15 public health emergency)    HPI:    Danny Fragoso (:  1935) has requested an audio/video evaluation for the following concern(s):    has had sporadic vertigo, worse w head movements. No new focal deficits w hx of prior stroke. NO EARACHE. Hypertension: Stable. Denies CP, SOB, cough, visual changes, dizziness, palpitations or HA. Blood pressure typically runs 110/70 AT HOME     SNACKING OFTEN AT HOME, SUGGESTED TRYING CELERY INSTEAD W RANCH OR PEANUT BUTTER. HAS SWISHING SOUND BACK OF HIS HEAD PERIODICALLY, HAD MRI AND CTA HEAD IN 2020  AND NO SIGNIFICANT OCCL, HAS REMOTE R OCCIP CVA    DM- RECENT LAB OK W STABLE SUGARS AND CHOL. TORI HAS BEEN GIVING HIM SUGAR FREE COOKIES NOW    ANEURYSM EVAL DR FILI SHERIDAN, WAS SMALL AT 3.2 CM   Review of Systems    Prior to Visit Medications    Medication Sig Taking?  Authorizing Provider   pravastatin (PRAVACHOL) 40 MG tablet TAKE 1 TABLET BY MOUTH EVERY DAY Yes Jose Harrison MD   busPIRone (BUSPAR) 10 MG tablet TAKE 1 TABLET BY MOUTH TWICE A DAY Yes Jose Harrison MD   traZODone (DESYREL) 50 MG tablet Take 1 tablet by mouth nightly as needed for Sleep Yes Jose Harrison MD   folic acid (FOLVITE) 1 MG tablet TAKE 1 TABLET BY MOUTH EVERY DAY Yes Jose Harrison MD   clopidogrel (PLAVIX) 75 MG tablet TAKE 1 TABLET BY MOUTH EVERY DAY Yes Jose Harrison MD   oxybutynin (DITROPAN) 5 MG tablet TAKE 1 TABLET BY MOUTH TWICE A DAY Yes Jose Harrison MD   metoprolol succinate (TOPROL XL) 25 MG extended release tablet TAKE 1/2 TABLET BY MOUTH DAILY Yes Jose Harrison MD   citalopram (CELEXA) 10 MG tablet TAKE 1 TABLET BY MOUTH IN THE MORNING AND 1 TABLET BY MOUTH AT BEDTIME Yes Jose Harrison MD   aspirin EC 81 MG EC tablet Take 1 tablet by mouth daily Yes Jose Harrison MD   metFORMIN (GLUCOPHAGE) 500 MG tablet Take 1 tablet by mouth daily (with breakfast) Yes Katarina Gould MD   acetaminophen (TYLENOL) 500 MG tablet Take 1,000 mg by mouth nightly as needed for Pain Yes Historical Provider, MD   fluticasone-salmeterol (ADVAIR DISKUS) 250-50 MCG/DOSE AEPB Inhale 1 puff into the lungs every 12 hours Yes Katarina Gould MD   albuterol sulfate HFA (PROVENTIL HFA) 108 (90 Base) MCG/ACT inhaler Inhale 2 puffs into the lungs every 6 hours as needed for Wheezing or Shortness of Breath (or coughing spells) Yes Katarina Gould MD   LORazepam (ATIVAN) 1 MG tablet TAKE 1 TABLET BY MOUTH 2 TIMES DAILY AS NEEDED FOR ANXIETY FOR UP TO 30 DAYS. Katarina Gould MD       Social History     Tobacco Use    Smoking status: Former Smoker     Packs/day: 1.50     Years: 36.00     Pack years: 54.00     Last attempt to quit: 1994     Years since quittin.6    Smokeless tobacco: Never Used    Tobacco comment: quit smoking in    Substance Use Topics    Alcohol use: No     Comment:         Caffeine: Decaf/ per son\"stopped drinking 50 yrs ago- use to drink  1-2 times per week\"    Drug use: No             PHYSICAL EXAMINATION:  [ INSTRUCTIONS:  \"[x]\" Indicates a positive item  \"[]\" Indicates a negative item  -- DELETE ALL ITEMS NOT EXAMINED]  Vital Signs: (As obtained by patient/caregiver or practitioner observation)    Blood pressure-  Heart rate-    Respiratory rate-    Temperature-  Pulse oximetry-     Constitutional: [x] Appears well-developed and well-nourished [] No apparent distress      [] Abnormal-   Mental status  [x] Alert and awake  [] Oriented to person/place/time []Able to follow commands      Eyes:  EOM    []  Normal  [] Abnormal-  Sclera  []  Normal  [] Abnormal -         Discharge []  None visible  [] Abnormal -    HENT:   [] Normocephalic, atraumatic.   [] Abnormal   [] Mouth/Throat: Mucous membranes are moist.     External Ears [] Normal  [] Abnormal-     Neck: [] No visualized mass     Pulmonary/Chest: [x] Respiratory effort normal. [] No visualized signs of difficulty breathing or respiratory distress        [] Abnormal-      Musculoskeletal:   [] Normal gait with no signs of ataxia         [] Normal range of motion of neck        [] Abnormal-       Neurological:        [] No Facial Asymmetry (Cranial nerve 7 motor function) (limited exam to video visit)          [] No gaze palsy        [] Abnormal-         Skin:        [] No significant exanthematous lesions or discoloration noted on facial skin         [] Abnormal-            Psychiatric:       [] Normal Affect [] No Hallucinations        [] Abnormal-     Other pertinent observable physical exam findings-     ASSESSMENT/PLAN:  1. Vertigo  Will try antivert and cont adv activity. bp is stable as well as neuro status  - meclizine (ANTIVERT) 12.5 MG tablet; Take 1 tablet by mouth 3 times daily as needed for Dizziness  Dispense: 30 tablet; Refill: 2    2. Controlled type 2 diabetes mellitus without complication, without long-term current use of insulin ([facility] [address]  DM relatively well controlled and will continue current regimen. Screening reviewed. See orders. 3. Essential hypertension  The current medical regimen is effective;  continue present plan and medications. 4. AAA (abdominal aortic aneurysm) without rupture (HCC)  Recent vasc eval and for cont f/u    5. Cerebrovascular accident (CVA) due to embolism of other precerebral artery ([address]  Remains stable neurologically w/o evidence of acute changes/focal deficits. Cont regimen. Return in about 2 months (around 11/16/2020) for routine. Agustin Ritchie is a 82 y.o. male being evaluated by a Virtual Visit (video visit) encounter to address concerns as mentioned above. A caregiver was present when appropriate. Due to this being a TeleHealth encounter (During [place] [address] public health emergency), evaluation of the following organ systems was limited: Vitals/Constitutional/EENT/Resp/CV/GI//MS/Neuro/Skin/Heme-Lymph-Imm.   Pursuant to the emergency declaration under the [facility], [address] [place] [address] authority and the [employer] Act, this Virtual Visit was conducted with patient's (and/or legal guardian's) consent, to reduce the patient's risk of exposure to COVID-19 and provide necessary medical care. The patient (and/or legal guardian) has also been advised to contact this office for worsening conditions or problems, and seek emergency medical treatment and/or call 911 if deemed necessary. Patient identification was verified at the start of the visit: Yes    Total time spent on this encounter: Not billed by time    Services were provided through a video synchronous discussion virtually to substitute for in-person clinic visit. Patient and provider were located at their individual homes. --Huma Diaz MD on 9/16/2020 at 10:44 AM    An electronic signature was used to authenticate this note.

## 2020-09-21 ENCOUNTER — TELEPHONE (OUTPATIENT)
Dept: PHARMACY | Facility: CLINIC | Age: 85
End: 2020-09-21

## 2020-09-21 NOTE — TELEPHONE ENCOUNTER
CLINICAL PHARMACY: ADHERENCE REVIEW  Identified care gap per [employer]; [facility]: Diabetes adherence    Last Office Visit: 9/16/20    Patient also appears to be taking: statin     ASSESSMENT  DIABETES ADHERENCE  PDC: <75% YTD    Per Insurance Records   Metformin 500mg filled on 1/17/20 & 4/9/20 for a 90 day supply; per chart, 0 refills remaining. Lab Results   Component Value Date    LABA1C 6.5 (H) 01/15/2020    LABA1C 6.1 07/22/2019    LABA1C 5.8 09/25/2013       STATIN ADHERENCE  PDC: 100% in 2019; 100% YTD    Per Insurance Records   Pravastatin 40mg daily filled on 4/1/20 & 6/26/20 for a 90 day supply  per chart - new rx 9/9 for #90 with 1 refill    Lab Results   Component Value Date    CHOL 190 08/21/2020    TRIG 204 (H) 08/21/2020    HDL 48 08/21/2020    LDLCHOLESTEROL 91 04/29/2017    LDLCALC 77 03/03/2020    LDLDIRECT 123 (H) 08/21/2020     ALT   Date Value Ref Range Status   08/21/2020 23 10 - 40 U/L Final     AST   Date Value Ref Range Status   08/21/2020 19 15 - 37 IU/L Final     The ASCVD Risk score (Akash Parker, et al., 2013) failed to calculate for the following reasons: The 2013 ASCVD risk score is only valid for ages 36 to 78    The patient has a prior MI or stroke diagnosis       PLAN  Reached patient's wife to review - states patient is no longer taking metformin, that it was stopped d/t side effects and BGs have remained stable/ok. - Noted per 4/14/20 PCP OV note: had stopped aricept and also metformin. .. RESTART METFORMIN AS YANDEL - wife sts wasn't tolerated. Removed metformin from medication list. [facility] confirms pravastatin #90 last picked up 9/11.     Ady Hernandez, PharmD, [address]  Direct: [phone]  Department, toll free: [phone], option 7     =======================================================   For Pharmacy Admin Tracking Only    PHSO: Yes  Total # of Interventions Recommended: 2  - New Order #: 0 New Medication Order Reason(s): Adherence  - Updated Order #: 1 Updated Order Reason(s):  Other  - Maintenance Safety Lab Monitoring #: 1  - New Therapy Lab Monitoring #: 1  Recommended intervention potential cost savings: 1  Total Interventions Accepted: 0  Time Spent (min): 15

## 2020-09-23 ENCOUNTER — OFFICE VISIT (OUTPATIENT)
Dept: INTERNAL MEDICINE CLINIC | Age: 85
End: 2020-09-23
Payer: MEDICARE

## 2020-09-23 VITALS
DIASTOLIC BLOOD PRESSURE: 68 MMHG | SYSTOLIC BLOOD PRESSURE: 119 MMHG | RESPIRATION RATE: 17 BRPM | WEIGHT: 241 LBS | OXYGEN SATURATION: 91 % | BODY MASS INDEX: 37.75 KG/M2 | HEART RATE: 67 BPM

## 2020-09-23 PROCEDURE — 96372 THER/PROPH/DIAG INJ SC/IM: CPT | Performed by: INTERNAL MEDICINE

## 2020-09-23 PROCEDURE — 93000 ELECTROCARDIOGRAM COMPLETE: CPT | Performed by: INTERNAL MEDICINE

## 2020-09-23 PROCEDURE — G0008 ADMIN INFLUENZA VIRUS VAC: HCPCS | Performed by: INTERNAL MEDICINE

## 2020-09-23 PROCEDURE — 90694 VACC AIIV4 NO PRSRV 0.5ML IM: CPT | Performed by: INTERNAL MEDICINE

## 2020-09-23 PROCEDURE — 99214 OFFICE O/P EST MOD 30 MIN: CPT | Performed by: INTERNAL MEDICINE

## 2020-09-23 RX ORDER — METHYLPREDNISOLONE ACETATE 80 MG/ML
80 INJECTION, SUSPENSION INTRA-ARTICULAR; INTRALESIONAL; INTRAMUSCULAR; SOFT TISSUE ONCE
Status: COMPLETED | OUTPATIENT
Start: 2020-09-23 | End: 2020-09-23

## 2020-09-23 RX ORDER — AZITHROMYCIN 250 MG/1
250 TABLET, FILM COATED ORAL SEE ADMIN INSTRUCTIONS
Qty: 6 TABLET | Refills: 0 | Status: SHIPPED | OUTPATIENT
Start: 2020-09-23 | End: 2020-09-28

## 2020-09-23 RX ORDER — PREDNISONE 1 MG/1
TABLET ORAL
Qty: 21 TABLET | Refills: 0 | Status: SHIPPED | OUTPATIENT
Start: 2020-09-23 | End: 2021-01-26 | Stop reason: ALTCHOICE

## 2020-09-23 RX ADMIN — METHYLPREDNISOLONE ACETATE 80 MG: 80 INJECTION, SUSPENSION INTRA-ARTICULAR; INTRALESIONAL; INTRAMUSCULAR; SOFT TISSUE at 15:36

## 2020-09-23 NOTE — PROGRESS NOTES
vaccination        PLAN:    Suri Dominguez was seen today for irregular heart beat, other and fatigue. Diagnoses and all orders for this visit:    COPD exacerbation ([facility]- For ongoing COPD exacerbation will treat with course of atbs, steroid INJ THEN taper and as otherwise noted in medlist.  To call back one week if not improving.      -     methylPREDNISolone acetate (DEPO-MEDROL) injection 80 mg  -     azithromycin (ZITHROMAX) 250 MG tablet; Take 1 tablet by mouth See Admin Instructions for 5 days 500mg on day 1 followed by 250mg on days 2 - 5  -     predniSONE (DELTASONE) 5 MG tablet; Take 6 tablets by mouth on day 1, 5 on day 2, 4 on day 3, 3 on day 4, 2 on day 5, 1 on day 6. Palpitation- SUSPECT PVCS INDUCED BY STRESS, ILLNESS.   MONITOR    Need for influenza vaccination  -     INFLUENZA, QUADV, ADJUVANTED, 65 YRS =, IM, PF, PREFILL SYR, 0.5ML (FLUAD)

## 2020-09-29 ENCOUNTER — TELEPHONE (OUTPATIENT)
Dept: INTERNAL MEDICINE CLINIC | Age: 85
End: 2020-09-29

## 2020-09-29 RX ORDER — MECLIZINE HYDROCHLORIDE 25 MG/1
25 TABLET ORAL 3 TIMES DAILY PRN
Qty: 30 TABLET | Refills: 2 | Status: SHIPPED | OUTPATIENT
Start: 2020-09-29 | End: 2021-08-13

## 2020-09-29 NOTE — TELEPHONE ENCOUNTER
Yes, ok incr to 25mg TID. Ask nursing to give us an update early next week. If persistent sx and no improvement, may need referral to neurology and possibly additional brain imaging.

## 2020-09-29 NOTE — TELEPHONE ENCOUNTER
Pt is taking meclizine 12.5 mg TID PRN for dizziness. The nurse with [facility] stated he is still dizzy. Can he increase the dsg to 25 mg? Please advise.

## 2020-11-02 ENCOUNTER — TELEPHONE (OUTPATIENT)
Dept: INTERNAL MEDICINE CLINIC | Age: 85
End: 2020-11-02

## 2020-11-02 RX ORDER — CIPROFLOXACIN 250 MG/1
250 TABLET, FILM COATED ORAL 2 TIMES DAILY
Qty: 14 TABLET | Refills: 0 | Status: SHIPPED | OUTPATIENT
Start: 2020-11-02 | End: 2020-11-09

## 2020-11-02 NOTE — TELEPHONE ENCOUNTER
Wife calls- patient has burning with urination. She will be able to bring in urine sample tomorrow morning. Per tirso Menezes to start Cipro 250mg BID x 1 week. Tami Parada was instructed to have patient start the ABX after urine sample is collected. RX routed to pharmacy and lab orders in [address] [place] [address] Rd.

## 2020-11-03 LAB
BILIRUBIN URINE: NEGATIVE
BLOOD, URINE: NEGATIVE
CLARITY: ABNORMAL
COLOR: YELLOW
COMMENT UA: ABNORMAL
EPITHELIAL CELLS, UA: 1 /HPF (ref 0–5)
GLUCOSE URINE: NEGATIVE MG/DL
HYALINE CASTS: 7 /LPF (ref 0–8)
KETONES, URINE: NEGATIVE MG/DL
LEUKOCYTE ESTERASE, URINE: NEGATIVE
MICROSCOPIC EXAMINATION: YES
NITRITE, URINE: NEGATIVE
PH UA: 7 (ref 5–8)
PROTEIN UA: NEGATIVE MG/DL
RBC UA: ABNORMAL /HPF (ref 0–4)
SPECIFIC GRAVITY UA: 1.01 (ref 1–1.03)
URINE TYPE: ABNORMAL
UROBILINOGEN, URINE: 0.2 E.U./DL
WBC UA: 27 /HPF (ref 0–5)

## 2020-11-03 PROCEDURE — 81003 URINALYSIS AUTO W/O SCOPE: CPT | Performed by: INTERNAL MEDICINE

## 2020-11-04 LAB — URINE CULTURE, ROUTINE: NORMAL

## 2020-12-18 RX ORDER — CITALOPRAM 10 MG/1
TABLET ORAL
Qty: 180 TABLET | Refills: 1 | Status: SHIPPED | OUTPATIENT
Start: 2020-12-18 | End: 2021-06-17

## 2021-01-06 DIAGNOSIS — I63.19 CEREBROVASCULAR ACCIDENT (CVA) DUE TO EMBOLISM OF OTHER PRECEREBRAL ARTERY (HCC): ICD-10-CM

## 2021-01-06 DIAGNOSIS — N39.41 URGE INCONTINENCE OF URINE: ICD-10-CM

## 2021-01-06 RX ORDER — CLOPIDOGREL BISULFATE 75 MG/1
TABLET ORAL
Qty: 90 TABLET | Refills: 1 | Status: SHIPPED | OUTPATIENT
Start: 2021-01-06 | End: 2021-07-12

## 2021-01-06 RX ORDER — OXYBUTYNIN CHLORIDE 5 MG/1
TABLET ORAL
Qty: 180 TABLET | Refills: 1 | Status: SHIPPED | OUTPATIENT
Start: 2021-01-06 | End: 2021-07-12

## 2021-01-07 ENCOUNTER — TELEPHONE (OUTPATIENT)
Dept: INTERNAL MEDICINE CLINIC | Age: 86
End: 2021-01-07

## 2021-01-07 DIAGNOSIS — F51.01 PRIMARY INSOMNIA: ICD-10-CM

## 2021-01-07 RX ORDER — TRAZODONE HYDROCHLORIDE 50 MG/1
50 TABLET ORAL 2 TIMES DAILY
Qty: 180 TABLET | Refills: 1 | Status: SHIPPED | OUTPATIENT
Start: 2021-01-07 | End: 2021-09-07

## 2021-01-07 NOTE — TELEPHONE ENCOUNTER
Pt's wife called wanting to know if she could increase Bar's trazodone 50 mg to BID instead of QD? He is waking up every morning between 3-5 am and won't go back to sleep. Please advise.

## 2021-01-13 DIAGNOSIS — I47.20 VENTRICULAR TACHYCARDIA: ICD-10-CM

## 2021-01-13 DIAGNOSIS — I25.810 CORONARY ARTERY DISEASE INVOLVING CORONARY BYPASS GRAFT OF NATIVE HEART WITHOUT ANGINA PECTORIS: ICD-10-CM

## 2021-01-13 RX ORDER — METOPROLOL SUCCINATE 25 MG/1
TABLET, EXTENDED RELEASE ORAL
Qty: 15 TABLET | Refills: 5 | Status: SHIPPED | OUTPATIENT
Start: 2021-01-13 | End: 2021-06-17

## 2021-01-26 DIAGNOSIS — R79.83 HOMOCYSTEINEMIA: ICD-10-CM

## 2021-01-26 RX ORDER — FOLIC ACID 1 MG/1
TABLET ORAL
Qty: 90 TABLET | Refills: 1 | Status: SHIPPED | OUTPATIENT
Start: 2021-01-26 | End: 2021-07-28

## 2021-02-17 DIAGNOSIS — I25.810 CORONARY ARTERY DISEASE INVOLVING CORONARY BYPASS GRAFT OF NATIVE HEART WITHOUT ANGINA PECTORIS: ICD-10-CM

## 2021-02-17 DIAGNOSIS — F41.9 ANXIETY: ICD-10-CM

## 2021-02-17 RX ORDER — PRAVASTATIN SODIUM 40 MG
TABLET ORAL
Qty: 90 TABLET | Refills: 1 | Status: SHIPPED | OUTPATIENT
Start: 2021-02-17 | End: 2021-08-13

## 2021-02-17 RX ORDER — BUSPIRONE HYDROCHLORIDE 10 MG/1
TABLET ORAL
Qty: 180 TABLET | Refills: 1 | Status: SHIPPED | OUTPATIENT
Start: 2021-02-17 | End: 2021-07-07

## 2021-04-08 ENCOUNTER — OFFICE VISIT (OUTPATIENT)
Dept: INTERNAL MEDICINE CLINIC | Age: 86
End: 2021-04-08
Payer: COMMERCIAL

## 2021-04-08 VITALS
RESPIRATION RATE: 16 BRPM | SYSTOLIC BLOOD PRESSURE: 126 MMHG | OXYGEN SATURATION: 93 % | HEART RATE: 88 BPM | DIASTOLIC BLOOD PRESSURE: 78 MMHG

## 2021-04-08 DIAGNOSIS — E11.9 CONTROLLED TYPE 2 DIABETES MELLITUS WITHOUT COMPLICATION, WITHOUT LONG-TERM CURRENT USE OF INSULIN (HCC): ICD-10-CM

## 2021-04-08 DIAGNOSIS — I10 ESSENTIAL HYPERTENSION: ICD-10-CM

## 2021-04-08 DIAGNOSIS — R79.83 HOMOCYSTEINEMIA: ICD-10-CM

## 2021-04-08 DIAGNOSIS — G81.91 RIGHT HEMIPARESIS (HCC): ICD-10-CM

## 2021-04-08 DIAGNOSIS — G89.29 ABDOMINAL PAIN, CHRONIC, GENERALIZED: ICD-10-CM

## 2021-04-08 DIAGNOSIS — E78.5 DYSLIPIDEMIA: ICD-10-CM

## 2021-04-08 DIAGNOSIS — I63.9 ACUTE CVA (CEREBROVASCULAR ACCIDENT) (HCC): ICD-10-CM

## 2021-04-08 DIAGNOSIS — R10.84 ABDOMINAL PAIN, CHRONIC, GENERALIZED: ICD-10-CM

## 2021-04-08 DIAGNOSIS — J44.1 COPD EXACERBATION (HCC): ICD-10-CM

## 2021-04-08 DIAGNOSIS — I71.40 ABDOMINAL AORTIC ANEURYSM (AAA) WITHOUT RUPTURE: Primary | ICD-10-CM

## 2021-04-08 PROCEDURE — 99214 OFFICE O/P EST MOD 30 MIN: CPT | Performed by: INTERNAL MEDICINE

## 2021-04-08 NOTE — PROGRESS NOTES
Calvin Boy  1935 04/08/21    SUBJECTIVE:  HX OF PRIOR CVA, 2019. DID HAVE SPEECH EVAL THEN W INTACT SWALLOWING. HOWEVER THE PAST 3-4 MO W EPISODIC CHOKIING LIQUIDS OR SOLIDS. NO OTHER NEW FOCAL DEFICITS. HE HAS C/O SOME RECURRING ABD PAIN, IS ALWAYS HUNGRY. HX OF SMALL AAA NOTED ON U/S AUG 2020 APPROX 3.2 CM. HOWEVER W SOME RADIATION OF PAIN TO HIS BACK, MARKEL HIS WIFE WAS CONCERNED ABOUT POSSIBLE PROGRESSION. 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 DM- DIFFICULTY W CONTROLLING APPETITE BUT WE DISCUSSED STRATEGIES TO TRY MORE WATER RICH FOOD, LIMIT RED MEAT, INCR LEAN MEAT AND ESPEC BOOST VEGGIES, WHOLE FRUITS. ALSO TO WORK ON AVOIDING SUGAR FREE DIET FOODS SUCH AS COOKIES WHICH STILL HAVE HIGH JACOB CONTENT. Lab Results   Component Value Date    LABA1C 6.5 (H) 01/15/2020    LABA1C 6.1 07/22/2019    LABA1C 5.8 09/25/2013     Lab Results   Component Value Date    GLUF 90 06/03/2019    LABMICR YES 11/03/2020    LDLCALC 77 03/03/2020    CREATININE 1.2 08/21/2020       BP STABLE W/O SX CP OR SOB. COPD stable on current regiment of inhalers/meds. OBJECTIVE:    /78   Pulse 88   Resp 16   SpO2 93%     Physical Exam  Vitals signs reviewed. Constitutional:       General: He is not in acute distress. Appearance: He is well-developed. HENT:      Head: Normocephalic and atraumatic. Eyes:      General: No scleral icterus. Right eye: No discharge. Left eye: No discharge. Conjunctiva/sclera: Conjunctivae normal.      Pupils: Pupils are equal, round, and reactive to light. Neck:      Musculoskeletal: Normal range of motion and neck supple. Thyroid: No thyromegaly. Vascular: No JVD. Trachea: No tracheal deviation. Cardiovascular:      Rate and Rhythm: Normal rate and regular rhythm. Heart sounds: Normal heart sounds. No murmur. No friction rub. No gallop. Pulmonary:      Effort: Pulmonary effort is normal. No respiratory distress. Breath sounds: Normal breath sounds. No wheezing or rales. Abdominal:      General: Bowel sounds are normal. There is no distension. Palpations: Abdomen is soft. There is no mass. Tenderness: There is no abdominal tenderness. There is no guarding or rebound. Musculoskeletal:         General: No tenderness. Lymphadenopathy:      Cervical: No cervical adenopathy. Skin:     General: Skin is warm and dry. Neurological:      Mental Status: He is alert. Psychiatric:         Mood and Affect: Mood normal.         ASSESSMENT:    1. Abdominal aortic aneurysm (AAA) without rupture ([address]    2. COPD exacerbation ([address]    3. Homocysteinemia ([address]    4. Right hemiparesis ([address]    5. Acute CVA (cerebrovascular accident) ([address]    6. Abdominal pain, chronic, generalized    7. Controlled type 2 diabetes mellitus without complication, without long-term current use of insulin ([address]    8. Essential hypertension    9. Dyslipidemia        PLAN:    Ora Arora was seen today for gi problem. Diagnoses and all orders for this visit:    Abdominal aortic aneurysm (AAA) without rupture ([address] - RECHECK W CT  -     CT ABDOMEN PELVIS WO CONTRAST Additional Contrast? Oral; Future    COPD exacerbation (HCC) - RESOLVED, COPD stable on current regiment of inhalers/meds. Homocysteinemia ([address]- CONT FOLATE    Right hemiparesis ([address]- NO NEW FOCAL DEFICITS X SPORADIC CHOKING, WE'LL CHECK HEAD CT  -     CT HEAD WO CONTRAST; Future    Acute CVA (cerebrovascular accident) ([address]- CHECK HEAD CT AND CONT MEDS INCL ANTIPLT THERAPY  -     CT HEAD WO CONTRAST; Future    Abdominal pain, chronic, generalized- CHECK LAB, CT  -     Comprehensive Metabolic Panel; Future  -     CBC Auto Differential; Future  -     Lipid Panel;  Future  -     CT ABDOMEN PELVIS WO CONTRAST Additional Contrast? Oral; Future    Controlled type 2 diabetes mellitus without complication, without long-term current use of insulin (HCC)  - DM relatively well controlled and will continue current regimen. Screening reviewed. See orders. working on diet suggestions as noted    -     Hemoglobin A1C; Future    Essential hypertension - Blood pressure stable and will continue current regimen. Will plan periodic monitoring of renal function, electrolytes, lipid profile. Dyslipidemia  - Pt will continue to work on a low fat diet and also exercise, wt loss as appropriate. Will continue periodic monitoring of fasting lipid profile, glucose, liver function. -     Comprehensive Metabolic Panel; Future  -     CBC Auto Differential; Future  -     Lipid Panel; Future  -     TSH without Reflex;  Future

## 2021-04-09 ENCOUNTER — HOSPITAL ENCOUNTER (OUTPATIENT)
Dept: CT IMAGING | Age: 86
Discharge: HOME | End: 2021-04-09
Payer: MEDICARE

## 2021-04-09 ENCOUNTER — HOSPITAL ENCOUNTER (OUTPATIENT)
Age: 86
Discharge: HOME | End: 2021-04-09
Payer: MEDICARE

## 2021-04-09 DIAGNOSIS — R10.84 ABDOMINAL PAIN, CHRONIC, GENERALIZED: ICD-10-CM

## 2021-04-09 DIAGNOSIS — I63.9 ACUTE CVA (CEREBROVASCULAR ACCIDENT) (HCC): ICD-10-CM

## 2021-04-09 DIAGNOSIS — G81.91 RIGHT HEMIPARESIS (HCC): ICD-10-CM

## 2021-04-09 DIAGNOSIS — G89.29 ABDOMINAL PAIN, CHRONIC, GENERALIZED: ICD-10-CM

## 2021-04-09 DIAGNOSIS — I71.40 ABDOMINAL AORTIC ANEURYSM (AAA) WITHOUT RUPTURE: ICD-10-CM

## 2021-04-09 LAB
ALBUMIN SERPL-MCNC: 3.8 GM/DL (ref 3.4–5)
ALP BLD-CCNC: 78 IU/L (ref 40–128)
ALT SERPL-CCNC: 14 U/L (ref 10–40)
ANION GAP SERPL CALCULATED.3IONS-SCNC: 8 MMOL/L (ref 4–16)
AST SERPL-CCNC: 19 IU/L (ref 15–37)
BASOPHILS ABSOLUTE: 0 K/CU MM
BASOPHILS RELATIVE PERCENT: 0.4 % (ref 0–1)
BILIRUB SERPL-MCNC: 0.3 MG/DL (ref 0–1)
BUN BLDV-MCNC: 19 MG/DL (ref 6–23)
CALCIUM SERPL-MCNC: 9.8 MG/DL (ref 8.3–10.6)
CHLORIDE BLD-SCNC: 99 MMOL/L (ref 99–110)
CHOLESTEROL: 160 MG/DL
CO2: 33 MMOL/L (ref 21–32)
CREAT SERPL-MCNC: 1.3 MG/DL (ref 0.9–1.3)
DIFFERENTIAL TYPE: ABNORMAL
EOSINOPHILS ABSOLUTE: 0.1 K/CU MM
EOSINOPHILS RELATIVE PERCENT: 1.2 % (ref 0–3)
ESTIMATED AVERAGE GLUCOSE: 143 MG/DL
GFR AFRICAN AMERICAN: >60 ML/MIN/1.73M2
GFR NON-AFRICAN AMERICAN: 53 ML/MIN/1.73M2
GLUCOSE BLD-MCNC: 134 MG/DL (ref 70–99)
HBA1C MFR BLD: 6.6 % (ref 4.2–6.3)
HCT VFR BLD CALC: 49.4 % (ref 42–52)
HDLC SERPL-MCNC: 37 MG/DL
HEMOGLOBIN: 15.5 GM/DL (ref 13.5–18)
IMMATURE NEUTROPHIL %: 0.4 % (ref 0–0.43)
LDL CHOLESTEROL DIRECT: 98 MG/DL
LYMPHOCYTES ABSOLUTE: 2.3 K/CU MM
LYMPHOCYTES RELATIVE PERCENT: 32.9 % (ref 24–44)
MCH RBC QN AUTO: 34.8 PG (ref 27–31)
MCHC RBC AUTO-ENTMCNC: 31.4 % (ref 32–36)
MCV RBC AUTO: 110.8 FL (ref 78–100)
MONOCYTES ABSOLUTE: 0.5 K/CU MM
MONOCYTES RELATIVE PERCENT: 7.1 % (ref 0–4)
NUCLEATED RBC %: 0 %
PDW BLD-RTO: 14.3 % (ref 11.7–14.9)
PLATELET # BLD: 154 K/CU MM (ref 140–440)
PMV BLD AUTO: 10 FL (ref 7.5–11.1)
POTASSIUM SERPL-SCNC: 4.3 MMOL/L (ref 3.5–5.1)
RBC # BLD: 4.46 M/CU MM (ref 4.6–6.2)
SEGMENTED NEUTROPHILS ABSOLUTE COUNT: 4 K/CU MM
SEGMENTED NEUTROPHILS RELATIVE PERCENT: 58 % (ref 36–66)
SODIUM BLD-SCNC: 140 MMOL/L (ref 135–145)
TOTAL IMMATURE NEUTOROPHIL: 0.03 K/CU MM
TOTAL NUCLEATED RBC: 0 K/CU MM
TOTAL PROTEIN: 6.6 GM/DL (ref 6.4–8.2)
TRIGL SERPL-MCNC: 242 MG/DL
TSH HIGH SENSITIVITY: 1.84 UIU/ML (ref 0.27–4.2)
WBC # BLD: 7 K/CU MM (ref 4–10.5)

## 2021-04-09 PROCEDURE — 36415 COLL VENOUS BLD VENIPUNCTURE: CPT

## 2021-04-09 PROCEDURE — 6360000004 HC RX CONTRAST MEDICATION: Performed by: INTERNAL MEDICINE

## 2021-04-09 PROCEDURE — 84443 ASSAY THYROID STIM HORMONE: CPT

## 2021-04-09 PROCEDURE — 80053 COMPREHEN METABOLIC PANEL: CPT

## 2021-04-09 PROCEDURE — 85025 COMPLETE CBC W/AUTO DIFF WBC: CPT

## 2021-04-09 PROCEDURE — 74176 CT ABD & PELVIS W/O CONTRAST: CPT

## 2021-04-09 PROCEDURE — 80061 LIPID PANEL: CPT

## 2021-04-09 PROCEDURE — 83721 ASSAY OF BLOOD LIPOPROTEIN: CPT

## 2021-04-09 PROCEDURE — 70450 CT HEAD/BRAIN W/O DYE: CPT

## 2021-04-09 PROCEDURE — 83036 HEMOGLOBIN GLYCOSYLATED A1C: CPT

## 2021-04-09 RX ADMIN — IOHEXOL 50 ML: 240 INJECTION, SOLUTION INTRATHECAL; INTRAVASCULAR; INTRAVENOUS; ORAL at 12:00

## 2021-04-09 NOTE — RESULT ENCOUNTER NOTE
Chol, sugars, labs appear in stable range, DM is controlled. notify pt please. Thyroid fxn also nl. HOWEVER, HIS ANEURYSM DOES APPEAR SL LARGER, FR PRIOR 4.3CM NOW TO 4.6 SO REC WE ALSO REFER TO DR PENN FOR EARLIER F/U INSTEAD OF THIS SEPT. THE CT READING IS LARGER THAN WHAT THE U/S HAD INDICATED.

## 2021-04-12 NOTE — RESULT ENCOUNTER NOTE
Call Mayra Becker, pls let her know Bar's head CT did not indicate any new strokes but shows old strokes, also changes of brain aging and shrinkage

## 2021-05-06 ENCOUNTER — VIRTUAL VISIT (OUTPATIENT)
Dept: INTERNAL MEDICINE CLINIC | Age: 86
End: 2021-05-06
Payer: COMMERCIAL

## 2021-05-06 DIAGNOSIS — E11.9 CONTROLLED TYPE 2 DIABETES MELLITUS WITHOUT COMPLICATION, WITHOUT LONG-TERM CURRENT USE OF INSULIN (HCC): Primary | ICD-10-CM

## 2021-05-06 DIAGNOSIS — M15.9 GENERALIZED OSTEOARTHRITIS: ICD-10-CM

## 2021-05-06 DIAGNOSIS — E66.01 MORBID OBESITY (HCC): ICD-10-CM

## 2021-05-06 DIAGNOSIS — G30.9 DEMENTIA DUE TO ALZHEIMER'S DISEASE (HCC): ICD-10-CM

## 2021-05-06 DIAGNOSIS — F02.80 DEMENTIA DUE TO ALZHEIMER'S DISEASE (HCC): ICD-10-CM

## 2021-05-06 DIAGNOSIS — J01.00 ACUTE NON-RECURRENT MAXILLARY SINUSITIS: ICD-10-CM

## 2021-05-06 PROCEDURE — 99214 OFFICE O/P EST MOD 30 MIN: CPT | Performed by: INTERNAL MEDICINE

## 2021-05-06 RX ORDER — PREDNISONE 1 MG/1
TABLET ORAL
Qty: 21 TABLET | Refills: 0 | Status: SHIPPED | OUTPATIENT
Start: 2021-05-06 | End: 2021-07-07 | Stop reason: ALTCHOICE

## 2021-05-06 RX ORDER — HYDROCODONE BITARTRATE AND ACETAMINOPHEN 5; 325 MG/1; MG/1
0.5 TABLET ORAL 2 TIMES DAILY
Qty: 30 TABLET | Refills: 0 | Status: SHIPPED | OUTPATIENT
Start: 2021-05-06 | End: 2021-06-16 | Stop reason: SDUPTHER

## 2021-05-06 RX ORDER — AZITHROMYCIN 250 MG/1
250 TABLET, FILM COATED ORAL SEE ADMIN INSTRUCTIONS
Qty: 6 TABLET | Refills: 0 | Status: SHIPPED | OUTPATIENT
Start: 2021-05-06 | End: 2021-05-11

## 2021-05-06 NOTE — PROGRESS NOTES
2021    TELEHEALTH EVALUATION -- Audio/Visual (During BSUSF-70 public health emergency)    HPI:    Manish Thomas (:  1935) has requested an audio/video evaluation for the following concern(s):       4d hx of sinus congestion and drainage, voice hoarse. ST noted but no fever or chills. Sl loose cough noted too. DM- has cut out soda and is losing wt. Lab Results   Component Value Date    LABA1C 6.6 (H) 2021    LABA1C 6.5 (H) 01/15/2020    LABA1C 6.1 2019     Lab Results   Component Value Date    GLUF 90 2019    LABMICR YES 2020    LDLCALC 77 2020    CREATININE 1.3 2021     alz dementia, memory fluctuates but overall stable. Morbid obesity, some wt loss w stopping soda. Got covid shot Tenny Kevin first one yesterday    Has some diffuse aches w knees, back, abd.  Knees can give out. Review of Systems    Prior to Visit Medications    Medication Sig Taking?  Authorizing Provider   busPIRone (BUSPAR) 10 MG tablet TAKE 1 TABLET BY MOUTH TWICE A DAY Yes Nury Carreno MD   pravastatin (PRAVACHOL) 40 MG tablet TAKE 1 TABLET BY MOUTH EVERY DAY Yes Nury Carreno MD   folic acid (FOLVITE) 1 MG tablet TAKE 1 TABLET BY MOUTH EVERY DAY Yes Nury Carreno MD   metoprolol succinate (TOPROL XL) 25 MG extended release tablet TAKE 1/2 Orren Mews Yes Nury Carreno MD   traZODone (DESYREL) 50 MG tablet Take 1 tablet by mouth 2 times daily Yes Nury Carreno MD   oxybutynin (DITROPAN) 5 MG tablet TAKE 1 TABLET BY MOUTH TWICE A DAY Yes Nury Carreno MD   clopidogrel (PLAVIX) 75 MG tablet TAKE 1 TABLET BY MOUTH EVERY DAY Yes Nury Carreno MD   citalopram (CELEXA) 10 MG tablet TAKE 1 TABLET BY MOUTH IN THE MORNING AND 1 TABLET BY MOUTH AT BEDTIME Yes Nury Carreno MD   aspirin EC 81 MG EC tablet Take 1 tablet by mouth daily Yes Nury Carreno MD   acetaminophen (TYLENOL) 500 MG tablet Take 1,000 mg by mouth nightly as needed for Pain Yes Historical Provider, MD   fluticasone-salmeterol (ADVAIR DISKUS) 250-50 MCG/DOSE AEPB Inhale 1 puff into the lungs every 12 hours Yes Veena Ohara MD   albuterol sulfate HFA (PROVENTIL HFA) 108 (90 Base) MCG/ACT inhaler Inhale 2 puffs into the lungs every 6 hours as needed for Wheezing or Shortness of Breath (or coughing spells) Yes Veena Ohara MD   LORazepam (ATIVAN) 1 MG tablet TAKE 1 TABLET BY MOUTH 2 TIMES DAILY AS NEEDED FOR ANXIETY FOR UP TO 30 DAYS. Veena Ohara MD       Social History     Tobacco Use    Smoking status: Former Smoker     Packs/day: 1.50     Years: 36.00     Pack years: 54.00     Quit date: 1994     Years since quittin.3    Smokeless tobacco: Never Used    Tobacco comment: quit smoking in    Substance Use Topics    Alcohol use: No     Comment:         Caffeine: Decaf/ per son\"stopped drinking 50 yrs ago- use to drink  1-2 times per week\"    Drug use: No             PHYSICAL EXAMINATION:  [ INSTRUCTIONS:  \"[x]\" Indicates a positive item  \"[]\" Indicates a negative item  -- DELETE ALL ITEMS NOT EXAMINED]  Vital Signs: (As obtained by patient/caregiver or practitioner observation)    Blood pressure-  Heart rate-    Respiratory rate-    Temperature-  Pulse oximetry-     Constitutional: [x] Appears well-developed and well-nourished [] No apparent distress      [] Abnormal-   Mental status  [x] Alert and awake  [] Oriented to person/place/time []Able to follow commands      Eyes:  EOM    []  Normal  [] Abnormal-  Sclera  []  Normal  [] Abnormal -         Discharge []  None visible  [] Abnormal -    HENT:   [] Normocephalic, atraumatic.   [] Abnormal   [] Mouth/Throat: Mucous membranes are moist.     External Ears [] Normal  [] Abnormal-     Neck: [] No visualized mass     Pulmonary/Chest: [x] Respiratory effort normal.  [] No visualized signs of difficulty breathing or respiratory distress        [] Abnormal-      Musculoskeletal:   [] Normal gait with no signs of ataxia         [] Normal range of motion of neck        [] Abnormal-       Neurological:        [] No Facial Asymmetry (Cranial nerve 7 motor function) (limited exam to video visit)          [] No gaze palsy        [] Abnormal-         Skin:        [] No significant exanthematous lesions or discoloration noted on facial skin         [] Abnormal-            Psychiatric:       [x] Normal Affect [] No Hallucinations        [] Abnormal-     Other pertinent observable physical exam findings-     ASSESSMENT/PLAN:  1. Controlled type 2 diabetes mellitus without complication, without long-term current use of insulin (HCC)  The current medical regimen is effective;  continue present plan and medications. 2. Acute non-recurrent maxillary sinusitis  Consistent w presentation, rec rx as below and fluids, rest.  Pt to call back one week if not improving.      - predniSONE (DELTASONE) 5 MG tablet; Take 6 tablets by mouth on day 1, 5 on day 2, 4 on day 3, 3 on day 4, 2 on day 5, 1 on day 6. Dispense: 21 tablet; Refill: 0  - azithromycin (ZITHROMAX) 250 MG tablet; Take 1 tablet by mouth See Admin Instructions for 5 days 500mg on day 1 followed by 250mg on days 2 - 5  Dispense: 6 tablet; Refill: 0    3. Dementia due to Alzheimer's disease ([facility] [address]  MEMORY POOR BASELINE BUT STABLE AND CONT MONITOR, DOES HAVE CARETAKER ALSO AT HOME    4. Morbid obesity (HCC)  WT LOSS OFF SODA NOTED    5. Generalized osteoarthritis  ADD PRN NORCO LOW DOSE AND I'LL REASSESS ONE MONTH. FAMILY WILL WATCH FOR CONFUSION AND CONSTIPATION  - HYDROcodone-acetaminophen (NORCO) 5-325 MG per tablet; Take 0.5 tablets by mouth 2 times daily for 30 days. Intended supply: 30 days  Dispense: 30 tablet; Refill: 0      Return in about 4 weeks (around 6/3/2021) for routine. Neela Land, was evaluated through a synchronous (real-time) audio-video encounter.  The patient (or guardian if applicable) is aware that this is a billable service. Verbal consent to proceed has been obtained within the past 12 months. The visit was conducted pursuant to the emergency declaration under the [address] [place] [address] and the [employer] Act. Patient identification was verified, and a caregiver was present when appropriate. The patient was located in a state where the provider was credentialed to provide care. Total time spent on this encounter: Not billed by time    --Eileen Mcgrath MD on 5/6/2021 at 10:25 AM    An electronic signature was used to authenticate this note.

## 2021-05-19 ENCOUNTER — TELEPHONE (OUTPATIENT)
Dept: INTERNAL MEDICINE CLINIC | Age: 86
End: 2021-05-19

## 2021-05-19 RX ORDER — CIPROFLOXACIN 250 MG/1
250 TABLET, FILM COATED ORAL 2 TIMES DAILY
Qty: 14 TABLET | Refills: 0 | Status: SHIPPED | OUTPATIENT
Start: 2021-05-19 | End: 2021-05-26

## 2021-05-19 NOTE — TELEPHONE ENCOUNTER
Patient's wife calls- patient is c/o UTI with burning urination. Per Dr. Mesha Manley. Cipro was routed to pharmacy and Fouzia Andrewsils notified.

## 2021-06-16 ENCOUNTER — OFFICE VISIT (OUTPATIENT)
Dept: INTERNAL MEDICINE CLINIC | Age: 86
End: 2021-06-16
Payer: COMMERCIAL

## 2021-06-16 VITALS
OXYGEN SATURATION: 95 % | DIASTOLIC BLOOD PRESSURE: 82 MMHG | SYSTOLIC BLOOD PRESSURE: 138 MMHG | RESPIRATION RATE: 18 BRPM | HEART RATE: 73 BPM

## 2021-06-16 DIAGNOSIS — R10.32 CHRONIC BILATERAL LOWER ABDOMINAL PAIN: Primary | ICD-10-CM

## 2021-06-16 DIAGNOSIS — G89.29 CHRONIC BILATERAL LOWER ABDOMINAL PAIN: Primary | ICD-10-CM

## 2021-06-16 DIAGNOSIS — M15.9 GENERALIZED OSTEOARTHRITIS: ICD-10-CM

## 2021-06-16 DIAGNOSIS — F41.9 ANXIETY: ICD-10-CM

## 2021-06-16 DIAGNOSIS — R10.31 CHRONIC BILATERAL LOWER ABDOMINAL PAIN: Primary | ICD-10-CM

## 2021-06-16 PROBLEM — F11.29 OPIOID DEPENDENCE WITH UNSPECIFIED OPIOID-INDUCED DISORDER (HCC): Status: ACTIVE | Noted: 2021-06-16

## 2021-06-16 PROBLEM — F11.99 OPIOID USE, UNSPECIFIED WITH UNSPECIFIED OPIOID-INDUCED DISORDER (HCC): Status: ACTIVE | Noted: 2021-06-16

## 2021-06-16 PROBLEM — F11.20 OPIOID DEPENDENCE, UNCOMPLICATED (HCC): Status: ACTIVE | Noted: 2021-06-16

## 2021-06-16 PROCEDURE — 99213 OFFICE O/P EST LOW 20 MIN: CPT | Performed by: INTERNAL MEDICINE

## 2021-06-16 RX ORDER — HYDROCODONE BITARTRATE AND ACETAMINOPHEN 5; 325 MG/1; MG/1
0.5 TABLET ORAL 2 TIMES DAILY
Qty: 30 TABLET | Refills: 0 | Status: SHIPPED | OUTPATIENT
Start: 2021-06-16 | End: 2021-08-03 | Stop reason: SDUPTHER

## 2021-06-16 RX ORDER — ALPRAZOLAM 0.25 MG/1
0.25 TABLET ORAL NIGHTLY PRN
Qty: 30 TABLET | Refills: 0 | Status: SHIPPED | OUTPATIENT
Start: 2021-06-16 | End: 2021-07-07

## 2021-06-16 NOTE — PROGRESS NOTES
John Gallo  1935 06/16/21    SUBJECTIVE:  Episodic lower abd pain off and on for a few months but constant the past 3 weeks. No change w meals. No change w BMs. No constipation, is regular. Family tries tylenol first, sometimes helps but then also better w prn norco, using on avg3-4x/week. Did have abd /pelv CT in Apr, no significant dz x aneurysm was sl larger. Also w periodic headache, neck britney. Head CT w chronic R temporal/occip stroke, global loss. Is also chronically dizzy. Chr anxiety noted and also insomnia. OBJECTIVE:    /82   Pulse 73   Resp 18   SpO2 95%     Physical Exam  Vitals reviewed. Constitutional:       Appearance: He is well-developed. Cardiovascular:      Rate and Rhythm: Normal rate and regular rhythm. Heart sounds: Normal heart sounds. No murmur heard. No friction rub. No gallop. Pulmonary:      Effort: Pulmonary effort is normal. No respiratory distress. Breath sounds: Normal breath sounds. No wheezing or rales. Abdominal:      General: Bowel sounds are normal. There is no distension. Palpations: Abdomen is soft. There is no mass. Tenderness: There is abdominal tenderness. There is no guarding or rebound. Hernia: No hernia is present. Musculoskeletal:      Cervical back: Tenderness (posterior neck, stiff) present. Neurological:      Mental Status: He is alert. ASSESSMENT:    1. Chronic bilateral lower abdominal pain    2. Anxiety    3. Generalized osteoarthritis    4. Opioid use, unspecified with unspecified opioid-induced disorder    5. Opioid dependence with unspecified opioid-induced disorder    6. Opioid dependence, uncomplicated        PLAN:    Poonam Francois was seen today for dementia, anxiety, abdominal pain, headache and altered mental status.     Diagnoses and all orders for this visit:    Chronic bilateral lower abdominal pain - will try incr freq of norco to 1/2 BID scheduled to aleviate sx, CT otherwise benign wo critical enlargement of his AAA  -     HYDROcodone-acetaminophen (NORCO) 5-325 MG per tablet; Take 0.5 tablets by mouth 2 times daily for 30 days. Intended supply: 30 days    Anxiety- also adding xanax qhs to help w anxiety and sleep  -     ALPRAZolam (XANAX) 0.25 MG tablet; Take 1 tablet by mouth nightly as needed for Sleep or Anxiety for up to 30 days. Generalized osteoarthritis  -     HYDROcodone-acetaminophen (NORCO) 5-325 MG per tablet; Take 0.5 tablets by mouth 2 times daily for 30 days.  Intended supply: 30 days    - if clinically improved next time on meds and not having more problems w any confusion or lethargy, constipation, then may start scripts for 2mo interval- AND WOULD ALSO NEED MED AGREEMENT FORM

## 2021-06-17 DIAGNOSIS — I25.810 CORONARY ARTERY DISEASE INVOLVING CORONARY BYPASS GRAFT OF NATIVE HEART WITHOUT ANGINA PECTORIS: ICD-10-CM

## 2021-06-17 DIAGNOSIS — F41.9 ANXIETY AND DEPRESSION: ICD-10-CM

## 2021-06-17 DIAGNOSIS — I47.20 VENTRICULAR TACHYCARDIA: ICD-10-CM

## 2021-06-17 DIAGNOSIS — F32.A ANXIETY AND DEPRESSION: ICD-10-CM

## 2021-06-17 RX ORDER — CITALOPRAM 10 MG/1
TABLET ORAL
Qty: 180 TABLET | Refills: 1 | Status: SHIPPED | OUTPATIENT
Start: 2021-06-17 | End: 2021-07-07

## 2021-06-17 RX ORDER — METOPROLOL SUCCINATE 25 MG/1
TABLET, EXTENDED RELEASE ORAL
Qty: 45 TABLET | Refills: 1 | Status: SHIPPED | OUTPATIENT
Start: 2021-06-17

## 2021-07-07 ENCOUNTER — TELEPHONE (OUTPATIENT)
Dept: INTERNAL MEDICINE CLINIC | Age: 86
End: 2021-07-07

## 2021-07-07 DIAGNOSIS — F41.9 ANXIETY: ICD-10-CM

## 2021-07-07 RX ORDER — BUSPIRONE HYDROCHLORIDE 10 MG/1
10 TABLET ORAL 3 TIMES DAILY
Qty: 270 TABLET | Refills: 0
Start: 2021-07-07 | End: 2021-08-16

## 2021-07-07 NOTE — TELEPHONE ENCOUNTER
Patients wife calls- patient slept all day yesterday and was in a deep sleep and hard to arouse. Today his is sleeping but not as deep and did wake up to use the bathroom. Son reports that BP is weak and hard to hear. [place] HR is thready per son. Wife reports his oxygen level is 89%, Dr. Alicia Ricketts wanted patient to go to ER if below 90%. Wife reports she won't be able to get Christina Lin to go to ER. She said she would put oxygen on Bar and see how he does since he was doing a little better today than yesterday. She mentioned if Buspar could be increased. Per Dr Alicia Ricketts okay to increase Buspar to 10mg TID for one week and tolerating okay then increase to 2 pills BID. Patient will monitor overnight and if declines she was instructed to go to ER. She will call us in the AM with update.

## 2021-07-10 DIAGNOSIS — N39.41 URGE INCONTINENCE OF URINE: ICD-10-CM

## 2021-07-10 DIAGNOSIS — I63.19 CEREBROVASCULAR ACCIDENT (CVA) DUE TO EMBOLISM OF OTHER PRECEREBRAL ARTERY (HCC): ICD-10-CM

## 2021-07-12 RX ORDER — CLOPIDOGREL BISULFATE 75 MG/1
TABLET ORAL
Qty: 90 TABLET | Refills: 1 | Status: SHIPPED | OUTPATIENT
Start: 2021-07-12

## 2021-07-12 RX ORDER — OXYBUTYNIN CHLORIDE 5 MG/1
TABLET ORAL
Qty: 180 TABLET | Refills: 1 | Status: SHIPPED | OUTPATIENT
Start: 2021-07-12

## 2021-07-28 DIAGNOSIS — R79.83 HOMOCYSTEINEMIA: ICD-10-CM

## 2021-07-28 RX ORDER — FOLIC ACID 1 MG/1
TABLET ORAL
Qty: 90 TABLET | Refills: 1 | Status: SHIPPED | OUTPATIENT
Start: 2021-07-28

## 2021-08-03 ENCOUNTER — OFFICE VISIT (OUTPATIENT)
Dept: INTERNAL MEDICINE CLINIC | Age: 86
End: 2021-08-03
Payer: COMMERCIAL

## 2021-08-03 VITALS
OXYGEN SATURATION: 91 % | DIASTOLIC BLOOD PRESSURE: 82 MMHG | SYSTOLIC BLOOD PRESSURE: 130 MMHG | HEART RATE: 78 BPM | RESPIRATION RATE: 18 BRPM

## 2021-08-03 DIAGNOSIS — M15.9 GENERALIZED OSTEOARTHRITIS: Primary | ICD-10-CM

## 2021-08-03 DIAGNOSIS — E11.9 CONTROLLED TYPE 2 DIABETES MELLITUS WITHOUT COMPLICATION, WITHOUT LONG-TERM CURRENT USE OF INSULIN (HCC): Primary | ICD-10-CM

## 2021-08-03 DIAGNOSIS — R10.32 CHRONIC BILATERAL LOWER ABDOMINAL PAIN: ICD-10-CM

## 2021-08-03 DIAGNOSIS — I10 ESSENTIAL HYPERTENSION: ICD-10-CM

## 2021-08-03 DIAGNOSIS — F02.80 DEMENTIA DUE TO ALZHEIMER'S DISEASE (HCC): ICD-10-CM

## 2021-08-03 DIAGNOSIS — E11.9 CONTROLLED TYPE 2 DIABETES MELLITUS WITHOUT COMPLICATION, WITHOUT LONG-TERM CURRENT USE OF INSULIN (HCC): ICD-10-CM

## 2021-08-03 DIAGNOSIS — G30.9 DEMENTIA DUE TO ALZHEIMER'S DISEASE (HCC): ICD-10-CM

## 2021-08-03 DIAGNOSIS — G89.29 CHRONIC BILATERAL LOWER ABDOMINAL PAIN: ICD-10-CM

## 2021-08-03 DIAGNOSIS — R10.31 CHRONIC BILATERAL LOWER ABDOMINAL PAIN: ICD-10-CM

## 2021-08-03 DIAGNOSIS — M15.9 GENERALIZED OSTEOARTHRITIS: ICD-10-CM

## 2021-08-03 DIAGNOSIS — I63.50 CEREBROVASCULAR ACCIDENT (CVA) DUE TO STENOSIS OF CEREBRAL ARTERY (HCC): ICD-10-CM

## 2021-08-03 LAB
A/G RATIO: 1.5 (ref 1.1–2.2)
ALBUMIN SERPL-MCNC: 4.1 G/DL (ref 3.4–5)
ALP BLD-CCNC: 92 U/L (ref 40–129)
ALT SERPL-CCNC: 12 U/L (ref 10–40)
ANION GAP SERPL CALCULATED.3IONS-SCNC: 13 MMOL/L (ref 3–16)
AST SERPL-CCNC: 14 U/L (ref 15–37)
BASOPHILS ABSOLUTE: 0 K/UL (ref 0–0.2)
BASOPHILS RELATIVE PERCENT: 0.5 %
BILIRUB SERPL-MCNC: 0.4 MG/DL (ref 0–1)
BUN BLDV-MCNC: 17 MG/DL (ref 7–20)
CALCIUM SERPL-MCNC: 9.6 MG/DL (ref 8.3–10.6)
CHLORIDE BLD-SCNC: 100 MMOL/L (ref 99–110)
CHOLESTEROL, TOTAL: 157 MG/DL (ref 0–199)
CO2: 30 MMOL/L (ref 21–32)
CREAT SERPL-MCNC: 1.2 MG/DL (ref 0.8–1.3)
EOSINOPHILS ABSOLUTE: 0.1 K/UL (ref 0–0.6)
EOSINOPHILS RELATIVE PERCENT: 1.5 %
GFR AFRICAN AMERICAN: >60
GFR NON-AFRICAN AMERICAN: 58
GLOBULIN: 2.8 G/DL
GLUCOSE BLD-MCNC: 146 MG/DL (ref 70–99)
HCT VFR BLD CALC: 47.4 % (ref 40.5–52.5)
HDLC SERPL-MCNC: 37 MG/DL (ref 40–60)
HEMOGLOBIN: 15.8 G/DL (ref 13.5–17.5)
LDL CHOLESTEROL CALCULATED: 76 MG/DL
LYMPHOCYTES ABSOLUTE: 2.3 K/UL (ref 1–5.1)
LYMPHOCYTES RELATIVE PERCENT: 37.7 %
MCH RBC QN AUTO: 34.9 PG (ref 26–34)
MCHC RBC AUTO-ENTMCNC: 33.3 G/DL (ref 31–36)
MCV RBC AUTO: 104.6 FL (ref 80–100)
MONOCYTES ABSOLUTE: 0.3 K/UL (ref 0–1.3)
MONOCYTES RELATIVE PERCENT: 4.4 %
NEUTROPHILS ABSOLUTE: 3.4 K/UL (ref 1.7–7.7)
NEUTROPHILS RELATIVE PERCENT: 55.9 %
PDW BLD-RTO: 14.4 % (ref 12.4–15.4)
PLATELET # BLD: 135 K/UL (ref 135–450)
PMV BLD AUTO: 8.6 FL (ref 5–10.5)
POTASSIUM SERPL-SCNC: 4.7 MMOL/L (ref 3.5–5.1)
RBC # BLD: 4.53 M/UL (ref 4.2–5.9)
SODIUM BLD-SCNC: 143 MMOL/L (ref 136–145)
TOTAL PROTEIN: 6.9 G/DL (ref 6.4–8.2)
TRIGL SERPL-MCNC: 219 MG/DL (ref 0–150)
VLDLC SERPL CALC-MCNC: 44 MG/DL
WBC # BLD: 6.1 K/UL (ref 4–11)

## 2021-08-03 PROCEDURE — 36415 COLL VENOUS BLD VENIPUNCTURE: CPT | Performed by: INTERNAL MEDICINE

## 2021-08-03 PROCEDURE — 99214 OFFICE O/P EST MOD 30 MIN: CPT | Performed by: INTERNAL MEDICINE

## 2021-08-03 RX ORDER — HYDROCODONE BITARTRATE AND ACETAMINOPHEN 5; 325 MG/1; MG/1
1 TABLET ORAL 2 TIMES DAILY
Qty: 60 TABLET | Refills: 0 | Status: SHIPPED | OUTPATIENT
Start: 2021-08-03 | End: 2021-09-02

## 2021-08-03 RX ORDER — HYDROCODONE BITARTRATE AND ACETAMINOPHEN 5; 325 MG/1; MG/1
1 TABLET ORAL 2 TIMES DAILY
Qty: 60 TABLET | Refills: 0 | Status: SHIPPED | OUTPATIENT
Start: 2021-09-02 | End: 2021-09-14 | Stop reason: SDUPTHER

## 2021-08-03 NOTE — PROGRESS NOTES
Luisa Renetta  1935 08/03/21    SUBJECTIVE:  SOME FATIGUE NOTED AT HOME AND AT TIMES MORE SOMNOLENT. CO DIFFUSE ACHES ESPEC HIPS AND HEADACHE   HAS SEVERELY LIMITED ROM L HIP ON EXAM TOAY    BP HIGH INITIALLY BUT IMPROVED ON RECHECK. SOMETIMES O2 SATS LOW BUT ON HOME O2. Dm- DUE FOR F/U LAB. Lab Results   Component Value Date    LABA1C 6.6 (H) 04/09/2021    LABA1C 6.5 (H) 01/15/2020    LABA1C 6.1 07/22/2019     Lab Results   Component Value Date    GLUF 90 06/03/2019    LABMICR YES 11/03/2020    LDLCALC 77 03/03/2020    CREATININE 1.3 04/09/2021       DIFFUSE OA AND ESPEC HIPS AND LBP. ON NORCO 1/2 BID, WE'LL INCR TO TID. Controlled substances monitoring: possible medication side effects, risk of tolerance and/or dependence, and alternative treatments discussed, no signs of potential drug abuse or diversion identified and OARRS report reviewed today- activity consistent with treatment plan. OBJECTIVE:    /82   Pulse 78   Resp 18   SpO2 91%     Physical Exam  Vitals reviewed. Constitutional:       Appearance: He is well-developed. Cardiovascular:      Rate and Rhythm: Normal rate and regular rhythm. Heart sounds: Normal heart sounds. No murmur heard. No friction rub. No gallop. Pulmonary:      Effort: Pulmonary effort is normal. No respiratory distress. Breath sounds: Normal breath sounds. No wheezing or rales. Abdominal:      General: Bowel sounds are normal. There is no distension. Palpations: Abdomen is soft. Tenderness: There is no abdominal tenderness. Musculoskeletal:      Cervical back: Neck supple. Neurological:      Mental Status: He is alert. Comments: GENERALIZED WEAKNESS         ASSESSMENT:    1. Controlled type 2 diabetes mellitus without complication, without long-term current use of insulin ([address]    2. Essential hypertension    3. Generalized osteoarthritis    4. Chronic bilateral lower abdominal pain    5.  Cerebrovascular accident (CVA) due to stenosis of cerebral artery [facility])        PLAN:    Bozena Lerma was seen today for fatigue, hip pain, leg pain, headache, dizziness and blurred vision. Diagnoses and all orders for this visit:    Controlled type 2 diabetes mellitus without complication, without long-term current use of insulin ([address] - DM relatively well controlled and will continue current regimen. Screening reviewed. See orders. -     Comprehensive Metabolic Panel; Future  -     CBC Auto Differential; Future  -     Lipid Panel; Future  -     Hemoglobin A1C; Future    Essential hypertension - Blood pressure stable and will continue current regimen. Will plan periodic monitoring of renal function, electrolytes, lipid profile. -     Comprehensive Metabolic Panel; Future  -     CBC Auto Differential; Future  -     Lipid Panel; Future    Generalized osteoarthritis- PAIN UNCONTROLLED, POOR CANDIDATE FOR SURGICAL OPTIONS, WE'LL TRY INCR NORCO FR 1/2 BID UP TO 1 TAB BID AS YANDEL. IS VERY LIMITED W ACTIVITY, AT TIMES CAN BARELY STAND ON HIS OWN AND APPEARS TO BE GETTING MORE DEBILITATED, CARE DEPENDENT DAY BY DAY. WE'LL SET UP FOR A [facility] EVAL, ESPEC GIVEN PRIOR STROKE, TO SEE IF ANY THERAPY INTERVENTIONS MAY BE WORTHWHILE BUT IF CONDITION OVERALL CONTINUES TO DETERIORATE MAY LATER BE CANDIDATE FOR NEEDING ECF PLACEMENT--- OR IF REFUSES ECF PERHAPS LATER FOR HOSPICE CARE?  -     HYDROcodone-acetaminophen (NORCO) 5-325 MG per tablet; Take 1 tablet by mouth 2 times daily for 30 days. Intended supply: 30 days  -     HYDROcodone-acetaminophen (NORCO) 5-325 MG per tablet; Take 1 tablet by mouth 2 times daily for 30 days. Intended supply: 30 days    Chronic bilateral lower abdominal pain  -     HYDROcodone-acetaminophen (NORCO) 5-325 MG per tablet; Take 1 tablet by mouth 2 times daily for 30 days. Intended supply: 30 days    CVA- CONTINUED DETERIORATION AS NOTED, FOR [facility] EVAL.

## 2021-08-04 LAB
ESTIMATED AVERAGE GLUCOSE: 142.7 MG/DL
HBA1C MFR BLD: 6.6 %

## 2021-08-13 DIAGNOSIS — I25.810 CORONARY ARTERY DISEASE INVOLVING CORONARY BYPASS GRAFT OF NATIVE HEART WITHOUT ANGINA PECTORIS: ICD-10-CM

## 2021-08-13 DIAGNOSIS — R42 VERTIGO: ICD-10-CM

## 2021-08-13 RX ORDER — PRAVASTATIN SODIUM 40 MG
TABLET ORAL
Qty: 90 TABLET | Refills: 1 | Status: SHIPPED | OUTPATIENT
Start: 2021-08-13

## 2021-08-13 RX ORDER — MECLIZINE HYDROCHLORIDE 25 MG/1
TABLET ORAL
Qty: 30 TABLET | Refills: 2 | Status: SHIPPED | OUTPATIENT
Start: 2021-08-13

## 2021-08-30 ENCOUNTER — TELEPHONE (OUTPATIENT)
Dept: INTERNAL MEDICINE CLINIC | Age: 86
End: 2021-08-30

## 2021-08-30 NOTE — TELEPHONE ENCOUNTER
Goldie Lara called to report that Leslie Cruz does not want to proceed with PT at home. She feels it isn't really helping him and it's causing more discomfort than anything. Also, she says Leslie Cruz complains of very sharp pains that come and go on the right side of his head x3 days. This is not a headache, per Lesliecynthia Cruz. She wanted to know if it could be angio rhythm stroke? HC mentioned it and wanted your opinion. Please advise.

## 2021-08-30 NOTE — TELEPHONE ENCOUNTER
Called and spoke with Baljit. She wants to hold off for now on the CT. She will monitor his symptoms. She said she will take him to the ED if symptoms worsen.

## 2021-08-30 NOTE — TELEPHONE ENCOUNTER
Ok to thus cancel PT if Joel Melgar is refusing. For his headache, can't say for sure over the phone whether this could be a stroke or not. He did have a stable head CT earlier this yr w/o new stroke- no contrast, and did have a CT with contrast also last yr. If he has any new changes of slurred speech or new weakness, numbness should go to ED. If only having episodic headaches, make sure first is taking his norco regularly as prescribed. Lastly, to be sure, we can schedule for a CT head- with contrast- to r/o stroke.   Dx CVA

## 2021-09-14 DIAGNOSIS — M15.9 GENERALIZED OSTEOARTHRITIS: ICD-10-CM

## 2021-09-14 RX ORDER — HYDROCODONE BITARTRATE AND ACETAMINOPHEN 5; 325 MG/1; MG/1
1 TABLET ORAL 2 TIMES DAILY
Qty: 60 TABLET | Refills: 0 | Status: SHIPPED | OUTPATIENT
Start: 2021-09-14 | End: 2021-10-14

## 2021-09-14 NOTE — TELEPHONE ENCOUNTER
Script printed for 9/2 was not filled and now too old. I called and verified with pharmacy. Last fill was 8/4/21.

## 2021-09-19 ENCOUNTER — HOSPITAL ENCOUNTER (INPATIENT)
Age: 86
LOS: 1 days | Discharge: HOME HEALTH | DRG: 193 | End: 2021-09-20
Attending: EMERGENCY MEDICINE | Admitting: INTERNAL MEDICINE
Payer: MEDICARE

## 2021-09-19 ENCOUNTER — APPOINTMENT (OUTPATIENT)
Dept: GENERAL RADIOLOGY | Age: 86
DRG: 193 | End: 2021-09-19
Payer: MEDICARE

## 2021-09-19 DIAGNOSIS — R74.01 TRANSAMINITIS: ICD-10-CM

## 2021-09-19 DIAGNOSIS — T82.310A TYPE III ENDOLEAK OF AORTIC GRAFT (HCC): Primary | ICD-10-CM

## 2021-09-19 DIAGNOSIS — R31.29 MICROSCOPIC HEMATURIA: ICD-10-CM

## 2021-09-19 LAB
ALBUMIN SERPL-MCNC: 3.4 GM/DL (ref 3.4–5)
ALP BLD-CCNC: 87 IU/L (ref 40–129)
ALT SERPL-CCNC: 331 U/L (ref 10–40)
ANION GAP SERPL CALCULATED.3IONS-SCNC: 8 MMOL/L (ref 4–16)
AST SERPL-CCNC: 176 IU/L (ref 15–37)
BASE EXCESS MIXED: 2.5 (ref 0–1.2)
BILIRUB SERPL-MCNC: 0.3 MG/DL (ref 0–1)
BUN BLDV-MCNC: 24 MG/DL (ref 6–23)
CALCIUM SERPL-MCNC: 8.5 MG/DL (ref 8.3–10.6)
CHLORIDE BLD-SCNC: 102 MMOL/L (ref 99–110)
CO2: 26 MMOL/L (ref 21–32)
CREAT SERPL-MCNC: 1 MG/DL (ref 0.9–1.3)
GFR AFRICAN AMERICAN: >60 ML/MIN/1.73M2
GFR NON-AFRICAN AMERICAN: >60 ML/MIN/1.73M2
GLUCOSE BLD-MCNC: 187 MG/DL (ref 70–99)
HCO3 VENOUS: 28.4 MMOL/L (ref 19–25)
LIPASE: 40 IU/L (ref 13–60)
O2 SAT, VEN: 95.6 % (ref 50–70)
PCO2, VEN: 48 MMHG (ref 38–52)
PH VENOUS: 7.38 (ref 7.32–7.42)
PO2, VEN: 193 MMHG (ref 28–48)
POTASSIUM SERPL-SCNC: 4.2 MMOL/L (ref 3.5–5.1)
PRO-BNP: 831.4 PG/ML
SODIUM BLD-SCNC: 136 MMOL/L (ref 135–145)
TOTAL PROTEIN: 6.5 GM/DL (ref 6.4–8.2)
TROPONIN T: <0.01 NG/ML

## 2021-09-19 PROCEDURE — 87086 URINE CULTURE/COLONY COUNT: CPT

## 2021-09-19 PROCEDURE — 85025 COMPLETE CBC W/AUTO DIFF WBC: CPT

## 2021-09-19 PROCEDURE — 81001 URINALYSIS AUTO W/SCOPE: CPT

## 2021-09-19 PROCEDURE — 83880 ASSAY OF NATRIURETIC PEPTIDE: CPT

## 2021-09-19 PROCEDURE — 71045 X-RAY EXAM CHEST 1 VIEW: CPT

## 2021-09-19 PROCEDURE — 87635 SARS-COV-2 COVID-19 AMP PRB: CPT

## 2021-09-19 PROCEDURE — 87040 BLOOD CULTURE FOR BACTERIA: CPT

## 2021-09-19 PROCEDURE — 93005 ELECTROCARDIOGRAM TRACING: CPT | Performed by: EMERGENCY MEDICINE

## 2021-09-19 PROCEDURE — 84484 ASSAY OF TROPONIN QUANT: CPT

## 2021-09-19 PROCEDURE — 99282 EMERGENCY DEPT VISIT SF MDM: CPT

## 2021-09-19 PROCEDURE — 82805 BLOOD GASES W/O2 SATURATION: CPT

## 2021-09-19 PROCEDURE — 83690 ASSAY OF LIPASE: CPT

## 2021-09-19 PROCEDURE — 80053 COMPREHEN METABOLIC PANEL: CPT

## 2021-09-19 NOTE — Clinical Note
Patient Class: Inpatient [101]   REQUIRED: Diagnosis: Epigastric abdominal pain [363350]   Estimated Length of Stay: Estimated stay of more than 2 midnights   Telemetry/Cardiac Monitoring Required?: Yes

## 2021-09-20 ENCOUNTER — APPOINTMENT (OUTPATIENT)
Dept: CT IMAGING | Age: 86
DRG: 193 | End: 2021-09-20
Payer: MEDICARE

## 2021-09-20 VITALS
OXYGEN SATURATION: 88 % | HEART RATE: 72 BPM | TEMPERATURE: 98.3 F | SYSTOLIC BLOOD PRESSURE: 134 MMHG | RESPIRATION RATE: 18 BRPM | DIASTOLIC BLOOD PRESSURE: 64 MMHG

## 2021-09-20 PROBLEM — R10.13 EPIGASTRIC ABDOMINAL PAIN: Status: ACTIVE | Noted: 2021-09-20

## 2021-09-20 LAB
ADENOVIRUS DETECTION BY PCR: NOT DETECTED
ALBUMIN SERPL-MCNC: 3.5 GM/DL (ref 3.4–5)
ALP BLD-CCNC: 80 IU/L (ref 40–129)
ALT SERPL-CCNC: 315 U/L (ref 10–40)
ANION GAP SERPL CALCULATED.3IONS-SCNC: 12 MMOL/L (ref 4–16)
AST SERPL-CCNC: 152 IU/L (ref 15–37)
BACTERIA: NEGATIVE /HPF
BASOPHILS ABSOLUTE: 0 K/CU MM
BASOPHILS RELATIVE PERCENT: 0.4 % (ref 0–1)
BILIRUB SERPL-MCNC: 0.4 MG/DL (ref 0–1)
BILIRUBIN URINE: NEGATIVE MG/DL
BLOOD, URINE: ABNORMAL
BORDETELLA PARAPERTUSSIS BY PCR: NOT DETECTED
BORDETELLA PERTUSSIS PCR: NOT DETECTED
BUN BLDV-MCNC: 22 MG/DL (ref 6–23)
CALCIUM SERPL-MCNC: 8.6 MG/DL (ref 8.3–10.6)
CHLAMYDOPHILA PNEUMONIA PCR: NOT DETECTED
CHLORIDE BLD-SCNC: 101 MMOL/L (ref 99–110)
CLARITY: CLEAR
CO2: 26 MMOL/L (ref 21–32)
COLOR: YELLOW
CORONAVIRUS 229E PCR: NOT DETECTED
CORONAVIRUS HKU1 PCR: NOT DETECTED
CORONAVIRUS NL63 PCR: NOT DETECTED
CORONAVIRUS OC43 PCR: NOT DETECTED
CREAT SERPL-MCNC: 1 MG/DL (ref 0.9–1.3)
DIFFERENTIAL TYPE: ABNORMAL
EOSINOPHILS ABSOLUTE: 0.2 K/CU MM
EOSINOPHILS RELATIVE PERCENT: 3.6 % (ref 0–3)
GFR AFRICAN AMERICAN: >60 ML/MIN/1.73M2
GFR NON-AFRICAN AMERICAN: >60 ML/MIN/1.73M2
GLUCOSE BLD-MCNC: 122 MG/DL (ref 70–99)
GLUCOSE, URINE: NEGATIVE MG/DL
HCT VFR BLD CALC: 43.7 % (ref 42–52)
HEMOGLOBIN: 13.6 GM/DL (ref 13.5–18)
HUMAN METAPNEUMOVIRUS PCR: NOT DETECTED
IMMATURE NEUTROPHIL %: 0.2 % (ref 0–0.43)
INFLUENZA A BY PCR: NOT DETECTED
INFLUENZA A H1 (2009) PCR: NOT DETECTED
INFLUENZA A H1 PANDEMIC PCR: NOT DETECTED
INFLUENZA A H3 PCR: NOT DETECTED
INFLUENZA B BY PCR: NOT DETECTED
KETONES, URINE: NEGATIVE MG/DL
LEUKOCYTE ESTERASE, URINE: ABNORMAL
LYMPHOCYTES ABSOLUTE: 1.9 K/CU MM
LYMPHOCYTES RELATIVE PERCENT: 34.4 % (ref 24–44)
MCH RBC QN AUTO: 33.8 PG (ref 27–31)
MCHC RBC AUTO-ENTMCNC: 31.1 % (ref 32–36)
MCV RBC AUTO: 108.7 FL (ref 78–100)
MONOCYTES ABSOLUTE: 0.4 K/CU MM
MONOCYTES RELATIVE PERCENT: 7.8 % (ref 0–4)
MYCOPLASMA PNEUMONIAE PCR: NOT DETECTED
NITRITE URINE, QUANTITATIVE: NEGATIVE
NUCLEATED RBC %: 0 %
PARAINFLUENZA 1 PCR: NOT DETECTED
PARAINFLUENZA 2 PCR: NOT DETECTED
PARAINFLUENZA 3 PCR: NOT DETECTED
PARAINFLUENZA 4 PCR: NOT DETECTED
PDW BLD-RTO: 14.6 % (ref 11.7–14.9)
PH, URINE: 5 (ref 5–8)
PLATELET # BLD: 154 K/CU MM (ref 140–440)
PMV BLD AUTO: 10.4 FL (ref 7.5–11.1)
POTASSIUM SERPL-SCNC: 4.4 MMOL/L (ref 3.5–5.1)
PROCALCITONIN: 1.3
PROTEIN UA: NEGATIVE MG/DL
RBC # BLD: 4.02 M/CU MM (ref 4.6–6.2)
RBC URINE: 58 /HPF (ref 0–3)
RHINOVIRUS ENTEROVIRUS PCR: NOT DETECTED
RSV PCR: NOT DETECTED
SARS-COV-2, NAAT: NOT DETECTED
SARS-COV-2: NOT DETECTED
SEGMENTED NEUTROPHILS ABSOLUTE COUNT: 3 K/CU MM
SEGMENTED NEUTROPHILS RELATIVE PERCENT: 53.6 % (ref 36–66)
SODIUM BLD-SCNC: 139 MMOL/L (ref 135–145)
SOURCE: NORMAL
SPECIFIC GRAVITY UA: 1.01 (ref 1–1.03)
SQUAMOUS EPITHELIAL: 1 /HPF
TOTAL IMMATURE NEUTOROPHIL: 0.01 K/CU MM
TOTAL NUCLEATED RBC: 0 K/CU MM
TOTAL PROTEIN: 6.7 GM/DL (ref 6.4–8.2)
TRANSITIONAL EPITHELIAL: <1 /HPF
TRICHOMONAS: ABNORMAL /HPF
UROBILINOGEN, URINE: 2 MG/DL (ref 0.2–1)
WBC # BLD: 5.6 K/CU MM (ref 4–10.5)
WBC UA: 8 /HPF (ref 0–2)

## 2021-09-20 PROCEDURE — 96374 THER/PROPH/DIAG INJ IV PUSH: CPT

## 2021-09-20 PROCEDURE — 6360000002 HC RX W HCPCS: Performed by: INTERNAL MEDICINE

## 2021-09-20 PROCEDURE — 87040 BLOOD CULTURE FOR BACTERIA: CPT

## 2021-09-20 PROCEDURE — 6370000000 HC RX 637 (ALT 250 FOR IP): Performed by: INTERNAL MEDICINE

## 2021-09-20 PROCEDURE — 2580000003 HC RX 258: Performed by: INTERNAL MEDICINE

## 2021-09-20 PROCEDURE — 74177 CT ABD & PELVIS W/CONTRAST: CPT

## 2021-09-20 PROCEDURE — 0202U NFCT DS 22 TRGT SARS-COV-2: CPT

## 2021-09-20 PROCEDURE — 80053 COMPREHEN METABOLIC PANEL: CPT

## 2021-09-20 PROCEDURE — 84145 PROCALCITONIN (PCT): CPT

## 2021-09-20 PROCEDURE — 94640 AIRWAY INHALATION TREATMENT: CPT

## 2021-09-20 PROCEDURE — 99223 1ST HOSP IP/OBS HIGH 75: CPT | Performed by: INTERNAL MEDICINE

## 2021-09-20 PROCEDURE — G0378 HOSPITAL OBSERVATION PER HR: HCPCS

## 2021-09-20 PROCEDURE — 96375 TX/PRO/DX INJ NEW DRUG ADDON: CPT

## 2021-09-20 PROCEDURE — 1200000000 HC SEMI PRIVATE

## 2021-09-20 PROCEDURE — 6370000000 HC RX 637 (ALT 250 FOR IP): Performed by: EMERGENCY MEDICINE

## 2021-09-20 PROCEDURE — 6360000004 HC RX CONTRAST MEDICATION: Performed by: EMERGENCY MEDICINE

## 2021-09-20 RX ORDER — ONDANSETRON 2 MG/ML
4 INJECTION INTRAMUSCULAR; INTRAVENOUS EVERY 6 HOURS PRN
Status: DISCONTINUED | OUTPATIENT
Start: 2021-09-20 | End: 2021-09-20 | Stop reason: HOSPADM

## 2021-09-20 RX ORDER — SODIUM CHLORIDE 0.9 % (FLUSH) 0.9 %
5-40 SYRINGE (ML) INJECTION EVERY 12 HOURS SCHEDULED
Status: DISCONTINUED | OUTPATIENT
Start: 2021-09-20 | End: 2021-09-20 | Stop reason: HOSPADM

## 2021-09-20 RX ORDER — POLYETHYLENE GLYCOL 3350 17 G/17G
17 POWDER, FOR SOLUTION ORAL DAILY PRN
Status: DISCONTINUED | OUTPATIENT
Start: 2021-09-20 | End: 2021-09-20 | Stop reason: HOSPADM

## 2021-09-20 RX ORDER — BUSPIRONE HYDROCHLORIDE 10 MG/1
10 TABLET ORAL 3 TIMES DAILY
Status: DISCONTINUED | OUTPATIENT
Start: 2021-09-20 | End: 2021-09-20 | Stop reason: HOSPADM

## 2021-09-20 RX ORDER — SODIUM CHLORIDE 0.9 % (FLUSH) 0.9 %
5-40 SYRINGE (ML) INJECTION PRN
Status: DISCONTINUED | OUTPATIENT
Start: 2021-09-20 | End: 2021-09-20 | Stop reason: HOSPADM

## 2021-09-20 RX ORDER — METHYLPREDNISOLONE SODIUM SUCCINATE 40 MG/ML
40 INJECTION, POWDER, LYOPHILIZED, FOR SOLUTION INTRAMUSCULAR; INTRAVENOUS EVERY 6 HOURS
Status: DISCONTINUED | OUTPATIENT
Start: 2021-09-20 | End: 2021-09-20

## 2021-09-20 RX ORDER — LORAZEPAM 2 MG/ML
0.5 INJECTION INTRAMUSCULAR ONCE
Status: DISCONTINUED | OUTPATIENT
Start: 2021-09-20 | End: 2021-09-20

## 2021-09-20 RX ORDER — ALBUTEROL SULFATE 90 UG/1
2 AEROSOL, METERED RESPIRATORY (INHALATION) EVERY 6 HOURS PRN
Status: DISCONTINUED | OUTPATIENT
Start: 2021-09-20 | End: 2021-09-20 | Stop reason: HOSPADM

## 2021-09-20 RX ORDER — CEFADROXIL 500 MG/1
500 CAPSULE ORAL 2 TIMES DAILY
Qty: 10 CAPSULE | Refills: 0 | Status: SHIPPED | OUTPATIENT
Start: 2021-09-20 | End: 2021-09-25

## 2021-09-20 RX ORDER — ACETAMINOPHEN 650 MG/1
650 SUPPOSITORY RECTAL EVERY 6 HOURS PRN
Status: DISCONTINUED | OUTPATIENT
Start: 2021-09-20 | End: 2021-09-20 | Stop reason: HOSPADM

## 2021-09-20 RX ORDER — ACETAMINOPHEN 325 MG/1
650 TABLET ORAL EVERY 6 HOURS PRN
Status: DISCONTINUED | OUTPATIENT
Start: 2021-09-20 | End: 2021-09-20 | Stop reason: HOSPADM

## 2021-09-20 RX ORDER — PRAVASTATIN SODIUM 40 MG
40 TABLET ORAL DAILY
Status: DISCONTINUED | OUTPATIENT
Start: 2021-09-20 | End: 2021-09-20 | Stop reason: HOSPADM

## 2021-09-20 RX ORDER — ALPRAZOLAM 0.25 MG/1
0.25 TABLET ORAL ONCE
Status: COMPLETED | OUTPATIENT
Start: 2021-09-20 | End: 2021-09-20

## 2021-09-20 RX ORDER — ALBUTEROL SULFATE 90 UG/1
2 AEROSOL, METERED RESPIRATORY (INHALATION)
Status: DISCONTINUED | OUTPATIENT
Start: 2021-09-20 | End: 2021-09-20 | Stop reason: HOSPADM

## 2021-09-20 RX ORDER — SODIUM CHLORIDE, SODIUM LACTATE, POTASSIUM CHLORIDE, CALCIUM CHLORIDE 600; 310; 30; 20 MG/100ML; MG/100ML; MG/100ML; MG/100ML
INJECTION, SOLUTION INTRAVENOUS CONTINUOUS
Status: DISCONTINUED | OUTPATIENT
Start: 2021-09-20 | End: 2021-09-20 | Stop reason: HOSPADM

## 2021-09-20 RX ORDER — PREDNISONE 20 MG/1
40 TABLET ORAL DAILY
Status: DISCONTINUED | OUTPATIENT
Start: 2021-09-22 | End: 2021-09-20

## 2021-09-20 RX ORDER — TRAZODONE HYDROCHLORIDE 50 MG/1
50 TABLET ORAL 2 TIMES DAILY
Status: DISCONTINUED | OUTPATIENT
Start: 2021-09-20 | End: 2021-09-20 | Stop reason: HOSPADM

## 2021-09-20 RX ORDER — METOPROLOL SUCCINATE 25 MG/1
12.5 TABLET, EXTENDED RELEASE ORAL DAILY
Status: DISCONTINUED | OUTPATIENT
Start: 2021-09-20 | End: 2021-09-20 | Stop reason: HOSPADM

## 2021-09-20 RX ORDER — ONDANSETRON 4 MG/1
4 TABLET, ORALLY DISINTEGRATING ORAL EVERY 8 HOURS PRN
Status: DISCONTINUED | OUTPATIENT
Start: 2021-09-20 | End: 2021-09-20 | Stop reason: HOSPADM

## 2021-09-20 RX ORDER — IPRATROPIUM BROMIDE AND ALBUTEROL SULFATE 2.5; .5 MG/3ML; MG/3ML
1 SOLUTION RESPIRATORY (INHALATION)
Status: DISCONTINUED | OUTPATIENT
Start: 2021-09-20 | End: 2021-09-20

## 2021-09-20 RX ORDER — OXYBUTYNIN CHLORIDE 5 MG/1
5 TABLET ORAL 2 TIMES DAILY
Status: DISCONTINUED | OUTPATIENT
Start: 2021-09-20 | End: 2021-09-20 | Stop reason: HOSPADM

## 2021-09-20 RX ORDER — BUDESONIDE AND FORMOTEROL FUMARATE DIHYDRATE 160; 4.5 UG/1; UG/1
2 AEROSOL RESPIRATORY (INHALATION) 2 TIMES DAILY
Status: DISCONTINUED | OUTPATIENT
Start: 2021-09-20 | End: 2021-09-20 | Stop reason: HOSPADM

## 2021-09-20 RX ORDER — HYDROCODONE BITARTRATE AND ACETAMINOPHEN 5; 325 MG/1; MG/1
1 TABLET ORAL 2 TIMES DAILY
Status: DISCONTINUED | OUTPATIENT
Start: 2021-09-20 | End: 2021-09-20 | Stop reason: HOSPADM

## 2021-09-20 RX ORDER — DOXYCYCLINE HYCLATE 100 MG
100 TABLET ORAL 2 TIMES DAILY
Qty: 10 TABLET | Refills: 0 | Status: SHIPPED | OUTPATIENT
Start: 2021-09-20 | End: 2021-09-25

## 2021-09-20 RX ORDER — SODIUM CHLORIDE 9 MG/ML
25 INJECTION, SOLUTION INTRAVENOUS PRN
Status: DISCONTINUED | OUTPATIENT
Start: 2021-09-20 | End: 2021-09-20 | Stop reason: HOSPADM

## 2021-09-20 RX ORDER — FOLIC ACID 1 MG/1
1 TABLET ORAL DAILY
Status: DISCONTINUED | OUTPATIENT
Start: 2021-09-20 | End: 2021-09-20 | Stop reason: HOSPADM

## 2021-09-20 RX ADMIN — SODIUM CHLORIDE, POTASSIUM CHLORIDE, SODIUM LACTATE AND CALCIUM CHLORIDE: 600; 310; 30; 20 INJECTION, SOLUTION INTRAVENOUS at 06:10

## 2021-09-20 RX ADMIN — ALBUTEROL SULFATE 2 PUFF: 90 AEROSOL, METERED RESPIRATORY (INHALATION) at 07:28

## 2021-09-20 RX ADMIN — IOPAMIDOL 75 ML: 755 INJECTION, SOLUTION INTRAVENOUS at 00:44

## 2021-09-20 RX ADMIN — METHYLPREDNISOLONE SODIUM SUCCINATE 40 MG: 40 INJECTION, POWDER, FOR SOLUTION INTRAMUSCULAR; INTRAVENOUS at 06:06

## 2021-09-20 RX ADMIN — Medication 2 PUFF: at 07:30

## 2021-09-20 RX ADMIN — BUDESONIDE AND FORMOTEROL FUMARATE DIHYDRATE 2 PUFF: 160; 4.5 AEROSOL RESPIRATORY (INHALATION) at 07:32

## 2021-09-20 RX ADMIN — ALPRAZOLAM 0.25 MG: 0.25 TABLET ORAL at 03:53

## 2021-09-20 NOTE — ED NOTES
Pt wife stated that she is Medical POA and stated \"tie him down if you have to because he is not leaving. \"  PT was unable to walk on his own but was able to stand up but became short of breath doing so.       Anais Green RN  09/20/21 0258

## 2021-09-20 NOTE — ED NOTES
Bed: 03TR-03  Expected date:   Expected time:   Means of arrival:   Comments:  ems     [address], RN  09/19/21 4740

## 2021-09-20 NOTE — DISCHARGE INSTR - COC
Continuity of Care Form    Patient Name: Josr Riley   :  1935  MRN:  [id number]    Admit date:  2021  Discharge date:  ***    Code Status Order: Full Code   Advance Directives:     Admitting Physician:  No admitting provider for patient encounter.   PCP: Stefan Szymanski MD    Discharging Nurse: [facility] Unit/Room#: TR03/03TR-03  Discharging Unit Phone Number: ***    Emergency Contact:   Extended Emergency Contact Information  Primary Emergency Contact: Deidra Love  Address: [address] [place] [address] Phone: [phone]  Mobile Phone: [phone]  Relation: Spouse  Secondary Emergency Contact: [address] Phone: [phone]  Mobile Phone: [phone]  Relation: Child    Past Surgical History:  Past Surgical History:   Procedure Laterality Date    ABDOMEN SURGERY      ABDOMINAL AORTIC ANEURYSM REPAIR, ENDOVASCULAR  13    CARDIAC CATHETERIZATION  2019    CARDIAC SURGERY      CARDIOVASCULAR STRESS TEST  1/10/2012    Dr. Valentine Lima- normal with EF 45%    CAROTID ENDARTERECTOMY Left 2019    LEFT CAROTID ENDARTERECTOMY performed by David Arredondo MD at [address]    Dr Mati Duong  CABG x 4    DENTAL SURGERY  years ago    wears full dentures    EYE SURGERY      jaimee cataract surgey    HEMORRHOIDECTOMY  30+ yrs ago    VASCULAR SURGERY  2019    left endarectomy       Immunization History:   Immunization History   Administered Date(s) Administered    COVID-19, Pfizer, PF, 30mcg/0.3mL 2021, 2021    Influenza 10/12/2010, 2013    Influenza Vaccine, unspecified formulation 2013, 2014    Influenza Virus Vaccine 10/20/2012    Influenza, High Dose (Fluzone 65 yrs and older) 2014, 2017, 2018, 2019    Influenza, Intradermal, Preservative free 2016    Influenza, Quadv, adjuvanted, 65 yrs +, IM, PF (Fluad) 09/23/2020    Pneumococcal Conjugate 13-valent ([id number]) 09/12/2017    Pneumococcal Polysaccharide ([id number]) 05/09/2012, 07/25/2018       Active Problems:  Patient Active Problem List   Diagnosis Code    CAD (coronary artery disease) I25.10    Benign essential tremor G25.0    Obstructive lung disease ([facility] [address] J44.9    Back pain, chronic M54.9, G89.29    Hypertension I10    Dyslipidemia E78.5    AAA (abdominal aortic aneurysm) without rupture ([facility]) I71.4    Cerebral infarction ([facility] [address] I63.9    CVA (cerebrovascular accident) ([facility] [address] I63.9    Homocysteinemia ([facility]) E72.11    PFO (patent foramen ovale) Q21.1    Migraine headache G43.909    COPD (chronic obstructive pulmonary disease) ([facility]) J44.9    AAA (abdominal aortic aneurysm) ([facility]) I71.4    Dementia due to Alzheimer's disease ([facility] [address] G30.9, F02.80    Atherosclerosis  I70.90    Delirium R41.0    Hemispheric carotid artery syndrome G45.1    Stage 3 chronic kidney disease ([facility]) N18.30    Dysthymia F34.1    GERD (gastroesophageal reflux disease) K21.9    Cerebrovascular accident (CVA) due to stenosis of cerebral artery ([facility]) I63.50    Acute CVA (cerebrovascular accident) ([facility] [address] I63.9    Dysarthria R47.1    Left carotid stenosis I65.22    Right hemiparesis ([facility]) G81.91    Transient ischemic attack G45.9    Vascular dementia without behavioral disturbance ([facility]) F01.50    Acute cerebrovascular accident ([facility] [address] I63.9    History of stroke Z86.73    Dementia without behavioral disturbance ([facility] [address] F03.90    Diabetes mellitus type 2, controlled ([facility] [address] E11.9    DM (diabetes mellitus), type 2 ([facility] [address] E11.9    Morbid obesity ([facility] [address] E66.01    Generalized osteoarthritis M15.9    Opioid use, unspecified with unspecified opioid-induced disorder F11.99    Opioid dependence with unspecified opioid-induced disorder F11.29    Opioid dependence, uncomplicated R64.67    Epigastric abdominal pain R10.13       Isolation/Infection:   Isolation          No Isolation        Patient Infection Status     Infection Onset Added Last Indicated Last Indicated By Review Planned Expiration Resolved Resolved By    None active    Resolved    COVID-19 Rule Out 21 Respiratory Panel, Molecular, with COVID-19 (Restricted: peds pts or suitable admitted adults) (Ordered)   21 Rule-Out Test Resulted    COVID-19 Rule Out 21 COVID-19, Rapid (Ordered)   21 Rule-Out Test Resulted          Nurse Assessment:  Last Vital Signs: /64   Pulse 72   Temp 98.3 °F (36.8 °C) (Oral)   Resp 18   SpO2 (!) 88%     Last documented pain score (0-10 scale):    Last Weight:   Wt Readings from Last 1 Encounters:   20 241 lb (109.3 kg)     Mental Status:  {IP PT MENTAL STATUS:}    IV Access:  { ARON IV ACCESS:442517757}    Nursing Mobility/ADLs:  Walking   {CHP DME BYM}  Transfer  {CHP DME WLOJ:214562420}  Bathing  {CHP DME PHIC:857154092}  Dressing  {CHP DME YUWU:087187001}  Toileting  {CHP DME QSMY:342552488}  Feeding  {CHP DME JQHT:028613130}  Med Admin  {CHP DME YWPD:734526976}  Med Delivery   { ARON MED Delivery:609922710}    Wound Care Documentation and Therapy:        Elimination:  Continence:   · Bowel: {YES / KP:01773}  · Bladder: {YES / OL:91604}  Urinary Catheter: {Urinary Catheter:193065928}   Colostomy/Ileostomy/Ileal Conduit: {YES / HP:07473}       Date of Last BM: ***  No intake or output data in the 24 hours ending 21 0954  No intake/output data recorded.     Safety Concerns:     [address] [place] Safety Concerns:232537381}    Impairments/Disabilities:      [address] [place] Impairments/Disabilities:458863611}    Nutrition Therapy:  Current Nutrition Therapy:   [address] [place] Diet List:592210938}    Routes of Feeding: {[facility] DME Other Feedings:651805078}  Liquids: {Slp liquid thickness:71669}  Daily Fluid Restriction: {CHP DME Yes amt example:045873993}  Last Modified Barium Swallow with Video (Video Swallowing Test): {Done

## 2021-09-20 NOTE — ED PROVIDER NOTES
eMERGENCY dEPARTMENT eNCOUnter      PCP: Sherwin Stahl MD    CHIEF COMPLAINT    Chief Complaint   Patient presents with    Shortness of Breath       HPI    Heidy Becker is a 82 y.o. male who presents with lethargy. Wife states that he was sleeping more this evening. States that nurses aide came in, checked on him and he was sleeping. States that she left her. Timing came back and he was still in the same place. He has been sleeping all afternoon. States that he has seemed more fatigued than normal.  Reports that he seemed like he was short of breath. She states he had a temperature at home of 99.7. No complaint of chest pain. He does admit to feeling short of breath. Denies vomiting, diarrhea. No new cough. States that he sometimes wears oxygen at home if it is low. He has history of COPD. REVIEW OF SYSTEMS    Constitutional:  + fever, denies chills.    HENT:  Denies sore throat or ear pain   Cardiovascular:  Denies chest pain, palpitations   Respiratory:  Denies cough, + shortness of breath    GI:  Denies abdominal pain, nausea, vomiting, or diarrhea  :  Denies any urinary symptoms, flank pain  Musculoskeletal:  Denies back pain, extremity pain  Skin:  Denies rash, color change  Neurologic:  Denies headache, focal weakness or sensory changes   Lymphatic:  Denies swollen glands, edema    All other review of systems are negative  See HPI and nursing notes for additional information     PAST MEDICAL AND SURGICAL HISTORY    Past Medical History:   Diagnosis Date    AAA (abdominal aortic aneurysm) ([facility] [address]     s/p endograft, Dr Rosalio Cutler monitoring- Susi    Arteriosclerosis     monitoring homocysteine    Arthritis     back & bilat hips    Asthma 07/2002    Back pain     lumbar DDD    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 Casey Yuen. & Dr. Fransisco Najera Carotid stenosis     \"he is 90% blocked on the left side of carotid\"    Carotid stenosis, right 11/2013    ~50% or less, on MRA and carotid u/s    CKD (chronic kidney disease)     cannot take nsaids    COPD (chronic obstructive pulmonary disease) ([facility] [address]     no lung specialist    CVA (cerebrovascular accident) ([facility] [address] 11/16/13 admission    CHRONIC MILD L SIDED ARM AND LEG WEAKNESS-- R temporal/occipital and R thalamic infarct, L sided weakness and focal seizure// er old chart came in 5/18/2019 with CVA- right sided weakness- \"getting better\"    Dementia due to Alzheimer's disease ([facility] [address] 03/28/2017    MMSE 22/30 ON 3/28/17--- STARTING TRIAL ARICEPT    Diabetes mellitus type 2, controlled ([facility] [address] 01/16/2020    hgb a1c in hospital 1/2020--- 6.5.  DM (diabetes mellitus), type 2 ([facility] [address]     HGB A1C 6.5 IN HOSPITAL 1/2020    Dyslipidemia 10/2004    ED (erectile dysfunction) 6/02 372 ,  1/2001  387    testos    Former smoker     Generalized osteoarthritis     GERD (gastroesophageal reflux disease)     Hearing loss 05/2006    wear hearing aides bilat\"does not wear them\"    History of kidney stones     \"passed one 2013\"    Homocysteinemia ([facility] [address]     on foltx    Hyperlipidemia     Hypertension     follows with dr Lois Gonzalez MI (myocardial infarction) ([facility] [address] 1991    Migraine headache     Obesity (BMI 30-39. 9)     On home oxygen therapy     at 2l/nc at night only    PFO (patent foramen ovale)     noted on [place] 12/17/13- Dr Alessio Shaw Redundant prepuce and phimosis 01/05/2012    Dr Dino Duong Seizure disorder, focal motor ([facility] [address] 11/13 admission    L hand tremor assoc w CVA\"they think he had seizure with first stroke but none since then\"    Short-term memory loss     per wife\"from the first stroke- getting alittle worse\"    Vertigo      Past Surgical History:   Procedure Laterality Date    ABDOMEN SURGERY      ABDOMINAL AORTIC ANEURYSM REPAIR, ENDOVASCULAR  9/24/13 6       ALLERGIES    Allergies   Allergen Reactions    Aricept [Donepezil Hcl]      NAUSEA    Flomax [Tamsulosin Hcl]      Hypotension/syncope    Nsaids      Due to CKD    Penicillins Hives    Topamax [Topiramate]      Fatigue and severe depression    Valium [Diazepam]      \"after his heart cath - got Valium and he stopped breathing for short times and we kept having to wake him to take a breath\"       SOCIAL AND FAMILY HISTORY    Social History     Socioeconomic History    Marital status:      Spouse name: Not on file    Number of children: Not on file    Years of education: Not on file    Highest education level: Not on file   Occupational History    Occupation: heating and air     Comment: business owner   Tobacco Use    Smoking status: Former Smoker     Packs/day: 1.50     Years: 36.00     Pack years: 54.00     Quit date: 1994     Years since quittin.6    Smokeless tobacco: Never Used    Tobacco comment: quit smoking in    Vaping Use    Vaping Use: Never used   Substance and Sexual Activity    Alcohol use: No     Comment:         Caffeine: Decaf/ per son\"stopped drinking 50 yrs ago- use to drink  1-2 times per week\"    Drug use: No    Sexual activity: Not on file   Other Topics Concern    Not on file   Social History Narrative    Not on file     Social Determinants of Health     Financial Resource Strain:     Difficulty of Paying Living Expenses:    Food Insecurity:     Worried About Running Out of Food in the Last Year:     Shon of Food in the Last Year:    Transportation Needs:     Lack of Transportation (Medical):      Lack of Transportation (Non-Medical):    Physical Activity:     Days of Exercise per Week:     Minutes of Exercise per Session:    Stress:     Feeling of Stress :    Social Connections:     Frequency of Communication with Friends and Family:     Frequency of Social Gatherings with Friends and Family:     Attends Sikh Services:     Active Member of Clubs or Organizations:     Attends Club or Organization Meetings:     Marital Status:    Intimate Partner Violence:     Fear of Current or Ex-Partner:     Emotionally Abused:     Physically Abused:     Sexually Abused:      Family History   Problem Relation Age of Onset    COPD Mother     Other Mother         benign tremors         PHYSICAL EXAM    VITAL SIGNS: BP (!) 161/69   Pulse 67   Resp 19   SpO2 92%    Constitutional:  Well developed, No acute distress. HENT:  Normocephalic, Atraumatic, PERRL. EOMI. Sclera clear. Conjunctiva normal. Yellow dried matting of eyelids. Neck: supple without ridigity  Cardiovascular:  Regular rate and rhythm, No murmurs  Respiratory:  Nonlabored breathing. Normal breath sounds  Abdomen: Soft, periumbilical tenderness to palpation, bowel sounds present. Musculoskeletal: bilateral ankle edema, No tenderness  Integument:  Warm, Dry, no rash  Neurologic:  Alert & oriented , No focal deficits noted.   Speech normal.  Psychiatric:  Affect normal, Mood normal.       Labs:  Labs Reviewed   CBC WITH AUTO DIFFERENTIAL - Abnormal; Notable for the following components:       Result Value    RBC 4.02 (*)     .7 (*)     MCH 33.8 (*)     MCHC 31.1 (*)     Monocytes % 7.8 (*)     Eosinophils % 3.6 (*)     All other components within normal limits   COMPREHENSIVE METABOLIC PANEL - Abnormal; Notable for the following components:    BUN 24 (*)     Glucose 187 (*)      (*)      (*)     All other components within normal limits   BRAIN NATRIURETIC PEPTIDE - Abnormal; Notable for the following components:    Pro-.4 (*)     All other components within normal limits   URINALYSIS - Abnormal; Notable for the following components:    Blood, Urine MODERATE (*)     Urobilinogen, Urine 2.0 (*)     Leukocyte Esterase, Urine TRACE (*)     RBC, UA 58 (*)     WBC, UA 8 (*)     All other components within normal limits   BLOOD GAS, VENOUS - Abnormal; Notable for the following components:    pO2, Dago 193 (*)     Base Exc, Mixed 2.5 (*)     HCO3, Venous 28.4 (*)     O2 Sat, Dago 95.6 (*)     All other components within normal limits   COVID-19, RAPID   TROPONIN   LIPASE         EKG    This EKG was interpreted by me. Rate is 63, rhythm is sinus, with right bundle branch block. . KS and QT intervals are within normal limits. There is no ST segment or T wave changes. This EKG was compared to previous EKG from date 9/23/2020. No significant change from previous EKG. RADIOLOGY    CT ABDOMEN PELVIS W IV CONTRAST Additional Contrast? None    Result Date: 9/20/2021  1. Status post abdominal aortic aneurysm repair with findings concerning for a type 3 endoleak. The size of the aneurysm sac is stable since the prior examination of April 9, 2021 and measures 4.2 x 4.8 cm. 2. Diffuse fatty infiltration of the liver. 3. Multiple nonobstructing bilateral renal calculi. 4. Bibasilar atelectasis. 5. Colonic diverticulosis. 6. Indeterminate bilateral renal lesions with the largest measuring 1.8 x 1.7 cm in the left kidney. These could represent hemorrhagic or proteinaceous cyst however confirmation with MRI of the kidneys/renal mass protocol CT is recommended non emergently. XR CHEST PORTABLE    Result Date: 9/19/2021  EXAMINATION: ONE XRAY VIEW OF THE CHEST 9/19/2021 10:13 pm COMPARISON: Chest radiograph dated January 14, 2020 HISTORY: ORDERING SYSTEM PROVIDED HISTORY: shortness of breath TECHNOLOGIST PROVIDED HISTORY: Reason for exam:->shortness of breath Reason for Exam: shortness of breath Acuity: Acute Type of Exam: Initial FINDINGS: Median sternotomy wires are noted. Cardiomegaly is seen. Pulmonary vascular congestion is noted. There is no focal consolidation, large pleural effusion, or definite pneumothorax. Cardiomegaly with pulmonary vascular congestion.          ED COURSE & MEDICAL DECISION MAKING       Vital signs and nursing notes reviewed during ED

## 2021-09-20 NOTE — ED TRIAGE NOTES
PT to ED with EMS for high blood pressure. PT has a HX of dementia. EMS stated neg [place] stroke scale. PT pulse ox was 91 room air.

## 2021-09-20 NOTE — DISCHARGE SUMMARY
Discharge Summary    Name:  Tad Zimmer /Age/Sex: 1935  (82 y.o. male)   MRN & CSN:  [id number] & [id number] Admission Date/Time: 2021  9:49 PM   Attending:  Shahrzad Salomon MD Discharging Physician: Shahrzad Salomon MD     Hospital Course:   Tad Zimmer is a 82 y.o.  male with a past medical history of AAA s/p EVAR , COPD on intermittent home O2, CVA, dementia, CAD s/p CABG who present to the ED on account of hypersomnolence. In the ED, was found to be hypoxic and deconditioned. CT abdomen and pelvis was done which showed type III endoleak and so cardiothoracic surgery consultation was requested. At this time, cardiothoracic surgery thinks that patient is stable enough for outpatient follow-up. Family would like to take patient home at this time. Due to elevated procalcitonin, hypoxia, will treat him for presumed community-acquired pneumonia. Will discharge on Duricef and doxycycline for 5 days. The patient/family expressed appropriate understanding of and agreement with the discharge recommendations, medications, and plan.      Consults this admission:  IP CONSULT TO [address] [place] [address] TO HOSPITALIST    Discharge Instruction:   Follow up appointments: Cardiothoracic surgery  Primary care physician:  within 2 weeks    Diet:  cardiac diet   Activity: activity as tolerated  Disposition: Discharged to:   []Home, [x]C, []SNF, []Acute Rehab, []Hospice   Condition on discharge: Stable    Discharge Medications:      Mary Bell   Home Medication Instructions VIVIENNE:[id number]    Printed on:21 8016   Medication Information                      acetaminophen (TYLENOL) 500 MG tablet  Take 1,000 mg by mouth nightly as needed for Pain             albuterol sulfate HFA (PROVENTIL HFA) 108 (90 Base) MCG/ACT inhaler  Inhale 2 puffs into the lungs every 6 hours as needed for Wheezing or Shortness of Breath (or coughing spells)             aspirin EC 81 MG EC tablet  Take 1 tablet by mouth daily             busPIRone (BUSPAR) 10 MG tablet  Take 1 tablet by mouth 3 times daily             cefadroxil (DURICEF) 500 MG capsule  Take 1 capsule by mouth 2 times daily for 10 doses             clopidogrel (PLAVIX) 75 MG tablet  TAKE 1 TABLET BY MOUTH EVERY DAY             doxycycline hyclate (VIBRA-TABS) 100 MG tablet  Take 1 tablet by mouth 2 times daily for 5 days             fluticasone-salmeterol (ADVAIR DISKUS) 250-50 MCG/DOSE AEPB  Inhale 1 puff into the lungs every 12 hours             folic acid (FOLVITE) 1 MG tablet  TAKE 1 TABLET BY MOUTH EVERY DAY             HYDROcodone-acetaminophen (NORCO) 5-325 MG per tablet  Take 1 tablet by mouth 2 times daily for 30 days. Intended supply: 30 days             meclizine (ANTIVERT) 25 MG tablet  TAKE 1 TABLET BY MOUTH 3 TIMES A DAY AS NEEDED FOR DIZZINESS             metoprolol succinate (TOPROL XL) 25 MG extended release tablet  TAKE 1/2 TABLET BY MOUTH EVERY DAY             oxybutynin (DITROPAN) 5 MG tablet  TAKE 1 TABLET BY MOUTH TWICE A DAY             pravastatin (PRAVACHOL) 40 MG tablet  TAKE 1 TABLET BY MOUTH EVERY DAY             traZODone (DESYREL) 50 MG tablet  TAKE 1 TABLET BY MOUTH TWICE A DAY                 Objective Findings at Discharge:   /64   Pulse 72   Temp 98.3 °F (36.8 °C) (Oral)   Resp 18   SpO2 (!) 88%            PHYSICAL EXAM  GEN: Awake male, alert and oriented x1-2 in no apparent distress. Appears given age. Knows he is in the [facility]  Does not know the year. HEENT: Normal   NECK: Supple, no apparent thyromegaly or masses. No THALIA  RESP: Diminished bilaterally. Symmetric chest movement while on 2 L  CVS: RRR, S1, S2  GI/: Abdomen is soft, nontender, no organomegaly. . Bowel sounds normal, rectal exam deferred. No CVA tenderness. MSK: No gross joint deformities. No tenderness  SKIN: Normal coloration, warm, dry.   NEURO: Cranial nerves appear grossly intact, normal speech, no lateralizing weakness. PSYCH: Affect appropriate.     BMP/CBC  Recent Labs     09/19/21  2223 09/20/21  0618    139   K 4.2 4.4    101   CO2 26 26   BUN 24* 22   CREATININE 1.0 1.0   WBC 5.6  --    HCT 43.7  --      --        Discharge Time of 35 minutes    Electronically signed by Britta Cuevas MD on 9/20/2021 at 9:56 AM

## 2021-09-20 NOTE — PROGRESS NOTES
Patient is seen and examined, admitted by my partner earlier today. Patient has a background of mild dementia and was apparently brought to the hospital after he was noted to be hypersomnolent by family members and home health staff. In the ED, he was also found to be somewhat hypoxic and tender on abdominal exam.  This led to a CT abdomen and pelvis been obtained which was concerning for a type III endoleak. Vascular/CTS consultation has been requested. Patient appears hemodynamically stable at this time. Patient is wanting to leave the hospital.  He is not very insightful but this appears to be his baseline. He tells me that given his age he is not expected to remember everything. 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 On exam  Vitals:    09/19/21 2145 09/19/21 2348 09/20/21 0357 09/20/21 0728   BP: (!) 161/69  134/64    Pulse: 67  72    Resp: 19 19 18   Temp:  98.3 °F (36.8 °C)     TempSrc:  Oral     SpO2: 92%  96% (!) 88%      currently on 3 L, does not wear oxygen at home. Alert and oriented elderly male. Does not appear to be in any distress  Chest: Diminished breath sounds bilaterally, no wheezes  CVS: Regular rate and rhythm, S1-S2  Abdomen: Soft, no tenderness. Extremities: No leg edema, no calf tenderness. Assessment/plan  1. Acute hypoxic respiratory failure: With shortness of breath and elevated procalcitonin  We will treat as presumed pneumonia. Check blood cultures, chest x-ray for the time being. Hold off on steroids in case infected endoleak. 2.  Type III endoleak: Vascular/CTS consult awaited. Rest of the assessment and plan as documented in H&P.  [facility] hospitalist will continue to follow. 3.  Impaired insight: Cannot discharge against advice. Please defer to next of kin/spouse for disposition decision making.     Addendum 9:40 AM.  Wife present at bedside, reports that patient is back to his baseline and she would like to take him home.  Will like to follow-up outpatient for any further work-up. Called and discussed with cardiothoracic surgery who was okay with discharge and close outpatient follow-up. With regards to respiratory failure, wife reports that patient does have intermittent hypoxia and he has home O2 set up. Patient also has a list of care set up at home. She is ready for discharge home.

## 2021-09-20 NOTE — H&P
[facility] History and Physical      CHIEF COMPLAINT:  somnolence    History of Present Illness: 71-year-old male history of COPD, CVA, atherosclerotic heart disease, abdominal aortic aneurysm status post EVAR a few years ago. He was brought in by family members due to change in mentation and shortness of breath. Reportedly he was sleeping most of the day yesterday and noted by fellow members to be short of breath. When seen he has significant wheezing even with talking. He insisted on wanting to leave was noted to not be able to move around independently by staff. When he stood up he became significantly more short of breath and had audible wheezing. He denies any cough or URTI symptoms. However he also states he does not have shortness of breath. Noted to have abdominal pain in the ED. CT showed stage III endoleak. CT surgery consulted who recommended admission. When seen the patient immediately stated that he wants to leave the hospital.  However he has significant weakness and was unable to stand up and had difficulty ambulating. Also has significant shortness of breath even with minimal movement and talking. However the patient is in denial of this. It appears he had expressed similar concerns to the ED team.  His wife was contacted and stated that he is not at his baseline mentation and was agreeable to sedation and restraint if needed. The patient was unable to state what his diagnosis is despite being told and was unable to express the risks of leaving.       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 Donnell Morgan. & Dr. Alycia Iqbal Carotid stenosis     \"he is 90% blocked on the left side of carotid\"    Carotid stenosis, right 11/2013    ~50% or less, on MRA and carotid u/s    CKD (chronic kidney disease)     cannot take nsaids    COPD (chronic obstructive pulmonary disease) ([facility]     no lung specialist    CVA (cerebrovascular accident) ([facility] [facility] 11/16/13 admission    CHRONIC MILD L SIDED ARM AND LEG WEAKNESS-- R temporal/occipital and R thalamic infarct, L sided weakness and focal seizure// er old chart came in 5/18/2019 with CVA- right sided weakness- \"getting better\"    Dementia due to Alzheimer's disease ([facility] [facility] 03/28/2017    MMSE 22/30 ON 3/28/17--- STARTING TRIAL ARICEPT    Diabetes mellitus type 2, controlled ([facility] [address] [facility] 01/16/2020    hgb a1c in hospital 1/2020--- 6.5.  DM (diabetes mellitus), type 2 ([address]     HGB A1C 6.5 IN HOSPITAL 1/2020    Dyslipidemia 10/2004    ED (erectile dysfunction) 6/02 372 ,  1/2001  387    testos    Former smoker     Generalized osteoarthritis     GERD (gastroesophageal reflux disease)     Hearing loss 05/2006    wear hearing aides bilat\"does not wear them\"    History of kidney stones     \"passed one 2013\"    Homocysteinemia ([facility]     on foltx    Hyperlipidemia     Hypertension     follows with dr Tony Reddy MI (myocardial infarction) ([facility] [facility] 1991    Migraine headache     Obesity (BMI 30-39. 9)     On home oxygen therapy     at 2l/nc at night only    PFO (patent foramen ovale)     noted on ROSETTA 12/17/13- Dr Charmayne Gallant Redundant prepuce and phimosis 01/05/2012    Dr Jennifer Ferrera Seizure disorder, focal motor ([facility] [facility] 11/13 admission    L hand tremor assoc w CVA\"they think he had seizure with first stroke but none since then\"    Short-term memory loss     per wife\"from the first stroke- getting alittle worse\"    Vertigo        Surgical History:  Past Surgical History:   Procedure Laterality Date    ABDOMEN SURGERY      ABDOMINAL AORTIC ANEURYSM REPAIR, ENDOVASCULAR  9/24/13    CARDIAC CATHETERIZATION  05/2019    CARDIAC SURGERY      CARDIOVASCULAR STRESS TEST  1/10/2012    Dr. Lau Plain- normal with EF 45%    CAROTID ENDARTERECTOMY Left 6/21/2019    LEFT CAROTID ENDARTERECTOMY performed by Amie Garcia MD at [id number] GRAFT  01/1994    Dr Ladi Thomas  CABG x 4    DENTAL SURGERY  years ago    wears full dentures    EYE SURGERY  1990's    jaimee cataract surgey    HEMORRHOIDECTOMY  30+ yrs ago    VASCULAR SURGERY  06/21/2019    left endarectomy       Medications Prior to Admission:    Prior to Admission medications    Medication Sig Start Date End Date Taking? Authorizing Provider   HYDROcodone-acetaminophen (NORCO) 5-325 MG per tablet Take 1 tablet by mouth 2 times daily for 30 days.  Intended supply: 30 days 9/14/21 10/14/21  Anival Oliveira MD   traZODone (DESYREL) 50 MG tablet TAKE 1 TABLET BY MOUTH TWICE A DAY 9/7/21   Anival Oliveira MD   busPIRone (BUSPAR) 10 MG tablet Take 1 tablet by mouth 3 times daily 8/16/21   Anival Oliviera MD   meclizine (ANTIVERT) 25 MG tablet TAKE 1 TABLET BY MOUTH 3 TIMES A DAY AS NEEDED FOR DIZZINESS 8/13/21   Anival Oliveira MD   pravastatin (PRAVACHOL) 40 MG tablet TAKE 1 TABLET BY MOUTH EVERY DAY 8/13/21   Anival Oliveira MD   folic acid (FOLVITE) 1 MG tablet TAKE 1 TABLET BY MOUTH EVERY DAY 7/28/21   Anival Oliveira MD   oxybutynin (DITROPAN) 5 MG tablet TAKE 1 TABLET BY MOUTH TWICE A DAY 7/12/21   Anival Oliveira MD   clopidogrel (PLAVIX) 75 MG tablet TAKE 1 TABLET BY MOUTH EVERY DAY 7/12/21   Anival Oliveira MD   metoprolol succinate (TOPROL XL) 25 MG extended release tablet TAKE 1/2 TABLET BY MOUTH EVERY DAY 6/17/21   Neo Rdz MD Marlena   aspirin EC 81 MG EC tablet Take 1 tablet by mouth daily 5/4/20   Marisol Kulkarni MD   acetaminophen (TYLENOL) 500 MG tablet Take 1,000 mg by mouth nightly as needed for Pain    Historical Provider, MD   fluticasone-salmeterol (ADVAIR DISKUS) 250-50 MCG/DOSE AEPB Inhale 1 puff into the lungs every 12 hours 4/9/19   Marisol Kulkarni MD   albuterol sulfate HFA (PROVENTIL HFA) 108 (90 Base) MCG/ACT inhaler Inhale 2 puffs into the lungs every 6 hours as needed for Wheezing or Shortness of Breath (or coughing spells) 10/8/18   Marisol Kulkarni MD       Allergies:    Aricept Leni Gearing hcl], Flomax [tamsulosin hcl], Nsaids, Penicillins, Topamax [topiramate], and Valium [diazepam]    Social History:    reports that he quit smoking about 27 years ago. He has a 54.00 pack-year smoking history. He has never used smokeless tobacco. He reports that he does not drink alcohol and does not use drugs. Family History:   family history includes COPD in his mother; Other in his mother. PHYSICAL EXAM:  Vitals:  /64   Pulse 72   Temp 98.3 °F (36.8 °C) (Oral)   Resp 19   SpO2 96%   General Appearance: alert and oriented to person, place and time and in no acute distress  Skin: warm and dry, turgor not diminished  Head: normocephalic and atraumatic  Eyes: pupils equal, round, and reactive to light, extraocular eye movements intact, conjunctivae normal  Neck: neck supple and non tender without mass   Pulmonary/Chest: clear to auscultation bilaterally-wheezing audible even without stethoscope  Cardiovascular: normal rate, normal S1 and S2 and no M/R/R  Abdomen: soft, mild diffuse tenderness palpation  Extremities: no cyanosis, no clubbing and no edema  Neurologic: no cranial nerve deficit and speech normal.  Seem to have adequate strength on exam but was shaking when he stopped.         LABS:  Recent Labs     09/19/21  2223      K 4.2      CO2 26   BUN 24*   CREATININE 1.0   GLUCOSE 187*   CALCIUM 8.5       Recent Labs     09/19/21  2223   WBC 5.6   RBC 4.02*   HGB 13.6   HCT 43.7   .7*   MCH 33.8*   MCHC 31.1*   RDW 14.6      MPV 10.4       No results for input(s): POCGLU in the last 72 hours. Radiology:   CT ABDOMEN PELVIS W IV CONTRAST Additional Contrast? None   Preliminary Result   1. Status post abdominal aortic aneurysm repair with findings concerning for   a type 3 endoleak. The size of the aneurysm sac is stable since the prior   examination of April 9, 2021 and measures 4.2 x 4.8 cm.   2. Diffuse fatty infiltration of the liver. 3. Multiple nonobstructing bilateral renal calculi. 4. Bibasilar atelectasis. 5. Colonic diverticulosis. 6. Indeterminate bilateral renal lesions with the largest measuring 1.8 x 1.7   cm in the left kidney. These could represent hemorrhagic or proteinaceous   cyst however confirmation with MRI of the kidneys/renal mass protocol CT is   recommended non emergently. XR CHEST PORTABLE   Final Result   Cardiomegaly with pulmonary vascular congestion. EKG: no acute abnl    ASSESSMENT/PLAN:  Abdominal aortic aneurysm status post EVAR  Type III endoleak  -CT surgery consulted, recommended admission  -N.p.o. hold aspirin and Plavix pending surgery eval    COPD with acute exacerbation  -Steroids, bronchodilators  -Respiratory panel  -Check procalcitonin. Hold off on antibiotics    Generalized weakness  -IV fluids  -PT/OT  -Monitor with treatment of above    Transaminitis:  -Repeat after IV fluids. If persistently elevated will need further evaluation with hepatitis panel. Renal cyst  -Stable since 2015  -Outpatient follow-up    Resume home medications for chronic issues including CVA, atherosclerotic heart disease, carotid stenosis, right bundle branch block    Code Status: Full  DVT prophylaxis: Hold pending surgery eval      NOTE: This report was transcribed using voice recognition software.  Every effort was made to ensure accuracy; however, inadvertent computerized transcription errors may be present.   Electronically signed by Ashley Polanco MD on 9/20/2021 at 4:28 AM

## 2021-09-21 LAB
CULTURE: NORMAL
Lab: NORMAL
SPECIMEN: NORMAL

## 2021-09-22 LAB
EKG ATRIAL RATE: 63 BPM
EKG DIAGNOSIS: NORMAL
EKG P-R INTERVAL: 176 MS
EKG Q-T INTERVAL: 472 MS
EKG QRS DURATION: 160 MS
EKG QTC CALCULATION (BAZETT): 483 MS
EKG R AXIS: -6 DEGREES
EKG T AXIS: -18 DEGREES
EKG VENTRICULAR RATE: 63 BPM

## 2021-09-22 PROCEDURE — 93010 ELECTROCARDIOGRAM REPORT: CPT | Performed by: INTERNAL MEDICINE

## 2022-12-01 NOTE — PROGRESS NOTES
Case Management Note     [] Daily  [x] Weekly Care Conference Note  [] Discharge    Patient:Bar Beavers      :1935  TAV:[id number]  Rehab Dx/Hx: Acute CVA (cerebrovascular accident) [facility]) [I63.9]  Contact Info: (Name/phone):  Date:  2019 Contact:  [] Spouse/Family  [] Insurance    [x] Team Care Conference  [] Patient  [] Other Comments: Patient lives with his wife in a single level home. Patient requires SBA-CGA with transfers and ambulation. He requires Min A with bathing and Mod A with lower body dressing. Signed:      Date:  2019 Contact:  [x] Spouse/Family  [] Insurance    [] Team Care Conference  [] Patient  [] Other Comments: MSW called patient's wife who reports that she is preparing for patient's discharge. They received the grab bars for the bathroom today. Equipment Needs       [] Palomo Ports  []  [place] [address]   []  Cathlene Backers  [] Wheelchair  [] Tub Seat  [] Tub Bench  []    []    []  []    Grab bars. Anticipated Discharge Date      2019   Home Eval Scheduled Date:    Additional Therapy Needs after Discharge: [x] [facility]  [] Outpatient  [x] PT     [] Nursing  [x] OT    []  [] ST        Referral made to [facility] per wife's request.      Current level of fx:   Bathing:   Grooming:   Dressing:   Mobility:   Cognition:   Communication:   Swallowing:   Nursing issues:   Signed:  Grace Lombardo, 2019, 4:46 PM Class II - visualization of the soft palate, fauces, and uvula

## 2023-07-24 NOTE — PROGRESS NOTES
Thomas Mora is a 82 y.o. male patient.     Current Facility-Administered Medications   Medication Dose Route Frequency Provider Last Rate Last Dose    enoxaparin (LOVENOX) injection 30 mg  30 mg Subcutaneous Daily C Abelardo Neville MD   30 mg at 05/21/19 0816    albuterol sulfate  (90 Base) MCG/ACT inhaler 2 puff  2 puff Inhalation Q6H PRN Madelyn Crowley MD   2 puff at 05/19/19 1128    aspirin EC tablet 81 mg  81 mg Oral Daily Madelyn Crowley MD   81 mg at 05/21/19 0815    atorvastatin (LIPITOR) tablet 40 mg  40 mg Oral Nightly Madelyn Crowley MD   40 mg at 05/20/19 2036    azithromycin (ZITHROMAX) tablet 250 mg  250 mg Oral Daily Madelyn Crowley MD   250 mg at 05/21/19 0815    citalopram (CELEXA) tablet 20 mg  20 mg Oral Daily Madelyn Crowley MD   20 mg at 05/21/19 8541    clopidogrel (PLAVIX) tablet 75 mg  75 mg Oral Daily Madelyn Crowley MD   75 mg at 05/21/19 8216    donepezil (ARICEPT) tablet 10 mg  10 mg Oral Nightly Madelyn Crowley MD   10 mg at 89/58/82 3575    folic acid (FOLVITE) tablet 1 mg  1 mg Oral Daily Madelyn Crowley MD   1 mg at 05/21/19 0814    HYDROcodone-acetaminophen ([facility]) 5-325 MG per tablet 1 tablet  1 tablet Oral Q6H PRN Madelyn Crowley MD   1 tablet at 05/20/19 0842    mometasone-formoterol (DULERA) 200-5 MCG/ACT inhaler 2 puff  2 puff Inhalation BID Madelyn Crowley MD   2 puff at 05/21/19 0825    oxybutynin (DITROPAN) tablet 5 mg  5 mg Oral BID Madelyn Crowley MD   5 mg at 05/21/19 0815    pantoprazole (PROTONIX) tablet 40 mg  40 mg Oral QAM AC Madelyn Crowley MD   40 mg at 05/21/19 0546    QUEtiapine (SEROQUEL) tablet 25 mg  25 mg Oral Nightly PRN Madelyn Crowley MD   25 mg at 05/20/19 2036    traZODone (DESYREL) tablet 50 mg  50 mg Oral Nightly PRN Madelyn Crowley MD   50 mg at 05/20/19 2037    acetaminophen (TYLENOL) tablet 650 mg  650 mg Oral Q4H PRN JULISSA Zhou Se, MD   650 mg at 05/21/19 8510    magnesium hydroxide (MILK OF MAGNESIA) 400 MG/5ML suspension 30 mL  30 mL Oral Daily PRN JULISSA Mcgowan MD        metoprolol succinate (TOPROL XL) extended release tablet 12.5 mg  12.5 mg Oral Daily Kristal Rodriguez MD   12.5 mg at 05/21/19 8360     Allergies   Allergen Reactions    Flomax [Tamsulosin Hcl]      Hypotension/syncope    Nsaids      Due to CKD    Penicillins Hives    Topamax [Topiramate]      Fatigue and severe depression     Principal Problem:    Acute CVA (cerebrovascular accident) ([facility] [address]  Active Problems:    Hypertension    COPD (chronic obstructive pulmonary disease) ([facility])    Stage 3 chronic kidney disease (HCC)    Right hemiparesis ([facility]  Resolved Problems:    * No resolved hospital problems. *    Blood pressure (!) 144/78, pulse 85, temperature 98 °F (36.7 °C), temperature source Oral, resp. rate 16, height 5' 8\" (1.727 m), weight 235 lb 3.2 oz (106.7 kg), SpO2 92 %. Subjective   5/20/19: patient seen in his room with nursing staff. Tolerating rehab program.  Using CPAP at night. Very anxious for discharge. Explained the rehab program to the patient and his wife. He appears to be making progress. Denies bowel or bladder complaints. Dyspnea with exertion. Denies chest pain. Otherwise complete review of systems as above are unremarkable  5/21/19: Patient seen in his room with nursing staff. Requires 3 L of oxygen. He was not on home oxygen prior. Seen by speech therapy today. He is receiving his pills in applesauce. He has bladder incontinence. Reports chronic lower back pain. His wife reports he was previously independent at home after his prior stroke with left hemiparesis which resolved. He does have a lift chair at home. Denies chest pain or shortness of breath. Denies any bowel complaints. Pain is controlled with Norco.  Currently transferring minimal assistance with cues. Ambulates 43 feet contact guard to minimal assistance.   Objective:  General Appearance: Comfortable. Vital signs: (most recent): Blood pressure (!) 144/78, pulse 85, temperature 98 °F (36.7 °C), temperature source Oral, resp. rate 16, height 5' 8\" (1.727 m), weight 235 lb 3.2 oz (106.7 kg), SpO2 92 %. Vital signs are normal.    Lungs:  Normal respiratory rate. Breath sounds clear to auscultation. Heart: Normal rate. Regular rhythm. Abdomen: Bowel sounds are normal.   There is no abdominal tenderness. Neurological: Patient is alert and oriented to person, place and time. moving all 4 limbs. Motor strength 5 over 5 in both upper and lower limbs. Assessment & Plan    An 82-year-old male with a history of right cerebrovascular  accident six years ago with chronic left hemiparesis. 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 He has chronic kidney disease, hypertension, and COPD.     Strengths of the patient include his supportive spouse, accessible home,  and experience with adaptive equipment. Limitations include the  bilaterality of his weakness. his risk for falling, and his kidney  disease.     RECOMMENDATIONS:  Acute inpatient rehabilitation with occupational and  physical therapy and speech-language pathology, 180 minutes, five out of  every seven days. We will address basic and advancing mobility with  self-care instruction and adaptive equipment training. Caregiver  education will be offered. Expected length of stay prior to a  supervised level of function for discharge to home is 16 to 18 days.     ADDITIONAL RECOMMENDATIONS:  1. Ischemic stroke with infarction:  The patient requires daily  occupational and physical therapy with speech-language pathology. He  requires antiplatelet therapy with Plavix and baby aspirin. He is  getting GI prophylaxis. Blood pressure and kidney disease treatment is  required. We will address appropriate nutrition and hydration orally.    Pulmonary hygiene and treatment of his COPD are important. We will  emphasize pulmonary hygiene and DVT prophylaxis:  2. DVT prophylaxis:  Lovenox will be dosed at 30 mg daily. Weightbearing activities will be pursued daily. I must monitor his  hemoglobin and platelet count while on this medication. GI prophylaxis  is offered. 3.  Hypertension:  The patient's blood pressures have been fluctuating  some and Toprol is now used to control the fluctuation. Target systolic  blood pressure is 120 to 140. We will monitor vital signs at rest and  with activity and encourage consistent oral intake. His cardiologist is  modifying his diet limiting sodium. 4. COPD:  The patient requires Dulera inhaler and p.r.n. albuterol  inhaler, Zithromax, and pulmonary hygiene. We will monitor O2  saturations at rest and with activity and consult Pulmonary Medicine  should his symptoms deteriorate. 5.  Chronic kidney disease stage III:  Try to avoid nephrotoxic  medications including anti-inflammatories. We will monitor his  chemistries periodically and encourage consistent oral intake. 6.  Chronic lower back pain: Continue with Norco as needed   7. Chronic urinary incontinence: Continue with Ditropan  8. Depression: Continue with Celexa  Discharge plan: Plan for team staffing 5/23/19    Barriers: Decreased endurance, decreased safety, difficulty with mobility and ADLs, bowel and bladder management, dyspnea with exertion, anxiety, chronic low back pain    The patient continues to benefit from inpatient rehabilitation. He has significant functional deficits and medical comorbidities requiring ongoing physician visits and comprehensive interdisciplinary rehabilitation care. His needs could not be met at a lesser level of care.       Carol Garvin MD  5/21/2019 Statement Selected

## (undated) DEVICE — CLIP INT SM WIDE RED TI TRNSVRS GRV CHEVRON SHP W/ PRECIS

## (undated) DEVICE — Z INACTIVE USE 2535480 CLIP LIG M BLU TI HRT SHP WIRE HORZ 180 PER BX

## (undated) DEVICE — SKIN AFFIX SURG ADHESIVE 72/CS 0.55ML: Brand: MEDLINE

## (undated) DEVICE — CORD ES L15FT PT RET REUSE VALLEYLAB REM

## (undated) DEVICE — SUTURE VCRL SZ 4-0 L27IN ABSRB VLT L26MM SH 1/2 CIR J315H

## (undated) DEVICE — SYRINGE MED 3ML CLR PLAS STD N CTRL LUERLOCK TIP DISP

## (undated) DEVICE — INTENDED FOR TISSUE SEPARATION, AND OTHER PROCEDURES THAT REQUIRE A SHARP SURGICAL BLADE TO PUNCTURE OR CUT.: Brand: BARD-PARKER ® STAINLESS STEEL BLADES

## (undated) DEVICE — PROTECTOR EYE PT SELF ADH NS OPT GRD LF

## (undated) DEVICE — DECANTER FLD 9IN ST BG FOR ASEP TRNSF OF FLD

## (undated) DEVICE — CHLORAPREP 26ML ORANGE

## (undated) DEVICE — TOWEL,OR,DSP,ST,WHITE,DLX,XR,4/PK,20PK/C: Brand: MEDLINE

## (undated) DEVICE — PRESSURE MONITORING SET: Brand: TRUWAVE

## (undated) DEVICE — GLOVE SURG SZ 7 L12IN FNGR THK87MIL WHT LTX FREE

## (undated) DEVICE — DRAPE THYROID

## (undated) DEVICE — CONTAINER,SPECIMEN,OR STERILE,4OZ: Brand: MEDLINE

## (undated) DEVICE — ANESTHESIA CIRCUIT ADULT-LF: Brand: MEDLINE INDUSTRIES, INC.

## (undated) DEVICE — CATHETER ADAPTER: Brand: ADDTO

## (undated) DEVICE — ADAPTER,CATHETER/SYRINGE/LUER,STERILE: Brand: MEDLINE

## (undated) DEVICE — MARKER,SKIN,WI/RULER AND LABELS: Brand: MEDLINE

## (undated) DEVICE — PENCIL ES CRD L10FT HND SWCHING ROCK SWCH W/ EDGE COAT BLDE

## (undated) DEVICE — GLOVE SURG SZ 6 THK91MIL LTX FREE SYN POLYISOPRENE ANTI

## (undated) DEVICE — E-Z CLEAN, NON-STICK, PTFE COATED, ELECTROSURGICAL BLADE ELECTRODE, MODIFIED EXTENDED INSULATION, 4 INCH (10.2 CM): Brand: MEGADYNE

## (undated) DEVICE — GOWN,SIRUS,POLYRNF,BRTHSLV,XLN/XL,20/CS: Brand: MEDLINE

## (undated) DEVICE — CLIP SM RED INTERN HMOCLP TITAN LIGATING

## (undated) DEVICE — ELECTRODE ES AD CRDLSS PT RET REM POLYHESIVE

## (undated) DEVICE — GLOVE ORANGE PI 7   MSG9070

## (undated) DEVICE — LINER,SEMI-RIGID,3000CC,50EA/CS: Brand: MEDLINE

## (undated) DEVICE — DRAPE,UTILITY,XL,4/PK,STERILE: Brand: MEDLINE

## (undated) DEVICE — TOWEL,OR,DSP,ST,BLUE,STD,6/PK,12PK/CS: Brand: MEDLINE

## (undated) DEVICE — COUNTER NDL 60 COUNT FOAM STRP SGL MAG

## (undated) DEVICE — DRESSING TRNSPAR W5XL4.5IN FLM SHT SEMIPERMEABLE WIND

## (undated) DEVICE — LOOP VES W25MM THK1MM MAXI RED SIL FLD REPELLENT 100 PER

## (undated) DEVICE — TUBING, SUCTION, 3/16" X 10', STRAIGHT: Brand: MEDLINE

## (undated) DEVICE — 9F PRUITT F3 OUTLYING SHUNT WITHOUT T-PORT
Type: IMPLANTABLE DEVICE | Site: NECK | Status: NON-FUNCTIONAL
Brand: PRUITT F3 CAROTID SHUNT
Removed: 2019-06-21

## (undated) DEVICE — STAPLER SKIN L39MM DIA0.53MM CRWN 5.7MM S STL FIX HD PROX

## (undated) DEVICE — SUPPORT WRST AD W3.5XL9IN DIA14.5IN ART SFT ADJ HK AND LOOP

## (undated) DEVICE — SENSOR OXMTR SM AD DISP FOR INVOS SYS

## (undated) DEVICE — SYRINGE IRRIG 60ML SFT PLIABLE BLB EZ TO GRP 1 HND USE W/

## (undated) DEVICE — PAD,EYE,1-5/8X2 5/8,STERILE,LF,1/PK: Brand: MEDLINE

## (undated) DEVICE — SUTURE PROL SZ 6-0 L24IN NONABSORBABLE BLU L13MM C-1 3/8 8726H

## (undated) DEVICE — LINER SUCT CANSTR 1500CC SEMI RIG W/ POR HYDROPHOBIC SHUT

## (undated) DEVICE — Z INACTIVE USE 2641837 CLIP LIG M BLU TI HRT SHP WIRE HORZ 600 PER BX

## (undated) DEVICE — SUTURE PROL SZ 6-0 L18IN NONABSORBABLE BLU L13MM C-1 3/8 8718H

## (undated) DEVICE — ELECTRODE ES L2.75IN S STL INSUL BLDE W/ SL EDGE

## (undated) DEVICE — BLADE CLIPPER GEN PURP NS

## (undated) DEVICE — GAUZE,SPONGE,4"X4",16PLY,XRAY,STRL,LF: Brand: MEDLINE

## (undated) DEVICE — NEEDLE HYPO 25GA L0.625IN BLU POLYPR HUB S STL REG BVL STR

## (undated) DEVICE — SUTURE PERMAHAND SZ 2-0 L17X18IN NONABSORBABLE BLK SILK SA65H

## (undated) DEVICE — PACK,BASIC,IX: Brand: MEDLINE